# Patient Record
Sex: FEMALE | Race: WHITE | NOT HISPANIC OR LATINO | Employment: OTHER | URBAN - METROPOLITAN AREA
[De-identification: names, ages, dates, MRNs, and addresses within clinical notes are randomized per-mention and may not be internally consistent; named-entity substitution may affect disease eponyms.]

---

## 2017-01-09 RX ORDER — HYDROCHLOROTHIAZIDE 12.5 MG/1
CAPSULE, GELATIN COATED ORAL
Qty: 30 CAPSULE | Refills: 2 | OUTPATIENT
Start: 2017-01-09

## 2017-01-12 ENCOUNTER — ANESTHESIA (OUTPATIENT)
Dept: PERIOP | Facility: HOSPITAL | Age: 72
End: 2017-01-12

## 2017-01-12 ENCOUNTER — ANESTHESIA EVENT (OUTPATIENT)
Dept: PERIOP | Facility: HOSPITAL | Age: 72
End: 2017-01-12

## 2017-01-12 ENCOUNTER — APPOINTMENT (OUTPATIENT)
Dept: GENERAL RADIOLOGY | Facility: HOSPITAL | Age: 72
End: 2017-01-12

## 2017-01-12 PROBLEM — M17.9 DJD (DEGENERATIVE JOINT DISEASE) OF KNEE: Status: ACTIVE | Noted: 2017-01-12

## 2017-01-12 PROBLEM — M17.9 OSTEOARTHRITIS, KNEE: Status: ACTIVE | Noted: 2017-01-12

## 2017-01-12 PROCEDURE — 25010000002 HYDROMORPHONE PER 4 MG: Performed by: NURSE ANESTHETIST, CERTIFIED REGISTERED

## 2017-01-12 PROCEDURE — 25010000002 ONDANSETRON PER 1 MG: Performed by: NURSE ANESTHETIST, CERTIFIED REGISTERED

## 2017-01-12 PROCEDURE — 25010000002 FENTANYL CITRATE (PF) 100 MCG/2ML SOLUTION: Performed by: ANESTHESIOLOGY

## 2017-01-12 PROCEDURE — 73560 X-RAY EXAM OF KNEE 1 OR 2: CPT

## 2017-01-12 PROCEDURE — 25010000002 CEFAZOLIN PER 500 MG: Performed by: NURSE ANESTHETIST, CERTIFIED REGISTERED

## 2017-01-12 PROCEDURE — 25010000002 PROPOFOL 10 MG/ML EMULSION: Performed by: NURSE ANESTHETIST, CERTIFIED REGISTERED

## 2017-01-12 PROCEDURE — 25010000002 DEXAMETHASONE PER 1 MG: Performed by: NURSE ANESTHETIST, CERTIFIED REGISTERED

## 2017-01-12 RX ORDER — ONDANSETRON 2 MG/ML
INJECTION INTRAMUSCULAR; INTRAVENOUS AS NEEDED
Status: DISCONTINUED | OUTPATIENT
Start: 2017-01-12 | End: 2017-01-12 | Stop reason: SURG

## 2017-01-12 RX ORDER — LIDOCAINE HYDROCHLORIDE 20 MG/ML
INJECTION, SOLUTION INFILTRATION; PERINEURAL AS NEEDED
Status: DISCONTINUED | OUTPATIENT
Start: 2017-01-12 | End: 2017-01-12 | Stop reason: SURG

## 2017-01-12 RX ORDER — DEXAMETHASONE SODIUM PHOSPHATE 10 MG/ML
INJECTION INTRAMUSCULAR; INTRAVENOUS AS NEEDED
Status: DISCONTINUED | OUTPATIENT
Start: 2017-01-12 | End: 2017-01-12 | Stop reason: SURG

## 2017-01-12 RX ORDER — PROPOFOL 10 MG/ML
VIAL (ML) INTRAVENOUS AS NEEDED
Status: DISCONTINUED | OUTPATIENT
Start: 2017-01-12 | End: 2017-01-12 | Stop reason: SURG

## 2017-01-12 RX ORDER — TRANEXAMIC ACID 100 MG/ML
INJECTION, SOLUTION INTRAVENOUS AS NEEDED
Status: DISCONTINUED | OUTPATIENT
Start: 2017-01-12 | End: 2017-01-12 | Stop reason: SURG

## 2017-01-12 RX ORDER — CEFAZOLIN SODIUM 500 MG/2.2ML
INJECTION, POWDER, FOR SOLUTION INTRAMUSCULAR; INTRAVENOUS AS NEEDED
Status: DISCONTINUED | OUTPATIENT
Start: 2017-01-12 | End: 2017-01-12 | Stop reason: SURG

## 2017-01-12 RX ORDER — HYDROMORPHONE HCL 110MG/55ML
PATIENT CONTROLLED ANALGESIA SYRINGE INTRAVENOUS AS NEEDED
Status: DISCONTINUED | OUTPATIENT
Start: 2017-01-12 | End: 2017-01-12 | Stop reason: SURG

## 2017-01-12 RX ADMIN — PROPOFOL 200 MG: 10 INJECTION, EMULSION INTRAVENOUS at 12:19

## 2017-01-12 RX ADMIN — FENTANYL CITRATE 25 MCG: 50 INJECTION INTRAMUSCULAR; INTRAVENOUS at 12:42

## 2017-01-12 RX ADMIN — HYDROMORPHONE HYDROCHLORIDE 0.4 MG: 2 INJECTION, SOLUTION INTRAMUSCULAR; INTRAVENOUS; SUBCUTANEOUS at 13:35

## 2017-01-12 RX ADMIN — HYDROMORPHONE HYDROCHLORIDE 0.2 MG: 2 INJECTION, SOLUTION INTRAMUSCULAR; INTRAVENOUS; SUBCUTANEOUS at 13:13

## 2017-01-12 RX ADMIN — DEXAMETHASONE SODIUM PHOSPHATE 4 MG: 10 INJECTION INTRAMUSCULAR; INTRAVENOUS at 12:27

## 2017-01-12 RX ADMIN — SODIUM CHLORIDE, POTASSIUM CHLORIDE, SODIUM LACTATE AND CALCIUM CHLORIDE: 600; 310; 30; 20 INJECTION, SOLUTION INTRAVENOUS at 12:55

## 2017-01-12 RX ADMIN — FENTANYL CITRATE 100 MCG: 50 INJECTION INTRAMUSCULAR; INTRAVENOUS at 13:38

## 2017-01-12 RX ADMIN — SODIUM CHLORIDE, POTASSIUM CHLORIDE, SODIUM LACTATE AND CALCIUM CHLORIDE: 600; 310; 30; 20 INJECTION, SOLUTION INTRAVENOUS at 12:13

## 2017-01-12 RX ADMIN — FENTANYL CITRATE 25 MCG: 50 INJECTION INTRAMUSCULAR; INTRAVENOUS at 12:25

## 2017-01-12 RX ADMIN — CEFAZOLIN SODIUM 2 G: 500 INJECTION, POWDER, FOR SOLUTION INTRAMUSCULAR; INTRAVENOUS at 13:26

## 2017-01-12 RX ADMIN — FENTANYL CITRATE 50 MCG: 50 INJECTION INTRAMUSCULAR; INTRAVENOUS at 12:45

## 2017-01-12 RX ADMIN — ONDANSETRON 4 MG: 2 INJECTION INTRAMUSCULAR; INTRAVENOUS at 12:27

## 2017-01-12 RX ADMIN — HYDROMORPHONE HYDROCHLORIDE 0.6 MG: 2 INJECTION, SOLUTION INTRAMUSCULAR; INTRAVENOUS; SUBCUTANEOUS at 13:44

## 2017-01-12 RX ADMIN — TRANEXAMIC ACID 800 MG: 100 INJECTION, SOLUTION INTRAVENOUS at 13:12

## 2017-01-12 RX ADMIN — HYDROMORPHONE HYDROCHLORIDE 0.4 MG: 2 INJECTION, SOLUTION INTRAMUSCULAR; INTRAVENOUS; SUBCUTANEOUS at 12:55

## 2017-01-12 RX ADMIN — HYDROMORPHONE HYDROCHLORIDE 0.4 MG: 2 INJECTION, SOLUTION INTRAMUSCULAR; INTRAVENOUS; SUBCUTANEOUS at 13:42

## 2017-01-12 RX ADMIN — FENTANYL CITRATE 50 MCG: 50 INJECTION INTRAMUSCULAR; INTRAVENOUS at 12:44

## 2017-01-12 RX ADMIN — LIDOCAINE HYDROCHLORIDE 40 MG: 20 INJECTION, SOLUTION INFILTRATION; PERINEURAL at 12:19

## 2017-01-12 NOTE — ANESTHESIA PROCEDURE NOTES
Airway  Urgency: elective    Airway not difficult    General Information and Staff    Patient location during procedure: OR  Anesthesiologist: KATLYN CARROLL  CRNA: CARYL ESPAÑA    Indications and Patient Condition  Indications for airway management: airway protection    Preoxygenated: yes  Mask difficulty assessment: 1 - vent by mask    Final Airway Details  Final airway type: supraglottic airway      Successful airway: unique  Size 4    Number of attempts at approach: 1    Additional Comments  Smooth IV/mask induction and placement of LMA. Atraumatic, lips/teeth/mouth intact, as preop. +ETCO2, bilateral breath sounds and equal.

## 2017-01-12 NOTE — ANESTHESIA PREPROCEDURE EVALUATION
Anesthesia Evaluation      History of anesthetic complications (ponv)   Airway   Mallampati: II  TM distance: >3 FB  Neck ROM: full  no difficulty expected  Dental - normal exam     Pulmonary     breath sounds clear to auscultation  (+) asthma (very mild),   Cardiovascular   (+) hypertension, valvular problems/murmurs (mild) MR, CHF (EF 40%, no symptoms), PVD (carotid stenosis)  (-) CAD (negative cath)    Rhythm: regular  Rate: normal    Neuro/Psych  GI/Hepatic/Renal/Endo    (+)  hyperthyroidism    Musculoskeletal     Abdominal    Substance History      OB/GYN          Other   (+) arthritis                          Anesthesia Plan    ASA 3     general     Anesthetic plan and risks discussed with patient.

## 2017-01-12 NOTE — ANESTHESIA POSTPROCEDURE EVALUATION
Patient: Natalie Rosen    Procedure Summary     Date Anesthesia Start Anesthesia Stop Room / Location    01/12/17 1214 1344 [facility] OR 23 / [facility]       Procedure Diagnosis Surgeon Provider    RT TOTAL KNEE ARTHROPLASTY (Right Knee) DJD (degenerative joint disease) of knee MD Trace Dykes MD          Anesthesia Type: general  Last vitals  /75 (01/12/17 1545)    Temp      Pulse 89 (01/12/17 1545)   Resp 12 (01/12/17 1545)    SpO2 100 % (01/12/17 1545)      Post Anesthesia Care and Evaluation    Patient location during evaluation: bedside  Patient participation: complete - patient participated  Level of consciousness: awake  Pain management: adequate  Airway patency: patent  Anesthetic complications: No anesthetic complications    Cardiovascular status: acceptable  Respiratory status: acceptable  Hydration status: acceptable

## 2017-01-18 RX ORDER — HYDROCHLOROTHIAZIDE 12.5 MG/1
CAPSULE, GELATIN COATED ORAL
Qty: 30 CAPSULE | Refills: 2 | Status: SHIPPED | OUTPATIENT
Start: 2017-01-18 | End: 2017-04-16 | Stop reason: SDUPTHER

## 2017-01-27 ENCOUNTER — RESULTS ENCOUNTER (OUTPATIENT)
Dept: ENDOCRINOLOGY | Age: 72
End: 2017-01-27

## 2017-01-27 DIAGNOSIS — E05.90 HYPERTHYROIDISM: ICD-10-CM

## 2017-01-27 DIAGNOSIS — R53.82 CHRONIC FATIGUE: ICD-10-CM

## 2017-01-27 DIAGNOSIS — E04.2 NON-TOXIC MULTINODULAR GOITER: ICD-10-CM

## 2017-01-27 DIAGNOSIS — I42.9 CARDIOMYOPATHY (HCC): ICD-10-CM

## 2017-01-27 DIAGNOSIS — E78.49 OTHER HYPERLIPIDEMIA: ICD-10-CM

## 2017-02-08 DIAGNOSIS — E04.2 NON-TOXIC MULTINODULAR GOITER: ICD-10-CM

## 2017-02-08 DIAGNOSIS — E05.90 HYPERTHYROIDISM: Primary | ICD-10-CM

## 2017-02-26 RX ORDER — CARVEDILOL 25 MG/1
TABLET ORAL
Qty: 60 TABLET | Refills: 0 | Status: SHIPPED | OUTPATIENT
Start: 2017-02-26 | End: 2017-02-28 | Stop reason: SDUPTHER

## 2017-02-28 RX ORDER — CARVEDILOL 25 MG/1
25 TABLET ORAL 2 TIMES DAILY WITH MEALS
Qty: 60 TABLET | Refills: 2 | Status: SHIPPED | OUTPATIENT
Start: 2017-02-28 | End: 2017-07-17 | Stop reason: SDUPTHER

## 2017-03-13 RX ORDER — PRAVASTATIN SODIUM 40 MG
TABLET ORAL
Qty: 30 TABLET | Refills: 1 | OUTPATIENT
Start: 2017-03-13

## 2017-03-19 RX ORDER — PRAVASTATIN SODIUM 40 MG
TABLET ORAL
Qty: 30 TABLET | Refills: 1 | Status: SHIPPED | OUTPATIENT
Start: 2017-03-19 | End: 2017-05-26 | Stop reason: SDUPTHER

## 2017-04-17 RX ORDER — HYDROCHLOROTHIAZIDE 12.5 MG/1
CAPSULE, GELATIN COATED ORAL
Qty: 30 CAPSULE | Refills: 1 | Status: SHIPPED | OUTPATIENT
Start: 2017-04-17 | End: 2017-06-12

## 2017-04-25 LAB
BASOPHILS # BLD AUTO: 0.06 10*3/MM3 (ref 0–0.2)
BASOPHILS NFR BLD AUTO: 0.9 % (ref 0–1.5)
EOSINOPHIL # BLD AUTO: 0.26 10*3/MM3 (ref 0–0.7)
EOSINOPHIL NFR BLD AUTO: 3.7 % (ref 0.3–6.2)
ERYTHROCYTE [DISTWIDTH] IN BLOOD BY AUTOMATED COUNT: 15.9 % (ref 11.7–13)
HCT VFR BLD AUTO: 38.6 % (ref 35.6–45.5)
HGB BLD-MCNC: 12.4 G/DL (ref 11.9–15.5)
IMM GRANULOCYTES # BLD: 0 10*3/MM3 (ref 0–0.03)
IMM GRANULOCYTES NFR BLD: 0 % (ref 0–0.5)
LYMPHOCYTES # BLD AUTO: 2.02 10*3/MM3 (ref 0.9–4.8)
LYMPHOCYTES NFR BLD AUTO: 28.9 % (ref 19.6–45.3)
MCH RBC QN AUTO: 28.8 PG (ref 26.9–32)
MCHC RBC AUTO-ENTMCNC: 32.1 G/DL (ref 32.4–36.3)
MCV RBC AUTO: 89.8 FL (ref 80.5–98.2)
MONOCYTES # BLD AUTO: 0.76 10*3/MM3 (ref 0.2–1.2)
MONOCYTES NFR BLD AUTO: 10.9 % (ref 5–12)
NEUTROPHILS # BLD AUTO: 3.9 10*3/MM3 (ref 1.9–8.1)
NEUTROPHILS NFR BLD AUTO: 55.6 % (ref 42.7–76)
PLATELET # BLD AUTO: 210 10*3/MM3 (ref 140–500)
RBC # BLD AUTO: 4.3 10*6/MM3 (ref 3.9–5.2)
T4 FREE SERPL-MCNC: 1.16 NG/DL (ref 0.93–1.7)
TSH SERPL DL<=0.005 MIU/L-ACNC: 0.01 MIU/ML (ref 0.27–4.2)
WBC # BLD AUTO: 7 10*3/MM3 (ref 4.5–10.7)

## 2017-05-01 ENCOUNTER — OFFICE VISIT (OUTPATIENT)
Dept: ENDOCRINOLOGY | Age: 72
End: 2017-05-01

## 2017-05-01 VITALS
OXYGEN SATURATION: 98 % | SYSTOLIC BLOOD PRESSURE: 138 MMHG | DIASTOLIC BLOOD PRESSURE: 72 MMHG | HEIGHT: 67 IN | HEART RATE: 81 BPM | BODY MASS INDEX: 26.53 KG/M2 | WEIGHT: 169 LBS

## 2017-05-01 DIAGNOSIS — E04.2 NON-TOXIC MULTINODULAR GOITER: Primary | ICD-10-CM

## 2017-05-01 DIAGNOSIS — R63.5 ABNORMAL WEIGHT GAIN: ICD-10-CM

## 2017-05-01 DIAGNOSIS — E05.90 HYPERTHYROIDISM: ICD-10-CM

## 2017-05-01 DIAGNOSIS — R53.82 CHRONIC FATIGUE: ICD-10-CM

## 2017-05-01 PROCEDURE — 99214 OFFICE O/P EST MOD 30 MIN: CPT | Performed by: INTERNAL MEDICINE

## 2017-05-01 RX ORDER — METHIMAZOLE 5 MG/1
5 TABLET ORAL EVERY OTHER DAY
Qty: 15 TABLET | Refills: 6 | Status: SHIPPED | OUTPATIENT
Start: 2017-05-01 | End: 2017-08-16 | Stop reason: SDUPTHER

## 2017-05-13 RX ORDER — PRAVASTATIN SODIUM 40 MG
TABLET ORAL
Qty: 30 TABLET | Refills: 0 | OUTPATIENT
Start: 2017-05-13

## 2017-05-27 RX ORDER — PRAVASTATIN SODIUM 40 MG
TABLET ORAL
Qty: 30 TABLET | Refills: 0 | Status: SHIPPED | OUTPATIENT
Start: 2017-05-27 | End: 2017-06-12

## 2017-06-12 ENCOUNTER — HOSPITAL ENCOUNTER (OUTPATIENT)
Dept: GENERAL RADIOLOGY | Facility: HOSPITAL | Age: 72
Discharge: HOME | End: 2017-06-12
Admitting: ORTHOPAEDIC SURGERY

## 2017-06-12 ENCOUNTER — APPOINTMENT (OUTPATIENT)
Dept: PREADMISSION TESTING | Facility: HOSPITAL | Age: 72
End: 2017-06-12

## 2017-06-12 ENCOUNTER — HOSPITAL ENCOUNTER (OUTPATIENT)
Dept: GENERAL RADIOLOGY | Facility: HOSPITAL | Age: 72
Discharge: HOME | End: 2017-06-12

## 2017-06-12 VITALS
WEIGHT: 167 LBS | RESPIRATION RATE: 18 BRPM | HEART RATE: 67 BPM | OXYGEN SATURATION: 98 % | HEIGHT: 67 IN | SYSTOLIC BLOOD PRESSURE: 139 MMHG | BODY MASS INDEX: 26.21 KG/M2 | DIASTOLIC BLOOD PRESSURE: 74 MMHG | TEMPERATURE: 98.2 F

## 2017-06-12 LAB
ALBUMIN SERPL-MCNC: 4.4 G/DL (ref 3.5–5.2)
ALBUMIN/GLOB SERPL: 1.7 G/DL
ALP SERPL-CCNC: 64 U/L (ref 39–117)
ALT SERPL W P-5'-P-CCNC: 6 U/L (ref 1–33)
ANION GAP SERPL CALCULATED.3IONS-SCNC: 12.2 MMOL/L
APTT PPP: 27 SECONDS (ref 22.7–35.4)
AST SERPL-CCNC: 17 U/L (ref 1–32)
BACTERIA UR QL AUTO: NORMAL /HPF
BASOPHILS # BLD AUTO: 0.06 10*3/MM3 (ref 0–0.2)
BASOPHILS NFR BLD AUTO: 0.9 % (ref 0–1.5)
BILIRUB SERPL-MCNC: 0.5 MG/DL (ref 0.1–1.2)
BILIRUB UR QL STRIP: NEGATIVE
BUN BLD-MCNC: 25 MG/DL (ref 8–23)
BUN/CREAT SERPL: 35.7 (ref 7–25)
CALCIUM SPEC-SCNC: 9.4 MG/DL (ref 8.6–10.5)
CHLORIDE SERPL-SCNC: 100 MMOL/L (ref 98–107)
CLARITY UR: CLEAR
CO2 SERPL-SCNC: 27.8 MMOL/L (ref 22–29)
COLOR UR: YELLOW
CREAT BLD-MCNC: 0.7 MG/DL (ref 0.57–1)
DEPRECATED RDW RBC AUTO: 48.1 FL (ref 37–54)
EOSINOPHIL # BLD AUTO: 0.15 10*3/MM3 (ref 0–0.7)
EOSINOPHIL NFR BLD AUTO: 2.3 % (ref 0.3–6.2)
ERYTHROCYTE [DISTWIDTH] IN BLOOD BY AUTOMATED COUNT: 14.7 % (ref 11.7–13)
GFR SERPL CREATININE-BSD FRML MDRD: 83 ML/MIN/1.73
GLOBULIN UR ELPH-MCNC: 2.6 GM/DL
GLUCOSE BLD-MCNC: 113 MG/DL (ref 65–99)
GLUCOSE UR STRIP-MCNC: NEGATIVE MG/DL
HCT VFR BLD AUTO: 38 % (ref 35.6–45.5)
HGB BLD-MCNC: 12.3 G/DL (ref 11.9–15.5)
HGB UR QL STRIP.AUTO: NEGATIVE
HYALINE CASTS UR QL AUTO: NORMAL /LPF
IMM GRANULOCYTES # BLD: 0 10*3/MM3 (ref 0–0.03)
IMM GRANULOCYTES NFR BLD: 0 % (ref 0–0.5)
INR PPP: 1.08 (ref 0.9–1.1)
KETONES UR QL STRIP: NEGATIVE
LEUKOCYTE ESTERASE UR QL STRIP.AUTO: ABNORMAL
LYMPHOCYTES # BLD AUTO: 1.95 10*3/MM3 (ref 0.9–4.8)
LYMPHOCYTES NFR BLD AUTO: 29.4 % (ref 19.6–45.3)
MCH RBC QN AUTO: 28.9 PG (ref 26.9–32)
MCHC RBC AUTO-ENTMCNC: 32.4 G/DL (ref 32.4–36.3)
MCV RBC AUTO: 89.4 FL (ref 80.5–98.2)
MONOCYTES # BLD AUTO: 0.74 10*3/MM3 (ref 0.2–1.2)
MONOCYTES NFR BLD AUTO: 11.1 % (ref 5–12)
NEUTROPHILS # BLD AUTO: 3.74 10*3/MM3 (ref 1.9–8.1)
NEUTROPHILS NFR BLD AUTO: 56.3 % (ref 42.7–76)
NITRITE UR QL STRIP: NEGATIVE
PH UR STRIP.AUTO: 6.5 [PH] (ref 5–8)
PLATELET # BLD AUTO: 221 10*3/MM3 (ref 140–500)
PMV BLD AUTO: 9.7 FL (ref 6–12)
POTASSIUM BLD-SCNC: 3.7 MMOL/L (ref 3.5–5.2)
PROT SERPL-MCNC: 7 G/DL (ref 6–8.5)
PROT UR QL STRIP: NEGATIVE
PROTHROMBIN TIME: 13.6 SECONDS (ref 11.7–14.2)
RBC # BLD AUTO: 4.25 10*6/MM3 (ref 3.9–5.2)
RBC # UR: NORMAL /HPF
REF LAB TEST METHOD: NORMAL
SODIUM BLD-SCNC: 140 MMOL/L (ref 136–145)
SP GR UR STRIP: 1.02 (ref 1–1.03)
SQUAMOUS #/AREA URNS HPF: NORMAL /HPF
UROBILINOGEN UR QL STRIP: ABNORMAL
WBC NRBC COR # BLD: 6.64 10*3/MM3 (ref 4.5–10.7)
WBC UR QL AUTO: NORMAL /HPF

## 2017-06-12 PROCEDURE — 36415 COLL VENOUS BLD VENIPUNCTURE: CPT

## 2017-06-12 PROCEDURE — 73560 X-RAY EXAM OF KNEE 1 OR 2: CPT

## 2017-06-12 PROCEDURE — 85025 COMPLETE CBC W/AUTO DIFF WBC: CPT | Performed by: ORTHOPAEDIC SURGERY

## 2017-06-12 PROCEDURE — 85610 PROTHROMBIN TIME: CPT | Performed by: ORTHOPAEDIC SURGERY

## 2017-06-12 PROCEDURE — 80053 COMPREHEN METABOLIC PANEL: CPT | Performed by: ORTHOPAEDIC SURGERY

## 2017-06-12 PROCEDURE — 71020 HC CHEST PA AND LATERAL: CPT

## 2017-06-12 PROCEDURE — 85730 THROMBOPLASTIN TIME PARTIAL: CPT | Performed by: ORTHOPAEDIC SURGERY

## 2017-06-12 PROCEDURE — 81001 URINALYSIS AUTO W/SCOPE: CPT | Performed by: ORTHOPAEDIC SURGERY

## 2017-06-12 PROCEDURE — 87086 URINE CULTURE/COLONY COUNT: CPT | Performed by: ORTHOPAEDIC SURGERY

## 2017-06-12 RX ORDER — OXYBUTYNIN CHLORIDE 10 MG/1
10 TABLET, EXTENDED RELEASE ORAL EVERY MORNING
COMMUNITY
End: 2018-05-07

## 2017-06-12 RX ORDER — HYDROCHLOROTHIAZIDE 12.5 MG/1
12.5 TABLET ORAL EVERY MORNING
COMMUNITY
End: 2017-11-06 | Stop reason: SDUPTHER

## 2017-06-12 RX ORDER — PRAVASTATIN SODIUM 40 MG
40 TABLET ORAL NIGHTLY
COMMUNITY
End: 2017-11-06 | Stop reason: SDUPTHER

## 2017-06-12 RX ORDER — ASPIRIN 81 MG/1
81 TABLET, CHEWABLE ORAL EVERY MORNING
COMMUNITY
End: 2022-08-01

## 2017-06-12 NOTE — DISCHARGE INSTRUCTIONS
SURGERY 6-26-17  ARRIVAL TIME  7:00    Take the following medications the morning of surgery with a small sip of water:    CARVEDILOL      General Instructions:  • Do not eat solid food after midnight the night before surgery.  • NOTHING TO DRINK AFTER MIDNIGHT.   • Patients who avoid smoking, chewing tobacco and alcohol for 4 weeks prior to surgery have a reduced risk of post-operative complications.  Quit smoking as many days before surgery as you can.  • Do not smoke, use chewing tobacco or drink alcohol the day of surgery.   • If applicable bring your C-PAP/ BI-PAP machine.  • Bring any papers given to you in the doctor’s office.  • Wear clean comfortable clothes and socks.  • Do not wear contact lenses or make-up.  Bring a case for your glasses.   • Bring crutches or walker if applicable.  • Leave all other valuables and jewelry at home.  • The Pre-Admission Testing nurse will instruct you to bring medications if unable to obtain an accurate list in Pre-Admission Testing.        If you were given a blood bank ID arm band remember to bring it with you the day of surgery.    Preventing a Surgical Site Infection:  • For 2 to 3 days before surgery, avoid shaving with a razor because the razor can irritate skin and make it easier to develop an infection.  • The night prior to surgery sleep in a clean bed with clean clothing.  Do not allow pets to sleep with you.  • Shower on the morning of surgery using a fresh bar of anti-bacterial soap (such as Dial) and clean washcloth.  Dry with a clean towel and dress in clean clothing.  • Ask your surgeon if you will be receiving antibiotics prior to surgery.  • Make sure you, your family, and all healthcare providers clean their hands with soap and water or an alcohol based hand  before caring for you or your wound.    Day of surgery:  Upon arrival, a Pre-op nurse and Anesthesiologist will review your health history, obtain vital signs, and answer questions you may  have.  The only belongings needed at this time will be your home medications and if applicable your C-PAP/BI-PAP machine.  If you are staying overnight your family can leave the rest of your belongings in the car and bring them to your room later.  A Pre-op nurse will start an IV and you may receive medication in preparation for surgery, including something to help you relax.  Your family will be able to see you in the Pre-op area.  While you are in surgery your family should notify the waiting room  if they leave the waiting room area and provide a contact phone number.    Please be aware that surgery does come with discomfort.  We want to make every effort to control your discomfort so please discuss any uncontrolled symptoms with your nurse.   Your doctor will most likely have prescribed pain medications.      If you are going home after surgery you will receive individualized written care instructions before being discharged.  A responsible adult must drive you to and from the hospital on the day of your surgery and stay with you for 24 hours.    If you are staying overnight following surgery, you will be transported to your hospital room following the recovery period.  [facility] has all private rooms.    If you have any questions please call Pre-Admission Testing at [phone].  Deductibles and co-payments are collected on the day of service. Please be prepared to pay the required co-pay, deductible or deposit on the day of service as defined by your plan.USE AS DIRECTED    2% CHLORAHEXIDINE GLUCONATE* CLOTH  Preparing or “prepping” skin before surgery can reduce the risk of infection at the surgical site. To make the process easier, [facility] has chosen disposable cloths moistened with a rinse-free, 2% Chlorhexidine Gluconate (CHG) antiseptic solution. The steps below outline the prepping process and should be carefully followed.        Use the prep cloth on the area  that is circled in the diagram             Directions Night before Surgery  1) Shower using a fresh bar of anti-bacterial soap (such as Dial) and clean washcloth.  Use a clean towel to completely dry your skin.  2) Do not use any lotions, oils or creams on your skin.  3) Open the package and remove 1 cloth, wipe your skin for 30 seconds in a circular motion.  Allow to dry for 3 minutes.  4) Repeat #3 with second cloth.  5) Do not touch your eyes, ears, or mouth with the prep cloth.  6) Allow the wet prep solution to air dry.  7) Discard the prep cloth and wash your hands with soap and water.   8) Dress in clean bed clothes and sleep on fresh clean bed sheets.   9) You may experience some temporary itching after the prep.    Directions Day of Surgery  1) Repeat steps 1,2,3,4,5,6,7, and 9.   2) Dress in clean clothes before coming to the hospital.    BACTROBAN NASAL OINTMENT  There are many germs normally in your nose. Bactroban is an ointment that will help reduce these germs. Please follow these instructions for Bactroban use:    ____Two days before surgery in the evening Date________    ____The day before surgery in the morning  Date________    ____The day before surgery in the evening              Date________    ____The day of surgery in the morning    Date________    **Squirt ½ package of Bactroban Ointment onto a cotton applicator and apply to inside of 1st nostril.  Squirt the remaining Bactroban and apply to the inside of the other nostril.

## 2017-06-14 LAB — BACTERIA SPEC AEROBE CULT: NORMAL

## 2017-06-19 RX ORDER — HYDROCHLOROTHIAZIDE 12.5 MG/1
CAPSULE, GELATIN COATED ORAL
Qty: 30 CAPSULE | Refills: 0 | Status: ON HOLD | OUTPATIENT
Start: 2017-06-19 | End: 2017-06-26 | Stop reason: SDUPTHER

## 2017-06-25 RX ORDER — PRAVASTATIN SODIUM 40 MG
TABLET ORAL
Qty: 30 TABLET | Refills: 0 | OUTPATIENT
Start: 2017-06-25

## 2017-06-26 ENCOUNTER — HOSPITAL ENCOUNTER (INPATIENT)
Facility: HOSPITAL | Age: 72
LOS: 2 days | Discharge: HOME HEALTH | End: 2017-06-28
Attending: ORTHOPAEDIC SURGERY | Admitting: ORTHOPAEDIC SURGERY

## 2017-06-26 ENCOUNTER — APPOINTMENT (OUTPATIENT)
Dept: GENERAL RADIOLOGY | Facility: HOSPITAL | Age: 72
End: 2017-06-26

## 2017-06-26 ENCOUNTER — ANESTHESIA EVENT (OUTPATIENT)
Dept: PERIOP | Facility: HOSPITAL | Age: 72
End: 2017-06-26

## 2017-06-26 ENCOUNTER — ANESTHESIA (OUTPATIENT)
Dept: PERIOP | Facility: HOSPITAL | Age: 72
End: 2017-06-26

## 2017-06-26 DIAGNOSIS — R26.2 DIFFICULTY WALKING: Primary | ICD-10-CM

## 2017-06-26 PROCEDURE — 25010000002 VANCOMYCIN: Performed by: ORTHOPAEDIC SURGERY

## 2017-06-26 PROCEDURE — C1776 JOINT DEVICE (IMPLANTABLE): HCPCS | Performed by: ORTHOPAEDIC SURGERY

## 2017-06-26 PROCEDURE — 25010000002 DEXAMETHASONE PER 1 MG: Performed by: NURSE ANESTHETIST, CERTIFIED REGISTERED

## 2017-06-26 PROCEDURE — 94799 UNLISTED PULMONARY SVC/PX: CPT

## 2017-06-26 PROCEDURE — C1713 ANCHOR/SCREW BN/BN,TIS/BN: HCPCS | Performed by: ORTHOPAEDIC SURGERY

## 2017-06-26 PROCEDURE — 25010000002 ONDANSETRON PER 1 MG: Performed by: NURSE ANESTHETIST, CERTIFIED REGISTERED

## 2017-06-26 PROCEDURE — 97110 THERAPEUTIC EXERCISES: CPT

## 2017-06-26 PROCEDURE — 25010000002 KETOROLAC TROMETHAMINE PER 15 MG: Performed by: ORTHOPAEDIC SURGERY

## 2017-06-26 PROCEDURE — 25010000002 MIDAZOLAM PER 1 MG: Performed by: ANESTHESIOLOGY

## 2017-06-26 PROCEDURE — 0SRD0J9 REPLACEMENT OF LEFT KNEE JOINT WITH SYNTHETIC SUBSTITUTE, CEMENTED, OPEN APPROACH: ICD-10-PCS | Performed by: ORTHOPAEDIC SURGERY

## 2017-06-26 PROCEDURE — 25010000002 FENTANYL CITRATE (PF) 100 MCG/2ML SOLUTION: Performed by: NURSE ANESTHETIST, CERTIFIED REGISTERED

## 2017-06-26 PROCEDURE — 25010000002 DIPHENHYDRAMINE PER 50 MG: Performed by: NURSE ANESTHETIST, CERTIFIED REGISTERED

## 2017-06-26 PROCEDURE — 25010000002 PROPOFOL 10 MG/ML EMULSION: Performed by: NURSE ANESTHETIST, CERTIFIED REGISTERED

## 2017-06-26 PROCEDURE — 25010000002 NEOSTIGMINE PER 0.5 MG: Performed by: NURSE ANESTHETIST, CERTIFIED REGISTERED

## 2017-06-26 PROCEDURE — 25010000003 CEFAZOLIN IN DEXTROSE 2-4 GM/100ML-% SOLUTION: Performed by: ORTHOPAEDIC SURGERY

## 2017-06-26 PROCEDURE — 97161 PT EVAL LOW COMPLEX 20 MIN: CPT

## 2017-06-26 PROCEDURE — 73560 X-RAY EXAM OF KNEE 1 OR 2: CPT

## 2017-06-26 PROCEDURE — 25010000002 HYDROMORPHONE PER 4 MG: Performed by: NURSE ANESTHETIST, CERTIFIED REGISTERED

## 2017-06-26 PROCEDURE — 25010000002 CEFAZOLIN PER 500 MG: Performed by: NURSE ANESTHETIST, CERTIFIED REGISTERED

## 2017-06-26 DEVICE — STEM TIB PRI FINN 46X40MM: Type: IMPLANTABLE DEVICE | Site: KNEE | Status: FUNCTIONAL

## 2017-06-26 DEVICE — TRY TIB INTERLOK PRI 71MM: Type: IMPLANTABLE DEVICE | Site: KNEE | Status: FUNCTIONAL

## 2017-06-26 DEVICE — CMT BONE PALACOS 120001: Type: IMPLANTABLE DEVICE | Site: KNEE | Status: FUNCTIONAL

## 2017-06-26 DEVICE — BEAR TIB/KN VANGUARD CR DCM 10X71/75MM NS: Type: IMPLANTABLE DEVICE | Site: KNEE | Status: FUNCTIONAL

## 2017-06-26 DEVICE — COMP FEM/KN VANGUARD INTLK CR 65MM NS LT: Type: IMPLANTABLE DEVICE | Site: KNEE | Status: FUNCTIONAL

## 2017-06-26 DEVICE — CAP TOTL KN CMT PREMIUM: Type: IMPLANTABLE DEVICE | Status: FUNCTIONAL

## 2017-06-26 DEVICE — PAT 3PEG THN 34X7.8 34MM: Type: IMPLANTABLE DEVICE | Site: PATELLA | Status: FUNCTIONAL

## 2017-06-26 RX ORDER — NALOXONE HCL 0.4 MG/ML
0.1 VIAL (ML) INJECTION
Status: DISCONTINUED | OUTPATIENT
Start: 2017-06-26 | End: 2017-06-28 | Stop reason: HOSPADM

## 2017-06-26 RX ORDER — GLYCOPYRROLATE 0.2 MG/ML
INJECTION INTRAMUSCULAR; INTRAVENOUS AS NEEDED
Status: DISCONTINUED | OUTPATIENT
Start: 2017-06-26 | End: 2017-06-26 | Stop reason: SURG

## 2017-06-26 RX ORDER — KETOROLAC TROMETHAMINE 30 MG/ML
30 INJECTION, SOLUTION INTRAMUSCULAR; INTRAVENOUS EVERY 6 HOURS
Status: DISPENSED | OUTPATIENT
Start: 2017-06-26 | End: 2017-06-27

## 2017-06-26 RX ORDER — LIDOCAINE HYDROCHLORIDE 20 MG/ML
INJECTION, SOLUTION INFILTRATION; PERINEURAL AS NEEDED
Status: DISCONTINUED | OUTPATIENT
Start: 2017-06-26 | End: 2017-06-26 | Stop reason: SURG

## 2017-06-26 RX ORDER — HYDRALAZINE HYDROCHLORIDE 20 MG/ML
5 INJECTION INTRAMUSCULAR; INTRAVENOUS
Status: DISCONTINUED | OUTPATIENT
Start: 2017-06-26 | End: 2017-06-26 | Stop reason: HOSPADM

## 2017-06-26 RX ORDER — DOCUSATE SODIUM 100 MG/1
100 CAPSULE, LIQUID FILLED ORAL 2 TIMES DAILY PRN
Status: DISCONTINUED | OUTPATIENT
Start: 2017-06-26 | End: 2017-06-28 | Stop reason: HOSPADM

## 2017-06-26 RX ORDER — FENTANYL CITRATE 50 UG/ML
50 INJECTION, SOLUTION INTRAMUSCULAR; INTRAVENOUS
Status: DISCONTINUED | OUTPATIENT
Start: 2017-06-26 | End: 2017-06-26 | Stop reason: HOSPADM

## 2017-06-26 RX ORDER — PROMETHAZINE HYDROCHLORIDE 25 MG/1
12.5 TABLET ORAL ONCE AS NEEDED
Status: DISCONTINUED | OUTPATIENT
Start: 2017-06-26 | End: 2017-06-26 | Stop reason: HOSPADM

## 2017-06-26 RX ORDER — ONDANSETRON 4 MG/1
4 TABLET, ORALLY DISINTEGRATING ORAL EVERY 6 HOURS PRN
Status: DISCONTINUED | OUTPATIENT
Start: 2017-06-26 | End: 2017-06-28 | Stop reason: HOSPADM

## 2017-06-26 RX ORDER — SODIUM CHLORIDE 0.9 % (FLUSH) 0.9 %
1-10 SYRINGE (ML) INJECTION AS NEEDED
Status: DISCONTINUED | OUTPATIENT
Start: 2017-06-26 | End: 2017-06-26 | Stop reason: HOSPADM

## 2017-06-26 RX ORDER — OXYBUTYNIN CHLORIDE 10 MG/1
10 TABLET, EXTENDED RELEASE ORAL EVERY MORNING
Status: DISCONTINUED | OUTPATIENT
Start: 2017-06-27 | End: 2017-06-28 | Stop reason: HOSPADM

## 2017-06-26 RX ORDER — ACETAMINOPHEN 500 MG
1000 TABLET ORAL EVERY 6 HOURS PRN
COMMUNITY
End: 2018-05-07

## 2017-06-26 RX ORDER — SODIUM CHLORIDE, SODIUM LACTATE, POTASSIUM CHLORIDE, CALCIUM CHLORIDE 600; 310; 30; 20 MG/100ML; MG/100ML; MG/100ML; MG/100ML
9 INJECTION, SOLUTION INTRAVENOUS CONTINUOUS
Status: DISCONTINUED | OUTPATIENT
Start: 2017-06-26 | End: 2017-06-28 | Stop reason: HOSPADM

## 2017-06-26 RX ORDER — FAMOTIDINE 10 MG/ML
20 INJECTION, SOLUTION INTRAVENOUS ONCE
Status: COMPLETED | OUTPATIENT
Start: 2017-06-26 | End: 2017-06-26

## 2017-06-26 RX ORDER — ATORVASTATIN CALCIUM 10 MG/1
10 TABLET, FILM COATED ORAL DAILY
Status: DISCONTINUED | OUTPATIENT
Start: 2017-06-26 | End: 2017-06-28 | Stop reason: HOSPADM

## 2017-06-26 RX ORDER — DEXAMETHASONE SODIUM PHOSPHATE 10 MG/ML
INJECTION INTRAMUSCULAR; INTRAVENOUS AS NEEDED
Status: DISCONTINUED | OUTPATIENT
Start: 2017-06-26 | End: 2017-06-26 | Stop reason: SURG

## 2017-06-26 RX ORDER — PROPOFOL 10 MG/ML
VIAL (ML) INTRAVENOUS AS NEEDED
Status: DISCONTINUED | OUTPATIENT
Start: 2017-06-26 | End: 2017-06-26 | Stop reason: SURG

## 2017-06-26 RX ORDER — SULINDAC 200 MG/1
200 TABLET ORAL 2 TIMES DAILY
COMMUNITY
End: 2017-11-06

## 2017-06-26 RX ORDER — SODIUM CHLORIDE 0.9 % (FLUSH) 0.9 %
1-10 SYRINGE (ML) INJECTION AS NEEDED
Status: DISCONTINUED | OUTPATIENT
Start: 2017-06-26 | End: 2017-06-28 | Stop reason: HOSPADM

## 2017-06-26 RX ORDER — ONDANSETRON 2 MG/ML
4 INJECTION INTRAMUSCULAR; INTRAVENOUS ONCE AS NEEDED
Status: DISCONTINUED | OUTPATIENT
Start: 2017-06-26 | End: 2017-06-26 | Stop reason: HOSPADM

## 2017-06-26 RX ORDER — CEFAZOLIN SODIUM 2 G/100ML
2 INJECTION, SOLUTION INTRAVENOUS EVERY 8 HOURS
Status: COMPLETED | OUTPATIENT
Start: 2017-06-26 | End: 2017-06-27

## 2017-06-26 RX ORDER — EPHEDRINE SULFATE 50 MG/ML
5 INJECTION, SOLUTION INTRAVENOUS ONCE AS NEEDED
Status: DISCONTINUED | OUTPATIENT
Start: 2017-06-26 | End: 2017-06-26 | Stop reason: HOSPADM

## 2017-06-26 RX ORDER — HYDROCHLOROTHIAZIDE 25 MG/1
12.5 TABLET ORAL EVERY MORNING
Status: DISCONTINUED | OUTPATIENT
Start: 2017-06-27 | End: 2017-06-28 | Stop reason: HOSPADM

## 2017-06-26 RX ORDER — BISACODYL 10 MG
10 SUPPOSITORY, RECTAL RECTAL DAILY PRN
Status: DISCONTINUED | OUTPATIENT
Start: 2017-06-26 | End: 2017-06-28 | Stop reason: HOSPADM

## 2017-06-26 RX ORDER — NALOXONE HCL 0.4 MG/ML
0.2 VIAL (ML) INJECTION AS NEEDED
Status: DISCONTINUED | OUTPATIENT
Start: 2017-06-26 | End: 2017-06-26 | Stop reason: HOSPADM

## 2017-06-26 RX ORDER — CEFAZOLIN SODIUM 500 MG/2.2ML
INJECTION, POWDER, FOR SOLUTION INTRAMUSCULAR; INTRAVENOUS AS NEEDED
Status: DISCONTINUED | OUTPATIENT
Start: 2017-06-26 | End: 2017-06-26 | Stop reason: SURG

## 2017-06-26 RX ORDER — MIDAZOLAM HYDROCHLORIDE 1 MG/ML
1 INJECTION INTRAMUSCULAR; INTRAVENOUS
Status: DISCONTINUED | OUTPATIENT
Start: 2017-06-26 | End: 2017-06-26 | Stop reason: HOSPADM

## 2017-06-26 RX ORDER — ACETAMINOPHEN 325 MG/1
325 TABLET ORAL EVERY 4 HOURS PRN
Status: DISCONTINUED | OUTPATIENT
Start: 2017-06-26 | End: 2017-06-28 | Stop reason: HOSPADM

## 2017-06-26 RX ORDER — MIDAZOLAM HYDROCHLORIDE 1 MG/ML
2 INJECTION INTRAMUSCULAR; INTRAVENOUS
Status: DISCONTINUED | OUTPATIENT
Start: 2017-06-26 | End: 2017-06-26 | Stop reason: HOSPADM

## 2017-06-26 RX ORDER — ONDANSETRON 2 MG/ML
INJECTION INTRAMUSCULAR; INTRAVENOUS AS NEEDED
Status: DISCONTINUED | OUTPATIENT
Start: 2017-06-26 | End: 2017-06-26 | Stop reason: SURG

## 2017-06-26 RX ORDER — OXYCODONE HYDROCHLORIDE AND ACETAMINOPHEN 5; 325 MG/1; MG/1
1 TABLET ORAL EVERY 4 HOURS PRN
Status: DISCONTINUED | OUTPATIENT
Start: 2017-06-26 | End: 2017-06-28

## 2017-06-26 RX ORDER — HYDROCODONE BITARTRATE AND ACETAMINOPHEN 7.5; 325 MG/1; MG/1
1 TABLET ORAL ONCE AS NEEDED
Status: DISCONTINUED | OUTPATIENT
Start: 2017-06-26 | End: 2017-06-26 | Stop reason: HOSPADM

## 2017-06-26 RX ORDER — ASPIRIN 325 MG
325 TABLET, DELAYED RELEASE (ENTERIC COATED) ORAL DAILY
Status: DISCONTINUED | OUTPATIENT
Start: 2017-06-26 | End: 2017-06-28 | Stop reason: HOSPADM

## 2017-06-26 RX ORDER — ACETAMINOPHEN 325 MG/1
650 TABLET ORAL EVERY 4 HOURS PRN
Status: DISCONTINUED | OUTPATIENT
Start: 2017-06-26 | End: 2017-06-28 | Stop reason: HOSPADM

## 2017-06-26 RX ORDER — DIPHENHYDRAMINE HYDROCHLORIDE 50 MG/ML
12.5 INJECTION INTRAMUSCULAR; INTRAVENOUS
Status: DISCONTINUED | OUTPATIENT
Start: 2017-06-26 | End: 2017-06-26 | Stop reason: HOSPADM

## 2017-06-26 RX ORDER — OXYCODONE AND ACETAMINOPHEN 7.5; 325 MG/1; MG/1
1 TABLET ORAL ONCE AS NEEDED
Status: DISCONTINUED | OUTPATIENT
Start: 2017-06-26 | End: 2017-06-26 | Stop reason: HOSPADM

## 2017-06-26 RX ORDER — ACETAMINOPHEN 500 MG
1000 TABLET ORAL ONCE
Status: COMPLETED | OUTPATIENT
Start: 2017-06-26 | End: 2017-06-26

## 2017-06-26 RX ORDER — LABETALOL HYDROCHLORIDE 5 MG/ML
5 INJECTION, SOLUTION INTRAVENOUS
Status: DISCONTINUED | OUTPATIENT
Start: 2017-06-26 | End: 2017-06-26 | Stop reason: HOSPADM

## 2017-06-26 RX ORDER — OXYCODONE HYDROCHLORIDE AND ACETAMINOPHEN 5; 325 MG/1; MG/1
2 TABLET ORAL EVERY 4 HOURS PRN
Status: DISCONTINUED | OUTPATIENT
Start: 2017-06-26 | End: 2017-06-28

## 2017-06-26 RX ORDER — DIPHENHYDRAMINE HCL 25 MG
25 CAPSULE ORAL EVERY 6 HOURS PRN
Status: DISCONTINUED | OUTPATIENT
Start: 2017-06-26 | End: 2017-06-28 | Stop reason: HOSPADM

## 2017-06-26 RX ORDER — TRANEXAMIC ACID 100 MG/ML
INJECTION, SOLUTION INTRAVENOUS AS NEEDED
Status: DISCONTINUED | OUTPATIENT
Start: 2017-06-26 | End: 2017-06-26 | Stop reason: SURG

## 2017-06-26 RX ORDER — FERROUS SULFATE 325(65) MG
325 TABLET ORAL
Status: DISCONTINUED | OUTPATIENT
Start: 2017-06-27 | End: 2017-06-28 | Stop reason: HOSPADM

## 2017-06-26 RX ORDER — ONDANSETRON 4 MG/1
4 TABLET, FILM COATED ORAL EVERY 6 HOURS PRN
Status: DISCONTINUED | OUTPATIENT
Start: 2017-06-26 | End: 2017-06-28 | Stop reason: HOSPADM

## 2017-06-26 RX ORDER — CARVEDILOL 25 MG/1
25 TABLET ORAL 2 TIMES DAILY WITH MEALS
Status: DISCONTINUED | OUTPATIENT
Start: 2017-06-26 | End: 2017-06-28 | Stop reason: HOSPADM

## 2017-06-26 RX ORDER — CETIRIZINE HYDROCHLORIDE 10 MG/1
10 TABLET ORAL DAILY
Status: DISCONTINUED | OUTPATIENT
Start: 2017-06-26 | End: 2017-06-28 | Stop reason: HOSPADM

## 2017-06-26 RX ORDER — SODIUM CHLORIDE, SODIUM LACTATE, POTASSIUM CHLORIDE, CALCIUM CHLORIDE 600; 310; 30; 20 MG/100ML; MG/100ML; MG/100ML; MG/100ML
100 INJECTION, SOLUTION INTRAVENOUS CONTINUOUS
Status: DISCONTINUED | OUTPATIENT
Start: 2017-06-26 | End: 2017-06-28 | Stop reason: HOSPADM

## 2017-06-26 RX ORDER — HYDROMORPHONE HYDROCHLORIDE 1 MG/ML
0.5 INJECTION, SOLUTION INTRAMUSCULAR; INTRAVENOUS; SUBCUTANEOUS
Status: DISCONTINUED | OUTPATIENT
Start: 2017-06-26 | End: 2017-06-26 | Stop reason: HOSPADM

## 2017-06-26 RX ORDER — FLUMAZENIL 0.1 MG/ML
0.2 INJECTION INTRAVENOUS AS NEEDED
Status: DISCONTINUED | OUTPATIENT
Start: 2017-06-26 | End: 2017-06-26 | Stop reason: HOSPADM

## 2017-06-26 RX ORDER — MAGNESIUM HYDROXIDE 1200 MG/15ML
LIQUID ORAL AS NEEDED
Status: DISCONTINUED | OUTPATIENT
Start: 2017-06-26 | End: 2017-06-26 | Stop reason: HOSPADM

## 2017-06-26 RX ORDER — ONDANSETRON 2 MG/ML
4 INJECTION INTRAMUSCULAR; INTRAVENOUS EVERY 6 HOURS PRN
Status: DISCONTINUED | OUTPATIENT
Start: 2017-06-26 | End: 2017-06-28 | Stop reason: HOSPADM

## 2017-06-26 RX ORDER — MONTELUKAST SODIUM 10 MG/1
10 TABLET ORAL EVERY MORNING
Status: DISCONTINUED | OUTPATIENT
Start: 2017-06-27 | End: 2017-06-28 | Stop reason: HOSPADM

## 2017-06-26 RX ORDER — FENTANYL CITRATE 50 UG/ML
INJECTION, SOLUTION INTRAMUSCULAR; INTRAVENOUS AS NEEDED
Status: DISCONTINUED | OUTPATIENT
Start: 2017-06-26 | End: 2017-06-26 | Stop reason: SURG

## 2017-06-26 RX ORDER — ROCURONIUM BROMIDE 10 MG/ML
INJECTION, SOLUTION INTRAVENOUS AS NEEDED
Status: DISCONTINUED | OUTPATIENT
Start: 2017-06-26 | End: 2017-06-26 | Stop reason: SURG

## 2017-06-26 RX ORDER — LOSARTAN POTASSIUM 50 MG/1
100 TABLET ORAL EVERY MORNING
Status: DISCONTINUED | OUTPATIENT
Start: 2017-06-27 | End: 2017-06-28 | Stop reason: HOSPADM

## 2017-06-26 RX ORDER — BISACODYL 5 MG/1
10 TABLET, DELAYED RELEASE ORAL DAILY PRN
Status: DISCONTINUED | OUTPATIENT
Start: 2017-06-26 | End: 2017-06-28 | Stop reason: HOSPADM

## 2017-06-26 RX ADMIN — CARVEDILOL 25 MG: 25 TABLET, FILM COATED ORAL at 17:08

## 2017-06-26 RX ADMIN — OXYCODONE HYDROCHLORIDE AND ACETAMINOPHEN 2 TABLET: 5; 325 TABLET ORAL at 16:22

## 2017-06-26 RX ADMIN — ASPIRIN 325 MG: 325 TABLET, DELAYED RELEASE ORAL at 16:22

## 2017-06-26 RX ADMIN — HYDROMORPHONE HYDROCHLORIDE 0.5 MG: 1 INJECTION, SOLUTION INTRAMUSCULAR; INTRAVENOUS; SUBCUTANEOUS at 13:19

## 2017-06-26 RX ADMIN — FENTANYL CITRATE 50 MCG: 50 INJECTION INTRAMUSCULAR; INTRAVENOUS at 12:08

## 2017-06-26 RX ADMIN — HYDROMORPHONE HYDROCHLORIDE 0.5 MG: 1 INJECTION, SOLUTION INTRAMUSCULAR; INTRAVENOUS; SUBCUTANEOUS at 12:15

## 2017-06-26 RX ADMIN — SODIUM CHLORIDE, POTASSIUM CHLORIDE, SODIUM LACTATE AND CALCIUM CHLORIDE: 600; 310; 30; 20 INJECTION, SOLUTION INTRAVENOUS at 10:12

## 2017-06-26 RX ADMIN — CETIRIZINE HYDROCHLORIDE 10 MG: 10 TABLET, FILM COATED ORAL at 16:21

## 2017-06-26 RX ADMIN — TRANEXAMIC ACID 1000 MG: 100 INJECTION, SOLUTION INTRAVENOUS at 11:13

## 2017-06-26 RX ADMIN — ATORVASTATIN CALCIUM 10 MG: 10 TABLET, FILM COATED ORAL at 16:21

## 2017-06-26 RX ADMIN — FENTANYL CITRATE 50 MCG: 50 INJECTION, SOLUTION INTRAMUSCULAR; INTRAVENOUS at 10:44

## 2017-06-26 RX ADMIN — CEFAZOLIN SODIUM 2 G: 500 INJECTION, POWDER, FOR SOLUTION INTRAMUSCULAR; INTRAVENOUS at 11:23

## 2017-06-26 RX ADMIN — SODIUM CHLORIDE, POTASSIUM CHLORIDE, SODIUM LACTATE AND CALCIUM CHLORIDE 100 ML/HR: 600; 310; 30; 20 INJECTION, SOLUTION INTRAVENOUS at 17:09

## 2017-06-26 RX ADMIN — ROCURONIUM BROMIDE 40 MG: 10 INJECTION INTRAVENOUS at 10:19

## 2017-06-26 RX ADMIN — NEOSTIGMINE METHYLSULFATE 5 MG: 1 INJECTION INTRAMUSCULAR; INTRAVENOUS; SUBCUTANEOUS at 11:32

## 2017-06-26 RX ADMIN — GLYCOPYRROLATE 0.8 MG: 0.2 INJECTION INTRAMUSCULAR; INTRAVENOUS at 11:32

## 2017-06-26 RX ADMIN — SODIUM CHLORIDE, POTASSIUM CHLORIDE, SODIUM LACTATE AND CALCIUM CHLORIDE: 600; 310; 30; 20 INJECTION, SOLUTION INTRAVENOUS at 11:33

## 2017-06-26 RX ADMIN — HYDROMORPHONE HYDROCHLORIDE 0.5 MG: 1 INJECTION, SOLUTION INTRAMUSCULAR; INTRAVENOUS; SUBCUTANEOUS at 12:49

## 2017-06-26 RX ADMIN — ACETAMINOPHEN 1000 MG: 500 TABLET ORAL at 09:36

## 2017-06-26 RX ADMIN — VANCOMYCIN HYDROCHLORIDE 1500 MG: 1 INJECTION, POWDER, LYOPHILIZED, FOR SOLUTION INTRAVENOUS at 09:42

## 2017-06-26 RX ADMIN — PROPOFOL 200 MG: 10 INJECTION, EMULSION INTRAVENOUS at 10:19

## 2017-06-26 RX ADMIN — LIDOCAINE HYDROCHLORIDE 60 MG: 20 INJECTION, SOLUTION INFILTRATION; PERINEURAL at 10:19

## 2017-06-26 RX ADMIN — DEXAMETHASONE SODIUM PHOSPHATE 4 MG: 10 INJECTION INTRAMUSCULAR; INTRAVENOUS at 11:28

## 2017-06-26 RX ADMIN — FENTANYL CITRATE 50 MCG: 50 INJECTION, SOLUTION INTRAMUSCULAR; INTRAVENOUS at 10:58

## 2017-06-26 RX ADMIN — FENTANYL CITRATE 100 MCG: 50 INJECTION, SOLUTION INTRAMUSCULAR; INTRAVENOUS at 10:51

## 2017-06-26 RX ADMIN — HYDROMORPHONE HYDROCHLORIDE 0.5 MG: 1 INJECTION, SOLUTION INTRAMUSCULAR; INTRAVENOUS; SUBCUTANEOUS at 12:20

## 2017-06-26 RX ADMIN — SODIUM CHLORIDE, POTASSIUM CHLORIDE, SODIUM LACTATE AND CALCIUM CHLORIDE 9 ML/HR: 600; 310; 30; 20 INJECTION, SOLUTION INTRAVENOUS at 10:03

## 2017-06-26 RX ADMIN — FENTANYL CITRATE 50 MCG: 50 INJECTION, SOLUTION INTRAMUSCULAR; INTRAVENOUS at 10:27

## 2017-06-26 RX ADMIN — CEFAZOLIN SODIUM 2 G: 2 INJECTION, SOLUTION INTRAVENOUS at 20:00

## 2017-06-26 RX ADMIN — ONDANSETRON 4 MG: 2 INJECTION INTRAMUSCULAR; INTRAVENOUS at 11:28

## 2017-06-26 RX ADMIN — FAMOTIDINE 20 MG: 10 INJECTION INTRAVENOUS at 10:02

## 2017-06-26 RX ADMIN — ONDANSETRON 4 MG: 4 TABLET, FILM COATED ORAL at 16:22

## 2017-06-26 RX ADMIN — FENTANYL CITRATE 50 MCG: 50 INJECTION INTRAMUSCULAR; INTRAVENOUS at 11:57

## 2017-06-26 RX ADMIN — OXYCODONE HYDROCHLORIDE AND ACETAMINOPHEN 2 TABLET: 5; 325 TABLET ORAL at 21:10

## 2017-06-26 RX ADMIN — MIDAZOLAM 1 MG: 1 INJECTION INTRAMUSCULAR; INTRAVENOUS at 10:02

## 2017-06-26 RX ADMIN — DIPHENHYDRAMINE HYDROCHLORIDE 12.5 MG: 50 INJECTION INTRAMUSCULAR; INTRAVENOUS at 12:24

## 2017-06-26 NOTE — ANESTHESIA POSTPROCEDURE EVALUATION
Patient: Natalie Rosen    Procedure Summary     Date Anesthesia Start Anesthesia Stop Room / Location    06/26/17 1012 1147 [facility] OR 24 / [facility]       Procedure Diagnosis Surgeon Provider    LT TOTAL KNEE ARTHROPLASTY (Left Knee) No diagnosis on file. MD Leon Dykes MD          Anesthesia Type: general  Last vitals  /67 (06/26/17 1350)    Temp      Pulse 64 (06/26/17 1350)   Resp 14 (06/26/17 1350)    SpO2 95 % (06/26/17 1350)      Post Anesthesia Care and Evaluation    Patient location during evaluation: PACU  Patient participation: complete - patient participated  Level of consciousness: awake and alert  Pain management: adequate  Airway patency: patent  Anesthetic complications: No anesthetic complications    Cardiovascular status: acceptable  Respiratory status: acceptable  Hydration status: acceptable

## 2017-06-26 NOTE — ANESTHESIA PREPROCEDURE EVALUATION
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

## 2017-06-26 NOTE — ANESTHESIA PROCEDURE NOTES
Airway  Urgency: elective    Airway not difficult    General Information and Staff    Patient location during procedure: OR  Anesthesiologist: LUKASZ AMBROCIO  CRNA: FERNANDO LIU    Indications and Patient Condition  Indications for airway management: airway protection    Preoxygenated: yes  MILS maintained throughout  Mask difficulty assessment: 1 - vent by mask    Final Airway Details  Final airway type: endotracheal airway      Successful airway: ETT  Cuffed: yes   Successful intubation technique: direct laryngoscopy  Facilitating devices/methods: anterior pressure/BURP  Endotracheal tube insertion site: oral  Blade: Carmichael  Blade size: #2  ETT size: 7.0 mm  Cormack-Lehane Classification: grade IIa - partial view of glottis  Placement verified by: chest auscultation and capnometry   Cuff volume (mL): 8  Measured from: lips  ETT to lips (cm): 21  Number of attempts at approach: 1    Additional Comments  Pt preoxygenated, SIVI, bag mask vent, ATETI, dentition as before

## 2017-06-27 LAB
ANION GAP SERPL CALCULATED.3IONS-SCNC: 11.8 MMOL/L
BUN BLD-MCNC: 16 MG/DL (ref 8–23)
BUN/CREAT SERPL: 29.1 (ref 7–25)
CALCIUM SPEC-SCNC: 9 MG/DL (ref 8.6–10.5)
CHLORIDE SERPL-SCNC: 103 MMOL/L (ref 98–107)
CO2 SERPL-SCNC: 24.2 MMOL/L (ref 22–29)
CREAT BLD-MCNC: 0.55 MG/DL (ref 0.57–1)
GFR SERPL CREATININE-BSD FRML MDRD: 109 ML/MIN/1.73
GLUCOSE BLD-MCNC: 160 MG/DL (ref 65–99)
HCT VFR BLD AUTO: 34.1 % (ref 35.6–45.5)
HGB BLD-MCNC: 11.1 G/DL (ref 11.9–15.5)
POTASSIUM BLD-SCNC: 4.4 MMOL/L (ref 3.5–5.2)
SODIUM BLD-SCNC: 139 MMOL/L (ref 136–145)

## 2017-06-27 PROCEDURE — 25010000003 CEFAZOLIN IN DEXTROSE 2-4 GM/100ML-% SOLUTION: Performed by: ORTHOPAEDIC SURGERY

## 2017-06-27 PROCEDURE — 25010000002 FONDAPARINUX PER 0.5 MG: Performed by: ORTHOPAEDIC SURGERY

## 2017-06-27 PROCEDURE — 97110 THERAPEUTIC EXERCISES: CPT

## 2017-06-27 PROCEDURE — 25010000002 KETOROLAC TROMETHAMINE PER 15 MG: Performed by: ORTHOPAEDIC SURGERY

## 2017-06-27 PROCEDURE — 97150 GROUP THERAPEUTIC PROCEDURES: CPT

## 2017-06-27 PROCEDURE — 85014 HEMATOCRIT: CPT | Performed by: ORTHOPAEDIC SURGERY

## 2017-06-27 PROCEDURE — 85018 HEMOGLOBIN: CPT | Performed by: ORTHOPAEDIC SURGERY

## 2017-06-27 PROCEDURE — 80048 BASIC METABOLIC PNL TOTAL CA: CPT | Performed by: ORTHOPAEDIC SURGERY

## 2017-06-27 RX ORDER — FONDAPARINUX SODIUM 2.5 MG/.5ML
2.5 INJECTION SUBCUTANEOUS ONCE
Status: COMPLETED | OUTPATIENT
Start: 2017-06-27 | End: 2017-06-27

## 2017-06-27 RX ADMIN — KETOROLAC TROMETHAMINE 30 MG: 30 INJECTION, SOLUTION INTRAMUSCULAR at 10:17

## 2017-06-27 RX ADMIN — OXYCODONE HYDROCHLORIDE AND ACETAMINOPHEN 2 TABLET: 5; 325 TABLET ORAL at 03:38

## 2017-06-27 RX ADMIN — OXYCODONE HYDROCHLORIDE AND ACETAMINOPHEN 2 TABLET: 5; 325 TABLET ORAL at 12:37

## 2017-06-27 RX ADMIN — CARVEDILOL 25 MG: 25 TABLET, FILM COATED ORAL at 20:53

## 2017-06-27 RX ADMIN — CEFAZOLIN SODIUM 2 G: 2 INJECTION, SOLUTION INTRAVENOUS at 03:30

## 2017-06-27 RX ADMIN — ASPIRIN 325 MG: 325 TABLET, DELAYED RELEASE ORAL at 16:54

## 2017-06-27 RX ADMIN — OXYCODONE HYDROCHLORIDE AND ACETAMINOPHEN 2 TABLET: 5; 325 TABLET ORAL at 16:54

## 2017-06-27 RX ADMIN — DOCUSATE SODIUM 100 MG: 100 CAPSULE, LIQUID FILLED ORAL at 08:10

## 2017-06-27 RX ADMIN — OXYCODONE HYDROCHLORIDE AND ACETAMINOPHEN 2 TABLET: 5; 325 TABLET ORAL at 08:10

## 2017-06-27 RX ADMIN — ATORVASTATIN CALCIUM 10 MG: 10 TABLET, FILM COATED ORAL at 08:11

## 2017-06-27 RX ADMIN — OXYCODONE HYDROCHLORIDE AND ACETAMINOPHEN 2 TABLET: 5; 325 TABLET ORAL at 21:13

## 2017-06-27 RX ADMIN — OXYBUTYNIN CHLORIDE 10 MG: 10 TABLET, EXTENDED RELEASE ORAL at 06:50

## 2017-06-27 RX ADMIN — LOSARTAN POTASSIUM 100 MG: 50 TABLET, FILM COATED ORAL at 06:50

## 2017-06-27 RX ADMIN — CETIRIZINE HYDROCHLORIDE 10 MG: 10 TABLET, FILM COATED ORAL at 08:10

## 2017-06-27 RX ADMIN — FERROUS SULFATE TAB 325 MG (65 MG ELEMENTAL FE) 325 MG: 325 (65 FE) TAB at 08:10

## 2017-06-27 RX ADMIN — CARVEDILOL 25 MG: 25 TABLET, FILM COATED ORAL at 08:10

## 2017-06-27 RX ADMIN — MONTELUKAST 10 MG: 10 TABLET, FILM COATED ORAL at 06:51

## 2017-06-27 RX ADMIN — FONDAPARINUX SODIUM 2.5 MG: 2.5 INJECTION, SOLUTION SUBCUTANEOUS at 08:10

## 2017-06-27 RX ADMIN — HYDROCHLOROTHIAZIDE 12.5 MG: 25 TABLET ORAL at 06:51

## 2017-06-27 RX ADMIN — ASPIRIN 325 MG: 325 TABLET, DELAYED RELEASE ORAL at 08:10

## 2017-06-28 VITALS
TEMPERATURE: 97.5 F | HEIGHT: 67 IN | SYSTOLIC BLOOD PRESSURE: 130 MMHG | OXYGEN SATURATION: 94 % | BODY MASS INDEX: 26.06 KG/M2 | WEIGHT: 166 LBS | DIASTOLIC BLOOD PRESSURE: 63 MMHG | HEART RATE: 74 BPM | RESPIRATION RATE: 16 BRPM

## 2017-06-28 PROCEDURE — 97150 GROUP THERAPEUTIC PROCEDURES: CPT

## 2017-06-28 PROCEDURE — 97110 THERAPEUTIC EXERCISES: CPT

## 2017-06-28 RX ORDER — HYDROCODONE BITARTRATE AND ACETAMINOPHEN 10; 325 MG/1; MG/1
1 TABLET ORAL EVERY 6 HOURS PRN
Status: DISCONTINUED | OUTPATIENT
Start: 2017-06-28 | End: 2017-06-28 | Stop reason: HOSPADM

## 2017-06-28 RX ADMIN — OXYCODONE HYDROCHLORIDE AND ACETAMINOPHEN 2 TABLET: 5; 325 TABLET ORAL at 05:34

## 2017-06-28 RX ADMIN — ASPIRIN 325 MG: 325 TABLET, DELAYED RELEASE ORAL at 08:57

## 2017-06-28 RX ADMIN — OXYBUTYNIN CHLORIDE 10 MG: 10 TABLET, EXTENDED RELEASE ORAL at 08:59

## 2017-06-28 RX ADMIN — FERROUS SULFATE TAB 325 MG (65 MG ELEMENTAL FE) 325 MG: 325 (65 FE) TAB at 08:59

## 2017-06-28 RX ADMIN — LOSARTAN POTASSIUM 100 MG: 50 TABLET, FILM COATED ORAL at 08:59

## 2017-06-28 RX ADMIN — HYDROCODONE BITARTRATE AND ACETAMINOPHEN 1 TABLET: 10; 325 TABLET ORAL at 08:57

## 2017-06-28 RX ADMIN — HYDROCHLOROTHIAZIDE 12.5 MG: 25 TABLET ORAL at 08:58

## 2017-06-28 RX ADMIN — OXYCODONE HYDROCHLORIDE AND ACETAMINOPHEN 2 TABLET: 5; 325 TABLET ORAL at 01:08

## 2017-06-28 RX ADMIN — ATORVASTATIN CALCIUM 10 MG: 10 TABLET, FILM COATED ORAL at 08:57

## 2017-06-28 RX ADMIN — MONTELUKAST 10 MG: 10 TABLET, FILM COATED ORAL at 08:59

## 2017-06-28 RX ADMIN — DOCUSATE SODIUM 100 MG: 100 CAPSULE, LIQUID FILLED ORAL at 08:57

## 2017-06-28 RX ADMIN — CARVEDILOL 25 MG: 25 TABLET, FILM COATED ORAL at 08:57

## 2017-06-30 RX ORDER — PRAVASTATIN SODIUM 40 MG
TABLET ORAL
Qty: 30 TABLET | Refills: 0 | Status: SHIPPED | OUTPATIENT
Start: 2017-06-30

## 2017-07-03 RX ORDER — CARVEDILOL 25 MG/1
TABLET ORAL
Qty: 60 TABLET | Refills: 1 | OUTPATIENT
Start: 2017-07-03

## 2017-07-09 NOTE — OP NOTE
OP NOTE   Doc Lance M.D. - [facility]    Patient Name:  Natalie Rosen  YOB: 1945  Medical Records Number:  [id number]    Date of Procedure:  6/26/2017    Pre-operative Diagnosis:  DJD Left Knee    Post-operative Diagnosis:  DJD Left Knee    Procedure Performed:  Left Total Knee Replacement     Anesthesia: General, supplemented by a periarticular Exparel/bupivacaine injection.      Surgeon: Doc Lance MD     Assistant: Gerardo Ramos MD; note that as part of the surgical procedure, I utilized service of an assistant surgeon, specifically Gerardo Ramos MD. Assistant surgeon participated in crucial portion of the operation. Use of the assistant surgeon greatly reduced overall operative time, thus significantly reducing overall morbidity for the patient.      Implants:      Implant Name Type Inv. Item Serial No.  Lot No. LRB No. Used Action   CMT BONE PALACOS [id number] - [id number] Implant CMT BONE PALACOS [id number]  [place] [id number] Left 2 Implanted   TRY TIB INTERLOK BLAIR 71MM - [id number] Implant TRY TIB INTERLOK BLAIR 71MM  [employer] [id number] Left 1 Implanted   PAT 3PEG THN 34X7.8 34MM - [id number] Implant PAT 3PEG THN 34X7.8 34MM  [employer] [id number] Left 1 Implanted   STEM FEM VANGRD INTLK CR 65MM LT - [id number] Implant STEM FEM VANGRD INTLK CR 65MM LT  [employer] [id number] Left 1 Implanted   STEM TIB BLAIR DENTON [id number] - [id number] Implant STEM TIB BLAIR DENTON [id number]  [employer] [id number] Left 1 Implanted   BEAR TIB VANGRD CR DCM 10X71/75MM - [id number] Implant BEAR TIB VANGRD CR DCM 10X71/75MM  [employer] [id number] Left 1 Implanted   KN TOTL BLAIR [id number] - [id number] Implant KN TOTL BLAIR [id number]   [employer]   Left 1 Implanted             Medications: Note that we gave 1 g IV tranexamic acid when the cement was mixed.      Estimated Blood Loss: *No blood loss documented*     Complications: None.      Quality Measures: I have documented the patient's current medications including dosage,  frequency, and route of administration in medical record today. Conservative alternatives were discussed with the patient as part of the decision-making process prior to the procedure. The patient was evaluated immediately preoperatively for cardiovascular and thromboembolic risk factors. She has a history of CHF. Prophylactic IV antibiotics were administered before the tourniquet went up.      Description of Procedure: After prep and drape, Left knee was approached via midline longitudinal anterior incision. Medial parapatellar arthrotomy was extended to the vastus medialis obliquus which was split in line with the fibers near the medial border, in keeping with minimally invasive technique. Patella was osteotomized proper depth for a 34 patella implant. Acme holes are created. Patella was subluxed and protected. Intramedullary instrumentation was used on each side of the joint; careful and thorough canal content irrigation. I cut the femur 10 mm depth, 5° valgus controlling rotation. It was sized to a 65 implant. Anterior, posterior, and chamfer cuts are made. Tibia is cut 10 mm depth, 5° of posterior slope. Meniscectomies are completed. Trial reduction is performed. Rotation is marked and keel slot was created.      Bony surface was thoroughly cleaned and dried. I injected the posterior capsule and medial lateral gutter with Exparel solution containing 20 mL Exparel, 25 mL 0.25% bupivacaine with epinephrine, 20 mL saline. Care was taken to protect popliteal neurovascular structures and the peroneal nerve. I cemented the size 71 tibial baseplate, size 65 femur, and a 34 patella.  Trial reduction revealed a 10 mm  polyethylene insert best balanced stability and range of motion, and is locked in the tibial tray.      Tourniquet was released; hemostasis obtained. Wound was closed in layers with #1 Vicryl on the capsule, 2-0 Vicryl in subcutaneous tissue, and staples in the skin over a 1/8-inch drain. Note that I  injected my capsule with my Exparel solution during closure.         Doc Lance MD  7/9/2017  8:43 AM

## 2017-07-10 RX ORDER — CARVEDILOL 25 MG/1
TABLET ORAL
Qty: 60 TABLET | Refills: 1 | OUTPATIENT
Start: 2017-07-10

## 2017-07-14 ENCOUNTER — APPOINTMENT (OUTPATIENT)
Dept: WOMENS IMAGING | Facility: HOSPITAL | Age: 72
End: 2017-07-14

## 2017-07-14 PROCEDURE — G0279 TOMOSYNTHESIS, MAMMO: HCPCS | Performed by: RADIOLOGY

## 2017-07-14 PROCEDURE — 77063 BREAST TOMOSYNTHESIS BI: CPT

## 2017-07-14 PROCEDURE — 76641 ULTRASOUND BREAST COMPLETE: CPT | Performed by: RADIOLOGY

## 2017-07-14 PROCEDURE — G0202 SCR MAMMO BI INCL CAD: HCPCS | Performed by: RADIOLOGY

## 2017-07-14 PROCEDURE — G0206 DX MAMMO INCL CAD UNI: HCPCS | Performed by: RADIOLOGY

## 2017-07-14 PROCEDURE — MDREVIEWSP: Performed by: RADIOLOGY

## 2017-07-14 PROCEDURE — 77063 BREAST TOMOSYNTHESIS BI: CPT | Performed by: RADIOLOGY

## 2017-07-14 PROCEDURE — G0202 SCR MAMMO BI INCL CAD: HCPCS

## 2017-07-17 RX ORDER — CARVEDILOL 25 MG/1
25 TABLET ORAL 2 TIMES DAILY WITH MEALS
Qty: 90 TABLET | Refills: 3 | Status: SHIPPED | OUTPATIENT
Start: 2017-07-17 | End: 2022-12-06

## 2017-07-17 RX ORDER — CARVEDILOL 25 MG/1
TABLET ORAL
Qty: 60 TABLET | Refills: 1 | OUTPATIENT
Start: 2017-07-17

## 2017-07-24 RX ORDER — HYDROCHLOROTHIAZIDE 12.5 MG/1
CAPSULE, GELATIN COATED ORAL
Qty: 30 CAPSULE | Refills: 0 | Status: SHIPPED | OUTPATIENT
Start: 2017-07-24 | End: 2017-08-19 | Stop reason: SDUPTHER

## 2017-07-26 ENCOUNTER — APPOINTMENT (OUTPATIENT)
Dept: WOMENS IMAGING | Facility: HOSPITAL | Age: 72
End: 2017-07-26

## 2017-07-26 PROCEDURE — 19083 BX BREAST 1ST LESION US IMAG: CPT | Performed by: RADIOLOGY

## 2017-07-31 RX ORDER — PRAVASTATIN SODIUM 40 MG
TABLET ORAL
Qty: 30 TABLET | Refills: 0 | OUTPATIENT
Start: 2017-07-31

## 2017-08-07 ENCOUNTER — RESULTS ENCOUNTER (OUTPATIENT)
Dept: ENDOCRINOLOGY | Age: 72
End: 2017-08-07

## 2017-08-07 DIAGNOSIS — E05.90 HYPERTHYROIDISM: ICD-10-CM

## 2017-08-07 DIAGNOSIS — E04.2 NON-TOXIC MULTINODULAR GOITER: ICD-10-CM

## 2017-08-07 LAB
ALBUMIN SERPL-MCNC: 4.4 G/DL (ref 3.5–5.2)
ALBUMIN/GLOB SERPL: 1.8 G/DL
ALP SERPL-CCNC: 63 U/L (ref 39–117)
ALT SERPL-CCNC: 11 U/L (ref 1–33)
AST SERPL-CCNC: 16 U/L (ref 1–32)
BILIRUB SERPL-MCNC: 0.5 MG/DL (ref 0.1–1.2)
BUN SERPL-MCNC: 16 MG/DL (ref 8–23)
BUN/CREAT SERPL: 30.2 (ref 7–25)
CALCIUM SERPL-MCNC: 9.8 MG/DL (ref 8.6–10.5)
CHLORIDE SERPL-SCNC: 104 MMOL/L (ref 98–107)
CO2 SERPL-SCNC: 30.6 MMOL/L (ref 22–29)
CREAT SERPL-MCNC: 0.53 MG/DL (ref 0.57–1)
GLOBULIN SER CALC-MCNC: 2.5 GM/DL
GLUCOSE SERPL-MCNC: 126 MG/DL (ref 65–99)
POTASSIUM SERPL-SCNC: 4.1 MMOL/L (ref 3.5–5.2)
PROT SERPL-MCNC: 6.9 G/DL (ref 6–8.5)
SODIUM SERPL-SCNC: 146 MMOL/L (ref 136–145)
T4 FREE SERPL-MCNC: 1.85 NG/DL (ref 0.93–1.7)
TSH SERPL DL<=0.005 MIU/L-ACNC: <0.005 MIU/ML (ref 0.27–4.2)

## 2017-08-07 RX ORDER — PRAVASTATIN SODIUM 40 MG
TABLET ORAL
Qty: 30 TABLET | Refills: 0 | OUTPATIENT
Start: 2017-08-07

## 2017-08-08 RX ORDER — PRAVASTATIN SODIUM 40 MG
TABLET ORAL
Qty: 30 TABLET | Refills: 0 | OUTPATIENT
Start: 2017-08-08

## 2017-08-16 DIAGNOSIS — E05.90 HYPERTHYROIDISM: ICD-10-CM

## 2017-08-16 RX ORDER — METHIMAZOLE 5 MG/1
5 TABLET ORAL
Qty: 30 TABLET | Refills: 6 | Status: SHIPPED | OUTPATIENT
Start: 2017-08-16 | End: 2018-05-07 | Stop reason: SDUPTHER

## 2017-08-21 RX ORDER — HYDROCHLOROTHIAZIDE 12.5 MG/1
CAPSULE, GELATIN COATED ORAL
Qty: 30 CAPSULE | Refills: 0 | Status: SHIPPED | OUTPATIENT
Start: 2017-08-21 | End: 2017-09-16 | Stop reason: SDUPTHER

## 2017-09-18 RX ORDER — HYDROCHLOROTHIAZIDE 12.5 MG/1
CAPSULE, GELATIN COATED ORAL
Qty: 30 CAPSULE | Refills: 0 | Status: SHIPPED | OUTPATIENT
Start: 2017-09-18 | End: 2017-10-21 | Stop reason: SDUPTHER

## 2017-10-23 RX ORDER — HYDROCHLOROTHIAZIDE 12.5 MG/1
CAPSULE, GELATIN COATED ORAL
Qty: 30 CAPSULE | Refills: 0 | Status: SHIPPED | OUTPATIENT
Start: 2017-10-23 | End: 2017-11-18 | Stop reason: SDUPTHER

## 2017-10-30 ENCOUNTER — LAB (OUTPATIENT)
Dept: ENDOCRINOLOGY | Age: 72
End: 2017-10-30

## 2017-10-30 DIAGNOSIS — E04.2 NON-TOXIC MULTINODULAR GOITER: ICD-10-CM

## 2017-10-30 DIAGNOSIS — E05.90 HYPERTHYROIDISM: ICD-10-CM

## 2017-10-30 DIAGNOSIS — E05.90 HYPERTHYROIDISM: Primary | ICD-10-CM

## 2017-10-30 LAB
ALBUMIN SERPL-MCNC: 4.7 G/DL (ref 3.5–5.2)
ALBUMIN/GLOB SERPL: 1.8 G/DL
ALP SERPL-CCNC: 71 U/L (ref 39–117)
ALT SERPL-CCNC: 16 U/L (ref 1–33)
AST SERPL-CCNC: 19 U/L (ref 1–32)
BASOPHILS # BLD AUTO: 0.05 10*3/MM3 (ref 0–0.2)
BASOPHILS NFR BLD AUTO: 0.8 % (ref 0–1.5)
BILIRUB SERPL-MCNC: 0.3 MG/DL (ref 0.1–1.2)
BUN SERPL-MCNC: 20 MG/DL (ref 8–23)
BUN/CREAT SERPL: 31.7 (ref 7–25)
CALCIUM SERPL-MCNC: 9.8 MG/DL (ref 8.6–10.5)
CHLORIDE SERPL-SCNC: 103 MMOL/L (ref 98–107)
CO2 SERPL-SCNC: 28.1 MMOL/L (ref 22–29)
CREAT SERPL-MCNC: 0.63 MG/DL (ref 0.57–1)
EOSINOPHIL # BLD AUTO: 0.09 10*3/MM3 (ref 0–0.7)
EOSINOPHIL NFR BLD AUTO: 1.5 % (ref 0.3–6.2)
ERYTHROCYTE [DISTWIDTH] IN BLOOD BY AUTOMATED COUNT: 16.5 % (ref 11.7–13)
GFR SERPLBLD CREATININE-BSD FMLA CKD-EPI: 113 ML/MIN/1.73
GFR SERPLBLD CREATININE-BSD FMLA CKD-EPI: 93 ML/MIN/1.73
GLOBULIN SER CALC-MCNC: 2.6 GM/DL
GLUCOSE SERPL-MCNC: 96 MG/DL (ref 65–99)
HCT VFR BLD AUTO: 41.1 % (ref 35.6–45.5)
HGB BLD-MCNC: 13.1 G/DL (ref 11.9–15.5)
IMM GRANULOCYTES # BLD: 0 10*3/MM3 (ref 0–0.03)
IMM GRANULOCYTES NFR BLD: 0 % (ref 0–0.5)
LYMPHOCYTES # BLD AUTO: 2.05 10*3/MM3 (ref 0.9–4.8)
LYMPHOCYTES NFR BLD AUTO: 34.5 % (ref 19.6–45.3)
MCH RBC QN AUTO: 29.5 PG (ref 26.9–32)
MCHC RBC AUTO-ENTMCNC: 31.9 G/DL (ref 32.4–36.3)
MCV RBC AUTO: 92.6 FL (ref 80.5–98.2)
MONOCYTES # BLD AUTO: 0.5 10*3/MM3 (ref 0.2–1.2)
MONOCYTES NFR BLD AUTO: 8.4 % (ref 5–12)
NEUTROPHILS # BLD AUTO: 3.25 10*3/MM3 (ref 1.9–8.1)
NEUTROPHILS NFR BLD AUTO: 54.8 % (ref 42.7–76)
PLATELET # BLD AUTO: 195 10*3/MM3 (ref 140–500)
POTASSIUM SERPL-SCNC: 4.5 MMOL/L (ref 3.5–5.2)
PROT SERPL-MCNC: 7.3 G/DL (ref 6–8.5)
RBC # BLD AUTO: 4.44 10*6/MM3 (ref 3.9–5.2)
SODIUM SERPL-SCNC: 144 MMOL/L (ref 136–145)
T4 FREE SERPL-MCNC: 0.85 NG/DL (ref 0.93–1.7)
TSH SERPL DL<=0.005 MIU/L-ACNC: 0.15 MIU/ML (ref 0.27–4.2)
WBC # BLD AUTO: 5.94 10*3/MM3 (ref 4.5–10.7)

## 2017-11-06 ENCOUNTER — OFFICE VISIT (OUTPATIENT)
Dept: ENDOCRINOLOGY | Age: 72
End: 2017-11-06

## 2017-11-06 ENCOUNTER — RESULTS ENCOUNTER (OUTPATIENT)
Dept: ENDOCRINOLOGY | Age: 72
End: 2017-11-06

## 2017-11-06 VITALS
DIASTOLIC BLOOD PRESSURE: 60 MMHG | SYSTOLIC BLOOD PRESSURE: 114 MMHG | WEIGHT: 165.4 LBS | HEIGHT: 67 IN | HEART RATE: 77 BPM | BODY MASS INDEX: 25.96 KG/M2

## 2017-11-06 DIAGNOSIS — I42.9 CARDIOMYOPATHY, UNSPECIFIED TYPE (HCC): ICD-10-CM

## 2017-11-06 DIAGNOSIS — E04.2 NON-TOXIC MULTINODULAR GOITER: ICD-10-CM

## 2017-11-06 DIAGNOSIS — R63.5 ABNORMAL WEIGHT GAIN: ICD-10-CM

## 2017-11-06 DIAGNOSIS — E78.49 OTHER HYPERLIPIDEMIA: ICD-10-CM

## 2017-11-06 DIAGNOSIS — E05.90 HYPERTHYROIDISM: Primary | ICD-10-CM

## 2017-11-06 DIAGNOSIS — R53.82 CHRONIC FATIGUE: ICD-10-CM

## 2017-11-06 DIAGNOSIS — E05.90 HYPERTHYROIDISM: ICD-10-CM

## 2017-11-06 PROCEDURE — 99214 OFFICE O/P EST MOD 30 MIN: CPT | Performed by: INTERNAL MEDICINE

## 2017-11-06 NOTE — PROGRESS NOTES
72 y.o.    Patient Care Team:  James William Bosler III, MD as PCP - General (Internal Medicine)    Chief Complaint:        F/U HYPERTHYROIDISM.  HERE TO DISCUSS LAB RESULTS.  Subjective     HPI   Patient is a 72-year-old white female with a past history of hyperthyroidism and multinodular goiter came for follow-up  Hypothyroidism  Patient is currently taking methimazole 5 mg daily for the past 2-3 months  She is to take every other day.  After the last lab results she was advised to take it every day  She denies any side effects on the medication  She reports compliance with the medication  Multinodular goiter  Patient denies any recent change in the size of the gland  She denies any difficulty swallowing.  She denies any tenderness in the neck.  She denies any change in voice.  Fatigue  Patient continues to report fatigue.  Patient is steadily managed to lose some weight but feeling slightly better         Interval History          Hyperthyroidism:  Patient is currently taking methimazole 5 mg 1 tablet every other day. She denies any itching or rash. No other side effects reported she appears to be tolerating the medication very well.  Multinodular goiter  Patient denied any increase in size of the gland recently. She denied any difficulty swallowing or tenderness in the gland.  Fatigue  Patient reported significant improvement in his symptom over the past few months but still feels fatigued and cannot sleep much  she reports that she is trying to lose an additional 5 pounds in the next 3 months  patient has history of nonischemic cardiomyopathy and she sees her cardiologist She reports that she recently had an echocardiogram in the past few months and apparently it was unchanged from before      The following portions of the patient's history were reviewed and updated as appropriate: allergies, current medications, past family history, past medical history, past social history, past surgical history and problem  list.    Past Medical History:   Diagnosis Date   • Arthritis    • Asthma    • Benign essential hypertension    • Cancer     MELANOMA   • Cardiomyopathy    • Hyperlipidemia    • Hyperthyroidism    • Mitral valve insufficiency    • PONV (postoperative nausea and vomiting)    • Seasonal allergies    • Urinary frequency      Family History   Problem Relation Age of Onset   • Breast cancer Other    • Diabetes Other    • Malig Hyperthermia Neg Hx      Social History     Social History   • Marital status:      Spouse name: N/A   • Number of children: N/A   • Years of education: N/A     Occupational History   • Not on file.     Social History Main Topics   • Smoking status: Former Smoker     Packs/day: 1.00     Years: 25.00     Types: Cigarettes     Quit date: 12/27/1985   • Smokeless tobacco: Never Used   • Alcohol use Yes      Comment: DAILY COCKTAIL   • Drug use: No   • Sexual activity: Defer     Other Topics Concern   • Not on file     Social History Narrative     No Known Allergies    Current Outpatient Prescriptions:   •  acetaminophen (TYLENOL) 500 MG tablet, Take 1,000 mg by mouth Every 6 (Six) Hours As Needed for Mild Pain (1-3)., Disp: , Rfl:   •  aspirin 81 MG chewable tablet, Chew 81 mg Every Morning. ON HOLD FOR SX., Disp: , Rfl:   •  aspirin  MG EC tablet, Take 1 tablet by mouth Daily., Disp: 42 tablet, Rfl: 0  •  carvedilol (COREG) 25 MG tablet, Take 1 tablet by mouth 2 (Two) Times a Day With Meals., Disp: 90 tablet, Rfl: 3  •  hydrochlorothiazide (HYDRODIURIL) 12.5 MG tablet, Take 12.5 mg by mouth Every Morning., Disp: , Rfl:   •  hydrochlorothiazide (MICROZIDE) 12.5 MG capsule, TAKE ONE CAPSULE BY MOUTH EVERY MORNING, Disp: 30 capsule, Rfl: 0  •  levocetirizine (XYZAL) 5 MG tablet, Take 5 mg by mouth Every Morning., Disp: , Rfl:   •  losartan (COZAAR) 100 MG tablet, Take 100 mg by mouth Every Morning., Disp: , Rfl:   •  methIMAzole (TAPAZOLE) 5 MG tablet, Take 1 tablet by mouth Daily With  "Breakfast., Disp: 30 tablet, Rfl: 6  •  montelukast (SINGULAIR) 10 MG tablet, Take 1 tablet by mouth Every Morning., Disp: , Rfl:   •  multivitamin-minerals (CENTRUM) tablet, Take 1 tablet by mouth Daily. ON HOLD FOR SX., Disp: , Rfl:   •  oxybutynin XL (DITROPAN-XL) 10 MG 24 hr tablet, Take 10 mg by mouth Every Morning., Disp: , Rfl:   •  pravastatin (PRAVACHOL) 40 MG tablet, Take 40 mg by mouth Every Night., Disp: , Rfl:   •  pravastatin (PRAVACHOL) 40 MG tablet, TAKE ONE TABLET BY MOUTH EVERY NIGHT AT BEDTIME, Disp: 30 tablet, Rfl: 0  •  SSD 1 % cream, Apply 1 application topically As Needed (DRY SKIN TO EARS)., Disp: , Rfl:   •  sulindac (CLINORIL) 200 MG tablet, Take 200 mg by mouth 2 (Two) Times a Day. ON HOLD FOR SX., Disp: , Rfl:   •  triamcinolone (NASACORT) 55 MCG/ACT nasal inhaler, 2 sprays into each nostril Every Morning., Disp: , Rfl:         Review of Systems   Constitutional: Negative for chills, fatigue and fever.   Cardiovascular: Negative for chest pain and palpitations.   Gastrointestinal: Negative for abdominal pain, constipation, diarrhea, nausea and vomiting.   Endocrine: Negative for cold intolerance and heat intolerance.   All other systems reviewed and are negative.      Objective       Vitals:    11/06/17 1000   BP: 114/60   Pulse: 77   Weight: 165 lb 6.4 oz (75 kg)   Height: 67\" (170.2 cm)     Body mass index is 25.91 kg/(m^2).      Physical Exam   Constitutional: She is oriented to person, place, and time. She appears well-developed and well-nourished.   HENT:   Head: Normocephalic and atraumatic.   Eyes: EOM are normal. Pupils are equal, round, and reactive to light.   Neck: Normal range of motion. Neck supple. Thyromegaly present.   Cardiovascular: Normal rate, regular rhythm, normal heart sounds and intact distal pulses.    Pulmonary/Chest: Effort normal and breath sounds normal.   Abdominal: Soft. Bowel sounds are normal.   Musculoskeletal: Normal range of motion. She exhibits no " edema.   Neurological: She is alert and oriented to person, place, and time.   Skin: Skin is warm.   Psychiatric: She has a normal mood and affect. Her behavior is normal.   Nursing note and vitals reviewed.    Results Review:     I reviewed the patient's new clinical results.    Medical records reviewed  Summary:      Lab on 10/30/2017   Component Date Value Ref Range Status   • Glucose 10/30/2017 96  65 - 99 mg/dL Final   • BUN 10/30/2017 20  8 - 23 mg/dL Final   • Creatinine 10/30/2017 0.63  0.57 - 1.00 mg/dL Final   • eGFR Non  Am 10/30/2017 93  >60 mL/min/1.73 Final    Comment: The MDRD GFR formula is only valid for adults with stable  renal function between ages 18 and 70.     • eGFR  Am 10/30/2017 113  >60 mL/min/1.73 Final   • BUN/Creatinine Ratio 10/30/2017 31.7* 7.0 - 25.0 Final   • Sodium 10/30/2017 144  136 - 145 mmol/L Final   • Potassium 10/30/2017 4.5  3.5 - 5.2 mmol/L Final   • Chloride 10/30/2017 103  98 - 107 mmol/L Final   • Total CO2 10/30/2017 28.1  22.0 - 29.0 mmol/L Final   • Calcium 10/30/2017 9.8  8.6 - 10.5 mg/dL Final   • Total Protein 10/30/2017 7.3  6.0 - 8.5 g/dL Final   • Albumin 10/30/2017 4.70  3.50 - 5.20 g/dL Final   • Globulin 10/30/2017 2.6  gm/dL Final   • A/G Ratio 10/30/2017 1.8  g/dL Final   • Total Bilirubin 10/30/2017 0.3  0.1 - 1.2 mg/dL Final   • Alkaline Phosphatase 10/30/2017 71  39 - 117 U/L Final   • AST (SGOT) 10/30/2017 19  1 - 32 U/L Final   • ALT (SGPT) 10/30/2017 16  1 - 33 U/L Final   • Free T4 10/30/2017 0.85* 0.93 - 1.70 ng/dL Final   • TSH 10/30/2017 0.151* 0.270 - 4.200 mIU/mL Final   • WBC 10/30/2017 5.94  4.50 - 10.70 10*3/mm3 Final   • RBC 10/30/2017 4.44  3.90 - 5.20 10*6/mm3 Final   • Hemoglobin 10/30/2017 13.1  11.9 - 15.5 g/dL Final   • Hematocrit 10/30/2017 41.1  35.6 - 45.5 % Final   • MCV 10/30/2017 92.6  80.5 - 98.2 fL Final   • MCH 10/30/2017 29.5  26.9 - 32.0 pg Final   • MCHC 10/30/2017 31.9* 32.4 - 36.3 g/dL Final   • RDW  10/30/2017 16.5* 11.7 - 13.0 % Final   • Platelets 10/30/2017 195  140 - 500 10*3/mm3 Final   • Neutrophil Rel % 10/30/2017 54.8  42.7 - 76.0 % Final   • Lymphocyte Rel % 10/30/2017 34.5  19.6 - 45.3 % Final   • Monocyte Rel % 10/30/2017 8.4  5.0 - 12.0 % Final   • Eosinophil Rel % 10/30/2017 1.5  0.3 - 6.2 % Final   • Basophil Rel % 10/30/2017 0.8  0.0 - 1.5 % Final   • Neutrophils Absolute 10/30/2017 3.25  1.90 - 8.10 10*3/mm3 Final   • Lymphocytes Absolute 10/30/2017 2.05  0.90 - 4.80 10*3/mm3 Final   • Monocytes Absolute 10/30/2017 0.50  0.20 - 1.20 10*3/mm3 Final   • Eosinophils Absolute 10/30/2017 0.09  0.00 - 0.70 10*3/mm3 Final   • Basophils Absolute 10/30/2017 0.05  0.00 - 0.20 10*3/mm3 Final   • Immature Granulocyte Rel % 10/30/2017 0.0  0.0 - 0.5 % Final   • Immature Grans Absolute 10/30/2017 0.00  0.00 - 0.03 10*3/mm3 Final     No results found for: HGBA1C  Lab Results   Component Value Date    CREATININE 0.63 10/30/2017     Imaging Results (most recent)     None                Assessment and Plan:    Natalie was seen today for hyperthyroidism.    Diagnoses and all orders for this visit:    Hyperthyroidism  -     TSH; Future  -     T4, Free; Future    Non-toxic multinodular goiter  -     TSH; Future  -     T4, Free; Future    Abnormal weight gain  -     TSH; Future  -     T4, Free; Future    Chronic fatigue  -     TSH; Future  -     T4, Free; Future    Cardiomyopathy, unspecified type  -     TSH; Future  -     T4, Free; Future    Other hyperlipidemia  -     TSH; Future  -     T4, Free; Future         #1 hyperthyroidism. Patient is currently on methimazole 5 mg 7/7 days  #2 multinodular goiter  #3 fatigue again  #4 cardiomyopathy stable , recently had an echocardiogram done  #5 HLD stable       Patient's lab reports reviewed  TSH is 0.1 much improved but free T4 was 0.8 lower than normal  Patient feels much better  Her weight is steady and stable    I advised the patient to continue the 5 mg every  "day  Patient will get lab testing done in 2 months for med point check  If the free T4 continues to slide down than we may have to come up with another plan of missing 12 pills per week or change to PTU next line patient verbalized understanding    Patient will get lab testing done in 2 months  Patient will return to follow-up in 6 months with lab tests    The total time spent for old record and lab review and face- to- face was more than 25 min of which greater than 15 min of time was spent on counseling the patient on recommended evaluation and treatment options, instructions for management/treatment and /or follow up  and importance of compliance with chosen management or treatment options  Roque Boone MD. FACE    11/06/17      EMR Dragon / transcription disclaimer:     \"Dictated utilizing Dragon dictation\".         "

## 2017-11-20 RX ORDER — HYDROCHLOROTHIAZIDE 12.5 MG/1
CAPSULE, GELATIN COATED ORAL
Qty: 30 CAPSULE | Refills: 0 | Status: SHIPPED | OUTPATIENT
Start: 2017-11-20 | End: 2017-12-16 | Stop reason: SDUPTHER

## 2017-12-18 RX ORDER — HYDROCHLOROTHIAZIDE 12.5 MG/1
CAPSULE, GELATIN COATED ORAL
Qty: 30 CAPSULE | Refills: 0 | Status: SHIPPED | OUTPATIENT
Start: 2017-12-18 | End: 2018-01-20 | Stop reason: SDUPTHER

## 2017-12-26 RX ORDER — OXYBUTYNIN CHLORIDE 10 MG/1
TABLET, EXTENDED RELEASE ORAL
Qty: 30 TABLET | Refills: 8 | OUTPATIENT
Start: 2017-12-26

## 2018-01-04 ENCOUNTER — RESULTS ENCOUNTER (OUTPATIENT)
Dept: ENDOCRINOLOGY | Age: 73
End: 2018-01-04

## 2018-01-04 DIAGNOSIS — I42.9 CARDIOMYOPATHY, UNSPECIFIED TYPE (HCC): ICD-10-CM

## 2018-01-04 DIAGNOSIS — E04.2 NON-TOXIC MULTINODULAR GOITER: ICD-10-CM

## 2018-01-04 DIAGNOSIS — R63.5 ABNORMAL WEIGHT GAIN: ICD-10-CM

## 2018-01-04 DIAGNOSIS — R53.82 CHRONIC FATIGUE: ICD-10-CM

## 2018-01-04 DIAGNOSIS — E78.49 OTHER HYPERLIPIDEMIA: ICD-10-CM

## 2018-01-04 DIAGNOSIS — E05.90 HYPERTHYROIDISM: ICD-10-CM

## 2018-01-09 LAB
T4 FREE SERPL-MCNC: 0.84 NG/DL (ref 0.93–1.7)
TSH SERPL DL<=0.005 MIU/L-ACNC: 1.1 MIU/ML (ref 0.27–4.2)

## 2018-01-22 RX ORDER — HYDROCHLOROTHIAZIDE 12.5 MG/1
CAPSULE, GELATIN COATED ORAL
Qty: 30 CAPSULE | Refills: 0 | Status: SHIPPED | OUTPATIENT
Start: 2018-01-22 | End: 2018-02-17 | Stop reason: SDUPTHER

## 2018-02-02 ENCOUNTER — APPOINTMENT (OUTPATIENT)
Dept: WOMENS IMAGING | Facility: HOSPITAL | Age: 73
End: 2018-02-02

## 2018-02-02 PROCEDURE — 76641 ULTRASOUND BREAST COMPLETE: CPT | Performed by: RADIOLOGY

## 2018-02-19 RX ORDER — HYDROCHLOROTHIAZIDE 12.5 MG/1
CAPSULE, GELATIN COATED ORAL
Qty: 30 CAPSULE | Refills: 1 | Status: SHIPPED | OUTPATIENT
Start: 2018-02-19 | End: 2018-04-14 | Stop reason: SDUPTHER

## 2018-04-17 RX ORDER — HYDROCHLOROTHIAZIDE 12.5 MG/1
CAPSULE, GELATIN COATED ORAL
Qty: 30 CAPSULE | Refills: 0 | Status: SHIPPED | OUTPATIENT
Start: 2018-04-17 | End: 2018-05-12 | Stop reason: SDUPTHER

## 2018-04-30 LAB
ALBUMIN SERPL-MCNC: 4.1 G/DL (ref 3.5–5.2)
ALBUMIN/GLOB SERPL: 1.6 G/DL
ALP SERPL-CCNC: 58 U/L (ref 39–117)
ALT SERPL-CCNC: 16 U/L (ref 1–33)
AST SERPL-CCNC: 21 U/L (ref 1–32)
BILIRUB SERPL-MCNC: 0.5 MG/DL (ref 0.1–1.2)
BUN SERPL-MCNC: 21 MG/DL (ref 8–23)
BUN/CREAT SERPL: 36.8 (ref 7–25)
CALCIUM SERPL-MCNC: 9.5 MG/DL (ref 8.6–10.5)
CHLORIDE SERPL-SCNC: 100 MMOL/L (ref 98–107)
CO2 SERPL-SCNC: 29.5 MMOL/L (ref 22–29)
CREAT SERPL-MCNC: 0.57 MG/DL (ref 0.57–1)
GFR SERPLBLD CREATININE-BSD FMLA CKD-EPI: 105 ML/MIN/1.73
GFR SERPLBLD CREATININE-BSD FMLA CKD-EPI: 127 ML/MIN/1.73
GLOBULIN SER CALC-MCNC: 2.5 GM/DL
GLUCOSE SERPL-MCNC: 91 MG/DL (ref 65–99)
POTASSIUM SERPL-SCNC: 4.4 MMOL/L (ref 3.5–5.2)
PROT SERPL-MCNC: 6.6 G/DL (ref 6–8.5)
SODIUM SERPL-SCNC: 141 MMOL/L (ref 136–145)
T4 FREE SERPL-MCNC: 1.07 NG/DL (ref 0.93–1.7)
TSH SERPL DL<=0.005 MIU/L-ACNC: 0.01 MIU/ML (ref 0.27–4.2)

## 2018-05-07 ENCOUNTER — OFFICE VISIT (OUTPATIENT)
Dept: ENDOCRINOLOGY | Age: 73
End: 2018-05-07

## 2018-05-07 VITALS
DIASTOLIC BLOOD PRESSURE: 64 MMHG | HEART RATE: 82 BPM | SYSTOLIC BLOOD PRESSURE: 120 MMHG | BODY MASS INDEX: 27.56 KG/M2 | WEIGHT: 175.6 LBS | HEIGHT: 67 IN

## 2018-05-07 DIAGNOSIS — E05.90 HYPERTHYROIDISM: Primary | ICD-10-CM

## 2018-05-07 DIAGNOSIS — R53.82 CHRONIC FATIGUE: ICD-10-CM

## 2018-05-07 DIAGNOSIS — E04.2 NON-TOXIC MULTINODULAR GOITER: ICD-10-CM

## 2018-05-07 DIAGNOSIS — E78.49 OTHER HYPERLIPIDEMIA: ICD-10-CM

## 2018-05-07 DIAGNOSIS — R63.5 ABNORMAL WEIGHT GAIN: ICD-10-CM

## 2018-05-07 DIAGNOSIS — I42.9 CARDIOMYOPATHY, UNSPECIFIED TYPE (HCC): ICD-10-CM

## 2018-05-07 PROCEDURE — 99214 OFFICE O/P EST MOD 30 MIN: CPT | Performed by: INTERNAL MEDICINE

## 2018-05-07 RX ORDER — HEPATITIS A VACCINE 1440 [IU]/ML
INJECTION, SUSPENSION INTRAMUSCULAR
COMMUNITY
Start: 2018-04-29 | End: 2019-07-25

## 2018-05-07 RX ORDER — METHIMAZOLE 5 MG/1
TABLET ORAL
Qty: 22 TABLET | Refills: 6 | Status: SHIPPED | OUTPATIENT
Start: 2018-05-07 | End: 2019-02-23 | Stop reason: SDUPTHER

## 2018-05-07 RX ORDER — AZELASTINE 1 MG/ML
SPRAY, METERED NASAL
COMMUNITY
Start: 2018-04-23 | End: 2022-04-11

## 2018-05-07 RX ORDER — METHIMAZOLE 5 MG/1
5 TABLET ORAL EVERY OTHER DAY
Qty: 30 TABLET | Refills: 6 | Status: SHIPPED | OUTPATIENT
Start: 2018-05-07 | End: 2018-05-07 | Stop reason: SDUPTHER

## 2018-05-07 RX ORDER — SULINDAC 150 MG/1
TABLET ORAL
COMMUNITY
Start: 2018-04-27 | End: 2020-10-01 | Stop reason: ALTCHOICE

## 2018-05-12 ENCOUNTER — RESULTS ENCOUNTER (OUTPATIENT)
Dept: ENDOCRINOLOGY | Age: 73
End: 2018-05-12

## 2018-05-12 DIAGNOSIS — E05.90 HYPERTHYROIDISM: ICD-10-CM

## 2018-05-12 DIAGNOSIS — E04.2 NON-TOXIC MULTINODULAR GOITER: ICD-10-CM

## 2018-05-14 RX ORDER — HYDROCHLOROTHIAZIDE 12.5 MG/1
CAPSULE, GELATIN COATED ORAL
Qty: 30 CAPSULE | Refills: 0 | Status: SHIPPED | OUTPATIENT
Start: 2018-05-14 | End: 2018-06-09 | Stop reason: SDUPTHER

## 2018-06-11 RX ORDER — HYDROCHLOROTHIAZIDE 12.5 MG/1
CAPSULE, GELATIN COATED ORAL
Qty: 30 CAPSULE | Refills: 0 | Status: SHIPPED | OUTPATIENT
Start: 2018-06-11 | End: 2018-07-14 | Stop reason: SDUPTHER

## 2018-06-25 ENCOUNTER — RESULTS ENCOUNTER (OUTPATIENT)
Dept: ENDOCRINOLOGY | Age: 73
End: 2018-06-25

## 2018-06-25 DIAGNOSIS — E04.2 NON-TOXIC MULTINODULAR GOITER: ICD-10-CM

## 2018-06-25 DIAGNOSIS — E05.90 HYPERTHYROIDISM: ICD-10-CM

## 2018-07-16 RX ORDER — HYDROCHLOROTHIAZIDE 12.5 MG/1
CAPSULE, GELATIN COATED ORAL
Qty: 30 CAPSULE | Refills: 0 | Status: SHIPPED | OUTPATIENT
Start: 2018-07-16 | End: 2018-08-18 | Stop reason: SDUPTHER

## 2018-08-20 ENCOUNTER — APPOINTMENT (OUTPATIENT)
Dept: WOMENS IMAGING | Facility: HOSPITAL | Age: 73
End: 2018-08-20

## 2018-08-20 PROCEDURE — G0279 TOMOSYNTHESIS, MAMMO: HCPCS | Performed by: RADIOLOGY

## 2018-08-20 PROCEDURE — MDREVIEWSP: Performed by: RADIOLOGY

## 2018-08-20 PROCEDURE — 77065 DX MAMMO INCL CAD UNI: CPT | Performed by: RADIOLOGY

## 2018-08-20 RX ORDER — HYDROCHLOROTHIAZIDE 12.5 MG/1
CAPSULE, GELATIN COATED ORAL
Qty: 30 CAPSULE | Refills: 0 | Status: SHIPPED | OUTPATIENT
Start: 2018-08-20 | End: 2018-09-15 | Stop reason: SDUPTHER

## 2018-08-27 ENCOUNTER — APPOINTMENT (OUTPATIENT)
Dept: WOMENS IMAGING | Facility: HOSPITAL | Age: 73
End: 2018-08-27

## 2018-08-27 PROCEDURE — 77065 DX MAMMO INCL CAD UNI: CPT | Performed by: RADIOLOGY

## 2018-08-27 PROCEDURE — G0279 TOMOSYNTHESIS, MAMMO: HCPCS | Performed by: RADIOLOGY

## 2018-08-27 PROCEDURE — MDREVIEWSP: Performed by: RADIOLOGY

## 2018-09-04 ENCOUNTER — APPOINTMENT (OUTPATIENT)
Dept: WOMENS IMAGING | Facility: HOSPITAL | Age: 73
End: 2018-09-04

## 2018-09-04 PROCEDURE — 19081 BX BREAST 1ST LESION STRTCTC: CPT | Performed by: RADIOLOGY

## 2018-09-15 RX ORDER — HYDROCHLOROTHIAZIDE 12.5 MG/1
CAPSULE, GELATIN COATED ORAL
Qty: 30 CAPSULE | Refills: 0 | Status: SHIPPED | OUTPATIENT
Start: 2018-09-15 | End: 2018-10-20 | Stop reason: SDUPTHER

## 2018-10-22 RX ORDER — HYDROCHLOROTHIAZIDE 12.5 MG/1
CAPSULE, GELATIN COATED ORAL
Qty: 30 CAPSULE | Refills: 0 | Status: SHIPPED | OUTPATIENT
Start: 2018-10-22 | End: 2018-11-17 | Stop reason: SDUPTHER

## 2018-11-12 ENCOUNTER — LAB (OUTPATIENT)
Dept: ENDOCRINOLOGY | Age: 73
End: 2018-11-12

## 2018-11-12 DIAGNOSIS — E05.90 HYPERTHYROIDISM: ICD-10-CM

## 2018-11-12 DIAGNOSIS — E05.90 HYPERTHYROIDISM: Primary | ICD-10-CM

## 2018-11-12 DIAGNOSIS — E04.2 NON-TOXIC MULTINODULAR GOITER: ICD-10-CM

## 2018-11-12 LAB
ALBUMIN SERPL-MCNC: 4.7 G/DL (ref 3.5–5.2)
ALBUMIN/GLOB SERPL: 1.7 G/DL
ALP SERPL-CCNC: 66 U/L (ref 39–117)
ALT SERPL-CCNC: 14 U/L (ref 1–33)
AST SERPL-CCNC: 19 U/L (ref 1–32)
BASOPHILS # BLD AUTO: 0.07 10*3/MM3 (ref 0–0.2)
BASOPHILS NFR BLD AUTO: 1 % (ref 0–1.5)
BILIRUB SERPL-MCNC: 0.5 MG/DL (ref 0.1–1.2)
BUN SERPL-MCNC: 22 MG/DL (ref 8–23)
BUN/CREAT SERPL: 28.9 (ref 7–25)
CALCIUM SERPL-MCNC: 10.1 MG/DL (ref 8.6–10.5)
CHLORIDE SERPL-SCNC: 102 MMOL/L (ref 98–107)
CO2 SERPL-SCNC: 27.8 MMOL/L (ref 22–29)
CREAT SERPL-MCNC: 0.76 MG/DL (ref 0.57–1)
EOSINOPHIL # BLD AUTO: 0.2 10*3/MM3 (ref 0–0.7)
EOSINOPHIL NFR BLD AUTO: 2.9 % (ref 0.3–6.2)
ERYTHROCYTE [DISTWIDTH] IN BLOOD BY AUTOMATED COUNT: 14.2 % (ref 11.7–13)
GLOBULIN SER CALC-MCNC: 2.7 GM/DL
GLUCOSE SERPL-MCNC: 115 MG/DL (ref 65–99)
HCT VFR BLD AUTO: 42 % (ref 35.6–45.5)
HGB BLD-MCNC: 13.1 G/DL (ref 11.9–15.5)
IMM GRANULOCYTES # BLD: 0.02 10*3/MM3 (ref 0–0.03)
IMM GRANULOCYTES NFR BLD: 0.3 % (ref 0–0.5)
LYMPHOCYTES # BLD AUTO: 2.08 10*3/MM3 (ref 0.9–4.8)
LYMPHOCYTES NFR BLD AUTO: 30.5 % (ref 19.6–45.3)
MCH RBC QN AUTO: 30 PG (ref 26.9–32)
MCHC RBC AUTO-ENTMCNC: 31.2 G/DL (ref 32.4–36.3)
MCV RBC AUTO: 96.1 FL (ref 80.5–98.2)
MONOCYTES # BLD AUTO: 0.75 10*3/MM3 (ref 0.2–1.2)
MONOCYTES NFR BLD AUTO: 11 % (ref 5–12)
NEUTROPHILS # BLD AUTO: 3.69 10*3/MM3 (ref 1.9–8.1)
NEUTROPHILS NFR BLD AUTO: 54.3 % (ref 42.7–76)
PLATELET # BLD AUTO: 217 10*3/MM3 (ref 140–500)
POTASSIUM SERPL-SCNC: 5.1 MMOL/L (ref 3.5–5.2)
PROT SERPL-MCNC: 7.4 G/DL (ref 6–8.5)
RBC # BLD AUTO: 4.37 10*6/MM3 (ref 3.9–5.2)
SODIUM SERPL-SCNC: 144 MMOL/L (ref 136–145)
T4 FREE SERPL-MCNC: 1.02 NG/DL (ref 0.93–1.7)
TSH SERPL DL<=0.005 MIU/L-ACNC: 0.03 MIU/ML (ref 0.27–4.2)
WBC # BLD AUTO: 6.81 10*3/MM3 (ref 4.5–10.7)

## 2018-11-17 RX ORDER — HYDROCHLOROTHIAZIDE 12.5 MG/1
CAPSULE, GELATIN COATED ORAL
Qty: 30 CAPSULE | Refills: 0 | Status: SHIPPED | OUTPATIENT
Start: 2018-11-17 | End: 2018-12-15 | Stop reason: SDUPTHER

## 2018-12-17 RX ORDER — HYDROCHLOROTHIAZIDE 12.5 MG/1
CAPSULE, GELATIN COATED ORAL
Qty: 30 CAPSULE | Refills: 0 | Status: SHIPPED | OUTPATIENT
Start: 2018-12-17 | End: 2019-01-12 | Stop reason: SDUPTHER

## 2019-01-07 ENCOUNTER — OFFICE VISIT (OUTPATIENT)
Dept: OBSTETRICS AND GYNECOLOGY | Age: 74
End: 2019-01-07

## 2019-01-07 VITALS
DIASTOLIC BLOOD PRESSURE: 74 MMHG | WEIGHT: 175 LBS | HEIGHT: 67 IN | SYSTOLIC BLOOD PRESSURE: 124 MMHG | BODY MASS INDEX: 27.47 KG/M2

## 2019-01-07 DIAGNOSIS — Z01.419 WELL WOMAN EXAM WITH ROUTINE GYNECOLOGICAL EXAM: ICD-10-CM

## 2019-01-07 DIAGNOSIS — R35.1 NOCTURIA: Primary | ICD-10-CM

## 2019-01-07 LAB
BILIRUB BLD-MCNC: NEGATIVE MG/DL
CLARITY, POC: CLEAR
COLOR UR: YELLOW
GLUCOSE UR STRIP-MCNC: NEGATIVE MG/DL
KETONES UR QL: ABNORMAL
LEUKOCYTE EST, POC: ABNORMAL
NITRITE UR-MCNC: NEGATIVE MG/ML
PH UR: 5.5 [PH] (ref 5–8)
PROT UR STRIP-MCNC: NEGATIVE MG/DL
RBC # UR STRIP: NEGATIVE /UL
SP GR UR: 1.02 (ref 1–1.03)
UROBILINOGEN UR QL: NORMAL

## 2019-01-07 PROCEDURE — 81003 URINALYSIS AUTO W/O SCOPE: CPT | Performed by: PHYSICIAN ASSISTANT

## 2019-01-07 PROCEDURE — G0101 CA SCREEN;PELVIC/BREAST EXAM: HCPCS | Performed by: PHYSICIAN ASSISTANT

## 2019-01-07 NOTE — PROGRESS NOTES
Subjective     Chief Complaint   Patient presents with   • Gynecologic Exam     annual exam. last pap 09/27/2016 neg/neg, m/g 07/2018, dexa florencio next week, c/scope about 10 years ago.c/o frequent urination at night.       History of Present Illness    Natalie Rosen is a 73 y.o. No obstetric history on file. who presents for annual exam.    she is noting urinary frequency at night  Has been present for a the past year  Does fluid restrict at night but still up at least 4 times a night    She does have hyperthryoidism and is stable with her meds  She sees her endo routinely and has been stable for 5 years  She will f/u this month    Sees PCP routinely    Pt of Dr Julian's  Last seen in 2016  H/o hyst and BSO in the 90's for heavy bleeding  No h/o abnormal paps      Obstetric History:  OB History     No data available         Menstrual History:     No LMP recorded. Patient has had a hysterectomy.         Current contraception: post menopausal status  History of abnormal Pap smear: no  Received Gardasil immunization: no  Perform regular self breast exam: yes - mthly  Family history of uterine or ovarian cancer: no  Family History of colon cancer: no  Family history of breast cancer: no    Mammogram: up to date.  Colonoscopy: recommended.  DEXA: not indicated.    Exercise: not active  Calcium/Vitamin D: adequate intake    The following portions of the patient's history were reviewed and updated as appropriate: allergies, current medications, past family history, past medical history, past social history, past surgical history and problem list.    Review of Systems   Genitourinary:        Increased urinary frequency at night   All other systems reviewed and are negative.      Review of Systems   Constitutional: Negative for fatigue.   Respiratory: Negative for shortness of breath.    Gastrointestinal: Negative for abdominal pain.   Genitourinary: Negative for dysuria.   Neurological: Negative for headaches.  "  Psychiatric/Behavioral: Negative for dysphoric mood.         Objective   Physical Exam    /74   Ht 170.2 cm (67\")   Wt 79.4 kg (175 lb)   Breastfeeding? No   BMI 27.41 kg/m²     General:   alert, appears stated age and cooperative   Neck: no adenopathy and thyroid normal to palpation   Heart: regular rate and rhythm   Lungs: clear to auscultation bilaterally   Abdomen: soft and nontender   Breast: inspection negative, no nipple discharge or bleeding, no masses or nodularity palpable   Vulva: normal   Vagina: normal mucosa, atrophic changes   Cervix: absent   Uterus: absent   Adnexa: no mass, fullness, tenderness and absent   Rectal: normal rectal, no masses and guaiac negative stool obtained     Assessment/Plan   Natalie was seen today for gynecologic exam.    Diagnoses and all orders for this visit:    Nocturia  -     POC Urinalysis Dipstick, Multipro  -     Urine Culture - Urine, Urine, Clean Catch    Well woman exam with routine gynecological exam        All questions answered.  Breast self exam technique reviewed and patient encouraged to perform self-exam monthly.  Discussed healthy lifestyle modifications.  Recommended 30 minutes of aerobic exercise five times per week.  Discussed calcium needs to prevent osteoporosis.    Pap no longer needed  Plan urine culture, if neg for infection, could consider nocdurna to treat nocturia  DEXA next week  Due for CSC, she will check with PCP and let us know if she needs a referral from us             "

## 2019-01-09 LAB
BACTERIA UR CULT: NORMAL
BACTERIA UR CULT: NORMAL

## 2019-01-14 RX ORDER — HYDROCHLOROTHIAZIDE 12.5 MG/1
CAPSULE, GELATIN COATED ORAL
Qty: 30 CAPSULE | Refills: 0 | Status: SHIPPED | OUTPATIENT
Start: 2019-01-14 | End: 2019-02-11 | Stop reason: SDUPTHER

## 2019-01-15 ENCOUNTER — TELEPHONE (OUTPATIENT)
Dept: OBSTETRICS AND GYNECOLOGY | Age: 74
End: 2019-01-15

## 2019-01-15 NOTE — TELEPHONE ENCOUNTER
Dr Julian pt states she has had a sample for Nocdurna from [facility] and would like a prescription be sent to [facility] in [place] on file.     Please Route to  [place]

## 2019-01-16 NOTE — TELEPHONE ENCOUNTER
She can access the website [email or web address] and she can get it for 40 dollar

## 2019-01-24 ENCOUNTER — OFFICE VISIT (OUTPATIENT)
Dept: ENDOCRINOLOGY | Age: 74
End: 2019-01-24

## 2019-01-24 VITALS
BODY MASS INDEX: 28.06 KG/M2 | HEART RATE: 84 BPM | WEIGHT: 178.8 LBS | HEIGHT: 67 IN | SYSTOLIC BLOOD PRESSURE: 122 MMHG | DIASTOLIC BLOOD PRESSURE: 68 MMHG

## 2019-01-24 DIAGNOSIS — E04.2 NON-TOXIC MULTINODULAR GOITER: ICD-10-CM

## 2019-01-24 DIAGNOSIS — R53.82 CHRONIC FATIGUE: ICD-10-CM

## 2019-01-24 DIAGNOSIS — R63.5 ABNORMAL WEIGHT GAIN: ICD-10-CM

## 2019-01-24 DIAGNOSIS — E05.90 HYPERTHYROIDISM: Primary | ICD-10-CM

## 2019-01-24 DIAGNOSIS — I42.9 CARDIOMYOPATHY, UNSPECIFIED TYPE (HCC): ICD-10-CM

## 2019-01-24 PROCEDURE — 99214 OFFICE O/P EST MOD 30 MIN: CPT | Performed by: INTERNAL MEDICINE

## 2019-01-24 NOTE — PROGRESS NOTES
73 y.o.    Patient Care Team:  Bosler, James William III, MD as PCP - General (Internal Medicine)    Chief Complaint:      F/U HYPERTHYROIDISM.  NO RECENT LABS.     Subjective     HPI   Patient is a 73-year-old white female with a history of hyperthyroidism and multinodular goiter came for follow-up    Hyperthyroidism  Patient is currently taking methimazole 5 mg   She takes it on Monday Tuesday and Thursday Friday and Saturday  She skips Wednesday and Sunday  She has been reporting compliance with the medication  Denies any side effects  Multinodular goiter  Patient denies any recent change in the size of the gland  She denies any difficulty swallowing  She denies any change in voice  Fatigue  Patient continues to report chronic fatigue for a long time  Abnormal weight gain  Patient currently weighs 178 pounds with a BMI of 28  She gained about 3 pounds in the past 4 months      Interval History    Hyperthyroidism:  Patient is currently taking methimazole 5 mg 1 tablet every other day. She denies any itching or rash. No other side effects reported she appears to be tolerating the medication very well.  Multinodular goiter  Patient denied any increase in size of the gland recently. She denied any difficulty swallowing or tenderness in the gland.  Fatigue  Patient reported significant improvement in his symptom over the past few months but still feels fatigued and cannot sleep much  she reports that she is trying to lose an additional 5 pounds in the next 3 months  patient has history of nonischemic cardiomyopathy and she sees her cardiologist She reports that she recently had an echocardiogram in the past few months and apparently it was unchanged from before         The following portions of the patient's history were reviewed and updated as appropriate: allergies, current medications, past family history, past medical history, past social history, past surgical history and problem list.    Past Medical History:    Diagnosis Date   • Arthritis    • Asthma    • Benign essential hypertension    • Cancer (CMS/HCC)     MELANOMA   • Cardiomyopathy (CMS/HCC)    • Hyperlipidemia    • Hyperthyroidism    • Mitral valve insufficiency    • PONV (postoperative nausea and vomiting)    • Seasonal allergies    • Urinary frequency      Family History   Problem Relation Age of Onset   • Breast cancer Other    • Diabetes Other    • Malig Hyperthermia Neg Hx      Social History     Socioeconomic History   • Marital status:      Spouse name: Not on file   • Number of children: Not on file   • Years of education: Not on file   • Highest education level: Not on file   Social Needs   • Financial resource strain: Not on file   • Food insecurity - worry: Not on file   • Food insecurity - inability: Not on file   • Transportation needs - medical: Not on file   • Transportation needs - non-medical: Not on file   Occupational History   • Not on file   Tobacco Use   • Smoking status: Former Smoker     Packs/day: 1.00     Years: 25.00     Pack years: 25.00     Types: Cigarettes     Last attempt to quit: 1985     Years since quittin.0   • Smokeless tobacco: Never Used   Substance and Sexual Activity   • Alcohol use: Yes     Comment: DAILY COCKTAIL   • Drug use: No   • Sexual activity: Defer   Other Topics Concern   • Not on file   Social History Narrative   • Not on file     No Known Allergies    Current Outpatient Medications:   •  aspirin 81 MG chewable tablet, Chew 81 mg Every Morning. ON HOLD FOR SX., Disp: , Rfl:   •  azelastine (ASTELIN) 0.1 % nasal spray, , Disp: , Rfl:   •  carvedilol (COREG) 25 MG tablet, Take 1 tablet by mouth 2 (Two) Times a Day With Meals., Disp: 90 tablet, Rfl: 3  •  Desmopressin Acetate (NOCDURNA) 27.7 MCG sublingual tablet, Place 1 tablet under the tongue Every Night., Disp: 30 tablet, Rfl: 3  •  HAVRIX 1440 EL U/ML vaccine, , Disp: , Rfl:   •  hydrochlorothiazide (MICROZIDE) 12.5 MG capsule, TAKE ONE  "CAPSULE BY MOUTH EVERY MORNING, Disp: 30 capsule, Rfl: 0  •  losartan (COZAAR) 100 MG tablet, Take 100 mg by mouth Every Morning., Disp: , Rfl:   •  methIMAzole (TAPAZOLE) 5 MG tablet, 5 DAYS OF THE WEEK PO, Disp: 22 tablet, Rfl: 6  •  montelukast (SINGULAIR) 10 MG tablet, Take 1 tablet by mouth Every Morning., Disp: , Rfl:   •  Multiple Vitamins-Minerals (PRESERVISION AREDS 2 PO), Take  by mouth., Disp: , Rfl:   •  multivitamin-minerals (CENTRUM) tablet, Take 1 tablet by mouth Daily. ON HOLD FOR SX., Disp: , Rfl:   •  pravastatin (PRAVACHOL) 40 MG tablet, TAKE ONE TABLET BY MOUTH EVERY NIGHT AT BEDTIME, Disp: 30 tablet, Rfl: 0  •  SHINGRIX 50 MCG reconstituted suspension, , Disp: , Rfl:   •  SSD 1 % cream, Apply 1 application topically As Needed (DRY SKIN TO EARS)., Disp: , Rfl:   •  sulindac (CLINORIL) 150 MG tablet, , Disp: , Rfl:   •  triamcinolone (NASACORT) 55 MCG/ACT nasal inhaler, 2 sprays into each nostril Every Morning., Disp: , Rfl:         Review of Systems   Constitutional: Negative for chills, fatigue and fever.   Cardiovascular: Negative for chest pain and palpitations.   Gastrointestinal: Negative for abdominal pain, constipation, diarrhea, nausea and vomiting.   Endocrine: Negative for cold intolerance and heat intolerance.   All other systems reviewed and are negative.      Objective       Vitals:    01/24/19 1418   BP: 122/68   Pulse: 84   Weight: 81.1 kg (178 lb 12.8 oz)   Height: 170.2 cm (67\")     Body mass index is 28 kg/m².      Physical Exam   Constitutional: She is oriented to person, place, and time. She appears well-developed and well-nourished.   HENT:   Head: Normocephalic and atraumatic.   Eyes: EOM are normal. Pupils are equal, round, and reactive to light.   Neck: Normal range of motion. Neck supple. Thyromegaly present.   Cardiovascular: Normal rate, regular rhythm, normal heart sounds and intact distal pulses.   Pulmonary/Chest: Effort normal and breath sounds normal.   Abdominal: " Soft. Bowel sounds are normal.   Musculoskeletal: Normal range of motion. She exhibits no edema.   Neurological: She is alert and oriented to person, place, and time.   Skin: Skin is warm.   Psychiatric: She has a normal mood and affect. Her behavior is normal.   Nursing note and vitals reviewed.    Results Review:     I reviewed the patient's new clinical results.    Medical records reviewed  Summary:      Office Visit on 01/07/2019   Component Date Value Ref Range Status   • Color 01/07/2019 Yellow  Yellow, Straw, Dark Yellow, Brianna Final   • Clarity, UA 01/07/2019 Clear  Clear Final   • Glucose, UA 01/07/2019 Negative  Negative, 1000 mg/dL (3+) mg/dL Final   • Bilirubin 01/07/2019 Negative  Negative Final   • Ketones, UA 01/07/2019 Trace* Negative Final   • Specific Gravity  01/07/2019 1.025  1.005 - 1.030 Final   • Blood, UA 01/07/2019 Negative  Negative Final   • pH, Urine 01/07/2019 5.5  5.0 - 8.0 Final   • Protein, POC 01/07/2019 Negative  Negative mg/dL Final   • Urobilinogen, UA 01/07/2019 Normal  Normal Final   • Nitrite, UA 01/07/2019 Negative  Negative Final   • Leukocytes 01/07/2019 Small (1+)* Negative Final   • Urine Culture 01/07/2019 Final report   Final   • Result 1 01/07/2019 Comment   Final    Comment: Mixed urogenital esteban  25,000-50,000 colony forming units per mL       No results found for: HGBA1C  Lab Results   Component Value Date    CREATININE 0.76 11/12/2018     Imaging Results (most recent)     None          Assessment and Plan:    Natalie was seen today for hyperthyroidism.    Diagnoses and all orders for this visit:    Hyperthyroidism  -     Comprehensive Metabolic Panel  -     T4, Free  -     TSH  -     CBC & Differential  -     T4, Free  -     TSH    Non-toxic multinodular goiter  -     Comprehensive Metabolic Panel  -     T4, Free  -     TSH  -     CBC & Differential    Abnormal weight gain    Chronic fatigue    Cardiomyopathy, unspecified type (CMS/HCC)        #1 hyperthyroidism.  "Patient is currently on methimazole 5 mg 7/7 days  #2 multinodular goiter  #3 fatigue again  #4 cardiomyopathy stable , recently had an echocardiogram done  #5 HLD stable     Patient is experiencing cold intolerance  Increasing fatigue  No vision problems  No voice changes or dysphagia    Patient's last blood test was in November  TSH was 0.02 slightly better  Patient was taking methimazole 5 days a week skipping Wednesday and Sunday    Patient also gained 3 pounds since last visit  Patient will get lab testing done today  After the lab results are reviewed she'll be notified of any further changes in medication  She will return to follow-up in 6 months.    The total time spent  was more than 25 min of which greater than 15 min of time (greater than 50% of the total time)  was spent face to face with the patient counseling and coordination of care on recommended evaluation and treatment options, instructions for management/treatment and /or follow up and importance of compliance with chosen management or treatment options    Roque Boone MD. FACE    01/24/19      EMR Dragon / transcription disclaimer:     \"Dictated utilizing Dragon dictation\".         "

## 2019-01-25 LAB
ALBUMIN SERPL-MCNC: 4.4 G/DL (ref 3.5–5.2)
ALBUMIN/GLOB SERPL: 1.8 G/DL
ALP SERPL-CCNC: 55 U/L (ref 39–117)
ALT SERPL-CCNC: 16 U/L (ref 1–33)
AST SERPL-CCNC: 19 U/L (ref 1–32)
BASOPHILS # BLD AUTO: 0.05 10*3/MM3 (ref 0–0.2)
BASOPHILS NFR BLD AUTO: 0.8 % (ref 0–1.5)
BILIRUB SERPL-MCNC: 0.4 MG/DL (ref 0.1–1.2)
BUN SERPL-MCNC: 27 MG/DL (ref 8–23)
BUN/CREAT SERPL: 29.3 (ref 7–25)
CALCIUM SERPL-MCNC: 9.8 MG/DL (ref 8.6–10.5)
CHLORIDE SERPL-SCNC: 102 MMOL/L (ref 98–107)
CO2 SERPL-SCNC: 27.9 MMOL/L (ref 22–29)
CREAT SERPL-MCNC: 0.92 MG/DL (ref 0.57–1)
EOSINOPHIL # BLD AUTO: 0.17 10*3/MM3 (ref 0–0.7)
EOSINOPHIL NFR BLD AUTO: 2.7 % (ref 0.3–6.2)
ERYTHROCYTE [DISTWIDTH] IN BLOOD BY AUTOMATED COUNT: 14.1 % (ref 11.7–13)
GLOBULIN SER CALC-MCNC: 2.4 GM/DL
GLUCOSE SERPL-MCNC: 105 MG/DL (ref 65–99)
HCT VFR BLD AUTO: 39.4 % (ref 35.6–45.5)
HGB BLD-MCNC: 12.3 G/DL (ref 11.9–15.5)
IMM GRANULOCYTES # BLD AUTO: 0 10*3/MM3 (ref 0–0.03)
IMM GRANULOCYTES NFR BLD AUTO: 0 % (ref 0–0.5)
LYMPHOCYTES # BLD AUTO: 2.28 10*3/MM3 (ref 0.9–4.8)
LYMPHOCYTES NFR BLD AUTO: 36.8 % (ref 19.6–45.3)
MCH RBC QN AUTO: 30.8 PG (ref 26.9–32)
MCHC RBC AUTO-ENTMCNC: 31.2 G/DL (ref 32.4–36.3)
MCV RBC AUTO: 98.7 FL (ref 80.5–98.2)
MONOCYTES # BLD AUTO: 0.56 10*3/MM3 (ref 0.2–1.2)
MONOCYTES NFR BLD AUTO: 9 % (ref 5–12)
NEUTROPHILS # BLD AUTO: 3.14 10*3/MM3 (ref 1.9–8.1)
NEUTROPHILS NFR BLD AUTO: 50.7 % (ref 42.7–76)
PLATELET # BLD AUTO: 198 10*3/MM3 (ref 140–500)
POTASSIUM SERPL-SCNC: 4.3 MMOL/L (ref 3.5–5.2)
PROT SERPL-MCNC: 6.8 G/DL (ref 6–8.5)
RBC # BLD AUTO: 3.99 10*6/MM3 (ref 3.9–5.2)
SODIUM SERPL-SCNC: 144 MMOL/L (ref 136–145)
T4 FREE SERPL-MCNC: 1.08 NG/DL (ref 0.93–1.7)
TSH SERPL DL<=0.005 MIU/L-ACNC: 0.01 MIU/ML (ref 0.27–4.2)
WBC # BLD AUTO: 6.2 10*3/MM3 (ref 4.5–10.7)

## 2019-02-12 RX ORDER — HYDROCHLOROTHIAZIDE 12.5 MG/1
CAPSULE, GELATIN COATED ORAL
Qty: 30 CAPSULE | Refills: 0 | Status: SHIPPED | OUTPATIENT
Start: 2019-02-12 | End: 2019-03-16 | Stop reason: SDUPTHER

## 2019-02-23 DIAGNOSIS — E05.90 HYPERTHYROIDISM: ICD-10-CM

## 2019-02-25 RX ORDER — METHIMAZOLE 5 MG/1
TABLET ORAL
Qty: 22 TABLET | Refills: 5 | Status: SHIPPED | OUTPATIENT
Start: 2019-02-25 | End: 2019-08-17 | Stop reason: SDUPTHER

## 2019-03-05 ENCOUNTER — APPOINTMENT (OUTPATIENT)
Dept: WOMENS IMAGING | Facility: HOSPITAL | Age: 74
End: 2019-03-05

## 2019-03-05 PROCEDURE — MDREVIEWSP: Performed by: RADIOLOGY

## 2019-03-05 PROCEDURE — G0279 TOMOSYNTHESIS, MAMMO: HCPCS | Performed by: RADIOLOGY

## 2019-03-05 PROCEDURE — 77065 DX MAMMO INCL CAD UNI: CPT | Performed by: RADIOLOGY

## 2019-03-18 RX ORDER — HYDROCHLOROTHIAZIDE 12.5 MG/1
CAPSULE, GELATIN COATED ORAL
Qty: 30 CAPSULE | Refills: 0 | Status: SHIPPED | OUTPATIENT
Start: 2019-03-18 | End: 2019-04-14 | Stop reason: SDUPTHER

## 2019-04-15 RX ORDER — HYDROCHLOROTHIAZIDE 12.5 MG/1
CAPSULE, GELATIN COATED ORAL
Qty: 30 CAPSULE | Refills: 0 | Status: SHIPPED | OUTPATIENT
Start: 2019-04-15 | End: 2019-05-11 | Stop reason: SDUPTHER

## 2019-05-13 RX ORDER — HYDROCHLOROTHIAZIDE 12.5 MG/1
CAPSULE, GELATIN COATED ORAL
Qty: 30 CAPSULE | Refills: 0 | Status: SHIPPED | OUTPATIENT
Start: 2019-05-13 | End: 2019-06-13 | Stop reason: SDUPTHER

## 2019-06-13 RX ORDER — HYDROCHLOROTHIAZIDE 12.5 MG/1
CAPSULE, GELATIN COATED ORAL
Qty: 30 CAPSULE | Refills: 0 | Status: SHIPPED | OUTPATIENT
Start: 2019-06-13 | End: 2019-07-13 | Stop reason: SDUPTHER

## 2019-07-11 ENCOUNTER — LAB (OUTPATIENT)
Dept: ENDOCRINOLOGY | Age: 74
End: 2019-07-11

## 2019-07-11 DIAGNOSIS — E05.90 HYPERTHYROIDISM: ICD-10-CM

## 2019-07-11 DIAGNOSIS — E04.2 NON-TOXIC MULTINODULAR GOITER: ICD-10-CM

## 2019-07-11 DIAGNOSIS — E05.90 HYPERTHYROIDISM: Primary | ICD-10-CM

## 2019-07-11 LAB
BASOPHILS # BLD AUTO: 0.06 10*3/MM3 (ref 0–0.2)
BASOPHILS NFR BLD AUTO: 0.9 % (ref 0–1.5)
EOSINOPHIL # BLD AUTO: 0.25 10*3/MM3 (ref 0–0.4)
EOSINOPHIL NFR BLD AUTO: 3.7 % (ref 0.3–6.2)
ERYTHROCYTE [DISTWIDTH] IN BLOOD BY AUTOMATED COUNT: 13.4 % (ref 12.3–15.4)
HCT VFR BLD AUTO: 39.3 % (ref 34–46.6)
HGB BLD-MCNC: 12.5 G/DL (ref 12–15.9)
IMM GRANULOCYTES # BLD AUTO: 0.02 10*3/MM3 (ref 0–0.05)
IMM GRANULOCYTES NFR BLD AUTO: 0.3 % (ref 0–0.5)
LYMPHOCYTES # BLD AUTO: 2.04 10*3/MM3 (ref 0.7–3.1)
LYMPHOCYTES NFR BLD AUTO: 30.3 % (ref 19.6–45.3)
MCH RBC QN AUTO: 31.3 PG (ref 26.6–33)
MCHC RBC AUTO-ENTMCNC: 31.8 G/DL (ref 31.5–35.7)
MCV RBC AUTO: 98.5 FL (ref 79–97)
MONOCYTES # BLD AUTO: 0.63 10*3/MM3 (ref 0.1–0.9)
MONOCYTES NFR BLD AUTO: 9.3 % (ref 5–12)
NEUTROPHILS # BLD AUTO: 3.74 10*3/MM3 (ref 1.7–7)
NEUTROPHILS NFR BLD AUTO: 55.5 % (ref 42.7–76)
NRBC BLD AUTO-RTO: 0 /100 WBC (ref 0–0.2)
PLATELET # BLD AUTO: 209 10*3/MM3 (ref 140–450)
RBC # BLD AUTO: 3.99 10*6/MM3 (ref 3.77–5.28)
T4 FREE SERPL-MCNC: 0.88 NG/DL (ref 0.93–1.7)
TSH SERPL DL<=0.005 MIU/L-ACNC: 0.05 MIU/ML (ref 0.27–4.2)
WBC # BLD AUTO: 6.74 10*3/MM3 (ref 3.4–10.8)

## 2019-07-15 RX ORDER — HYDROCHLOROTHIAZIDE 12.5 MG/1
CAPSULE, GELATIN COATED ORAL
Qty: 30 CAPSULE | Refills: 0 | Status: SHIPPED | OUTPATIENT
Start: 2019-07-15 | End: 2019-08-20 | Stop reason: SDUPTHER

## 2019-07-25 ENCOUNTER — OFFICE VISIT (OUTPATIENT)
Dept: ENDOCRINOLOGY | Age: 74
End: 2019-07-25

## 2019-07-25 VITALS
WEIGHT: 176.4 LBS | SYSTOLIC BLOOD PRESSURE: 126 MMHG | BODY MASS INDEX: 27.69 KG/M2 | HEART RATE: 75 BPM | DIASTOLIC BLOOD PRESSURE: 60 MMHG | HEIGHT: 67 IN

## 2019-07-25 DIAGNOSIS — R63.5 ABNORMAL WEIGHT GAIN: ICD-10-CM

## 2019-07-25 DIAGNOSIS — I42.9 CARDIOMYOPATHY, UNSPECIFIED TYPE (HCC): ICD-10-CM

## 2019-07-25 DIAGNOSIS — E05.90 HYPERTHYROIDISM: Primary | ICD-10-CM

## 2019-07-25 DIAGNOSIS — E04.2 NON-TOXIC MULTINODULAR GOITER: ICD-10-CM

## 2019-07-25 DIAGNOSIS — R53.82 CHRONIC FATIGUE: ICD-10-CM

## 2019-07-25 PROCEDURE — 99214 OFFICE O/P EST MOD 30 MIN: CPT | Performed by: INTERNAL MEDICINE

## 2019-07-25 RX ORDER — ALBUTEROL SULFATE 90 UG/1
2 AEROSOL, METERED RESPIRATORY (INHALATION) EVERY 6 HOURS PRN
COMMUNITY
Start: 2019-05-16

## 2019-07-25 NOTE — PROGRESS NOTES
73 y.o.    Patient Care Team:  Bosler, James William III, MD as PCP - General (Internal Medicine)  Bosler, James William III, MD as PCP - Claims Attributed    Chief Complaint:      F/U HYPERTHYROIDISM.  HERE TO DISCUSS LAB RESULTS.  Subjective     HPI     Patient is a 73-year-old white male with a past history of hyperthyroidism and multinodular goiter came for follow-up    Hyperthyroidism  Patient is currently taking methimazole 5 mg  She used to take 5 mg 5 days of the week skipping Wednesday and Sunday  Apparently she saw the [employer] message very late about a week ago-a message from January February 2019 and started taking daily for the past 7 to 10 days  She had lab testing on 7/11/2019 but she was taking 5 days a week at that time  Patient denies any symptoms since taking daily  She denies any side effects on methimazole  Multinodular goiter  Patient denies any recent change in the size of the gland  No difficulty swallowing  No difficulty or change in voice  Chronic fatigue  Patient continues to explain his chronic fatigue for a very long time  Abnormal weight gain  Patient actually lost 2-1/2 pounds since last visit        Interval History    Hyperthyroidism:  Patient is currently taking methimazole 5 mg 1 tablet every other day. She denies any itching or rash. No other side effects reported she appears to be tolerating the medication very well.  Multinodular goiter  Patient denied any increase in size of the gland recently. She denied any difficulty swallowing or tenderness in the gland.  Fatigue  Patient reported significant improvement in his symptom over the past few months but still feels fatigued and cannot sleep much  she reports that she is trying to lose an additional 5 pounds in the next 3 months  patient has history of nonischemic cardiomyopathy and she sees her cardiologist She reports that she recently had an echocardiogram in the past few months and apparently it was unchanged from  before         The following portions of the patient's history were reviewed and updated as appropriate: allergies, current medications, past family history, past medical history, past social history, past surgical history and problem list.    Past Medical History:   Diagnosis Date   • Arthritis    • Asthma    • Benign essential hypertension    • Cancer (CMS/HCC)     MELANOMA   • Cardiomyopathy (CMS/HCC)    • Hyperlipidemia    • Hyperthyroidism    • Mitral valve insufficiency    • PONV (postoperative nausea and vomiting)    • Seasonal allergies    • Urinary frequency      Family History   Problem Relation Age of Onset   • Breast cancer Other    • Diabetes Other    • Malig Hyperthermia Neg Hx      Social History     Socioeconomic History   • Marital status:      Spouse name: Not on file   • Number of children: Not on file   • Years of education: Not on file   • Highest education level: Not on file   Tobacco Use   • Smoking status: Former Smoker     Packs/day: 1.00     Years: 25.00     Pack years: 25.00     Types: Cigarettes     Last attempt to quit: 1985     Years since quittin.5   • Smokeless tobacco: Never Used   Substance and Sexual Activity   • Alcohol use: Yes     Comment: DAILY COCKTAIL   • Drug use: No   • Sexual activity: Defer     No Known Allergies    Current Outpatient Medications:   •  aspirin 81 MG chewable tablet, Chew 81 mg Every Morning. ON HOLD FOR SX., Disp: , Rfl:   •  azelastine (ASTELIN) 0.1 % nasal spray, , Disp: , Rfl:   •  carvedilol (COREG) 25 MG tablet, Take 1 tablet by mouth 2 (Two) Times a Day With Meals., Disp: 90 tablet, Rfl: 3  •  HAVRIX 1440 EL U/ML vaccine, , Disp: , Rfl:   •  hydrochlorothiazide (MICROZIDE) 12.5 MG capsule, TAKE ONE CAPSULE BY MOUTH EVERY MORNING, Disp: 30 capsule, Rfl: 0  •  losartan (COZAAR) 100 MG tablet, Take 100 mg by mouth Every Morning., Disp: , Rfl:   •  methIMAzole (TAPAZOLE) 5 MG tablet, TAKE ONE TABLET BY MOUTH FIVE DAYS OF THE WEEK,  "Disp: 22 tablet, Rfl: 5  •  montelukast (SINGULAIR) 10 MG tablet, Take 1 tablet by mouth Every Morning., Disp: , Rfl:   •  Multiple Vitamins-Minerals (PRESERVISION AREDS 2 PO), Take  by mouth., Disp: , Rfl:   •  multivitamin-minerals (CENTRUM) tablet, Take 1 tablet by mouth Daily. ON HOLD FOR SX., Disp: , Rfl:   •  pravastatin (PRAVACHOL) 40 MG tablet, TAKE ONE TABLET BY MOUTH EVERY NIGHT AT BEDTIME, Disp: 30 tablet, Rfl: 0  •  SHINGRIX 50 MCG reconstituted suspension, , Disp: , Rfl:   •  SSD 1 % cream, Apply 1 application topically As Needed (DRY SKIN TO EARS)., Disp: , Rfl:   •  sulindac (CLINORIL) 150 MG tablet, , Disp: , Rfl:   •  triamcinolone (NASACORT) 55 MCG/ACT nasal inhaler, 2 sprays into each nostril Every Morning., Disp: , Rfl:         Review of Systems   Constitutional: Negative for chills, fatigue and fever.   Cardiovascular: Negative for chest pain and palpitations.   Gastrointestinal: Negative for abdominal pain, constipation, diarrhea, nausea and vomiting.   Endocrine: Negative for cold intolerance and heat intolerance.   All other systems reviewed and are negative.      Objective       Vitals:    07/25/19 1003   BP: 126/60   Pulse: 75   Weight: 80 kg (176 lb 6.4 oz)   Height: 170.2 cm (67\")     Body mass index is 27.63 kg/m².      Physical Exam   Constitutional: She is oriented to person, place, and time. She appears well-developed and well-nourished.   HENT:   Head: Normocephalic and atraumatic.   Eyes: EOM are normal. Pupils are equal, round, and reactive to light.   Neck: Normal range of motion. Neck supple. Thyromegaly present.   Cardiovascular: Normal rate, regular rhythm, normal heart sounds and intact distal pulses.   Pulmonary/Chest: Effort normal and breath sounds normal.   Abdominal: Soft. Bowel sounds are normal.   Musculoskeletal: Normal range of motion. She exhibits no edema.   Neurological: She is alert and oriented to person, place, and time.   Skin: Skin is warm.   Psychiatric: She " has a normal mood and affect. Her behavior is normal.   Nursing note and vitals reviewed.    Results Review:     I reviewed the patient's new clinical results.    Medical records reviewed  Summary:      Lab on 07/11/2019   Component Date Value Ref Range Status   • WBC 07/11/2019 6.74  3.40 - 10.80 10*3/mm3 Final   • RBC 07/11/2019 3.99  3.77 - 5.28 10*6/mm3 Final   • Hemoglobin 07/11/2019 12.5  12.0 - 15.9 g/dL Final   • Hematocrit 07/11/2019 39.3  34.0 - 46.6 % Final   • MCV 07/11/2019 98.5* 79.0 - 97.0 fL Final   • MCH 07/11/2019 31.3  26.6 - 33.0 pg Final   • MCHC 07/11/2019 31.8  31.5 - 35.7 g/dL Final   • RDW 07/11/2019 13.4  12.3 - 15.4 % Final   • Platelets 07/11/2019 209  140 - 450 10*3/mm3 Final   • Neutrophil Rel % 07/11/2019 55.5  42.7 - 76.0 % Final   • Lymphocyte Rel % 07/11/2019 30.3  19.6 - 45.3 % Final   • Monocyte Rel % 07/11/2019 9.3  5.0 - 12.0 % Final   • Eosinophil Rel % 07/11/2019 3.7  0.3 - 6.2 % Final   • Basophil Rel % 07/11/2019 0.9  0.0 - 1.5 % Final   • Neutrophils Absolute 07/11/2019 3.74  1.70 - 7.00 10*3/mm3 Final   • Lymphocytes Absolute 07/11/2019 2.04  0.70 - 3.10 10*3/mm3 Final   • Monocytes Absolute 07/11/2019 0.63  0.10 - 0.90 10*3/mm3 Final   • Eosinophils Absolute 07/11/2019 0.25  0.00 - 0.40 10*3/mm3 Final   • Basophils Absolute 07/11/2019 0.06  0.00 - 0.20 10*3/mm3 Final   • Immature Granulocyte Rel % 07/11/2019 0.3  0.0 - 0.5 % Final   • Immature Grans Absolute 07/11/2019 0.02  0.00 - 0.05 10*3/mm3 Final   • nRBC 07/11/2019 0.0  0.0 - 0.2 /100 WBC Final   • TSH 07/11/2019 0.050* 0.270 - 4.200 mIU/mL Final   • Free T4 07/11/2019 0.88* 0.93 - 1.70 ng/dL Final     No results found for: HGBA1C  Lab Results   Component Value Date    CREATININE 0.92 01/24/2019     Imaging Results (most recent)     None          Assessment and Plan:    Natalie was seen today for hyperthyroidism.    Diagnoses and all orders for this visit:    Hyperthyroidism  -     T4, Free; Future  -     TSH;  "Future    Non-toxic multinodular goiter    Abnormal weight gain    Chronic fatigue    Cardiomyopathy, unspecified type (CMS/HCC)        #1 hyperthyroidism. Patient is currently on methimazole 5 mg 7/7 days  #2 multinodular goiter  #3 fatigue again  #4 cardiomyopathy stable , recently had an echocardiogram done  #5 HLD stable    Patient reports that just within the past week he started taking methimazole 5 mg daily  She reports that she never saw the [employer] message until recently  On 7/11/2019 she had lab testing done and at that time she was taking 5 days of the week  A TSH was 0.05 and the free T4 was also 0.8    Considering that the free T4 was already going down at that time I advised the patient not to take the methimazole daily  She must revert back to 5 days of the week skipping Wednesday and Sunday  I recommend that she must get the lab testing done third week of August 2019 for recheck and readjustment of the medication    Patient return to follow-up in 6 months    The total time spent  was more than 25 min of which greater than 15 min of time (greater than 50% of the total time)  was spent face to face with the patient counseling and coordination of care on recommended evaluation and treatment options, instructions for management/treatment and /or follow up and importance of compliance with chosen management or treatment options  Roque Boone MD. FACE    07/25/19      EMR Dragon / transcription disclaimer:     \"Dictated utilizing Dragon dictation\".         "

## 2019-08-17 DIAGNOSIS — E05.90 HYPERTHYROIDISM: ICD-10-CM

## 2019-08-19 RX ORDER — METHIMAZOLE 5 MG/1
TABLET ORAL
Qty: 22 TABLET | Refills: 4 | Status: SHIPPED | OUTPATIENT
Start: 2019-08-19 | End: 2020-01-27 | Stop reason: SDUPTHER

## 2019-08-20 RX ORDER — HYDROCHLOROTHIAZIDE 12.5 MG/1
12.5 CAPSULE, GELATIN COATED ORAL EVERY MORNING
Qty: 30 CAPSULE | Refills: 0 | Status: SHIPPED | OUTPATIENT
Start: 2019-08-20 | End: 2019-09-07 | Stop reason: SDUPTHER

## 2019-08-22 ENCOUNTER — RESULTS ENCOUNTER (OUTPATIENT)
Dept: ENDOCRINOLOGY | Age: 74
End: 2019-08-22

## 2019-08-22 DIAGNOSIS — E05.90 HYPERTHYROIDISM: ICD-10-CM

## 2019-08-28 DIAGNOSIS — R53.82 CHRONIC FATIGUE: ICD-10-CM

## 2019-08-28 DIAGNOSIS — R63.5 ABNORMAL WEIGHT GAIN: ICD-10-CM

## 2019-08-28 DIAGNOSIS — E05.90 HYPERTHYROIDISM: Primary | ICD-10-CM

## 2019-09-05 ENCOUNTER — RESULTS ENCOUNTER (OUTPATIENT)
Dept: ENDOCRINOLOGY | Age: 74
End: 2019-09-05

## 2019-09-05 DIAGNOSIS — R63.5 ABNORMAL WEIGHT GAIN: ICD-10-CM

## 2019-09-05 DIAGNOSIS — E05.90 HYPERTHYROIDISM: ICD-10-CM

## 2019-09-05 DIAGNOSIS — R53.82 CHRONIC FATIGUE: ICD-10-CM

## 2019-09-05 LAB
ALBUMIN SERPL-MCNC: 4.7 G/DL (ref 3.5–5.2)
ALBUMIN/GLOB SERPL: 2.1 G/DL
ALP SERPL-CCNC: 62 U/L (ref 39–117)
ALT SERPL-CCNC: 22 U/L (ref 1–33)
AST SERPL-CCNC: 28 U/L (ref 1–32)
BASOPHILS # BLD AUTO: 0.04 10*3/MM3 (ref 0–0.2)
BASOPHILS NFR BLD AUTO: 0.7 % (ref 0–1.5)
BILIRUB SERPL-MCNC: 0.5 MG/DL (ref 0.2–1.2)
BUN SERPL-MCNC: 12 MG/DL (ref 8–23)
BUN/CREAT SERPL: 18.8 (ref 7–25)
CALCIUM SERPL-MCNC: 9.6 MG/DL (ref 8.6–10.5)
CHLORIDE SERPL-SCNC: 103 MMOL/L (ref 98–107)
CO2 SERPL-SCNC: 29.1 MMOL/L (ref 22–29)
CREAT SERPL-MCNC: 0.64 MG/DL (ref 0.57–1)
EOSINOPHIL # BLD AUTO: 0.24 10*3/MM3 (ref 0–0.4)
EOSINOPHIL NFR BLD AUTO: 4.3 % (ref 0.3–6.2)
ERYTHROCYTE [DISTWIDTH] IN BLOOD BY AUTOMATED COUNT: 13.2 % (ref 12.3–15.4)
GLOBULIN SER CALC-MCNC: 2.2 GM/DL
GLUCOSE SERPL-MCNC: 93 MG/DL (ref 65–99)
HCT VFR BLD AUTO: 40.6 % (ref 34–46.6)
HGB BLD-MCNC: 12.7 G/DL (ref 12–15.9)
IMM GRANULOCYTES # BLD AUTO: 0.03 10*3/MM3 (ref 0–0.05)
IMM GRANULOCYTES NFR BLD AUTO: 0.5 % (ref 0–0.5)
LYMPHOCYTES # BLD AUTO: 2.03 10*3/MM3 (ref 0.7–3.1)
LYMPHOCYTES NFR BLD AUTO: 36.5 % (ref 19.6–45.3)
MCH RBC QN AUTO: 31.1 PG (ref 26.6–33)
MCHC RBC AUTO-ENTMCNC: 31.3 G/DL (ref 31.5–35.7)
MCV RBC AUTO: 99.3 FL (ref 79–97)
MONOCYTES # BLD AUTO: 0.65 10*3/MM3 (ref 0.1–0.9)
MONOCYTES NFR BLD AUTO: 11.7 % (ref 5–12)
NEUTROPHILS # BLD AUTO: 2.57 10*3/MM3 (ref 1.7–7)
NEUTROPHILS NFR BLD AUTO: 46.3 % (ref 42.7–76)
NRBC BLD AUTO-RTO: 0 /100 WBC (ref 0–0.2)
PLATELET # BLD AUTO: 189 10*3/MM3 (ref 140–450)
POTASSIUM SERPL-SCNC: 4.6 MMOL/L (ref 3.5–5.2)
PROT SERPL-MCNC: 6.9 G/DL (ref 6–8.5)
RBC # BLD AUTO: 4.09 10*6/MM3 (ref 3.77–5.28)
SODIUM SERPL-SCNC: 144 MMOL/L (ref 136–145)
T4 FREE SERPL-MCNC: 0.98 NG/DL (ref 0.93–1.7)
TSH SERPL DL<=0.005 MIU/L-ACNC: 0.09 UIU/ML (ref 0.27–4.2)
WBC # BLD AUTO: 5.56 10*3/MM3 (ref 3.4–10.8)

## 2019-09-09 ENCOUNTER — APPOINTMENT (OUTPATIENT)
Dept: WOMENS IMAGING | Facility: HOSPITAL | Age: 74
End: 2019-09-09

## 2019-09-09 PROCEDURE — 77067 SCR MAMMO BI INCL CAD: CPT | Performed by: RADIOLOGY

## 2019-09-09 PROCEDURE — 77063 BREAST TOMOSYNTHESIS BI: CPT | Performed by: RADIOLOGY

## 2019-09-09 PROCEDURE — MDREVIEWSP: Performed by: RADIOLOGY

## 2019-09-09 RX ORDER — HYDROCHLOROTHIAZIDE 12.5 MG/1
CAPSULE, GELATIN COATED ORAL
Qty: 30 CAPSULE | Refills: 0 | Status: SHIPPED | OUTPATIENT
Start: 2019-09-09 | End: 2020-03-09

## 2019-09-16 RX ORDER — HYDROCHLOROTHIAZIDE 12.5 MG/1
CAPSULE, GELATIN COATED ORAL
Qty: 30 CAPSULE | Refills: 0 | Status: SHIPPED | OUTPATIENT
Start: 2019-09-16 | End: 2019-11-19 | Stop reason: SDUPTHER

## 2019-11-20 RX ORDER — HYDROCHLOROTHIAZIDE 12.5 MG/1
12.5 CAPSULE, GELATIN COATED ORAL EVERY MORNING
Qty: 30 CAPSULE | Refills: 0 | Status: SHIPPED | OUTPATIENT
Start: 2019-11-20 | End: 2020-01-27 | Stop reason: SDUPTHER

## 2019-11-20 RX ORDER — HYDROCHLOROTHIAZIDE 12.5 MG/1
12.5 CAPSULE, GELATIN COATED ORAL EVERY MORNING
Qty: 30 CAPSULE | Refills: 0 | Status: SHIPPED | OUTPATIENT
Start: 2019-11-20 | End: 2019-12-14 | Stop reason: SDUPTHER

## 2019-12-16 RX ORDER — HYDROCHLOROTHIAZIDE 12.5 MG/1
12.5 CAPSULE, GELATIN COATED ORAL EVERY MORNING
Qty: 30 CAPSULE | Refills: 0 | Status: SHIPPED | OUTPATIENT
Start: 2019-12-16 | End: 2020-01-04 | Stop reason: SDUPTHER

## 2020-01-06 RX ORDER — HYDROCHLOROTHIAZIDE 12.5 MG/1
12.5 CAPSULE, GELATIN COATED ORAL EVERY MORNING
Qty: 30 CAPSULE | Refills: 0 | Status: SHIPPED | OUTPATIENT
Start: 2020-01-06 | End: 2020-01-27 | Stop reason: SDUPTHER

## 2020-01-20 LAB
T4 FREE SERPL-MCNC: 0.87 NG/DL (ref 0.93–1.7)
TSH SERPL DL<=0.005 MIU/L-ACNC: 0.11 UIU/ML (ref 0.27–4.2)

## 2020-01-25 DIAGNOSIS — E05.90 HYPERTHYROIDISM: ICD-10-CM

## 2020-01-27 ENCOUNTER — OFFICE VISIT (OUTPATIENT)
Dept: ENDOCRINOLOGY | Age: 75
End: 2020-01-27

## 2020-01-27 VITALS
HEIGHT: 67 IN | DIASTOLIC BLOOD PRESSURE: 62 MMHG | SYSTOLIC BLOOD PRESSURE: 124 MMHG | HEART RATE: 77 BPM | BODY MASS INDEX: 28.41 KG/M2 | WEIGHT: 181 LBS

## 2020-01-27 DIAGNOSIS — R63.5 ABNORMAL WEIGHT GAIN: Primary | ICD-10-CM

## 2020-01-27 DIAGNOSIS — E05.90 HYPERTHYROIDISM: ICD-10-CM

## 2020-01-27 DIAGNOSIS — R53.82 CHRONIC FATIGUE: ICD-10-CM

## 2020-01-27 DIAGNOSIS — I42.9 CARDIOMYOPATHY, UNSPECIFIED TYPE (HCC): ICD-10-CM

## 2020-01-27 DIAGNOSIS — E04.2 NON-TOXIC MULTINODULAR GOITER: ICD-10-CM

## 2020-01-27 PROCEDURE — 99214 OFFICE O/P EST MOD 30 MIN: CPT | Performed by: INTERNAL MEDICINE

## 2020-01-27 RX ORDER — IRBESARTAN 150 MG/1
150 TABLET ORAL DAILY
COMMUNITY
Start: 2020-01-19 | End: 2022-10-18 | Stop reason: ALTCHOICE

## 2020-01-27 RX ORDER — METHIMAZOLE 5 MG/1
TABLET ORAL
Qty: 15 TABLET | Refills: 5 | Status: SHIPPED | OUTPATIENT
Start: 2020-01-27 | End: 2020-04-20 | Stop reason: CLARIF

## 2020-01-27 RX ORDER — METHIMAZOLE 5 MG/1
TABLET ORAL
Qty: 22 TABLET | Refills: 3 | Status: SHIPPED | OUTPATIENT
Start: 2020-01-27 | End: 2020-04-20 | Stop reason: SDUPTHER

## 2020-01-27 NOTE — PROGRESS NOTES
74 y.o.    Patient Care Team:  Bosler, James William III, MD as PCP - General (Internal Medicine)    Chief Complaint:      F/U HYPERTHYROIDISM.  HERE TO DISCUSS LAB RESULTS  Subjective     HPI   Patient is a 74-year-old white male with a past history of hyperthyroidism and multinodular goiter came for follow-up    Hyperthyroidism  Patient is currently taking methimazole 5 mg 5 days of the week  She takes medication except Wednesday and   She denies any side effects of medication  Multinodular goiter  Patient denies any recent change in the size of the gland  She denies any difficulty swallowing or change in voice  Chronic fatigue  Patient continues to report fatigue for several years  Abnormal weight gain  Patient currently weighs 181 pounds and she gained about 4 pounds since last visit      Interval History      The following portions of the patient's history were reviewed and updated as appropriate: allergies, current medications, past family history, past medical history, past social history, past surgical history and problem list.    Past Medical History:   Diagnosis Date   • Arthritis    • Asthma    • Benign essential hypertension    • Cancer (CMS/HCC)     MELANOMA   • Cardiomyopathy (CMS/HCC)    • Hyperlipidemia    • Hyperthyroidism    • Mitral valve insufficiency    • PONV (postoperative nausea and vomiting)    • Seasonal allergies    • Urinary frequency      Family History   Problem Relation Age of Onset   • Breast cancer Other    • Diabetes Other    • Malig Hyperthermia Neg Hx      Social History     Socioeconomic History   • Marital status:      Spouse name: Not on file   • Number of children: Not on file   • Years of education: Not on file   • Highest education level: Not on file   Tobacco Use   • Smoking status: Former Smoker     Packs/day: 1.00     Years: 25.00     Pack years: 25.00     Types: Cigarettes     Last attempt to quit: 1985     Years since quittin.1   • Smokeless  tobacco: Never Used   Substance and Sexual Activity   • Alcohol use: Yes     Comment: DAILY COCKTAIL   • Drug use: No   • Sexual activity: Defer     No Known Allergies    Current Outpatient Medications:   •  albuterol sulfate  (90 Base) MCG/ACT inhaler, , Disp: , Rfl:   •  aspirin 81 MG chewable tablet, Chew 81 mg Every Morning. ON HOLD FOR SX., Disp: , Rfl:   •  azelastine (ASTELIN) 0.1 % nasal spray, , Disp: , Rfl:   •  carvedilol (COREG) 25 MG tablet, Take 1 tablet by mouth 2 (Two) Times a Day With Meals., Disp: 90 tablet, Rfl: 3  •  hydrochlorothiazide (MICROZIDE) 12.5 MG capsule, TAKE ONE CAPSULE BY MOUTH EVERY MORNING, Disp: 30 capsule, Rfl: 0  •  losartan (COZAAR) 100 MG tablet, Take 100 mg by mouth Every Morning., Disp: , Rfl:   •  methIMAzole (TAPAZOLE) 5 MG tablet, TAKE 1 TABLET PO QOD, Disp: 15 tablet, Rfl: 5  •  montelukast (SINGULAIR) 10 MG tablet, Take 1 tablet by mouth Every Morning., Disp: , Rfl:   •  Multiple Vitamins-Minerals (PRESERVISION AREDS 2 PO), Take  by mouth., Disp: , Rfl:   •  multivitamin-minerals (CENTRUM) tablet, Take 1 tablet by mouth Daily. ON HOLD FOR SX., Disp: , Rfl:   •  pravastatin (PRAVACHOL) 40 MG tablet, TAKE ONE TABLET BY MOUTH EVERY NIGHT AT BEDTIME, Disp: 30 tablet, Rfl: 0  •  SSD 1 % cream, Apply 1 application topically As Needed (DRY SKIN TO EARS)., Disp: , Rfl:   •  sulindac (CLINORIL) 150 MG tablet, , Disp: , Rfl:   •  triamcinolone (NASACORT) 55 MCG/ACT nasal inhaler, 2 sprays into each nostril Every Morning., Disp: , Rfl:   •  irbesartan (AVAPRO) 150 MG tablet, , Disp: , Rfl:         Review of Systems   Constitutional: Negative for chills, fatigue and fever.   Cardiovascular: Negative for chest pain and palpitations.   Gastrointestinal: Negative for abdominal pain, constipation, diarrhea, nausea and vomiting.   Endocrine: Negative for cold intolerance and heat intolerance.   All other systems reviewed and are negative.      Objective       Vitals:    01/27/20  "1050   BP: 124/62   Pulse: 77   Weight: 82.1 kg (181 lb)   Height: 170.2 cm (67\")     Body mass index is 28.35 kg/m².      Physical Exam   Constitutional: She is oriented to person, place, and time. She appears well-developed and well-nourished.   HENT:   Head: Normocephalic and atraumatic.   Eyes: Pupils are equal, round, and reactive to light. EOM are normal.   Neck: Normal range of motion. Neck supple. Thyromegaly present.   Cardiovascular: Normal rate, regular rhythm, normal heart sounds and intact distal pulses.   Pulmonary/Chest: Effort normal and breath sounds normal.   Abdominal: Soft. Bowel sounds are normal.   Musculoskeletal: Normal range of motion. She exhibits no edema.   Neurological: She is alert and oriented to person, place, and time.   Skin: Skin is warm.   Psychiatric: She has a normal mood and affect. Her behavior is normal.   Nursing note and vitals reviewed.    Results Review:     I reviewed the patient's new clinical results.    Medical records reviewed  Summary:      Results Encounter on 09/05/2019   Component Date Value Ref Range Status   • TSH 09/05/2019 0.090* 0.270 - 4.200 uIU/mL Final   • Free T4 09/05/2019 0.98  0.93 - 1.70 ng/dL Final   • WBC 09/05/2019 5.56  3.40 - 10.80 10*3/mm3 Final   • RBC 09/05/2019 4.09  3.77 - 5.28 10*6/mm3 Final   • Hemoglobin 09/05/2019 12.7  12.0 - 15.9 g/dL Final   • Hematocrit 09/05/2019 40.6  34.0 - 46.6 % Final   • MCV 09/05/2019 99.3* 79.0 - 97.0 fL Final   • MCH 09/05/2019 31.1  26.6 - 33.0 pg Final   • MCHC 09/05/2019 31.3* 31.5 - 35.7 g/dL Final   • RDW 09/05/2019 13.2  12.3 - 15.4 % Final   • Platelets 09/05/2019 189  140 - 450 10*3/mm3 Final   • Neutrophil Rel % 09/05/2019 46.3  42.7 - 76.0 % Final   • Lymphocyte Rel % 09/05/2019 36.5  19.6 - 45.3 % Final   • Monocyte Rel % 09/05/2019 11.7  5.0 - 12.0 % Final   • Eosinophil Rel % 09/05/2019 4.3  0.3 - 6.2 % Final   • Basophil Rel % 09/05/2019 0.7  0.0 - 1.5 % Final   • Neutrophils Absolute " 09/05/2019 2.57  1.70 - 7.00 10*3/mm3 Final   • Lymphocytes Absolute 09/05/2019 2.03  0.70 - 3.10 10*3/mm3 Final   • Monocytes Absolute 09/05/2019 0.65  0.10 - 0.90 10*3/mm3 Final   • Eosinophils Absolute 09/05/2019 0.24  0.00 - 0.40 10*3/mm3 Final   • Basophils Absolute 09/05/2019 0.04  0.00 - 0.20 10*3/mm3 Final   • Immature Granulocyte Rel % 09/05/2019 0.5  0.0 - 0.5 % Final   • Immature Grans Absolute 09/05/2019 0.03  0.00 - 0.05 10*3/mm3 Final   • nRBC 09/05/2019 0.0  0.0 - 0.2 /100 WBC Final   • Glucose 09/05/2019 93  65 - 99 mg/dL Final   • BUN 09/05/2019 12  8 - 23 mg/dL Final   • Creatinine 09/05/2019 0.64  0.57 - 1.00 mg/dL Final   • eGFR Non  Am 09/05/2019 91  >60 mL/min/1.73 Final    Comment: The MDRD GFR formula is only valid for adults with stable  renal function between ages 18 and 70.     • eGFR  Am 09/05/2019 111  >60 mL/min/1.73 Final   • BUN/Creatinine Ratio 09/05/2019 18.8  7.0 - 25.0 Final   • Sodium 09/05/2019 144  136 - 145 mmol/L Final   • Potassium 09/05/2019 4.6  3.5 - 5.2 mmol/L Final   • Chloride 09/05/2019 103  98 - 107 mmol/L Final   • Total CO2 09/05/2019 29.1* 22.0 - 29.0 mmol/L Final   • Calcium 09/05/2019 9.6  8.6 - 10.5 mg/dL Final   • Total Protein 09/05/2019 6.9  6.0 - 8.5 g/dL Final   • Albumin 09/05/2019 4.70  3.50 - 5.20 g/dL Final   • Globulin 09/05/2019 2.2  gm/dL Final   • A/G Ratio 09/05/2019 2.1  g/dL Final   • Total Bilirubin 09/05/2019 0.5  0.2 - 1.2 mg/dL Final   • Alkaline Phosphatase 09/05/2019 62  39 - 117 U/L Final   • AST (SGOT) 09/05/2019 28  1 - 32 U/L Final   • ALT (SGPT) 09/05/2019 22  1 - 33 U/L Final     No results found for: HGBA1C  Lab Results   Component Value Date    CREATININE 0.64 09/05/2019     Imaging Results (Most Recent)     None                Assessment and Plan:    Natalie was seen today for hyperthyroidism.    Diagnoses and all orders for this visit:    Abnormal weight gain    Hyperthyroidism  -     methIMAzole (TAPAZOLE) 5 MG  "tablet; TAKE 1 TABLET PO QOD    Chronic fatigue    Non-toxic multinodular goiter    Cardiomyopathy, unspecified type (CMS/HCC)    Lab reports reviewed  Hyperthyroidism-uncontrolled  Patient's TSH is 0.1 and free T4 is 0.8    Patient is currently taking Tapazole 5 mg 5 days of the week  I think she needs to cut back even further  I recommended taking 5 mg every other day reducing that weekly dose by 2 tablets  Patient verbalized understanding    CBC and CMP are stable  Patient return to follow-up in 6 months      Roque Boone MD. FACE    01/27/20      EMR Dragon / transcription disclaimer:     \"Dictated utilizing Dragon dictation\".         "

## 2020-03-09 RX ORDER — HYDROCHLOROTHIAZIDE 12.5 MG/1
CAPSULE, GELATIN COATED ORAL
Qty: 30 CAPSULE | Refills: 0 | Status: SHIPPED | OUTPATIENT
Start: 2020-03-09 | End: 2020-04-06

## 2020-04-06 RX ORDER — HYDROCHLOROTHIAZIDE 12.5 MG/1
CAPSULE, GELATIN COATED ORAL
Qty: 30 CAPSULE | Refills: 0 | Status: SHIPPED | OUTPATIENT
Start: 2020-04-06 | End: 2020-04-30 | Stop reason: SDUPTHER

## 2020-04-20 DIAGNOSIS — E05.90 HYPERTHYROIDISM: ICD-10-CM

## 2020-04-20 RX ORDER — METHIMAZOLE 5 MG/1
TABLET ORAL
Qty: 90 TABLET | Refills: 0 | Status: SHIPPED | OUTPATIENT
Start: 2020-04-20 | End: 2020-07-27 | Stop reason: SDUPTHER

## 2020-04-29 ENCOUNTER — LAB REQUISITION (OUTPATIENT)
Dept: LAB | Facility: HOSPITAL | Age: 75
End: 2020-04-29

## 2020-04-29 DIAGNOSIS — R05.9 COUGH: ICD-10-CM

## 2020-04-29 PROCEDURE — U0002 COVID-19 LAB TEST NON-CDC: HCPCS | Performed by: OBSTETRICS & GYNECOLOGY

## 2020-04-30 DIAGNOSIS — R09.89 CAROTID BRUIT, UNSPECIFIED LATERALITY: ICD-10-CM

## 2020-04-30 DIAGNOSIS — I10 BENIGN ESSENTIAL HYPERTENSION: Primary | ICD-10-CM

## 2020-04-30 LAB
REF LAB TEST METHOD: NORMAL
SARS-COV-2 RNA RESP QL NAA+PROBE: NOT DETECTED

## 2020-04-30 RX ORDER — HYDROCHLOROTHIAZIDE 12.5 MG/1
12.5 CAPSULE, GELATIN COATED ORAL EVERY MORNING
Qty: 90 CAPSULE | Refills: 0 | Status: SHIPPED | OUTPATIENT
Start: 2020-04-30 | End: 2020-08-03

## 2020-07-01 DIAGNOSIS — E05.90 HYPERTHYROIDISM: ICD-10-CM

## 2020-07-01 DIAGNOSIS — R53.82 CHRONIC FATIGUE: ICD-10-CM

## 2020-07-01 DIAGNOSIS — R63.5 ABNORMAL WEIGHT GAIN: ICD-10-CM

## 2020-07-01 DIAGNOSIS — E04.2 NON-TOXIC MULTINODULAR GOITER: Primary | ICD-10-CM

## 2020-07-13 LAB
ALBUMIN SERPL-MCNC: 4.6 G/DL (ref 3.5–5.2)
ALBUMIN/GLOB SERPL: 2.3 G/DL
ALP SERPL-CCNC: 62 U/L (ref 39–117)
ALT SERPL-CCNC: 33 U/L (ref 1–33)
AST SERPL-CCNC: 34 U/L (ref 1–32)
BASOPHILS # BLD AUTO: 0.05 10*3/MM3 (ref 0–0.2)
BASOPHILS NFR BLD AUTO: 1 % (ref 0–1.5)
BILIRUB SERPL-MCNC: 0.5 MG/DL (ref 0–1.2)
BUN SERPL-MCNC: 13 MG/DL (ref 8–23)
BUN/CREAT SERPL: 19.7 (ref 7–25)
CALCIUM SERPL-MCNC: 9.6 MG/DL (ref 8.6–10.5)
CHLORIDE SERPL-SCNC: 92 MMOL/L (ref 98–107)
CO2 SERPL-SCNC: 28.2 MMOL/L (ref 22–29)
CREAT SERPL-MCNC: 0.66 MG/DL (ref 0.57–1)
EOSINOPHIL # BLD AUTO: 0.08 10*3/MM3 (ref 0–0.4)
EOSINOPHIL NFR BLD AUTO: 1.5 % (ref 0.3–6.2)
ERYTHROCYTE [DISTWIDTH] IN BLOOD BY AUTOMATED COUNT: 12.4 % (ref 12.3–15.4)
GLOBULIN SER CALC-MCNC: 2 GM/DL
GLUCOSE SERPL-MCNC: 114 MG/DL (ref 65–99)
HCT VFR BLD AUTO: 37.8 % (ref 34–46.6)
HGB BLD-MCNC: 12.7 G/DL (ref 12–15.9)
IMM GRANULOCYTES # BLD AUTO: 0.01 10*3/MM3 (ref 0–0.05)
IMM GRANULOCYTES NFR BLD AUTO: 0.2 % (ref 0–0.5)
LYMPHOCYTES # BLD AUTO: 1.37 10*3/MM3 (ref 0.7–3.1)
LYMPHOCYTES NFR BLD AUTO: 26.1 % (ref 19.6–45.3)
MCH RBC QN AUTO: 31.1 PG (ref 26.6–33)
MCHC RBC AUTO-ENTMCNC: 33.6 G/DL (ref 31.5–35.7)
MCV RBC AUTO: 92.6 FL (ref 79–97)
MONOCYTES # BLD AUTO: 0.62 10*3/MM3 (ref 0.1–0.9)
MONOCYTES NFR BLD AUTO: 11.8 % (ref 5–12)
NEUTROPHILS # BLD AUTO: 3.11 10*3/MM3 (ref 1.7–7)
NEUTROPHILS NFR BLD AUTO: 59.4 % (ref 42.7–76)
NRBC BLD AUTO-RTO: 0 /100 WBC (ref 0–0.2)
PLATELET # BLD AUTO: 213 10*3/MM3 (ref 140–450)
POTASSIUM SERPL-SCNC: 4.1 MMOL/L (ref 3.5–5.2)
PROT SERPL-MCNC: 6.6 G/DL (ref 6–8.5)
RBC # BLD AUTO: 4.08 10*6/MM3 (ref 3.77–5.28)
SODIUM SERPL-SCNC: 133 MMOL/L (ref 136–145)
T4 FREE SERPL-MCNC: 1.29 NG/DL (ref 0.93–1.7)
TSH SERPL DL<=0.005 MIU/L-ACNC: 0.01 UIU/ML (ref 0.27–4.2)
WBC # BLD AUTO: 5.24 10*3/MM3 (ref 3.4–10.8)

## 2020-07-27 ENCOUNTER — OFFICE VISIT (OUTPATIENT)
Dept: ENDOCRINOLOGY | Age: 75
End: 2020-07-27

## 2020-07-27 VITALS
WEIGHT: 177.4 LBS | DIASTOLIC BLOOD PRESSURE: 86 MMHG | HEIGHT: 68 IN | BODY MASS INDEX: 26.89 KG/M2 | SYSTOLIC BLOOD PRESSURE: 138 MMHG

## 2020-07-27 DIAGNOSIS — E05.90 HYPERTHYROIDISM: ICD-10-CM

## 2020-07-27 DIAGNOSIS — E04.2 NON-TOXIC MULTINODULAR GOITER: Primary | ICD-10-CM

## 2020-07-27 DIAGNOSIS — R63.5 ABNORMAL WEIGHT GAIN: ICD-10-CM

## 2020-07-27 DIAGNOSIS — R53.82 CHRONIC FATIGUE: ICD-10-CM

## 2020-07-27 PROCEDURE — 99214 OFFICE O/P EST MOD 30 MIN: CPT | Performed by: INTERNAL MEDICINE

## 2020-07-27 RX ORDER — METHIMAZOLE 5 MG/1
TABLET ORAL
Qty: 90 TABLET | Refills: 5 | Status: SHIPPED | OUTPATIENT
Start: 2020-07-27 | End: 2021-09-03 | Stop reason: SDUPTHER

## 2020-07-27 NOTE — PROGRESS NOTES
74 y.o.    Patient Care Team:  Bosler, James William III, MD as PCP - General (Internal Medicine)    Chief Complaint:  PT HERE FOR 6 MONTH FUP HYPERTHYROID DZ LAB REVIEW     Subjective     HPI   Patient is a 74-year-old white female with a history of hyperthyroidism and multinodular goiter came for follow-up    Hyperthyroidism  Currently taking methimazole 5 mg every other day  She denies any side effects of medication  She reports compliance  Multinodular goiter  Patient denies any difficulty swallowing or change in voice  Chronic fatigue  Patient continues to report fatigue but it has been for several years  Abnormal weight gain  Patient currently weighs 177 pounds and she actually lost 4 pounds since last visit  Patient reports her appetite is also low  She denies any palpitations or sweating episodes or tremors      Interval History      The following portions of the patient's history were reviewed and updated as appropriate: allergies, current medications, past family history, past medical history, past social history, past surgical history and problem list.    Past Medical History:   Diagnosis Date   • Arthritis    • Asthma    • Benign essential hypertension    • Cancer (CMS/HCC)     MELANOMA   • Cardiomyopathy (CMS/HCC)    • Hyperlipidemia    • Hyperthyroidism    • Mitral valve insufficiency    • PONV (postoperative nausea and vomiting)    • Seasonal allergies    • Urinary frequency      Family History   Problem Relation Age of Onset   • Breast cancer Other    • Diabetes Other    • Malig Hyperthermia Neg Hx      Social History     Socioeconomic History   • Marital status:      Spouse name: Not on file   • Number of children: Not on file   • Years of education: Not on file   • Highest education level: Not on file   Tobacco Use   • Smoking status: Former Smoker     Packs/day: 1.00     Years: 25.00     Pack years: 25.00     Types: Cigarettes     Last attempt to quit: 12/27/1985     Years since quitting:  34.6   • Smokeless tobacco: Never Used   Substance and Sexual Activity   • Alcohol use: Yes     Comment: DAILY COCKTAIL   • Drug use: No   • Sexual activity: Defer     No Known Allergies    Current Outpatient Medications:   •  albuterol sulfate  (90 Base) MCG/ACT inhaler, , Disp: , Rfl:   •  aspirin 81 MG chewable tablet, Chew 81 mg Every Morning. ON HOLD FOR SX., Disp: , Rfl:   •  azelastine (ASTELIN) 0.1 % nasal spray, , Disp: , Rfl:   •  carvedilol (COREG) 25 MG tablet, Take 1 tablet by mouth 2 (Two) Times a Day With Meals., Disp: 90 tablet, Rfl: 3  •  hydroCHLOROthiazide (MICROZIDE) 12.5 MG capsule, Take 1 capsule by mouth Every Morning., Disp: 90 capsule, Rfl: 0  •  irbesartan (AVAPRO) 150 MG tablet, Take 150 mg by mouth Daily., Disp: , Rfl:   •  losartan (COZAAR) 100 MG tablet, Take 100 mg by mouth Every Morning., Disp: , Rfl:   •  methIMAzole (TAPAZOLE) 5 MG tablet, TAKE ONE TABLET BY MOUTH ONCE DAILY FIVE DAYS OUT OF THE WEEK, Disp: 90 tablet, Rfl: 5  •  montelukast (SINGULAIR) 10 MG tablet, Take 1 tablet by mouth Every Morning., Disp: , Rfl:   •  Multiple Vitamins-Minerals (PRESERVISION AREDS 2 PO), Take  by mouth., Disp: , Rfl:   •  multivitamin-minerals (CENTRUM) tablet, Take 1 tablet by mouth Daily. ON HOLD FOR SX., Disp: , Rfl:   •  pravastatin (PRAVACHOL) 40 MG tablet, TAKE ONE TABLET BY MOUTH EVERY NIGHT AT BEDTIME, Disp: 30 tablet, Rfl: 0  •  triamcinolone (NASACORT) 55 MCG/ACT nasal inhaler, 2 sprays into each nostril Every Morning., Disp: , Rfl:   •  SSD 1 % cream, Apply 1 application topically As Needed (DRY SKIN TO EARS)., Disp: , Rfl:   •  sulindac (CLINORIL) 150 MG tablet, , Disp: , Rfl:         Review of Systems   Constitutional: Negative.    HENT: Negative.    Eyes: Negative.    Respiratory: Negative.    Cardiovascular: Negative.    Gastrointestinal: Negative.    Endocrine: Negative.    Genitourinary: Positive for frequency.   Musculoskeletal: Negative.    Skin: Negative.   "  Allergic/Immunologic: Negative.    Neurological: Negative.    Hematological: Negative.    Psychiatric/Behavioral: Positive for sleep disturbance.   All other systems reviewed and are negative.      Objective       Vitals:    07/27/20 1144   BP: 138/86   Weight: 80.5 kg (177 lb 6.4 oz)   Height: 172.7 cm (68\")     Body mass index is 26.97 kg/m².      Physical Exam  Results Review:     I reviewed the patient's new clinical results.    Medical records reviewed  Summary:      Orders Only on 07/01/2020   Component Date Value Ref Range Status   • WBC 07/13/2020 5.24  3.40 - 10.80 10*3/mm3 Final   • RBC 07/13/2020 4.08  3.77 - 5.28 10*6/mm3 Final   • Hemoglobin 07/13/2020 12.7  12.0 - 15.9 g/dL Final   • Hematocrit 07/13/2020 37.8  34.0 - 46.6 % Final   • MCV 07/13/2020 92.6  79.0 - 97.0 fL Final   • MCH 07/13/2020 31.1  26.6 - 33.0 pg Final   • MCHC 07/13/2020 33.6  31.5 - 35.7 g/dL Final   • RDW 07/13/2020 12.4  12.3 - 15.4 % Final   • Platelets 07/13/2020 213  140 - 450 10*3/mm3 Final   • Neutrophil Rel % 07/13/2020 59.4  42.7 - 76.0 % Final   • Lymphocyte Rel % 07/13/2020 26.1  19.6 - 45.3 % Final   • Monocyte Rel % 07/13/2020 11.8  5.0 - 12.0 % Final   • Eosinophil Rel % 07/13/2020 1.5  0.3 - 6.2 % Final   • Basophil Rel % 07/13/2020 1.0  0.0 - 1.5 % Final   • Neutrophils Absolute 07/13/2020 3.11  1.70 - 7.00 10*3/mm3 Final   • Lymphocytes Absolute 07/13/2020 1.37  0.70 - 3.10 10*3/mm3 Final   • Monocytes Absolute 07/13/2020 0.62  0.10 - 0.90 10*3/mm3 Final   • Eosinophils Absolute 07/13/2020 0.08  0.00 - 0.40 10*3/mm3 Final   • Basophils Absolute 07/13/2020 0.05  0.00 - 0.20 10*3/mm3 Final   • Immature Granulocyte Rel % 07/13/2020 0.2  0.0 - 0.5 % Final   • Immature Grans Absolute 07/13/2020 0.01  0.00 - 0.05 10*3/mm3 Final   • nRBC 07/13/2020 0.0  0.0 - 0.2 /100 WBC Final   • Glucose 07/13/2020 114* 65 - 99 mg/dL Final   • BUN 07/13/2020 13  8 - 23 mg/dL Final   • Creatinine 07/13/2020 0.66  0.57 - 1.00 mg/dL " Final   • eGFR Non  Am 07/13/2020 88  >60 mL/min/1.73 Final    Comment: The MDRD GFR formula is only valid for adults with stable  renal function between ages 18 and 70.     • eGFR  Am 07/13/2020 106  >60 mL/min/1.73 Final   • BUN/Creatinine Ratio 07/13/2020 19.7  7.0 - 25.0 Final   • Sodium 07/13/2020 133* 136 - 145 mmol/L Final   • Potassium 07/13/2020 4.1  3.5 - 5.2 mmol/L Final   • Chloride 07/13/2020 92* 98 - 107 mmol/L Final   • Total CO2 07/13/2020 28.2  22.0 - 29.0 mmol/L Final   • Calcium 07/13/2020 9.6  8.6 - 10.5 mg/dL Final   • Total Protein 07/13/2020 6.6  6.0 - 8.5 g/dL Final   • Albumin 07/13/2020 4.60  3.50 - 5.20 g/dL Final   • Globulin 07/13/2020 2.0  gm/dL Final   • A/G Ratio 07/13/2020 2.3  g/dL Final   • Total Bilirubin 07/13/2020 0.5  0.0 - 1.2 mg/dL Final   • Alkaline Phosphatase 07/13/2020 62  39 - 117 U/L Final   • AST (SGOT) 07/13/2020 34* 1 - 32 U/L Final   • ALT (SGPT) 07/13/2020 33  1 - 33 U/L Final   • TSH 07/13/2020 0.009* 0.270 - 4.200 uIU/mL Final   • Free T4 07/13/2020 1.29  0.93 - 1.70 ng/dL Final    Results may be falsely increased if patient taking Biotin.     No results found for: HGBA1C  Lab Results   Component Value Date    CREATININE 0.66 07/13/2020     Imaging Results (Most Recent)     None                Assessment and Plan:    Natalie was seen today for fatigue and hyperthyroidism.    Diagnoses and all orders for this visit:    Non-toxic multinodular goiter    Hyperthyroidism  -     methIMAzole (TAPAZOLE) 5 MG tablet; TAKE ONE TABLET BY MOUTH ONCE DAILY FIVE DAYS OUT OF THE WEEK  -     T4, Free; Standing  -     TSH; Standing  -     Comprehensive Metabolic Panel; Standing  -     CBC & Differential; Standing    Chronic fatigue    Abnormal weight gain      Lab reports reviewed  Patient's TSH is 0.009  Free T4 is 1.27  Patient was previously suppressed 2 other times in the past 5 years  I advised the patient to return to methimazole 5 mg 5 days of the week  She  "would skip on Wednesday and Sunday  Patient will get lab testing done in 3 months in 6 months  Patient return to follow-up in 6 months    Roque Boone MD. FACE    07/27/20      [facility] Dragon / transcription disclaimer:     \"Dictated utilizing Dragon dictation\".         "

## 2020-08-01 DIAGNOSIS — I10 BENIGN ESSENTIAL HYPERTENSION: ICD-10-CM

## 2020-08-01 DIAGNOSIS — R09.89 CAROTID BRUIT, UNSPECIFIED LATERALITY: ICD-10-CM

## 2020-08-03 RX ORDER — HYDROCHLOROTHIAZIDE 12.5 MG/1
CAPSULE, GELATIN COATED ORAL
Qty: 90 CAPSULE | Refills: 0 | Status: SHIPPED | OUTPATIENT
Start: 2020-08-03 | End: 2022-09-04 | Stop reason: HOSPADM

## 2020-08-20 ENCOUNTER — OFFICE (OUTPATIENT)
Dept: URBAN - METROPOLITAN AREA CLINIC 75 | Facility: CLINIC | Age: 75
End: 2020-08-20

## 2020-08-20 VITALS — WEIGHT: 170 LBS | HEIGHT: 68 IN | RESPIRATION RATE: 16 BRPM

## 2020-08-20 DIAGNOSIS — R19.4 CHANGE IN BOWEL HABIT: ICD-10-CM

## 2020-08-20 DIAGNOSIS — K92.1 MELENA: ICD-10-CM

## 2020-08-20 DIAGNOSIS — R10.9 UNSPECIFIED ABDOMINAL PAIN: ICD-10-CM

## 2020-08-20 PROCEDURE — 99204 OFFICE O/P NEW MOD 45 MIN: CPT | Performed by: INTERNAL MEDICINE

## 2020-09-04 ENCOUNTER — TELEPHONE (OUTPATIENT)
Dept: NEUROSURGERY | Facility: CLINIC | Age: 75
End: 2020-09-04

## 2020-09-04 NOTE — PROGRESS NOTES
Subjective   History of Present Illness: Natalie Rosen is a 74 y.o. female is being seen for consultation today via Telephone Visit at the request of James William Bosler II* for neck and back pain that radiates into her buttocks.    You have chosen to receive care through a telephone visit. Do you consent to use a telephone visit for your medical care today? Yes.    We did a telephone visit today.  The patient was at home and I was in the office.  We talked for 5 minutes.    History of Present Illness     This patient has been having fairly severe pain in her neck and in her lower back for most of this year.  Over the past several months it has gotten steadily worse.  The pain is located in her neck.  There is no radiation into her arms but there is some radiation into her shoulders.  The pain in the lower back radiates into her buttocks but not down her legs.  She has no difficulty with bowel bladder control or other associated symptoms.  She does not sense that she has any weakness.  She has had no specific treatment so far.    The following portions of the patient's history were reviewed and updated as appropriate: allergies, current medications, past family history, past medical history, past social history, past surgical history and problem list.    Review of Systems   Constitutional: Positive for activity change.   HENT: Negative.    Eyes: Negative.    Respiratory: Negative for chest tightness and shortness of breath.    Cardiovascular: Negative for chest pain.   Gastrointestinal: Negative.    Endocrine: Negative.    Genitourinary: Negative.    Musculoskeletal: Positive for back pain, myalgias, neck pain and neck stiffness.   Allergic/Immunologic: Negative.    Neurological: Negative.    Hematological: Negative.    Psychiatric/Behavioral: Negative.        I have reviewed the review of systems as documented by my MA.      Objective         Physical Exam   Constitutional: She is oriented to person, place, and  time.   Neurological: She is oriented to person, place, and time.     Neurologic Exam     Mental Status   Oriented to person, place, and time.           Assessment/Plan   Independent Review of Radiographic Studies:      I personally reviewed the images from the following studies.    There are no images to review    Medical Decision Making:      I told the patient that from my point of view we need to image her neck and her lumbar spine.  We will then schedule her for an in person follow-up visit afterwards so I can actually examine her.    Natalie was seen today for back pain and neck pain.    Diagnoses and all orders for this visit:    Neck pain  -     MRI Cervical Spine Without Contrast; Future    Chronic bilateral low back pain without sciatica  -     MRI Lumbar Spine Without Contrast; Future      Return for After radiology test.

## 2020-09-04 NOTE — TELEPHONE ENCOUNTER
Called the patient to complete her Covid Travel screening prior to her visit on Tuesday, She does not currently have any active symptoms and she was tested on Tuesday of this week and tested negative. However we would prefer to do a telephone visit with her just to be safe as she is not at least 14 days out from exposure. She is completely fine with a telephone visit and understands our reasoning.

## 2020-09-08 ENCOUNTER — OFFICE VISIT (OUTPATIENT)
Dept: NEUROSURGERY | Facility: CLINIC | Age: 75
End: 2020-09-08

## 2020-09-08 DIAGNOSIS — G89.29 CHRONIC BILATERAL LOW BACK PAIN WITHOUT SCIATICA: ICD-10-CM

## 2020-09-08 DIAGNOSIS — M54.2 NECK PAIN: Primary | ICD-10-CM

## 2020-09-08 DIAGNOSIS — M54.50 CHRONIC BILATERAL LOW BACK PAIN WITHOUT SCIATICA: ICD-10-CM

## 2020-09-08 PROCEDURE — 99441 PR PHYS/QHP TELEPHONE EVALUATION 5-10 MIN: CPT | Performed by: NEUROLOGICAL SURGERY

## 2020-09-10 ENCOUNTER — APPOINTMENT (OUTPATIENT)
Dept: WOMENS IMAGING | Facility: HOSPITAL | Age: 75
End: 2020-09-10

## 2020-09-10 PROCEDURE — 77063 BREAST TOMOSYNTHESIS BI: CPT | Performed by: RADIOLOGY

## 2020-09-10 PROCEDURE — 77067 SCR MAMMO BI INCL CAD: CPT | Performed by: RADIOLOGY

## 2020-09-30 ENCOUNTER — HOSPITAL ENCOUNTER (OUTPATIENT)
Dept: MRI IMAGING | Facility: HOSPITAL | Age: 75
Discharge: HOME | End: 2020-09-30

## 2020-09-30 DIAGNOSIS — M54.2 NECK PAIN: ICD-10-CM

## 2020-09-30 DIAGNOSIS — M54.50 CHRONIC BILATERAL LOW BACK PAIN WITHOUT SCIATICA: ICD-10-CM

## 2020-09-30 DIAGNOSIS — G89.29 CHRONIC BILATERAL LOW BACK PAIN WITHOUT SCIATICA: ICD-10-CM

## 2020-09-30 PROCEDURE — 72148 MRI LUMBAR SPINE W/O DYE: CPT

## 2020-09-30 PROCEDURE — 72141 MRI NECK SPINE W/O DYE: CPT

## 2020-10-01 ENCOUNTER — OFFICE VISIT (OUTPATIENT)
Dept: NEUROSURGERY | Facility: CLINIC | Age: 75
End: 2020-10-01

## 2020-10-01 VITALS — DIASTOLIC BLOOD PRESSURE: 76 MMHG | HEART RATE: 75 BPM | SYSTOLIC BLOOD PRESSURE: 149 MMHG | TEMPERATURE: 97.8 F

## 2020-10-01 DIAGNOSIS — M54.50 CHRONIC BILATERAL LOW BACK PAIN WITHOUT SCIATICA: Primary | ICD-10-CM

## 2020-10-01 DIAGNOSIS — M54.2 NECK PAIN: ICD-10-CM

## 2020-10-01 DIAGNOSIS — G89.29 CHRONIC BILATERAL LOW BACK PAIN WITHOUT SCIATICA: Primary | ICD-10-CM

## 2020-10-01 PROCEDURE — 99213 OFFICE O/P EST LOW 20 MIN: CPT | Performed by: NEUROLOGICAL SURGERY

## 2020-10-08 ENCOUNTER — OFFICE (OUTPATIENT)
Dept: URBAN - METROPOLITAN AREA LAB 2 | Facility: LAB | Age: 75
End: 2020-10-08
Payer: MEDICARE

## 2020-10-08 VITALS
HEART RATE: 89 BPM | SYSTOLIC BLOOD PRESSURE: 192 MMHG | SYSTOLIC BLOOD PRESSURE: 159 MMHG | TEMPERATURE: 98.1 F | HEIGHT: 68 IN | WEIGHT: 170 LBS | SYSTOLIC BLOOD PRESSURE: 158 MMHG | HEART RATE: 96 BPM | RESPIRATION RATE: 12 BRPM | HEART RATE: 94 BPM | OXYGEN SATURATION: 97 % | OXYGEN SATURATION: 99 % | DIASTOLIC BLOOD PRESSURE: 75 MMHG | DIASTOLIC BLOOD PRESSURE: 87 MMHG | DIASTOLIC BLOOD PRESSURE: 70 MMHG | OXYGEN SATURATION: 100 % | DIASTOLIC BLOOD PRESSURE: 93 MMHG | RESPIRATION RATE: 8 BRPM | HEART RATE: 93 BPM | RESPIRATION RATE: 17 BRPM | DIASTOLIC BLOOD PRESSURE: 67 MMHG | SYSTOLIC BLOOD PRESSURE: 152 MMHG | DIASTOLIC BLOOD PRESSURE: 76 MMHG | SYSTOLIC BLOOD PRESSURE: 145 MMHG | RESPIRATION RATE: 7 BRPM | HEART RATE: 103 BPM | RESPIRATION RATE: 23 BRPM | SYSTOLIC BLOOD PRESSURE: 165 MMHG | RESPIRATION RATE: 14 BRPM | SYSTOLIC BLOOD PRESSURE: 151 MMHG | DIASTOLIC BLOOD PRESSURE: 60 MMHG | DIASTOLIC BLOOD PRESSURE: 72 MMHG | DIASTOLIC BLOOD PRESSURE: 80 MMHG | HEART RATE: 87 BPM | SYSTOLIC BLOOD PRESSURE: 188 MMHG | RESPIRATION RATE: 16 BRPM | RESPIRATION RATE: 13 BRPM | HEART RATE: 85 BPM | TEMPERATURE: 97.5 F | HEART RATE: 82 BPM | RESPIRATION RATE: 15 BRPM | OXYGEN SATURATION: 98 % | OXYGEN SATURATION: 96 % | SYSTOLIC BLOOD PRESSURE: 172 MMHG | HEART RATE: 90 BPM | SYSTOLIC BLOOD PRESSURE: 163 MMHG | SYSTOLIC BLOOD PRESSURE: 190 MMHG | HEART RATE: 101 BPM

## 2020-10-08 DIAGNOSIS — Z20.828 CONTACT WITH AND (SUSPECTED) EXPOSURE TO OTHER VIRAL COMMUNI: ICD-10-CM

## 2020-10-08 PROCEDURE — U0002 COVID-19 LAB TEST NON-CDC: HCPCS | Performed by: INTERNAL MEDICINE

## 2020-10-12 ENCOUNTER — AMBULATORY SURGICAL CENTER (OUTPATIENT)
Dept: URBAN - METROPOLITAN AREA SURGERY 17 | Facility: SURGERY | Age: 75
End: 2020-10-12

## 2020-10-12 ENCOUNTER — OFFICE (OUTPATIENT)
Dept: URBAN - METROPOLITAN AREA PATHOLOGY 4 | Facility: PATHOLOGY | Age: 75
End: 2020-10-12

## 2020-10-12 DIAGNOSIS — K29.50 UNSPECIFIED CHRONIC GASTRITIS WITHOUT BLEEDING: ICD-10-CM

## 2020-10-12 DIAGNOSIS — K21.00 GASTRO-ESOPHAGEAL REFLUX DISEASE WITH ESOPHAGITIS, WITHOUT B: ICD-10-CM

## 2020-10-12 DIAGNOSIS — K92.1 MELENA: ICD-10-CM

## 2020-10-12 DIAGNOSIS — R19.4 CHANGE IN BOWEL HABIT: ICD-10-CM

## 2020-10-12 DIAGNOSIS — R10.9 UNSPECIFIED ABDOMINAL PAIN: ICD-10-CM

## 2020-10-12 DIAGNOSIS — K59.00 CONSTIPATION, UNSPECIFIED: ICD-10-CM

## 2020-10-12 PROBLEM — K22.70 BARRETT'S ESOPHAGUS WITHOUT DYSPLASIA: Status: ACTIVE | Noted: 2020-10-12

## 2020-10-12 PROBLEM — K31.89 OTHER DISEASES OF STOMACH AND DUODENUM: Status: ACTIVE | Noted: 2020-10-12

## 2020-10-12 LAB
GI HISTOLOGY: A. UNSPECIFIED: (no result)
GI HISTOLOGY: B. UNSPECIFIED: (no result)
GI HISTOLOGY: C. SELECT: (no result)
GI HISTOLOGY: D. SELECT: (no result)
GI HISTOLOGY: PDF REPORT: (no result)

## 2020-10-12 PROCEDURE — 45378 DIAGNOSTIC COLONOSCOPY: CPT | Performed by: INTERNAL MEDICINE

## 2020-10-12 PROCEDURE — 43239 EGD BIOPSY SINGLE/MULTIPLE: CPT | Performed by: INTERNAL MEDICINE

## 2020-10-12 PROCEDURE — 88305 TISSUE EXAM BY PATHOLOGIST: CPT | Performed by: INTERNAL MEDICINE

## 2020-10-16 ENCOUNTER — RESULTS ENCOUNTER (OUTPATIENT)
Dept: ENDOCRINOLOGY | Age: 75
End: 2020-10-16

## 2020-10-16 DIAGNOSIS — E05.90 HYPERTHYROIDISM: ICD-10-CM

## 2020-10-19 NOTE — PROGRESS NOTES
The patient has a pain history of the following:  Chronic bilateral low back pain  Neck pain    Previous interventions that the patient has received include:   None    Pain medications include:  Tylenol arthritis   Aleve     Other conservative modalities which the patient reports using include:  Physical Therapy: no, just for knee   Chiropractor: yes - helped sometimes   Massage Therapy: no  Neck or back surgery: no  Past pain management: no    Past Significant Surgical History:  None     HPI:       CHIEF COMPLAINT: Back Pain and Neck Pain    Natalie Rosen is a 75 y.o. female referred here by Hung Stock MD. Natalie Rosen presents to the office for evaluation and treatment of Back Pain and Neck Pain      Onset:  10 years   Inciting Event:  None  Location:  Low back   Pain: Pain described as cramping and tightness. Located in the low back  and does radiate into the bilateral buttocks.  Severity:  Pain rated as a 6 /10.  Apportions pain as 100%  back pain and 0% extremity pain.  Symptoms have been constant.  Exacerbation:  None.   Alleviation:  None.  Associated Symptoms:   She denies any new onset of bowel or bladder weakness, significant leg weakness or saddle anesthesia. Denies balance problems or lower extremity incoordination.  Ambulates: Without assistive device  Limitations: This pain limits the patient from nothing, it just makes cooking & dishes uncomfortable, steps are difficult  Goals: Functional goals include ability to do the above with more ease.     Her neck pain has been painful all year.  It is painful on the left side without radiating pain into the shoulder.  She describes it as an achy pain.  There are no exacerbating or alleviating factors.        PEG Assessment   What number best describes your pain on average in the past week?7  What number best describes how, during the past week, pain has interfered with your enjoyment of life?0  What number best describes how, during the past week, pain  has interfered with your general activity?  4        Current Outpatient Medications:   •  albuterol sulfate  (90 Base) MCG/ACT inhaler, , Disp: , Rfl:   •  aspirin 81 MG chewable tablet, Chew 81 mg Every Morning. ON HOLD FOR SX., Disp: , Rfl:   •  azelastine (ASTELIN) 0.1 % nasal spray, , Disp: , Rfl:   •  Carbinoxamine Maleate 4 MG tablet, , Disp: , Rfl:   •  carvedilol (COREG) 25 MG tablet, Take 1 tablet by mouth 2 (Two) Times a Day With Meals., Disp: 90 tablet, Rfl: 3  •  hydroCHLOROthiazide (MICROZIDE) 12.5 MG capsule, TAKE ONE CAPSULE BY MOUTH EVERY MORNING, Disp: 90 capsule, Rfl: 0  •  irbesartan (AVAPRO) 150 MG tablet, Take 150 mg by mouth Daily., Disp: , Rfl:   •  losartan (COZAAR) 100 MG tablet, Take 100 mg by mouth Every Morning., Disp: , Rfl:   •  methIMAzole (TAPAZOLE) 5 MG tablet, TAKE ONE TABLET BY MOUTH ONCE DAILY FIVE DAYS OUT OF THE WEEK, Disp: 90 tablet, Rfl: 5  •  montelukast (SINGULAIR) 10 MG tablet, Take 1 tablet by mouth Every Morning., Disp: , Rfl:   •  Multiple Vitamins-Minerals (PRESERVISION AREDS 2 PO), Take  by mouth., Disp: , Rfl:   •  multivitamin-minerals (CENTRUM) tablet, Take 1 tablet by mouth Daily. ON HOLD FOR SX., Disp: , Rfl:   •  omeprazole (priLOSEC) 40 MG capsule, , Disp: , Rfl:   •  pravastatin (PRAVACHOL) 40 MG tablet, TAKE ONE TABLET BY MOUTH EVERY NIGHT AT BEDTIME, Disp: 30 tablet, Rfl: 0  •  SSD 1 % cream, Apply 1 application topically As Needed (DRY SKIN TO EARS)., Disp: , Rfl:   •  triamcinolone (NASACORT) 55 MCG/ACT nasal inhaler, 2 sprays into each nostril Every Morning., Disp: , Rfl:   •  diclofenac (VOLTAREN) 1 % gel gel, Apply 4 g topically to the appropriate area as directed 4 (Four) Times a Day., Disp: 100 g, Rfl: 3    The following portions of the patient's history were reviewed and updated as appropriate: allergies, current medications, past family history, past medical history, past social history, past surgical history and problem list.      REVIEW OF  PERTINENT MEDICAL DATA    9/30/2020 Cervical and Lumbar MRI: FINDINGS:     There is a mild degree of dextroconvex scoliotic curvature of the lumbar  spine with its apex centered at L3. There are pronounced degenerative  disc changes identified at L1-2, L2-3, L3-4, L4-5, and L5-S1. Adjacent  degenerative endplate marrow edema is seen at all of these levels with  the exception of the L2-3 level. Degenerative endplate marrow edema is  most prominently seen at the L1-2, L3-4, and L5-S1 levels.     The conus medullaris terminates at the level of the lower body of L1.     At L1-2, there is a disc bulge which minimally indents the ventral  subarachnoid space. There is minimal foraminal narrowing.     At L2-3, again there is minimal canal and foraminal narrowing secondary  to bulging disc material.     At L3-4, there is mild-to-moderate facet hypertrophic change. There is a  mild degree of left foraminal narrowing secondary to a disc osteophyte  complex. Minimal degree of right foraminal narrowing is noted. Minimal  canal stenosis is seen.     At L4-5, there is mild-to-moderate facet hypertrophic change. There is a  minimal degree of canal and foraminal narrowing at the L4-5 level  secondary to a disc osteophyte complex and facet arthropathy.     At L5-S1, there is mild-to-moderate facet arthropathy. There is a mild  degree of right foraminal narrowing secondary to bulging disc material.    IMPRESSION:     Mild dextroconvex scoliotic curvature of the lumbar spine is seen with  its apex centered at L3. There are pronounced degenerative disc changes  seen from L1-2 down to L5-S1. Degenerative endplate marrow edema is most  prominently seen at the L1-2, L3-4, and L5-S1 levels. Relatively mild  degrees of foraminal narrowing and minimal degrees of canal narrowing  are identified as discussed in detail above.     MRI of the cervical spine was obtained with sagittal T1, gradient echo,  and T2-weighted images. Additionally, there  are axial gradient echo and  oblique sagittal T2-weighted images through the cervical spine.     FINDINGS:     At C2-3, significant canal stenosis. Facet arthropathy is identified  bilaterally minimally narrowing the neural foramina. There is arthritic  marrow edema noted within the left posterior elements of C2 and C3 and  prominent fluid signal intensity is seen within the left C2-3 facet  joint compatible with facet synovitis.      At C3-4, there is mild left foraminal narrowing secondary to facet  arthropathy. There is no significant degree of right foraminal narrowing  or canal stenosis.     At C4-5, no significant canal or foraminal narrowing is appreciated.     At C5-6, there are advanced degenerative disc changes. There is a disc  osteophyte complex eccentric to the right which results in a mild degree  of canal stenosis. There is moderate right foraminal narrowing secondary  to uncovertebral joint hypertrophy. Minimal left foraminal narrowing is  identified.     At C6-7, there is a disc osteophyte complex which results in mild canal  stenosis. There is mild foraminal narrowing secondary to uncovertebral  joint hypertrophy. Prominent degenerative disc changes are also  appreciated at the C6-7 level     At C7-T1, there is no significant degree of canal or foraminal  narrowing.     The cervical spinal cord has normal signal intensity. The visualized  contents of the cranial vault are unremarkable. Incidental note is made  of degenerative anterior spondylolisthesis of T1 on T2 by approximately  2-3 mm. Prominent facet arthritic changes are noted bilaterally at the  T1-2 level.     IMPRESSION:     There is prominent facet arthritis involving the left C2-3 facet joint  with arthritic marrow edema identified within the left posterior  elements of C2 and C3.     Prominent degenerative disc changes are noted at C5-6 and C6-7 where  disc osteophyte complex results in relatively mild degrees of canal  narrowing.    "  There is a moderate degree of right C5-6 foraminal narrowing secondary  to uncovertebral joint hypertrophy. Other lesser degrees of multilevel  foraminal narrowing are as discussed in detail above.    7/13/2020 Creatinine 0.66, Platelets 213 (10*3)    Review of Systems   Constitutional: Negative for fatigue.   HENT: Positive for congestion.    Eyes: Negative for visual disturbance.   Respiratory: Negative for cough, shortness of breath and wheezing.    Cardiovascular: Negative.    Gastrointestinal: Negative for constipation and diarrhea.   Genitourinary: Negative for difficulty urinating.   Musculoskeletal: Positive for back pain and neck pain.   Neurological: Negative for weakness and numbness.   Psychiatric/Behavioral: Negative for sleep disturbance and suicidal ideas. The patient is not nervous/anxious.      I have reviewed and confirmed the accuracy of the ROS as documented by the MA/LPN/RN Kathy Pineda MD      Vitals:    10/22/20 1022   BP: 142/71   Pulse: 84   Resp: 18   Temp: 96.6 °F (35.9 °C)   SpO2: 94%   Weight: 80.2 kg (176 lb 12.8 oz)   Height: 172.7 cm (68\")   PainSc:   6   PainLoc: Back         Objective     Physical Exam  Vitals signs reviewed.   Constitutional:       General: She is not in acute distress.  HENT:      Head: Normocephalic.      Ears:      Comments: Hearing normal  Eyes:      General: No scleral icterus.     Conjunctiva/sclera: Conjunctivae normal.   Neck:      Comments: Cervical Examination:  Appearance: Posterior scarring absent.  Spurling's test is negative, bilateral.  Cervical paravertebral regions and transverse processes are tender.  Greater occipital nerves are not tender to palpation, bilateral. The Cervical Paraspinous muscles are tender to palpation, left.    Cardiovascular:      Rate and Rhythm: Normal rate.      Pulses: Normal pulses.   Pulmonary:      Effort: Pulmonary effort is normal. No respiratory distress.   Musculoskeletal:      Comments: Ambulation: Without " assistive device  Lumbar Exam:  Appearance: Scoliotic curve absent and scarring absent  Palpated over lumbosacral paravertebral regions and transverse processes with positive tenderness appreciated, Bilateral.   Sacroiliac joints are tender, Bilateral.  Trochanteric bursa are not tender, Bilateral.  Straight leg raise is negative radiculopathy, Bilateral.  Slump test is negative  radiculopathy, Bilateral.  Facet loading is positive for pain, Bilateral.  Paraspinal/adjacent lumbar musculature are not tender to palpation, Bilateral.  Nael Michael's test is negative sacroiliac pain, Bilateral.     Skin:     General: Skin is warm and dry.   Neurological:      General: No focal deficit present.      Mental Status: She is alert and oriented to person, place, and time.   Psychiatric:         Mood and Affect: Mood normal.         Thought Content: Thought content normal.       Trigger Point Injection.      Patient reassessed immediately prior to procedure    Patient location during procedure: Pain Clinic    Reason for block: procedure for pain  Performed by  Anesthesiologist: Kathy Pineda MD  Preanesthetic Checklist  Completed: patient identified, surgical consent, pre-op evaluation, timeout performed and risks and benefits discussed  Prep:  Patient Position:sitting  Sterile Tech:gloves and mask  Prep:Chloraprep  Procedure:  Sedation:no  Guidance:palpation technique  Location:cervical  Muscle Group:1  Needle Gauge:25 G  Aspiration:negative  Medications:  Depomedrol:40  Comment:0.25% bupivacaine 4mL    Post Assessment  Injection Assessment: negative  Patient Tolerance:comfortable throughout block  Complications:no  Additional Notes  Left cervical paraspinous muscle region      Bupivacaine 0.25% Batch: [id number], Exp: Dec 2022  Methylprednisolone 40mg Lot: [id number] Exp: 10/21    Assessment/Plan   Diagnoses and all orders for this visit:    1. Myofasciitis (Primary)  -     diclofenac (VOLTAREN) 1 % gel gel; Apply 4 g  topically to the appropriate area as directed 4 (Four) Times a Day.  Dispense: 100 g; Refill: 3  -     Trigger Point Injection.    2. Spondylosis of lumbar region without myelopathy or radiculopathy  -     diclofenac (VOLTAREN) 1 % gel gel; Apply 4 g topically to the appropriate area as directed 4 (Four) Times a Day.  Dispense: 100 g; Refill: 3  -     Case Request    3. Lumbar facet joint syndrome  -     diclofenac (VOLTAREN) 1 % gel gel; Apply 4 g topically to the appropriate area as directed 4 (Four) Times a Day.  Dispense: 100 g; Refill: 3  -     Case Request    4. Cervical spondylosis without myelopathy  -     diclofenac (VOLTAREN) 1 % gel gel; Apply 4 g topically to the appropriate area as directed 4 (Four) Times a Day.  Dispense: 100 g; Refill: 3        - Baseline urine drug screen was obtained.  - Pertinent labs reviewed.   - Pertinent imaging reviewed.   - Voltaren gel prescribed to use over painful areas.   - Performed left cervical paraspinous muscle trigger point injection today.  Risks discussed.   - Will schedule for bilateral L3-S1 Medial branch blocks x2 1-2 weeks apart.  If good results, plan for Radiofrequency ablation.  Risks discussed.     --- Follow-up for bilateral L3-S1 Medial branch blocks x2 1-2 weeks apart then 1-2 week office visit.            KRISSY REPORT    KRISSY report has been reviewed and scanned into the patient's chart.    As the clinician, I personally reviewed the KRISSY from 10/22/2020 while the patient was in the office today.    While examining this patient, I wore protective equipment including a mask, eye shield and gloves.  I washed my hands before and after this patient encounter.  The patient wore a mask throughout the visit as well.     Kathy Pineda MD  Pain Management

## 2020-10-22 ENCOUNTER — OFFICE VISIT (OUTPATIENT)
Dept: PAIN MEDICINE | Facility: CLINIC | Age: 75
End: 2020-10-22

## 2020-10-22 VITALS
SYSTOLIC BLOOD PRESSURE: 142 MMHG | TEMPERATURE: 96.6 F | BODY MASS INDEX: 26.8 KG/M2 | DIASTOLIC BLOOD PRESSURE: 71 MMHG | HEART RATE: 84 BPM | HEIGHT: 68 IN | OXYGEN SATURATION: 94 % | WEIGHT: 176.8 LBS | RESPIRATION RATE: 18 BRPM

## 2020-10-22 DIAGNOSIS — M47.816 LUMBAR FACET JOINT SYNDROME: ICD-10-CM

## 2020-10-22 DIAGNOSIS — M47.816 SPONDYLOSIS OF LUMBAR REGION WITHOUT MYELOPATHY OR RADICULOPATHY: ICD-10-CM

## 2020-10-22 DIAGNOSIS — M60.9 MYOFASCIITIS: Primary | ICD-10-CM

## 2020-10-22 DIAGNOSIS — M47.812 CERVICAL SPONDYLOSIS WITHOUT MYELOPATHY: ICD-10-CM

## 2020-10-22 LAB
POC AMPHETAMINES: NEGATIVE
POC BARBITURATES: NEGATIVE
POC BENZODIAZEPHINES: NEGATIVE
POC COCAINE: NEGATIVE
POC METHADONE: NEGATIVE
POC METHAMPHETAMINE SCREEN URINE: NEGATIVE
POC OPIATES: NEGATIVE
POC OXYCODONE: NEGATIVE
POC PHENCYCLIDINE: NEGATIVE
POC PROPOXYPHENE: NEGATIVE
POC THC: NEGATIVE
POC TRICYCLIC ANTIDEPRESSANTS: NEGATIVE

## 2020-10-22 PROCEDURE — 80305 DRUG TEST PRSMV DIR OPT OBS: CPT | Performed by: ANESTHESIOLOGY

## 2020-10-22 PROCEDURE — 99204 OFFICE O/P NEW MOD 45 MIN: CPT | Performed by: ANESTHESIOLOGY

## 2020-10-22 PROCEDURE — 20552 NJX 1/MLT TRIGGER POINT 1/2: CPT | Performed by: ANESTHESIOLOGY

## 2020-10-22 RX ORDER — OMEPRAZOLE 40 MG/1
40 CAPSULE, DELAYED RELEASE ORAL DAILY PRN
COMMUNITY
Start: 2020-10-12

## 2020-10-22 RX ORDER — CARBINOXAMINE MALEATE 4 MG/1
4 TABLET ORAL DAILY
COMMUNITY
Start: 2020-10-10 | End: 2023-03-20 | Stop reason: HOSPADM

## 2020-10-22 NOTE — PATIENT INSTRUCTIONS
Facet Syndrome    Facet syndrome is a condition in which joints (facet joints) that connect the bones of the spine (vertebrae) become damaged. Facet joints help the spine move, and they wear down (degenerate) or become inflamed as a person gets older. This can cause pain and stiffness in the neck (cervical facet syndrome) or in the lower back (lumbar facet syndrome).  When a facet joint becomes damaged, a vertebra may slip forward, out of its normal place in the spine. Damage to a facet joint can also damage nerves near the spine, which can cause tingling or weakness in the arms or legs. Facet syndrome can make it difficult to turn the head or bend backward without pain. This condition usually gets worse over time.  What are the causes?  Common causes of this condition include:  · Age-related inflammation of the facet joints (arthritis) that may create extra bone on the joint surface (bone spurs).  · Age-related decrease in space between the vertebrae (disk degeneration and cartilage degeneration).  · Repetitive stress on the spine, such as repetitive twisting of the back.  · Injury to the back or neck.  · Poor posture.  · Being overweight or obese.  What increases the risk?  The following factors may make you more likely to develop this condition:  · Playing contact sports.  · Doing activities or sports that involve repetitive twisting motions or repetitive heavy lifting.  · Having poor back strength and flexibility.  · Having another back or spine condition, such as scoliosis.  What are the signs or symptoms?  Symptoms of facet syndrome may include:  · An ache in the neck or lower back. This may get worse when you twist or arch your back, or when you look up.  · Stiffness in the neck or lower back.  · Numbness, tingling, or weakness in the arms or legs.  Symptoms of cervical facet syndrome may include:  · Headache.  · Pain in the back of the head.  · Pain in the shoulder blades.  Symptoms of lumbar facet syndrome  may include pain in any of the following areas:  · Groin.  · Thighs.  · Lower back.  · Buttocks.  · Hips.  How is this diagnosed?  This condition may be diagnosed based on:  · Your symptoms.  · Your medical history.  · A physical exam.  · Imaging tests, such as:  ? X-rays.  ? MRI.  · A procedure in which medicines to numb the area (local anesthetic) and medicines to reduce inflammation (steroids) are injected into your affected joint (facet joint block). This helps to diagnose and may relieve pain.  How is this treated?  This condition may be treated by:  · Stopping or modifying activities that make your condition worse.  · Taking medicines that help reduce pain and inflammation.  · Having steroid injections to help reduce severe pain.  · Doing physical therapy.  · Following a weight-control plan.  · Having a procedure called radiofrequency ablation. This is a surgical procedure that uses high-frequency radio waves to block signals from affected nerves.  · Having surgery to stabilize your spine or to take pressure off your nerves. This is rare.  Follow these instructions at home:  Medicines  · Take over-the-counter and prescription medicines only as told by your health care provider.  · Ask your health care provider if the medicine prescribed to you:  ? Requires you to avoid driving or using heavy machinery.  ? Can cause constipation. You may need to take actions to prevent or treat constipation, such as:  § Drink enough fluid to keep your urine pale yellow.  § Take over-the-counter or prescription medicines.  § Eat foods that are high in fiber, such as beans, whole grains, and fresh fruits and vegetables.  § Limit foods that are high in fat and processed sugars, such as fried or sweet foods.  Activity  · Rest your neck and back as told by your health care provider.  · Return to your normal activities as told by your health care provider. Ask your health care provider what activities are safe for you.  · If physical  therapy was prescribed, do exercises as told by your health care provider.  General instructions    · Do not use any products that contain nicotine or tobacco, such as cigarettes, e-cigarettes, and chewing tobacco. These can delay healing. If you need help quitting, ask your health care provider.  · Use good posture throughout your daily activities. Good posture means that your spine is in its natural S-curve position (your spine is neutral), your shoulders are pulled back slightly, and your head is not forward.  · Maintain a healthy weight. Follow instructions from your health care provider for weight control. These may include dietary restrictions.  · Keep all follow-up visits as told by your health care provider. This is important.  Contact a health care provider if you have:  · Symptoms that get worse or do not improve in 2-4 weeks of treatment.  · New symptoms.  Get help right away if you:  · Have numbness or weakness in any part of your body.  · Lose control over your bladder or bowel functions.  Summary  · Facet syndrome is a condition in which joints (facet joints) that connect the bones of the spine (vertebrae) become damaged. This can cause pain and stiffness in the neck (cervical facet syndrome) or in the lower back (lumbar facet syndrome).  · Rest your neck and back as told by your health care provider.  · If physical therapy was prescribed, do exercises as told by your health care provider.  · Take over-the-counter and prescription medicines only as told by your health care provider.  · Contact a health care provider if you have symptoms that get worse or do not improve in 2-4 weeks of treatment, or if you have new symptoms.  This information is not intended to replace advice given to you by your health care provider. Make sure you discuss any questions you have with your health care provider.  Document Released: 12/18/2006 Document Revised: 11/26/2019 Document Reviewed: 12/01/2019  [employer] Patient  Education © 2020 [employer]    Medial Branch Nerve Block    Medial branch nerve block is a procedure to numb the nerves that supply the joints between your spinal bones (facet joints). The facet joints are located on the back of your spine. You may have the procedure on your neck or your upper, middle, or lower spine.  During this procedure, your health care provider will inject a numbing medicine (local anesthetic) around the medial nerves near the facet joint being treated. If more than one facet joint is causing pain, you may have more than one injection. In most cases, an anti-inflammatory medicine (steroid) will also be injected. You may need this procedure if:  · You have back pain from wear and tear (osteoarthritis) of your facet joint.  · You have an injury to a facet joint.  · Your health care provider wants to diagnose a facet joint as the cause of your pain. If the procedure relieves the pain, this indicates that the facet joint was the cause.  The local anesthetic will relieve pain for several days. The steroid may continue to relieve pain for several weeks. If your pain returns when the medicines wear off, this procedure may be repeated.  Tell a health care provider about:  · Any allergies you have.  · All medicines you are taking, including vitamins, herbs, eye drops, creams, and over-the-counter medicines.  · Any problems you or family members have had with anesthetic medicines.  · Any blood disorders you have.  · Any surgeries you have had.  · Any medical conditions you have.  · Whether you are pregnant or may be pregnant.  What are the risks?  Generally, this is a safe procedure. However, problems may occur, including:  · Infection.  · Bleeding.  · Allergic reactions to medicines or dyes.  · Damage to other structures or organs.  · Injection of the anesthetic into a blood vessel, which may decrease blood supply to your spinal cord and cause damage.  · Spread of the anesthetic to nearby nerves,  which may cause temporary weakness or numbness.  What happens before the procedure?  · Ask your health care provider about:  ? Changing or stopping your regular medicines. This is especially important if you are taking diabetes medicines or blood thinners.  ? Taking medicines such as aspirin and ibuprofen. These medicines can thin your blood. Do not take these medicines unless your health care provider tells you to take them.  ? Taking over-the-counter medicines, vitamins, herbs, and supplements.  · Plan to have someone take you home from the hospital or clinic.  · Follow instructions from your health care provider about any eating or drinking restrictions before the procedure.  · Ask your health care provider what steps will be taken to help prevent infection. These may include:  ? Removing hair at the injection site.  ? Washing skin with a germ-killing soap.  What happens during the procedure?  · An IV may be inserted into one of your veins.  · You will be given one or more of the following:  ? A medicine to help you relax (sedative).  ? A medicine to numb the area (local anesthetic). Your health care provider will feel for the facet joint or joints that are causing pain and inject a short-acting local anesthetic into the skin over the joint or joints.  · Your health care provider will then pass a needle into the area around the facet joint.  · Your health care provider may use a type of X-ray (fluoroscopy) to look at images of your spinal cord. If so, the health care provider will inject a small amount of dye into the facet joint area. The dye will show up on fluoroscopy and help locate the exact area to inject the long-acting anesthetic.  · The needle will be removed, and a bandage will be placed over the injection site.  The procedure may vary among health care providers and hospitals.  What can I expect after the procedure?  · Your blood pressure, heart rate, breathing rate, and blood oxygen level will be  monitored until you leave the hospital or clinic.  · You should feel less pain in your back.  · You may have some soreness around the injection site.  Follow these instructions at home:  Injection site care  · Leave your bandage on for 24 hours.  · Do not take baths, swim, or use a hot tub until your health care provider approves.  · Check your injection site every day for signs of infection. Check for:  ? Redness, swelling, or pain.  ? Fluid or blood.  ? Warmth.  ? Pus or a bad smell.  · If directed, put ice on the injection area:  ? Put ice in a plastic bag.  ? Place a towel between your skin and the bag.  ? Leave the ice on for 20 minutes, 2-3 times a day.  General instructions  · Take over-the-counter and prescription medicines only as told by your health care provider.  · Do not drive for 24 hours if you were given a sedative during the procedure.  · Return to your normal activities as told by your health care provider. Ask your health care provider what activities are safe for you.  · Keep a log of your pain after the procedure. Keep track of how much pain you have and when you have it. This will help your health care provider plan your future treatment.  ? You should have relief of pain from the anesthetic for up to 3 days.  ? After that you may notice some pain again until the steroid starts to help. Pain relief from the steroid may last for a few weeks.  · Keep all follow-up visits as told by your health care provider. This is important.  Contact your health care provider if:  · Your pain is not relieved or gets worse at home.  · You have a fever or chills.  · You have any signs of infection.  · You develop any numbness or weakness.  Summary  · Medial branch nerve block is a procedure to numb the nerves that supply the joints between your spinal bones (facet joint).  · You may have the procedure on your neck or your upper, middle, or lower spine.  · This procedure may be done both to diagnose and relieve  facet joint pain.  This information is not intended to replace advice given to you by your health care provider. Make sure you discuss any questions you have with your health care provider.  Document Released: 08/22/2019 Document Revised: 04/10/2020 Document Reviewed: 08/22/2019  [employer] Patient Education © 2020 [employer]

## 2020-10-27 ENCOUNTER — RESULTS ENCOUNTER (OUTPATIENT)
Dept: PAIN MEDICINE | Facility: CLINIC | Age: 75
End: 2020-10-27

## 2020-10-27 DIAGNOSIS — M47.816 LUMBAR FACET JOINT SYNDROME: ICD-10-CM

## 2020-10-27 DIAGNOSIS — M60.9 MYOFASCIITIS: ICD-10-CM

## 2020-10-27 DIAGNOSIS — M47.812 CERVICAL SPONDYLOSIS WITHOUT MYELOPATHY: ICD-10-CM

## 2020-10-27 DIAGNOSIS — M47.816 SPONDYLOSIS OF LUMBAR REGION WITHOUT MYELOPATHY OR RADICULOPATHY: ICD-10-CM

## 2020-11-05 ENCOUNTER — LAB REQUISITION (OUTPATIENT)
Dept: LAB | Facility: HOSPITAL | Age: 75
End: 2020-11-05

## 2020-11-05 DIAGNOSIS — Z00.00 ENCOUNTER FOR GENERAL ADULT MEDICAL EXAMINATION WITHOUT ABNORMAL FINDINGS: ICD-10-CM

## 2020-11-06 PROCEDURE — U0004 COV-19 TEST NON-CDC HGH THRU: HCPCS | Performed by: ANESTHESIOLOGY

## 2020-11-07 LAB — SARS-COV-2 RNA RESP QL NAA+PROBE: NOT DETECTED

## 2020-11-09 ENCOUNTER — OUTSIDE FACILITY SERVICE (OUTPATIENT)
Dept: PAIN MEDICINE | Facility: CLINIC | Age: 75
End: 2020-11-09

## 2020-11-09 ENCOUNTER — DOCUMENTATION (OUTPATIENT)
Dept: PAIN MEDICINE | Facility: CLINIC | Age: 75
End: 2020-11-09

## 2020-11-09 PROCEDURE — 64494 INJ PARAVERT F JNT L/S 2 LEV: CPT | Performed by: ANESTHESIOLOGY

## 2020-11-09 PROCEDURE — 64493 INJ PARAVERT F JNT L/S 1 LEV: CPT | Performed by: ANESTHESIOLOGY

## 2020-11-09 PROCEDURE — 64495 INJ PARAVERT F JNT L/S 3 LEV: CPT | Performed by: ANESTHESIOLOGY

## 2020-11-09 NOTE — PROGRESS NOTES
Bilateral L3-S1 Lumbar Medial Branch Blockade  [facility]      PREOPERATIVE DIAGNOSIS:  Lumbar spondylosis without myelopathy    POSTOPERATIVE DIAGNOSIS:  Lumbar spondylosis without myelopathy    PROCEDURE:   Diagnostic Lumbar Medial Branch Nerve Blockades, with fluoroscopy:  at the L3, L4, L5 transverse processes and the sacral alar groove)   1. 56243-52 -- Lumbar Facet blocks, 1st Level  2. 26731-03 -- Lumbar Facet blocks, 2nd  Level  3. [id number] -- Lumbar Facet blocks, 3rd Level     PRE-PROCEDURE DISCUSSION WITH PATIENT:    Risks and complications were discussed with the patient prior to starting the procedure and informed consent was obtained.      SURGEON:  Kathy Pineda MD    REASON FOR PROCEDURE:    The patient complains of pain that seems to have a significant axial component, Tenderness of the affected facet joints on palpation, Increased back pain on range of motion exams and Pain on extension of the lumbar spine    SEDATION:  Patient declined administration of moderate sedation    ANESTHETIC:  0.25% bupivacaine  STEROID:  None  TOTAL VOLUME OF SOLUTION:  8ml    DESCRIPTON OF PROCEDURE:  After obtaining informed consent, IV access was not obtained in the preoperative area.   The patient was taken to the operating room.  The patient was placed in the prone position with a pillow under the abdomen. All pressure points were well padded.  EKG, blood pressure, and pulse oximeter were monitored.  The patient was monitored and sedated by the RN under my direction. The lumbosacral area was prepped with Chloraprep and draped in a sterile fashion.     AP fluoroscopic image was used to visualize the junction of the right S1 superior articular process with the sacral ala.  The skin and subcutaneous tissue over the area was anesthetized with 1% lidocaine.  A 22-gauge spinal needle was then advanced percutaneously through the anesthetized skin tract under fluoroscopic guidance in a coaxial view to  contact periosteum.  After negative aspiration, a volume of 1 mL of the local anesthetic was injected without resistance.  The image was then optimized to maximize visualization of the junctions of the L3, L4, L5 superior articular processes with the transverse processes.  The skin and subcutaneous tissue over the areas was anesthetized with 1% lidocaine.  A 22-gauge spinal needle was then advanced percutaneously through the anesthetized skin tracts under fluoroscopic guidance in a coaxial view to contact periosteum at the target sites.  After negative aspiration, a volume of 1 mL of the local anesthetic  was injected without resistance at each of the target sites.      The same procedure was then performed on the contralateral side in the exact same fashion.         Onset of analgesia was noted.  Vital signs remained stable throughout.      ESTIMATED BLOOD LOSS:  <5 mL  SPECIMENS:  none    COMPLICATIONS:   No complications were noted.    TOLERANCE & DISCHARGE CONDITION:    The patient tolerated the procedure well.  The patient was transported to the recovery area without difficulties.  The patient was discharged to home under the care of family in stable and satisfactory condition.    Pre-procedure pain score: 8/10  Post-procedure pain score: 3/10    PLAN OF CARE:  1. The patient was given our standard instruction sheet.  2. We discussed that Lumbar Medial Branch Blockade is a diagnostic procedure in consideration for radiofrequency ablation if two diagnostic procedures prove to be positive for significant benefit.  An alternative plan could be therapeutic lumbar branch blockades.  3. The patient is asked to keep an account of pain relief after the procedure today.  4. The patient will  Repeat injection 1 wk.  5. The patient will resume all medications as per the medication reconciliation sheet.

## 2020-11-12 ENCOUNTER — LAB REQUISITION (OUTPATIENT)
Dept: LAB | Facility: HOSPITAL | Age: 75
End: 2020-11-12

## 2020-11-12 DIAGNOSIS — Z00.00 ENCOUNTER FOR GENERAL ADULT MEDICAL EXAMINATION WITHOUT ABNORMAL FINDINGS: ICD-10-CM

## 2020-11-13 PROCEDURE — U0004 COV-19 TEST NON-CDC HGH THRU: HCPCS | Performed by: ANESTHESIOLOGY

## 2020-11-14 LAB — SARS-COV-2 RNA RESP QL NAA+PROBE: NOT DETECTED

## 2020-11-16 ENCOUNTER — DOCUMENTATION (OUTPATIENT)
Dept: PAIN MEDICINE | Facility: CLINIC | Age: 75
End: 2020-11-16

## 2020-11-16 ENCOUNTER — OUTSIDE FACILITY SERVICE (OUTPATIENT)
Dept: PAIN MEDICINE | Facility: CLINIC | Age: 75
End: 2020-11-16

## 2020-11-16 PROCEDURE — 64493 INJ PARAVERT F JNT L/S 1 LEV: CPT | Performed by: ANESTHESIOLOGY

## 2020-11-16 PROCEDURE — 64494 INJ PARAVERT F JNT L/S 2 LEV: CPT | Performed by: ANESTHESIOLOGY

## 2020-11-16 PROCEDURE — 64495 INJ PARAVERT F JNT L/S 3 LEV: CPT | Performed by: ANESTHESIOLOGY

## 2020-11-16 NOTE — PROGRESS NOTES
Bilateral L3-S1 Lumbar Medial Branch Blockade  [facility]      PREOPERATIVE DIAGNOSIS:  Lumbar spondylosis without myelopathy    POSTOPERATIVE DIAGNOSIS:  Lumbar spondylosis without myelopathy    PROCEDURE:   Diagnostic Lumbar Medial Branch Nerve Blockades, with fluoroscopy:  at the L3, L4, L5 transverse processes and the sacral alar groove)   1. 35534-61 -- Lumbar Facet blocks, 1st Level  2. [id number] -- Lumbar Facet blocks, 2nd  Level  3. [id number] -- Lumbar Facet blocks, 3rd Level     PRE-PROCEDURE DISCUSSION WITH PATIENT:    Risks and complications were discussed with the patient prior to starting the procedure and informed consent was obtained.      SURGEON:  Kathy Pineda MD    REASON FOR PROCEDURE:    The patient complains of pain that seems to have a significant axial component, Previous diagnostic positivity of a Lumbar Medial Branch Blockade at the same levels, Increased back pain on range of motion exams, Pain on extension of the lumbar spine and Positive lumbar facet loading maneuver    SEDATION:  Patient declined administration of moderate sedation    ANESTHETIC:  0.25% bupivacaine  STEROID:  None  TOTAL VOLUME OF SOLUTION:  8ml    DESCRIPTON OF PROCEDURE:  After obtaining informed consent, IV access was not obtained in the preoperative area.   The patient was taken to the operating room.  The patient was placed in the prone position with a pillow under the abdomen. All pressure points were well padded.  EKG, blood pressure, and pulse oximeter were monitored.  The patient was monitored and sedated by the RN under my direction. The lumbosacral area was prepped with Chloraprep and draped in a sterile fashion.     AP fluoroscopic image was used to visualize the junction of the right S1 superior articular process with the sacral ala.  The skin and subcutaneous tissue over the area was anesthetized with 1% lidocaine.  A 22-gauge spinal needle was then advanced percutaneously through the  anesthetized skin tract under fluoroscopic guidance in a coaxial view to contact periosteum.  After negative aspiration, a volume of 1 mL of the local anesthetic was injected without resistance.  The image was then optimized to maximize visualization of the junctions of the L3, L4, L5 superior articular processes with the transverse processes.  The skin and subcutaneous tissue over the areas was anesthetized with 1% lidocaine.  A 22-gauge spinal needle was then advanced percutaneously through the anesthetized skin tracts under fluoroscopic guidance in a coaxial view to contact periosteum at the target sites.  After negative aspiration, a volume of 1 mL of the local anesthetic  was injected without resistance at each of the target sites.      The same procedure was then performed on the contralateral side in the exact same fashion.         Onset of analgesia was noted.  Vital signs remained stable throughout.      ESTIMATED BLOOD LOSS:  <5 mL  SPECIMENS:  none    COMPLICATIONS:   No complications were noted.    TOLERANCE & DISCHARGE CONDITION:    The patient tolerated the procedure well.  The patient was transported to the recovery area without difficulties.  The patient was discharged to home under the care of family in stable and satisfactory condition.    Pre-procedure pain score: 5/10  Post-procedure pain score: 0/10    PLAN OF CARE:  1. The patient was given our standard instruction sheet.  2. We discussed that Lumbar Medial Branch Blockade is a diagnostic procedure in consideration for radiofrequency ablation if two diagnostic procedures prove to be positive for significant benefit.  An alternative plan could be therapeutic lumbar branch blockades.  3. The patient is asked to keep an account of pain relief after the procedure today.  4. The patient will  Return to clinic 1-2 wks.  5. The patient will resume all medications as per the medication reconciliation sheet.

## 2020-11-20 ENCOUNTER — OFFICE (OUTPATIENT)
Dept: URBAN - METROPOLITAN AREA CLINIC 75 | Facility: CLINIC | Age: 75
End: 2020-11-20

## 2020-11-20 VITALS — WEIGHT: 176 LBS | HEIGHT: 68 IN

## 2020-11-20 DIAGNOSIS — K59.00 CONSTIPATION, UNSPECIFIED: ICD-10-CM

## 2020-11-20 DIAGNOSIS — K21.9 GASTRO-ESOPHAGEAL REFLUX DISEASE WITHOUT ESOPHAGITIS: ICD-10-CM

## 2020-11-20 PROCEDURE — 99214 OFFICE O/P EST MOD 30 MIN: CPT | Performed by: NURSE PRACTITIONER

## 2020-11-20 RX ORDER — OMEPRAZOLE 40 MG/1
80 CAPSULE, DELAYED RELEASE ORAL
Qty: 60 | Refills: 2 | Status: COMPLETED
Start: 2020-11-20 | End: 2021-05-20

## 2020-11-23 ENCOUNTER — OFFICE VISIT (OUTPATIENT)
Dept: PAIN MEDICINE | Facility: CLINIC | Age: 75
End: 2020-11-23

## 2020-11-23 VITALS
BODY MASS INDEX: 26.52 KG/M2 | RESPIRATION RATE: 18 BRPM | WEIGHT: 175 LBS | HEART RATE: 79 BPM | DIASTOLIC BLOOD PRESSURE: 70 MMHG | HEIGHT: 68 IN | SYSTOLIC BLOOD PRESSURE: 137 MMHG | TEMPERATURE: 98.3 F | OXYGEN SATURATION: 99 %

## 2020-11-23 DIAGNOSIS — M54.2 NECK PAIN ON LEFT SIDE: ICD-10-CM

## 2020-11-23 DIAGNOSIS — M62.838 MUSCLE SPASM: ICD-10-CM

## 2020-11-23 DIAGNOSIS — G89.29 CHRONIC BILATERAL LOW BACK PAIN WITHOUT SCIATICA: Primary | ICD-10-CM

## 2020-11-23 DIAGNOSIS — M47.816 LUMBAR FACET ARTHROPATHY: ICD-10-CM

## 2020-11-23 DIAGNOSIS — M54.50 CHRONIC BILATERAL LOW BACK PAIN WITHOUT SCIATICA: Primary | ICD-10-CM

## 2020-11-23 PROCEDURE — 99214 OFFICE O/P EST MOD 30 MIN: CPT | Performed by: NURSE PRACTITIONER

## 2020-11-23 NOTE — PATIENT INSTRUCTIONS
You underwent a Bilateral L3-S1 Lumbar Medial Branch Blockade x 2    We are recommending the following:  --------  Education about Radiofrequency Lesioning of Medial Branches:    The medial branch blockade was intended for diagnostic purposes, with the intent of offering the patient Radiofrequency thermal rhizotomy if the MBB is diagnostically effective.  The diagnostic blockade is necessary to determine the likelihood that RF therapy could be efficacious in providing long term relief to the patient.  As indicated above, diagnostic efficacy was established.      In the RF procedure, needles are placed to the joint lines in the same fashion, and after testing, the needle tips are heated to thermally treat the nerves, blocking the nerves by in essence damaging the nerves with the heat treatment.      Medically, a successful RF procedure should provide a patient with 50% pain relief or more for at least 6 months.  We estimate a likelihood of about an 80% chance that medical success will be realized.  We discussed & educated once again that not all patients have a medically successful result, and the patient voices understanding.  However, our clinical experience suggests that successful patients receive relief more in the range of 12 months on average.  (We also discussed that a fortunate minority of patients receive therapeutic success from the MBB, and may not require RF ablation.  If a patient receives more than 8 weeks of relief from MBB, then occasional repeat MBB for therapeutic purposes is a very reasonable alternative therapy.  This course of therapy is consistent with our LCDs according to our CMS  in the area, and therefore other insurance providers should follow accordingly.  We will monitor our patients to screen for these therapeutic responders and will offer RF therapy only when necessary.  However, in this clinical scenario, this therapeutic result was not realized, and therefore  Radiofrequency Lesioning is medically necessary.)      We discussed that MBB & RF are not without risks.  Guidelines regarding anticoagulant use & neuraxial procedures will be respected.  Patients that are ill or otherwise may be at risk for sepsis will not have their spines accessed by neuraxial injections of any type.  This patient will not be offered these therapies if there is an increased risk.   We discussed that there is a risk of postprocedural pain and also a risk of worsening of clinical picture with these procedures as with any neuraxial procedure.  All invasive procedures have risks including but not limited to bleeding, infection, injury, nerve injury, paralysis, coma, death, lack of pain relief, and worsening of clinical picture.      In this education session, all of these topics were covered and the patient voiced understanding.    ---------

## 2020-11-23 NOTE — PROGRESS NOTES
"CHIEF COMPLAINT  Back pain has decreased since injections, neck pain has increased    Initial evaluation by JOSEPH Pascual   Natalie Rosen is a 75 y.o. female  who presents to the office for follow-up of procedure. Office visit from 10/22/2020 with Dr. Pineda reviewed.  Patient referred to our clinic by Dr. Marques for evaluation and treatment of back and neck pain.  Patient has complaint of cramping and tightness in her low back radiating into the bilateral buttocks.  She also complains of a painful area on the left side of her neck radiating into her shoulder.  Cervical paraspinous trigger point injections performed during this office visit.  Patient scheduled for bilateral L3-S1 medial branch blocks x2 with goal of performing RFL.     She completed a Bilateral L3-S1 Lumbar Medial Branch Blockade   on 11/9/2020 and 11/16/2020 performed by Dr. Pineda for management of back pain. Patient reports 80% relief from the first procedure x 2 days.  She reports 100% relief from the second procedure diagnostic.     Today her pain is 5/10VAS in severity. She describes her neck pain as intermittent \"twisting\" pain on the left side of her neck.  She states she received good relief from the TPI x 1 month and then her pain began to return.  She has not undergone PT for her neck pain. She describes her back pain as intermittent pain that is increasing in frequency.  She states this pain is shooting pain that does not radiate into her lower extremities.     Patient remained masked during entire encounter. No cough present. I donned a mask and eye protection throughout entire visit. Prior to donning mask and eye protection, hand hygiene was performed, as well as when it was doffed.  I was closer than 6 feet, but not for an extended period of time. No obvious exposure to any bodily fluids.    Neck Pain   This is a chronic problem. The current episode started more than 1 month ago. The problem occurs intermittently. The " "pain is associated with nothing. The pain is present in the left side. Quality: twisting. The pain is at a severity of 5/10. The symptoms are aggravated by bending and position. Pertinent negatives include no chest pain, fever, headaches, numbness or weakness. She has tried acetaminophen (TPI) for the symptoms. The treatment provided mild relief.   Back Pain  This is a chronic problem. The current episode started more than 1 year ago. The problem occurs intermittently (Has pain \"most days\"). The problem has been waxing and waning since onset. The pain is present in the lumbar spine. The quality of the pain is described as shooting. The pain does not radiate. The pain is at a severity of 5/10. The symptoms are aggravated by standing. Pertinent negatives include no chest pain, dysuria, fever, headaches, numbness or weakness. She has tried analgesics (Tylenol arthritis) for the symptoms. Improvement on treatment: MBB-Diagnostic relief.      PEG Assessment   What number best describes your pain on average in the past week?4  What number best describes how, during the past week, pain has interfered with your enjoyment of life?3  What number best describes how, during the past week, pain has interfered with your general activity?  3    The following portions of the patient's history were reviewed and updated as appropriate: allergies, current medications, past family history, past medical history, past social history, past surgical history and problem list.    Review of Systems   Constitutional: Negative for chills and fever.   Respiratory: Negative for shortness of breath.    Cardiovascular: Negative for chest pain.   Gastrointestinal: Positive for constipation. Negative for diarrhea, nausea and vomiting.   Genitourinary: Negative for difficulty urinating, dyspareunia and dysuria.   Musculoskeletal: Positive for back pain and neck pain.   Neurological: Negative for dizziness, weakness, light-headedness, numbness and " "headaches.   Psychiatric/Behavioral: Negative for confusion, hallucinations, self-injury, sleep disturbance and suicidal ideas. The patient is not nervous/anxious.      --  The aforementioned information the Chief Complaint section and above subjective data including any HPI data, and also the Review of Systems data, has been personally reviewed and affirmed.  --     Vitals:    11/23/20 1423   BP: 137/70   Pulse: 79   Resp: 18   Temp: 98.3 °F (36.8 °C)   SpO2: 99%   Weight: 79.4 kg (175 lb)   Height: 172.7 cm (68\")   PainSc:   8   PainLoc: Neck     Objective   Physical Exam  Vitals signs and nursing note reviewed.   Constitutional:       Appearance: Normal appearance. She is well-developed.   HENT:      Head: Normocephalic and atraumatic.   Eyes:      General: Lids are normal.      Conjunctiva/sclera: Conjunctivae normal.   Neck:      Musculoskeletal: Normal range of motion.      Trachea: Trachea normal.   Cardiovascular:      Rate and Rhythm: Normal rate.   Pulmonary:      Effort: Pulmonary effort is normal.   Musculoskeletal:      Cervical back: She exhibits decreased range of motion and spasm.      Lumbar back: She exhibits decreased range of motion. She exhibits no tenderness and no bony tenderness.      Comments: POS Lumbar Loading Manuever   Skin:     General: Skin is warm and dry.   Neurological:      Mental Status: She is alert and oriented to person, place, and time.      Gait: Gait normal.   Psychiatric:         Attention and Perception: Attention normal.         Mood and Affect: Mood normal.         Speech: Speech normal.         Behavior: Behavior normal.         Judgment: Judgment normal.       Assessment/Plan   Diagnoses and all orders for this visit:    1. Chronic bilateral low back pain without sciatica (Primary)  -     Case Request    2. Neck pain on left side  -     Ambulatory Referral to Physical Therapy Evaluate and treat    3. Muscle spasm  -     Ambulatory Referral to Physical Therapy Evaluate " and treat    4. Lumbar facet arthropathy  -     Case Request      --- Bilateral L3-S1 Radiofrequency Ablation.   --------  Education about Radiofrequency Lesioning of Medial Branches:    The medial branch blockade was intended for diagnostic purposes, with the intent of offering the patient Radiofrequency thermal rhizotomy if the MBB is diagnostically effective.  The diagnostic blockade is necessary to determine the likelihood that RF therapy could be efficacious in providing long term relief to the patient.  As indicated above, diagnostic efficacy was established.      In the RF procedure, needles are placed to the joint lines in the same fashion, and after testing, the needle tips are heated to thermally treat the nerves, blocking the nerves by in essence damaging the nerves with the heat treatment.      Medically, a successful RF procedure should provide a patient with 50% pain relief or more for at least 6 months.  We estimate a likelihood of about an 80% chance that medical success will be realized.  We discussed & educated once again that not all patients have a medically successful result, and the patient voices understanding.  However, our clinical experience suggests that successful patients receive relief more in the range of 12 months on average.  (We also discussed that a fortunate minority of patients receive therapeutic success from the MBB, and may not require RF ablation.  If a patient receives more than 8 weeks of relief from MBB, then occasional repeat MBB for therapeutic purposes is a very reasonable alternative therapy.  This course of therapy is consistent with our LCDs according to our CMS  in the area, and therefore other insurance providers should follow accordingly.  We will monitor our patients to screen for these therapeutic responders and will offer RF therapy only when necessary.  However, in this clinical scenario, this therapeutic result was not realized, and therefore  Radiofrequency Lesioning is medically necessary.)      We discussed that MBB & RF are not without risks.  Guidelines regarding anticoagulant use & neuraxial procedures will be respected.  Patients that are ill or otherwise may be at risk for sepsis will not have their spines accessed by neuraxial injections of any type.  This patient will not be offered these therapies if there is an increased risk.   We discussed that there is a risk of postprocedural pain and also a risk of worsening of clinical picture with these procedures as with any neuraxial procedure.  All invasive procedures have risks including but not limited to bleeding, infection, injury, nerve injury, paralysis, coma, death, lack of pain relief, and worsening of clinical picture.      In this education session, all of these topics were covered and the patient voiced understanding.    ---------  --- Physical Therapy with consideration for dry needling for left sided neck pain.   --- Follow-up after procedure     KRISSY REPORT  KRISSY report has been reviewed and scanned into the patient's chart.    As the clinician, I personally reviewed the KRISSY from 11/23/2020 while the patient was in the office today.    EMR Dragon/Transcription disclaimer:   Much of this encounter note is an electronic transcription/translation of spoken language to printed text. The electronic translation of spoken language may permit erroneous, or at times, nonsensical words or phrases to be inadvertently transcribed; Although I have reviewed the note for such errors, some may still exist.

## 2020-12-22 ENCOUNTER — TREATMENT (OUTPATIENT)
Dept: PHYSICAL THERAPY | Facility: CLINIC | Age: 75
End: 2020-12-22

## 2020-12-22 DIAGNOSIS — M54.2 PAIN, NECK: Primary | ICD-10-CM

## 2020-12-22 DIAGNOSIS — R29.898 DECREASED RANGE OF MOTION OF NECK: ICD-10-CM

## 2020-12-22 PROCEDURE — 97530 THERAPEUTIC ACTIVITIES: CPT | Performed by: PHYSICAL THERAPIST

## 2020-12-22 PROCEDURE — 97140 MANUAL THERAPY 1/> REGIONS: CPT | Performed by: PHYSICAL THERAPIST

## 2020-12-22 PROCEDURE — 97161 PT EVAL LOW COMPLEX 20 MIN: CPT | Performed by: PHYSICAL THERAPIST

## 2020-12-22 NOTE — PATIENT INSTRUCTIONS
Access Code: [email or web address]   URL: [email or web address]   Date: 12/22/2020   Prepared by: Kaya Fiore     Exercises  Seated Cervical Rotation AROM - 10 reps - 1x daily - 7x weekly  Seated Cervical Sidebending AROM - 10 reps - 1x daily - 7x weekly  Seated Cervical Extension AROM - 10 reps - 1x daily - 7x weekly  Seated Scapular Retraction - 10 reps - 3 sets - 1x daily - 7x weekly  Pt was educated on findings of evaluation, purpose of treatment and goals for therapy. Treatment options discussed and questions answered. Pt was educated on exercises, self treatment and pain relief techniques. Pt educated on anatomy of affected structures. Pt educated on posture and posture awareness, avoiding [place] hiking.

## 2020-12-22 NOTE — PROGRESS NOTES
Physical Therapy Initial Evaluation and Plan of Care    Patient: Natalie Rosen   : 1945  Diagnosis/ICD-10 Code:  Pain, neck [M54.2]  Referring practitioner: JOSEPH Feliciano    Subjective Evaluation    History of Present Illness  Mechanism of injury: Pt reports L neck muscle tightness and pain that started earlier this year of unknown etiology. Denies N&T in hands or shoulder pain. PT had MRI of cervical spine in 2020: There is prominent facet arthritis involving the left C2-3 facet joint  with arthritic marrow edema identified within the left posterior  elements of C2 and C3.     Prominent degenerative disc changes are noted at C5-6 and C6-7 where  disc osteophyte complex results in relatively mild degrees of canal  narrowing.     There is a moderate degree of right C5-6 foraminal narrowing secondary  to uncovertebral joint hypertrophy. Other lesser degrees of multilevel  foraminal narrowing are as discussed in detail above    PMH reviewed          Patient Occupation: retired  Pain  Current pain ratin  At best pain ratin  At worst pain ratin  Location: L side of neck   Quality: tight, dull ache and discomfort  Relieving factors: medications  Aggravating factors: prolonged positioning and movement (reading )  Progression: no change    Hand dominance: right    Diagnostic Tests  X-ray: abnormal  MRI studies: abnormal    Treatments  Previous treatment: injection treatment  Current treatment: physical therapy  Patient Goals  Patient goals for therapy: decreased pain, increased motion, increased strength and independence with ADLs/IADLs             Objective          Palpation   Left   Hypertonic in the upper trapezius.   Tenderness of the cervical paraspinals, levator scapulae, sternocleidomastoid and upper trapezius.     Right   Hypertonic in the upper trapezius. Tenderness of the cervical paraspinals and upper trapezius.     Active Range of Motion   Cervical/Thoracic Spine    Cervical    Flexion: 25 degrees   Extension: 20 degrees   Left lateral flexion: 15 degrees   Right lateral flexion: 20 degrees   Left rotation: 40 degrees   Right rotation: 70 degrees with pain    Strength/Myotome Testing   Cervical Spine     Left   Normal strength    Right   Normal strength    Functional Assessment     Comments  NDI: 17          Assessment & Plan     Assessment  Impairments: abnormal or restricted ROM, activity intolerance, impaired physical strength, lacks appropriate home exercise program and pain with function  Assessment details: Pt presents to PT with symptoms consistent with neck pain, decreased ROM, decreased activity tolerance, decreased posture .  Pt would benefit from skilled PT intervention to address the deficits noted.     Prognosis: good  Functional Limitations: carrying objects, lifting and uncomfortable because of pain  Goals  Plan Goals: SHORT TERM GOALS:  Time for Goal Achievement: 8 visits   1.  Patient to be compliant with HEP  2.  Pt to report increased ease to drive and read with less pain.  3.  Increased cervical and thoracic segmental mobility to allow for increased ease with driving and ADL's.  4.  Pt. to be independent with CT stabilization exercises to allow for improved posture while in clinic with fatiguing exercises.    LONG TERM GOALS: Time for Goal Achievement: 16 visits  1.  Pt to score < 20% perceived disability on Neck Index  2.  Pain level < 3/10 at worse with all activities  3.  Increased cervical AROM to WFL to allow for driving and household tasks without restrictions.  4.  Pt able to perform  drive, lift and daily activities without complaints of weakness or pain limiting function.        Plan  Therapy options: will be seen for skilled physical therapy services  Planned modality interventions: cryotherapy, electrical stimulation/Russian stimulation, TENS, thermotherapy (hydrocollator packs) and ultrasound  Other planned modality interventions: Dry  Needling  Planned therapy interventions: manual therapy, home exercise program, functional ROM exercises, flexibility, neuromuscular re-education, postural training, soft tissue mobilization, spinal/joint mobilization, strengthening, stretching, body mechanics training and therapeutic activities  Frequency: 2x week  Duration in visits: 16  Treatment plan discussed with: patient        Manual Therapy:    10      mins  80715;  Therapeutic Exercise:          mins  84429;     Neuromuscular Maryanne:         mins  56177;    Therapeutic Activity:      15     mins  99384;     Gait Training:            mins  33701;     Ultrasound:           mins  69396;    Electrical Stimulation:          mins  78961 ( );  Dry Needling           mins self-pay  Traction           mins [id number]  Canalith Repositioning         mins [id number]      Timed Treatment: 25     mins   Total Treatment:       55mins    PT SIGNATURE: Kaya Fiore PT   [place] Lic #[id number]    DATE TREATMENT INITIATED: 12/22/2020    Initial Certification  Certification Period: 3/22/2021  I certify that the therapy services are furnished while this patient is under my care.  The services outlined above are required by this patient, and will be reviewed every 90 days.     PHYSICIAN: Merna Silva APRN      DATE:     Please sign and return via fax to [phone].. Thank you, [facility].

## 2020-12-28 ENCOUNTER — TREATMENT (OUTPATIENT)
Dept: PHYSICAL THERAPY | Facility: CLINIC | Age: 75
End: 2020-12-28

## 2020-12-28 DIAGNOSIS — M54.2 PAIN, NECK: Primary | ICD-10-CM

## 2020-12-28 DIAGNOSIS — R29.898 DECREASED RANGE OF MOTION OF NECK: ICD-10-CM

## 2020-12-28 PROCEDURE — 97530 THERAPEUTIC ACTIVITIES: CPT | Performed by: PHYSICAL THERAPIST

## 2020-12-28 PROCEDURE — 97110 THERAPEUTIC EXERCISES: CPT | Performed by: PHYSICAL THERAPIST

## 2020-12-28 PROCEDURE — 97140 MANUAL THERAPY 1/> REGIONS: CPT | Performed by: PHYSICAL THERAPIST

## 2020-12-28 NOTE — PROGRESS NOTES
Physical Therapy Daily Progress Note    Patient: Natalie Rosen   : 1945  Diagnosis/ICD-10 Code:  Pain, neck [M54.2]  Referring practitioner: No ref. provider found  Date of Initial Visit: Type: THERAPY  Noted: 2020  Today's Date: 2020  Patient seen for 2 sessions         Natalie Rosen reports: Neck feels a little stiff, but exercises are going well and she feels like they are helping.    Subjective     Objective   See Exercise, Manual, and Modality Logs for complete treatment.       Assessment/Plan   Compliant and cooperative with rehab efforts. Subjectively, pt reports no increase of pain or discomfort with interventions performed today. Performed well with given HEP exercises and addition of new exercises. Positive response to manual therapy in regards to decreased tightness in L UT. Continues to benefit from verbal/tactile cues to ensure proper form and technique for exercise performance.       Progress per Plan of Care           Manual Therapy:    10     mins  48711;  Therapeutic Exercise:   18      mins  87386;     Neuromuscular Maryanne:        mins  15132;    Therapeutic Activity:     10     mins  03844;     Gait Training:           mins  72923;     Ultrasound:          mins  55138;    Electrical Stimulation:         mins  57365 ( );  Dry Needling          mins self-pay    Timed Treatment:   38   mins   Total Treatment:     48   mins    Kristina Thomas PTA  Physical Therapist Assistant [id number]

## 2020-12-29 ENCOUNTER — LAB REQUISITION (OUTPATIENT)
Dept: LAB | Facility: HOSPITAL | Age: 75
End: 2020-12-29

## 2020-12-29 DIAGNOSIS — Z00.00 ENCOUNTER FOR GENERAL ADULT MEDICAL EXAMINATION WITHOUT ABNORMAL FINDINGS: ICD-10-CM

## 2020-12-30 ENCOUNTER — TREATMENT (OUTPATIENT)
Dept: PHYSICAL THERAPY | Facility: CLINIC | Age: 75
End: 2020-12-30

## 2020-12-30 DIAGNOSIS — M54.2 PAIN, NECK: Primary | ICD-10-CM

## 2020-12-30 DIAGNOSIS — R29.898 DECREASED RANGE OF MOTION OF NECK: ICD-10-CM

## 2020-12-30 PROCEDURE — 97110 THERAPEUTIC EXERCISES: CPT | Performed by: PHYSICAL THERAPIST

## 2020-12-30 PROCEDURE — 97140 MANUAL THERAPY 1/> REGIONS: CPT | Performed by: PHYSICAL THERAPIST

## 2020-12-30 NOTE — PROGRESS NOTES
Physical Therapy Daily Progress Note    Patient: Natalie Rosen   : 1945  Diagnosis/ICD-10 Code:  Pain, neck [M54.2]  Referring practitioner: JOSEPH Feliciano  Date of Initial Visit: Type: THERAPY  Noted: 2020  Today's Date: 2020  Patient seen for 3 sessions         Natalie Rosen reports: A little more sore than usual after last visit, thinks it was from HFR exercises.    Subjective     Objective   D/C HFR exercises. Added first rib mobilizations, scapular strengthening and pec stretch in corner.   See Exercise, Manual, and Modality Logs for complete treatment.     Assessment/Plan   Compliant and cooperative with rehab efforts. Subjectively, pt reports no increase of pain or discomfort with interventions performed today. Performed well with first rib mobilizations and addition of strengthening activities. Positive response to manual therapy in regards to decreased tightness. Continues to benefit from verbal/tactile cues to ensure proper form and technique for exercise performance.       Progress per Plan of Care. Assess first rib.            Manual Therapy:    12     mins  87558;  Therapeutic Exercise:    22     mins  20879;     Neuromuscular Maryanne:        mins  27859;    Therapeutic Activity:          mins  85154;     Gait Training:           mins  69523;     Ultrasound:          mins  96898;    Electrical Stimulation:         mins  22861 ( );  Dry Needling          mins self-pay    Timed Treatment:  34    mins   Total Treatment:     44   mins    Kristina Thomas PTA  Physical Therapist Assistant [id number]

## 2021-01-01 PROCEDURE — U0004 COV-19 TEST NON-CDC HGH THRU: HCPCS | Performed by: ANESTHESIOLOGY

## 2021-01-02 LAB — SARS-COV-2 RNA RESP QL NAA+PROBE: NOT DETECTED

## 2021-01-04 ENCOUNTER — DOCUMENTATION (OUTPATIENT)
Dept: PAIN MEDICINE | Facility: CLINIC | Age: 76
End: 2021-01-04

## 2021-01-04 ENCOUNTER — OUTSIDE FACILITY SERVICE (OUTPATIENT)
Dept: PAIN MEDICINE | Facility: CLINIC | Age: 76
End: 2021-01-04

## 2021-01-04 PROCEDURE — 64635 DESTROY LUMB/SAC FACET JNT: CPT | Performed by: ANESTHESIOLOGY

## 2021-01-04 PROCEDURE — 64636 DESTROY L/S FACET JNT ADDL: CPT | Performed by: ANESTHESIOLOGY

## 2021-01-04 NOTE — PROGRESS NOTES
[address] [address]  Outpatient Physical Therapy    Treatment Note        Date: 12/3/2020  Patient: Kendal Overall  : 2014  ACCT #: [de-identified]  Referring Practitioner: Martha Hart  Diagnosis: heel cord tightness, left    Visit Information:  PT Visit Information  Onset Date: 11/11/15  PT Insurance Information: Jozef Strauss, through [facility]  Total # of Visits Approved: (unlimited)  Progress Note Counter: 3/12    Subjective: Mom present during session. States pt is doing really well;and is climbing stairs     HEP Compliance:  [x] Good [] Fair [] Poor [] Reports not doing due to:    Vital Signs  Patient Currently in Pain: No   Pain Screening  Patient Currently in Pain: No    OBJECTIVE:   Exercises  Exercise 1: Down dog stretch 20 sec x 3  Exercise 2: Manual Lt gastroc str 30 sec x 3  Exercise 3: Crab soccer  Exercise 4: jumping: foward contiuously x 6, onto off 2\"/4\" box, focus on FT  Exercise 5: Animal walks: Bear, crab, penguin 15 ft x 3 ea  Exercise 6: Lt SLS: 3 sec, 6 sec  Exercise 12: Lt SLS with ball toss at trampoline, CGA at hips to stabilize  Exercise 13: Rt foot on 2\" block to facilitate kicking ball with good grading and direction  Exercise 20: HEP: faciliated SLS with play, Heel strike        *Indicates exercise, modality, or manual techniques to be initiated when appropriate    Assessment: Body structures, Functions, Activity limitations: Decreased functional mobility , Decreased ROM, Decreased strength, Decreased safe awareness, Decreased balance, Decreased coordination, Decreased posture  Assessment: Focused on jumping with FT landing and cues for improved stability. Pt responded well and was able to caryover with improved stability in landing witout rolling Lt anklle. Pt demo's difficulty with  Kicking with LLE, often stomping ball vs DF to kick through toes. Improved with 2\" block to facilitate clearance.   Treatment Diagnosis: gait instability, decresed motor Radiofrequency ablation of bilateral L3-S1  [facility]    PREOPERATIVE DIAGNOSIS:  Lumbar spondylosis without myelopathy   POSTOPERATIVE DIAGNOSIS:  Lumbar spondylosis without myelopathy     PROCEDURES PERFORMED:   Bilateral  lumbar radiofrequency ablation of the medial branches at the transverse processes of L3, L4, L5, and the sacral alar groove to thermally treat these facet joints.  1.  45859 -50 Lumbar radiofrequency ablation 1st level.  2.  81653 -50 Lumbar radiofrequency ablation 2nd level.  3.  20202 -50 Lumbar radiofrequency ablation 3rd level.     INFORMED CONSENT:  In preprocedure discussion with the patient, the risks and complications were discussed prior to starting the procedure and informed consent was obtained.     SURGEON:   Kathy Pineda MD    INDICATIONS:  The patient presents with chronic lower back pain. The patient underwent 2 lumbar medial branch blocks with diagnostically positive relief. Given the patient’s significant pain relief, it is diagnostic that we have likely found the source of the patient’s pain; therefore, lumbar radiofrequency ablation has been indicated.     SEDATION:  Versed 2mg & Fentanyl 50 mcg IV.    TIME OF PROCEDURE:  The Interoperative procedure time, after administration of the IV sedative, was 23 minutes.    ANESTHETIC:  Lidocaine 1% for skin infiltration, 2% lidocaine and 0.25% bupivacaine for injection.    STEROID:  None.    DESCRIPTION OF PROCEDURE:  After obtaining informed consent an IV was  started in the preoperative area. The patient was taken to the operating room. The patient was placed in prone position with a pillow under the abdomen. All pressure points were padded. EKG, blood pressure, and pulse oximetry were monitored. The patient was  sedated. The lumbosacral area was prepped with ChloraPrep and draped in a sterile fashion.     The junction of the right S1 superior articular process and sacral ala was identified in a AP  control  Prognosis: Good       Goals:  Short term goals  Time Frame for Short term goals: 2 weeks  Short term goal 1: independent in HEP    Long term goals  Time Frame for Long term goals : 12 weeks  Long term goal 1: improve PROM of left ankle dorsiflexion by at least 10 degrees, to 10 degrees. Long term goal 2: Improve gait pattern to have heel strike at least 75% of normal walking speed  Long term goal 3: Improve jumping to include heel contact, in at least 50% of observed jumps when tested. Progress toward goals:jumping, core, rom     POST-PAIN       Pain Rating (0-10 pain scale):   0/10   Location and pain description same as pre-treatment unless indicated. Action: [x] NA   [] Perform HEP  [] Meds as prescribed  [] Modalities as prescribed   [] Call Physician     Frequency/Duration:  Plan  Times per week: 1  Plan weeks: 12  Current Treatment Recommendations: Strengthening, ROM, Balance Training, Functional Mobility Training, Home Exercise Program, Manual Therapy - Soft Tissue Mobilization, Patient/Caregiver Education & Training     Pt to continue current HEP. See objective section for any therapeutic exercise changes, additions or modifications this date.          PT Individual Minutes  Time In: 2350  Time Out: 1702  Minutes: 27  Timed Code Treatment Minutes: 27 Minutes  Procedure Minutes:0     Timed Activity Minutes Units   Ther Ex 25 2   Manual  2 0       Signature:  Electronically signed by Agus Jones PTA on 12/3/20 at 5:13 PM EST fluoroscopic view. The skin and subcutaneous tissue inferior to the junction was anesthetized with 1% lidocaine. A 20-gauge 150mm RF Abbott needle was then advanced percutaneously through the anesthetized skin tract under fluoroscopic guidance until the non-insulated portion of the needle lie at the junction.  The image was then obliqued towards the right side to maximize visualization of the junctions of the L3, L4, L5 superior articular processes with the transverse processes.  Needle placement was performed as described above until the non-insulated portion of the needles lie at the targeted junctions.  All needle tips were confirmed to be posterior to the neural foramen in the lateral fluoroscopic view. Sensory stimulation was then performed with good stimulation of the back at 0.5V or less at each level. Motor stimulation was performed up to 1.5V at each level producing stimulation of the multifidus muscles of the back and no stimulation of the lower extremity at any level. Each level was then anesthetized with 2% lidocaine prior to treatment with pulsed radiofrequency thermocoagulation at 42 degrees Celsius. Each level was then treated with thermal radiofrequency thermocoagulation at 80 degrees Celsius for 60 seconds in two separate cycles.  Prior to the removal of each needle, a volume of 1 mL of injectate was administered at each site.  The total volume consisted of mL of 2% lidocaine and mL of 0.25% bupivacaine.    The same procedure was then performed on the contralateral side in the exact same fashion.       ESTIMATED BLOOD LOSS:  Minimal.    SPECIMENS:  None.    COMPLICATIONS:  None.    TOLERANCE AND DISCHARGE:  The patient tolerated the procedure well. The patient was transported to the recovery area without difficulties. The patient was discharged home under the care of family in stable and satisfactory condition.    PLAN:  1.  The patient was given our standard instruction sheet.  2.  The patient will  resume all medications per the medication reconciliation sheet.  3.  The patient will return to the [facility] for reevaluation in approximately 6 weeks.

## 2021-01-05 ENCOUNTER — TREATMENT (OUTPATIENT)
Dept: PHYSICAL THERAPY | Facility: CLINIC | Age: 76
End: 2021-01-05

## 2021-01-05 DIAGNOSIS — M54.2 PAIN, NECK: Primary | ICD-10-CM

## 2021-01-05 DIAGNOSIS — R29.898 DECREASED RANGE OF MOTION OF NECK: ICD-10-CM

## 2021-01-05 PROCEDURE — 97110 THERAPEUTIC EXERCISES: CPT | Performed by: PHYSICAL THERAPIST

## 2021-01-05 PROCEDURE — 97140 MANUAL THERAPY 1/> REGIONS: CPT | Performed by: PHYSICAL THERAPIST

## 2021-01-05 PROCEDURE — 97530 THERAPEUTIC ACTIVITIES: CPT | Performed by: PHYSICAL THERAPIST

## 2021-01-05 NOTE — PROGRESS NOTES
Physical Therapy Daily Progress Note    Patient: Natalie Rosen   : 1945  Diagnosis/ICD-10 Code:  Pain, neck [M54.2]  Referring practitioner: JOSEPH Feliciano  Date of Initial Visit: Type: THERAPY  Noted: 2020  Today's Date: 2021  Patient seen for 4 sessions         Natalie Rosen reports: Neck is feeling better, still has discomfort and it bothers her at times but she is able to perform stretches that helps to relieve her symptoms.     Subjective     Objective   See Exercise, Manual, and Modality Logs for complete treatment.       Assessment/Plan   Compliant and cooperative with rehab efforts. Subjectively, pt reports decrease of pain and discomfort with therapy overall. Performed well with increased strengthening interventions. Positive response to manual therapy in regards to decreased tightness and discomfort in L UT. Continues to benefit from verbal/tactile cues to ensure proper form and technique for exercise performance.       Progress per Plan of Care           Manual Therapy:    12     mins  86201;  Therapeutic Exercise:    18     mins  61210;     Neuromuscular Maryanne:        mins  02112;    Therapeutic Activity:     10     mins  58325;     Gait Training:           mins  35786;     Ultrasound:          mins  05577;    Electrical Stimulation:         mins  17373 ( );  Dry Needling          mins self-pay    Timed Treatment:   40   mins   Total Treatment:     50   mins    Kristina Thomas PTA  Physical Therapist Assistant [id number]

## 2021-01-08 ENCOUNTER — TREATMENT (OUTPATIENT)
Dept: PHYSICAL THERAPY | Facility: CLINIC | Age: 76
End: 2021-01-08

## 2021-01-08 DIAGNOSIS — M54.2 PAIN, NECK: Primary | ICD-10-CM

## 2021-01-08 DIAGNOSIS — R29.898 DECREASED RANGE OF MOTION OF NECK: ICD-10-CM

## 2021-01-08 PROCEDURE — 97140 MANUAL THERAPY 1/> REGIONS: CPT | Performed by: PHYSICAL THERAPIST

## 2021-01-08 PROCEDURE — 97110 THERAPEUTIC EXERCISES: CPT | Performed by: PHYSICAL THERAPIST

## 2021-01-08 NOTE — PROGRESS NOTES
Physical Therapy Daily Progress Note  Visit: 5    Subjective Natalie Rosen reports: overall neck is feeling better, just feels tight     Objective   See Exercise, Manual, and Modality Logs for complete treatment.     Assessment/Plan: Compliant/cooperative with current rehab efforts.  Benefits from verbal/tactile cues to ensure correct exercise performance/technique, hold time and position. Plan details: Progress ROM / strengthening / stabilization / functional activity as tolerated     Manual Therapy:   15       mins  59672;  Therapeutic Exercise:    25      mins  59916;     Neuromuscular Maryanne:         mins  40776;    Therapeutic Activity:           mins  22385;     Gait Training:            mins  33528;     Ultrasound:           mins  05581;    Electrical Stimulation:          mins  54363 ( );  Dry Needling           mins self-pay  Traction           mins 85582  Canalith Repositioning         mins 48153      Timed Treatment:   40   mins   Total Treatment:    50    mins    JACK Avina License #: [id number]    Physical Therapist

## 2021-01-12 ENCOUNTER — TREATMENT (OUTPATIENT)
Dept: PHYSICAL THERAPY | Facility: CLINIC | Age: 76
End: 2021-01-12

## 2021-01-12 DIAGNOSIS — M54.2 PAIN, NECK: Primary | ICD-10-CM

## 2021-01-12 DIAGNOSIS — R29.898 DECREASED RANGE OF MOTION OF NECK: ICD-10-CM

## 2021-01-12 PROCEDURE — 97110 THERAPEUTIC EXERCISES: CPT | Performed by: PHYSICAL THERAPIST

## 2021-01-12 PROCEDURE — 97140 MANUAL THERAPY 1/> REGIONS: CPT | Performed by: PHYSICAL THERAPIST

## 2021-01-12 NOTE — PROGRESS NOTES
Physical Therapy Daily Progress Note  Visit: 6    Subjective Natalie Rosen reports: her neck felt better after last visit     Objective   See Exercise, Manual, and Modality Logs for complete treatment.     Assessment/Plan: responds well to manual therapy. Plan details: Progress ROM / strengthening / stabilization / functional activity as tolerated     Manual Therapy:   30       mins  16122;  Therapeutic Exercise:     8     mins  75177;     Neuromuscular Maryanne:         mins  22144;    Therapeutic Activity:           mins  73039;     Gait Training:            mins  85261;     Ultrasound:           mins  76915;    Electrical Stimulation:          mins  23937 ( );  Dry Needling           mins self-pay  Traction           mins 19048  Canalith Repositioning         mins 75238      Timed Treatment:   38   mins   Total Treatment:    48   mins    Kaya Fiore, PT  [place] License #: [id number]    Physical Therapist

## 2021-02-02 ENCOUNTER — OFFICE VISIT (OUTPATIENT)
Dept: ENDOCRINOLOGY | Age: 76
End: 2021-02-02

## 2021-02-02 VITALS
BODY MASS INDEX: 26.95 KG/M2 | DIASTOLIC BLOOD PRESSURE: 80 MMHG | SYSTOLIC BLOOD PRESSURE: 124 MMHG | WEIGHT: 177.8 LBS | RESPIRATION RATE: 16 BRPM | HEIGHT: 68 IN

## 2021-02-02 DIAGNOSIS — E05.90 HYPERTHYROIDISM: Primary | ICD-10-CM

## 2021-02-02 PROCEDURE — 99213 OFFICE O/P EST LOW 20 MIN: CPT | Performed by: NURSE PRACTITIONER

## 2021-02-02 NOTE — PROGRESS NOTES
"Chief Complaint  Hyperthyroidism and Goiter    Subjective          Natalie Rosen presents to [facility] for   History of Present Illness     Hyperthyroidism x 13 years found on labs after weight loss   Methimazole 5mg 5 days a week  Denies dysphagia   Weight has been stable  Does have a low appetite but this seems normal to her r/t her age   She is tired all the time but this is not worsening  She denies chest pains or palpitations   Overall doing well      Review of Systems   Constitutional: Negative for activity change, appetite change and fatigue.   HENT: Negative for sore throat, trouble swallowing and voice change.    Respiratory: Negative for cough, choking and shortness of breath.    Cardiovascular: Negative for chest pain, palpitations and leg swelling.   Gastrointestinal: Negative for abdominal pain, constipation, diarrhea, nausea and vomiting.   Endocrine: Negative for cold intolerance, heat intolerance, polydipsia, polyphagia and polyuria.   Genitourinary: Negative for decreased urine volume, dysuria, flank pain and urgency.   Musculoskeletal: Negative for arthralgias, gait problem and myalgias.   Skin: Negative for color change, rash and wound.   Neurological: Negative for dizziness, weakness, light-headedness, numbness and headaches.   Hematological: Does not bruise/bleed easily.   Psychiatric/Behavioral: Positive for sleep disturbance.             Objective   Vital Signs:   /80   Resp 16   Ht 172.7 cm (68\")   Wt 80.6 kg (177 lb 12.8 oz)   BMI 27.03 kg/m²     Physical Exam  Vitals signs reviewed.   Constitutional:       General: She is not in acute distress.  HENT:      Head: Normocephalic and atraumatic.   Neck:      Musculoskeletal: Normal range of motion and neck supple.   Cardiovascular:      Rate and Rhythm: Normal rate and regular rhythm.   Pulmonary:      Effort: Pulmonary effort is normal. No respiratory distress.   Musculoskeletal: Normal range of " motion.         General: No signs of injury.   Skin:     General: Skin is warm and dry.   Neurological:      Mental Status: She is alert and oriented to person, place, and time. Mental status is at baseline.   Psychiatric:         Mood and Affect: Mood normal.         Behavior: Behavior normal.         Thought Content: Thought content normal.         Judgment: Judgment normal.        Result Review :   The following data was reviewed by: JOSEPH Daugherty on 02/02/2021:  Common labs    Common Labsle 7/13/20 7/13/20    1031 1031   Glucose  114 (A)   BUN  13   Creatinine  0.66   eGFR Non  Am  88   eGFR African Am  106   Sodium  133 (A)   Potassium  4.1   Chloride  92 (A)   Calcium  9.6   Total Protein  6.6   Albumin  4.60   Total Bilirubin  0.5   Alkaline Phosphatase  62   AST (SGOT)  34 (A)   ALT (SGPT)  33   WBC 5.24    Hemoglobin 12.7    Hematocrit 37.8    Platelets 213    (A) Abnormal value       Comments are available for some flowsheets but are not being displayed.                     Assessment and Plan    Problem List Items Addressed This Visit        Other    Hyperthyroidism - Primary    Overview     Description: DIAGNOSED 2012         Relevant Orders    TSH    T4, Free    T3, Free    Comprehensive Metabolic Panel    CBC Auto Differential          Follow Up {Instructions Charge Capture  Follow-up Communications :23}  Return in about 6 months (around 8/2/2021).   Repeat labs today  Continue methimazole 5 mg 5 days a week  We will need close observation to minimize the risk of over or under treatment with thyroid hormone blocking medication    Patient was given instructions and counseling regarding her condition or for health maintenance advice. Please see specific information pulled into the AVS if appropriate.     JOSEPH Daugherty

## 2021-02-03 ENCOUNTER — TREATMENT (OUTPATIENT)
Dept: PHYSICAL THERAPY | Facility: CLINIC | Age: 76
End: 2021-02-03

## 2021-02-03 DIAGNOSIS — M54.2 PAIN, NECK: Primary | ICD-10-CM

## 2021-02-03 DIAGNOSIS — R29.898 DECREASED RANGE OF MOTION OF NECK: ICD-10-CM

## 2021-02-03 LAB
ALBUMIN SERPL-MCNC: 4.6 G/DL (ref 3.5–5.2)
ALBUMIN/GLOB SERPL: 1.9 G/DL
ALP SERPL-CCNC: 65 U/L (ref 39–117)
ALT SERPL-CCNC: 18 U/L (ref 1–33)
AST SERPL-CCNC: 27 U/L (ref 1–32)
BASOPHILS # BLD AUTO: 0.06 10*3/MM3 (ref 0–0.2)
BASOPHILS NFR BLD AUTO: 1 % (ref 0–1.5)
BILIRUB SERPL-MCNC: 0.5 MG/DL (ref 0–1.2)
BUN SERPL-MCNC: 17 MG/DL (ref 8–23)
BUN/CREAT SERPL: 23 (ref 7–25)
CALCIUM SERPL-MCNC: 9.5 MG/DL (ref 8.6–10.5)
CHLORIDE SERPL-SCNC: 96 MMOL/L (ref 98–107)
CO2 SERPL-SCNC: 28.5 MMOL/L (ref 22–29)
CREAT SERPL-MCNC: 0.74 MG/DL (ref 0.57–1)
EOSINOPHIL # BLD AUTO: 0.15 10*3/MM3 (ref 0–0.4)
EOSINOPHIL NFR BLD AUTO: 2.5 % (ref 0.3–6.2)
ERYTHROCYTE [DISTWIDTH] IN BLOOD BY AUTOMATED COUNT: 12.2 % (ref 12.3–15.4)
GLOBULIN SER CALC-MCNC: 2.4 GM/DL
GLUCOSE SERPL-MCNC: 91 MG/DL (ref 65–99)
HCT VFR BLD AUTO: 38 % (ref 34–46.6)
HGB BLD-MCNC: 13 G/DL (ref 12–15.9)
IMM GRANULOCYTES # BLD AUTO: 0.02 10*3/MM3 (ref 0–0.05)
IMM GRANULOCYTES NFR BLD AUTO: 0.3 % (ref 0–0.5)
LYMPHOCYTES # BLD AUTO: 1.63 10*3/MM3 (ref 0.7–3.1)
LYMPHOCYTES NFR BLD AUTO: 27.5 % (ref 19.6–45.3)
MCH RBC QN AUTO: 32.3 PG (ref 26.6–33)
MCHC RBC AUTO-ENTMCNC: 34.2 G/DL (ref 31.5–35.7)
MCV RBC AUTO: 94.5 FL (ref 79–97)
MONOCYTES # BLD AUTO: 0.65 10*3/MM3 (ref 0.1–0.9)
MONOCYTES NFR BLD AUTO: 11 % (ref 5–12)
NEUTROPHILS # BLD AUTO: 3.41 10*3/MM3 (ref 1.7–7)
NEUTROPHILS NFR BLD AUTO: 57.7 % (ref 42.7–76)
NRBC BLD AUTO-RTO: 0 /100 WBC (ref 0–0.2)
PLATELET # BLD AUTO: 209 10*3/MM3 (ref 140–450)
POTASSIUM SERPL-SCNC: 4.4 MMOL/L (ref 3.5–5.2)
PROT SERPL-MCNC: 7 G/DL (ref 6–8.5)
RBC # BLD AUTO: 4.02 10*6/MM3 (ref 3.77–5.28)
SODIUM SERPL-SCNC: 136 MMOL/L (ref 136–145)
T3FREE SERPL-MCNC: 3.9 PG/ML (ref 2–4.4)
T4 FREE SERPL-MCNC: 0.98 NG/DL (ref 0.93–1.7)
TSH SERPL DL<=0.005 MIU/L-ACNC: 0.18 UIU/ML (ref 0.27–4.2)
WBC # BLD AUTO: 5.92 10*3/MM3 (ref 3.4–10.8)

## 2021-02-03 PROCEDURE — 97140 MANUAL THERAPY 1/> REGIONS: CPT | Performed by: PHYSICAL THERAPIST

## 2021-02-03 PROCEDURE — 97530 THERAPEUTIC ACTIVITIES: CPT | Performed by: PHYSICAL THERAPIST

## 2021-02-03 NOTE — PROGRESS NOTES
Re-Assessment / Re-Certification        Patient: Natalie Rosen   : 1945  Diagnosis/ICD-10 Code:  Pain, neck [M54.2]  Referring practitioner: JOSEPH Feliciano  Date of Initial Visit: Type: THERAPY  Noted: 2020  Today's Date: 2/3/2021  Patient seen for 7 sessions      Subjective:   Natalie Rosen reports: increased L shoulder pain and continued neck stiffness but pain in neck lessened some.  Rates pain in L shoulder at 8/10  Subjective Questionnaire: NDI:17  Clinical Progress: improved  Home Program Compliance: Yes  Treatment has included: therapeutic exercise, manual therapy, therapeutic activity and moist heat    Subjective     Objective   Active Range of Motion   Cervical/Thoracic Spine   Cervical     Flexion: 25 degrees   Extension: 20 degrees   Left lateral flexion: 15 degrees   Right lateral flexion: 20 degrees   Left rotation: 40 degrees   Right rotation: 70 degrees with pain   AROM R shoulder flexion: 140  AROM R shoulder ABD: 135     Goals  Plan Goals: SHORT TERM GOALS:  Time for Goal Achievement: 8 visits   1.  Patient to be compliant with HEP Progressing   2.  Pt to report increased ease to drive and read with less pain. Progressing   3.  Increased cervical and thoracic segmental mobility to allow for increased ease with driving and ADL's.Progressing   4.  Pt. to be independent with CT stabilization exercises to allow for improved posture while in clinic with fatiguing exercises. Progressing    LONG TERM GOALS: Time for Goal Achievement: 16 visits  1.  Pt to score < 20% perceived disability on Neck Index Progressing   2.  Pain level < 3/10 at worse with all activities Progressing   3.  Increased cervical AROM to WFL to allow for driving and household tasks without restrictions.Progresing  4.  Pt able to perform  drive, lift and daily activities without complaints of weakness or pain limiting function. Progressing   Assessment/Plan  Progress toward previous goals:  Progressing    Recommendations: Continue as planned  Timeframe: 1 month  Prognosis to achieve goals: good    PT Signature: Kaya Fiore PT      Based upon review of the patient's progress and continued therapy plan, it is my medical opinion that Natalie Rosen should continue physical therapy treatment at [facility]  [address] [place] [id number]  [phone].    Signature: __________________________________  Merna Silva APRN    Manual Therapy:  25       mins  20052;  Therapeutic Exercise:         mins  71232;     Neuromuscular Maryanne:       mins  11199;    Therapeutic Activity:     15     mins  76887;     Gait Training:          mins  08023;     Ultrasound:          mins  06459;    Electrical Stimulation:         mins  99372 ( );  Dry Needling          mins self-pay    Timed Treatment:  40    mins   Total Treatment:     50   mins

## 2021-02-05 ENCOUNTER — TELEPHONE (OUTPATIENT)
Dept: ENDOCRINOLOGY | Age: 76
End: 2021-02-05

## 2021-02-05 ENCOUNTER — TREATMENT (OUTPATIENT)
Dept: PHYSICAL THERAPY | Facility: CLINIC | Age: 76
End: 2021-02-05

## 2021-02-05 DIAGNOSIS — M54.2 PAIN, NECK: Primary | ICD-10-CM

## 2021-02-05 DIAGNOSIS — R29.898 DECREASED RANGE OF MOTION OF NECK: ICD-10-CM

## 2021-02-05 PROCEDURE — 97140 MANUAL THERAPY 1/> REGIONS: CPT | Performed by: PHYSICAL THERAPIST

## 2021-02-05 NOTE — TELEPHONE ENCOUNTER
Spoke to patient. She will make change to medication dose and she expressed understanding. She will call back when she is ready for a refill.      ----- Message from JOSEPH Daugherty sent at 2/4/2021 10:07 AM EST -----  Please call patient with the following message:         The medication list says that she is on methimazole 1 tablet 5 days a week she could consider Increasing the dosage to 1 tablet for 6 days a week and we could reevaluate when she comes back when she comes back to see me

## 2021-02-05 NOTE — PROGRESS NOTES
Physical Therapy Daily Progress Note  Visit: 8    Subjective Natalie Rosen reports: her neck is getting better but continued tightness. Shoulder is feeling better than last visit     Objective   See Exercise, Manual, and Modality Logs for complete treatment.     Assessment/Plan: Compliant/cooperative with current rehab efforts.  Plan details: Progress ROM / strengthening / stabilization / functional activity as tolerated     Manual Therapy:     25     mins  56760;  Therapeutic Exercise:    5      mins  86165;     Neuromuscular Maryanne:         mins  18777;    Therapeutic Activity:           mins  77188;     Gait Training:            mins  69092;     Ultrasound:           mins  78775;    Electrical Stimulation:          mins  96480 ( );  Dry Needling           mins self-pay  Traction           mins 60253  Canalith Repositioning         mins 52303      Timed Treatment:   30   mins   Total Treatment:   40     mins    JACK Avina License #: [id number]    Physical Therapist

## 2021-02-08 ENCOUNTER — TREATMENT (OUTPATIENT)
Dept: PHYSICAL THERAPY | Facility: CLINIC | Age: 76
End: 2021-02-08

## 2021-02-08 DIAGNOSIS — M54.2 PAIN, NECK: Primary | ICD-10-CM

## 2021-02-08 DIAGNOSIS — R29.898 DECREASED RANGE OF MOTION OF NECK: ICD-10-CM

## 2021-02-08 PROCEDURE — 97140 MANUAL THERAPY 1/> REGIONS: CPT | Performed by: PHYSICAL THERAPIST

## 2021-02-08 PROCEDURE — 97110 THERAPEUTIC EXERCISES: CPT | Performed by: PHYSICAL THERAPIST

## 2021-02-08 NOTE — PROGRESS NOTES
Physical Therapy Daily Progress Note    Patient: Natalie Rosen   : 1945  Diagnosis/ICD-10 Code:  Pain, neck [M54.2]  Referring practitioner: JOSEPH Feliciano  Date of Initial Visit: Type: THERAPY  Noted: 2020  Today's Date: 2021  Patient seen for 9 sessions         Natalie Rosen reports: no new complaints, still having neck and L UE pain.     Subjective     Objective   See Exercise, Manual, and Modality Logs for complete treatment.       Assessment/Plan  Compliant and cooperative with rehab efforts. Subjectively, pt reports no increase of pain or discomfort with interventions performed today. Performed well with all established interventions.  Positive response to manual therapy in regards to decreased tightness and discomfort in L UE and L UT. Continues to benefit from verbal/tactile cues to ensure proper form and technique for exercise performance.     Progress per Plan of Care           Manual Therapy:    25     mins  27316;  Therapeutic Exercise:    13     mins  93813;     Neuromuscular Maryanne:        mins  67356;    Therapeutic Activity:          mins  19449;     Gait Training:           mins  79792;     Ultrasound:          mins  04726;    Electrical Stimulation:         mins  52653 ( );  Dry Needling          mins self-pay    Timed Treatment:   38   mins   Total Treatment:     48   mins    Kristina Thomas PTA  Physical Therapist Assistant [phone]

## 2021-02-15 ENCOUNTER — TREATMENT (OUTPATIENT)
Dept: PHYSICAL THERAPY | Facility: CLINIC | Age: 76
End: 2021-02-15

## 2021-02-15 DIAGNOSIS — M54.2 PAIN, NECK: Primary | ICD-10-CM

## 2021-02-15 DIAGNOSIS — R29.898 DECREASED RANGE OF MOTION OF NECK: ICD-10-CM

## 2021-02-15 PROCEDURE — 97140 MANUAL THERAPY 1/> REGIONS: CPT | Performed by: PHYSICAL THERAPIST

## 2021-02-15 NOTE — PROGRESS NOTES
Physical Therapy Daily Progress Note  Visit: 10    Subjective Natalie Rosen reports: her shoulder is doing better. Reports her neck feels good somedays and feels tight somedays.     Objective   See Exercise, Manual, and Modality Logs for complete treatment.     Assessment/Plan: Compliant/cooperative with current rehab efforts.  Plan details: Progress ROM / strengthening / stabilization / functional activity as tolerated     Manual Therapy:    25      mins  76810;  Therapeutic Exercise:     5     mins  52486;     Neuromuscular Maryanne:         mins  08084;    Therapeutic Activity:           mins  66178;     Gait Training:            mins  25920;     Ultrasound:           mins  79005;    Electrical Stimulation:          mins  81286 ( );  Dry Needling           mins self-pay  Traction           mins 60046  Canalith Repositioning         mins 52856      Timed Treatment:  30   mins   Total Treatment:    40    mins    JACK Avina License #: [id number]    Physical Therapist

## 2021-02-16 ENCOUNTER — OFFICE VISIT (OUTPATIENT)
Dept: PAIN MEDICINE | Facility: CLINIC | Age: 76
End: 2021-02-16

## 2021-02-16 VITALS
DIASTOLIC BLOOD PRESSURE: 74 MMHG | SYSTOLIC BLOOD PRESSURE: 127 MMHG | OXYGEN SATURATION: 98 % | TEMPERATURE: 98 F | RESPIRATION RATE: 16 BRPM | HEIGHT: 68 IN | WEIGHT: 178.2 LBS | HEART RATE: 83 BPM | BODY MASS INDEX: 27.01 KG/M2

## 2021-02-16 DIAGNOSIS — M62.838 MUSCLE SPASM: ICD-10-CM

## 2021-02-16 DIAGNOSIS — G89.29 CHRONIC BILATERAL LOW BACK PAIN WITHOUT SCIATICA: ICD-10-CM

## 2021-02-16 DIAGNOSIS — M54.50 CHRONIC BILATERAL LOW BACK PAIN WITHOUT SCIATICA: ICD-10-CM

## 2021-02-16 DIAGNOSIS — M47.816 LUMBAR FACET ARTHROPATHY: Primary | ICD-10-CM

## 2021-02-16 DIAGNOSIS — M54.2 NECK PAIN ON LEFT SIDE: ICD-10-CM

## 2021-02-16 PROCEDURE — 99212 OFFICE O/P EST SF 10 MIN: CPT | Performed by: NURSE PRACTITIONER

## 2021-02-16 NOTE — PROGRESS NOTES
"CHIEF COMPLAINT  F/U back and neck pain- Radiofrequency ablation of bilateral L3-S1    Subjective   Natalie Rosen is a 75 y.o. female  who presents to the office for follow-up of procedure.  She completed a Bilateral L3-S1 Radiofrequency ablation   on  1/4/2021 performed by Dr. Pineda for management of back pain. Patient reports 25% ONGOING relief from the procedure.     Today her pain is 4/10VAS in severity. She states she continues to have low back pain and stiffness in the morning and with prolonged activity.  She states she has not completed PT for her low back pain.  I will place a referral to PT for her back pain as well.     She continues with PT for her neck and states that this has helped.     Patient remained masked during entire encounter. No cough present. I donned a mask and eye protection throughout entire visit. Prior to donning mask and eye protection, hand hygiene was performed, as well as when it was doffed.  I was closer than 6 feet, but not for an extended period of time. No obvious exposure to any bodily fluids.    Neck Pain   This is a chronic problem. The current episode started more than 1 month ago. The problem occurs intermittently. The pain is associated with nothing. The pain is present in the left side. Quality: twisting. The pain is at a severity of 3/10. The symptoms are aggravated by bending and position. Pertinent negatives include no chest pain, fever, headaches, numbness or weakness. She has tried acetaminophen (TPI) for the symptoms. The treatment provided mild relief.   Back Pain  This is a chronic problem. The current episode started more than 1 year ago. The problem occurs intermittently (Has pain \"most days\"). The problem has been waxing and waning since onset. The pain is present in the lumbar spine. The quality of the pain is described as shooting. The pain does not radiate. The pain is at a severity of 4/10. The symptoms are aggravated by standing. Pertinent negatives " "include no abdominal pain, chest pain, dysuria, fever, headaches, numbness or weakness. She has tried analgesics (Tylenol arthritis) for the symptoms. Improvement on treatment: MBB-Diagnostic relief.      PEG Assessment   What number best describes your pain on average in the past week?7  What number best describes how, during the past week, pain has interfered with your enjoyment of life?0  What number best describes how, during the past week, pain has interfered with your general activity?  5    The following portions of the patient's history were reviewed and updated as appropriate: allergies, current medications, past family history, past medical history, past social history, past surgical history and problem list.    Review of Systems   Constitutional: Negative for activity change, chills, fatigue and fever.   HENT: Negative for congestion.    Eyes: Negative for visual disturbance.   Respiratory: Negative for chest tightness and shortness of breath.    Cardiovascular: Negative for chest pain.   Gastrointestinal: Negative for abdominal pain, constipation and diarrhea.   Genitourinary: Negative for difficulty urinating, dyspareunia and dysuria.   Musculoskeletal: Positive for back pain and neck pain.   Neurological: Negative for dizziness, weakness, light-headedness, numbness and headaches.   Psychiatric/Behavioral: Negative for agitation and sleep disturbance. The patient is not nervous/anxious.      --  The aforementioned information the Chief Complaint section and above subjective data including any HPI data, and also the Review of Systems data, has been personally reviewed and affirmed.  --     Vitals:    02/16/21 1014   BP: 127/74   Pulse: 83   Resp: 16   Temp: 98 °F (36.7 °C)   SpO2: 98%   Weight: 80.8 kg (178 lb 3.2 oz)   Height: 172.7 cm (68\")   PainSc:   4   PainLoc: Back     Objective   Physical Exam  Vitals signs and nursing note reviewed.   Constitutional:       Appearance: Normal appearance. She is " well-developed.   Eyes:      General: Lids are normal.   Neck:      Musculoskeletal: Normal range of motion.   Cardiovascular:      Rate and Rhythm: Normal rate.   Pulmonary:      Effort: Pulmonary effort is normal.   Musculoskeletal:      Lumbar back: She exhibits decreased range of motion. She exhibits no tenderness and no bony tenderness.      Comments: NEG Chi SLR for radiculopathy, POS back pain  NEG Chi Michael  NEG Chi Thigh Thrust  NEG Chi SI Compression   Neurological:      Mental Status: She is alert and oriented to person, place, and time.      Gait: Gait normal.   Psychiatric:         Speech: Speech normal.         Behavior: Behavior normal.         Judgment: Judgment normal.       Assessment/Plan   Diagnoses and all orders for this visit:    1. Lumbar facet arthropathy (Primary)  -     Ambulatory Referral to Physical Therapy Evaluate and treat    2. Chronic bilateral low back pain without sciatica  -     Ambulatory Referral to Physical Therapy Evaluate and treat    3. Muscle spasm    4. Neck pain on left side      --- Continue PT for neck pain  --- Referral to PT for low back pain  --- Continue voltaren gel as prescribed.   --- Follow-up after completion of PT if pain fails to improve or worsens.      KRISSY REPORT    KRISSY report has been reviewed and scanned into the patient's chart.    As the clinician, I personally reviewed the KRISSY from 2/16/2021 while the patient was in the office today.    EMR Dragon/Transcription disclaimer:   Much of this encounter note is an electronic transcription/translation of spoken language to printed text. The electronic translation of spoken language may permit erroneous, or at times, nonsensical words or phrases to be inadvertently transcribed; Although I have reviewed the note for such errors, some may still exist.

## 2021-03-05 ENCOUNTER — TREATMENT (OUTPATIENT)
Dept: PHYSICAL THERAPY | Facility: CLINIC | Age: 76
End: 2021-03-05

## 2021-03-05 DIAGNOSIS — M54.50 CHRONIC BILATERAL LOW BACK PAIN WITHOUT SCIATICA: Primary | ICD-10-CM

## 2021-03-05 DIAGNOSIS — G89.29 CHRONIC BILATERAL LOW BACK PAIN WITHOUT SCIATICA: Primary | ICD-10-CM

## 2021-03-05 PROCEDURE — 97161 PT EVAL LOW COMPLEX 20 MIN: CPT | Performed by: PHYSICAL THERAPIST

## 2021-03-05 PROCEDURE — 97140 MANUAL THERAPY 1/> REGIONS: CPT | Performed by: PHYSICAL THERAPIST

## 2021-03-05 PROCEDURE — 97110 THERAPEUTIC EXERCISES: CPT | Performed by: PHYSICAL THERAPIST

## 2021-03-05 NOTE — PROGRESS NOTES
Physical Therapy Initial Evaluation and Plan of Care      Patient: Natalie Rosen   : 1945  Diagnosis/ICD-10 Code:  Chronic bilateral low back pain without sciatica [M54.5, G89.29]  Referring practitioner: JOSEPH Feliciano    Subjective Evaluation    History of Present Illness  Mechanism of injury: Low back : radiofrequency ablation. Not much help  Chronic for years. Worsening  No LE pain.   gel on neck and back; Tylenol arthritis  B TKA 5yeras    Pain  Aggravating factors: ambulation and standing (sit tostand; standing > 10 min; sitting > 30 )             Objective          Active Range of Motion     Lumbar   Normal active range of motion    Passive Range of Motion     Additional Passive Range of Motion Details  Decreased B lumbar segmental mobility T12-L5    Strength/Myotome Testing     Left Hip   Planes of Motion   Flexion: 3+  Extension: 4-  Abduction: 4+  Adduction: 5  External rotation: 4-  Internal rotation: 4+    Right Hip   Planes of Motion   Flexion: 4  Extension: 4-  Abduction: 4+  Adduction: 5  External rotation: 4  Internal rotation: 4+    Left Knee   Flexion: 5  Extension: 5    Right Knee   Flexion: 5  Extension: 5    Left Ankle/Foot   Dorsiflexion: 5    Right Ankle/Foot   Dorsiflexion: 5    Additional Strength Details  Lower abs 3+/5    Tests     Lumbar     Left   Negative femoral stretch, passive SLR and quadrant.     Right   Negative femoral stretch, passive SLR and quadrant.     Functional Assessment     Comments  Modified Oswestry 54%          Assessment & Plan     Assessment  Impairments: abnormal muscle tone, activity intolerance, impaired physical strength, lacks appropriate home exercise program and pain with function  Assessment details: Pt is a good candidate for skilled PT intervention, azar manual therapy for spinal joint mobility, alignment, postural restoration and core strengthening to restore functional AROM and strength to return to previous level of ADL's.  Prognosis:  good  Functional Limitations: lifting, walking, uncomfortable because of pain, sitting and standing  Goals  Plan Goals: Short Term Goals: ( 3 weeks)  1.Pt to be independent with HEP  2. Pt to exhibit improved lumbar segmental mobility to WNL to allow for increased ease with ADL's  3. Pt to demonstrate proper lifting technique  4. Pt to improve lower ab strength to 4-/5 to allow for improved standing and walking tolerance    Long Term Goals (6 weeks )  1. Pt to demonstrate ability to stand/walk > 45 minutes with none to min LBP  2. Pt to exhibit >4/5 lower abdominal strength to allow for more strenuous daily activities  3. Pt to exhibit hip flexibility to WFLto allow for reaching and bending with min to no pain  4. Pt to score <25% on Back Index    Plan  Therapy options: will be seen for skilled physical therapy services  Planned modality interventions: cryotherapy, electrical stimulation/Russian stimulation, thermotherapy (hydrocollator packs) and traction  Planned therapy interventions: abdominal trunk stabilization, balance/weight-bearing training, body mechanics training, flexibility, home exercise program, joint mobilization, manual therapy, neuromuscular re-education, postural training, soft tissue mobilization, stretching, spinal/joint mobilization, strengthening and therapeutic activities  Frequency: 2x week  Duration in weeks: 6  Treatment plan discussed with: patient        Manual Therapy:    10     mins  74138;  Therapeutic Exercise:    15    mins  73104;     Neuromuscular Maryanne:        mins  03243;    Therapeutic Activity:          mins  90973;     Gait Training:           mins  60644;     Ultrasound:          mins  63100;    Electrical Stimulation:         mins  61996 ( );  Dry Needling          mins self-pay    Timed Treatment:   25   mins   Total Treatment:     55   mins    PT SIGNATURE: Ping Helms PT   [place] License # [id number]  DATE TREATMENT INITIATED: 3/8/2021    Medicare Initial  Certification  Certification Period: 6/6/2021  I certify that the therapy services are furnished while this patient is under my care.  The services outlined above are required by this patient, and will be reviewed every 90 days.     PHYSICIAN: Merna Silva, APRN      DATE:     Please sign and return via fax to [phone].. Thank you, [facility].

## 2021-03-05 NOTE — PATIENT INSTRUCTIONS
Access Code: [email or web address]  URL: [email or web address]  Date: 03/05/2021  Prepared by: Ping Helms    Exercises  Supine Lower Trunk Rotation - 10 reps - 1 sets - 1x daily - 7x weekly  Supine Lumbar Rotation Stretch - 10 reps - 1 sets - 1x daily - 7x weekly  Supine Posterior Pelvic Tilt - 10 reps - 3 sets - 1x daily - 7x weekly  Supine 90/90 Alternating Toe Touch - 10 reps - 3 sets - 1x daily - 7x weekly  Standing Hip Extension with Unilateral Counter Support - 10 reps - 1 sets - 1x daily - 7x weekly    Pt was educated on the importance of their HEP and their current need for skilled physical therapy. Patients goals and potential limitations were discussed and pt is in agreement with current plan of care and treatment emphasis.

## 2021-03-16 ENCOUNTER — TREATMENT (OUTPATIENT)
Dept: PHYSICAL THERAPY | Facility: CLINIC | Age: 76
End: 2021-03-16

## 2021-03-16 DIAGNOSIS — M54.50 CHRONIC BILATERAL LOW BACK PAIN WITHOUT SCIATICA: Primary | ICD-10-CM

## 2021-03-16 DIAGNOSIS — G89.29 CHRONIC BILATERAL LOW BACK PAIN WITHOUT SCIATICA: Primary | ICD-10-CM

## 2021-03-16 PROCEDURE — 97530 THERAPEUTIC ACTIVITIES: CPT | Performed by: PHYSICAL THERAPIST

## 2021-03-16 PROCEDURE — 97110 THERAPEUTIC EXERCISES: CPT | Performed by: PHYSICAL THERAPIST

## 2021-03-16 NOTE — PROGRESS NOTES
Physical Therapy Daily Progress Note    Patient: Natalie Rosen   : 1945  Diagnosis/ICD-10 Code:  Chronic bilateral low back pain without sciatica [M54.5, G89.29]  Referring practitioner: JOSEPH Feliciano  Date of Initial Visit: Type: THERAPY  Noted: 3/5/2021  Today's Date: 3/16/2021  Patient seen for 2 sessions         Natalie Rosen reports: Back is feeling tight today. Exercises are going well, no problems with them.    Subjective     Objective   See Exercise, Manual, and Modality Logs for complete treatment.       Assessment/Plan  Subjectively, pt reports no increase of pain or discomfort with interventions performed today. Performed well with added hip mobility and core strengthening interventions. Continues to demonstrate difficulty with transitional movements.  Continues to benefit from verbal/tactile cues to ensure proper form and technique for exercise performance.     Progress per Plan of Care           Manual Therapy:         mins  10297;  Therapeutic Exercise:    20     mins  83337;     Neuromuscular Maryanne:        mins  83392;    Therapeutic Activity:     15     mins  17533;     Gait Training:           mins  46843;     Ultrasound:          mins  43407;    Electrical Stimulation:         mins  23243 ( );  Dry Needling          mins self-pay    Timed Treatment:   35   mins   Total Treatment:     45   mins    Kristina Thomas PTA  Physical Therapist Assistant [id number]

## 2021-03-19 ENCOUNTER — TREATMENT (OUTPATIENT)
Dept: PHYSICAL THERAPY | Facility: CLINIC | Age: 76
End: 2021-03-19

## 2021-03-19 DIAGNOSIS — G89.29 CHRONIC BILATERAL LOW BACK PAIN WITHOUT SCIATICA: Primary | ICD-10-CM

## 2021-03-19 DIAGNOSIS — M54.50 CHRONIC BILATERAL LOW BACK PAIN WITHOUT SCIATICA: Primary | ICD-10-CM

## 2021-03-19 PROCEDURE — 97530 THERAPEUTIC ACTIVITIES: CPT | Performed by: PHYSICAL THERAPIST

## 2021-03-19 PROCEDURE — 97110 THERAPEUTIC EXERCISES: CPT | Performed by: PHYSICAL THERAPIST

## 2021-03-19 NOTE — PROGRESS NOTES
Physical Therapy Daily Progress Note    Patient: Natalie Rosen   : 1945  Diagnosis/ICD-10 Code:  Chronic bilateral low back pain without sciatica [M54.5, G89.29]  Referring practitioner: JOSEPH Feliciano  Date of Initial Visit: Type: THERAPY  Noted: 3/5/2021  Today's Date: 3/19/2021  Patient seen for 3 sessions         Natalie Rosen reports: Very sore after last session, stretches helped relieve it some. Feeling okay today.     Subjective     Objective   See Exercise, Manual, and Modality Logs for complete treatment.       Assessment/Plan  Subjectively, pt reports no increase of pain or discomfort with interventions performed today. Performed well with decreased exercise performance due to pt c/o soreness. Continues to demonstrate weakness and difficulty with transitional movements. Continues to benefit from verbal/tactile cues to ensure proper form and technique for exercise performance.     Progress per Plan of Care           Manual Therapy:         mins  45964;  Therapeutic Exercise:    15     mins  96640;     Neuromuscular Maryanne:        mins  34788;    Therapeutic Activity:     15     mins  30787;     Gait Training:           mins  24834;     Ultrasound:          mins  57201;    Electrical Stimulation:         mins  42558 ( );  Dry Needling          mins self-pay    Timed Treatment:   30   mins   Total Treatment:     40   mins    Kristina Thomas PTA  Physical Therapist Assistant [id number]

## 2021-03-23 ENCOUNTER — TREATMENT (OUTPATIENT)
Dept: PHYSICAL THERAPY | Facility: CLINIC | Age: 76
End: 2021-03-23

## 2021-03-23 DIAGNOSIS — G89.29 CHRONIC BILATERAL LOW BACK PAIN WITHOUT SCIATICA: Primary | ICD-10-CM

## 2021-03-23 DIAGNOSIS — M54.50 CHRONIC BILATERAL LOW BACK PAIN WITHOUT SCIATICA: Primary | ICD-10-CM

## 2021-03-23 PROCEDURE — 97110 THERAPEUTIC EXERCISES: CPT | Performed by: PHYSICAL THERAPIST

## 2021-03-23 PROCEDURE — 97530 THERAPEUTIC ACTIVITIES: CPT | Performed by: PHYSICAL THERAPIST

## 2021-03-23 NOTE — PROGRESS NOTES
Physical Therapy Daily Progress Note    Patient: Natalie Rosen   : 1945  Diagnosis/ICD-10 Code:  Chronic bilateral low back pain without sciatica [M54.5, G89.29]  Referring practitioner: JOSEPH Feliciano  Date of Initial Visit: Type: THERAPY  Noted: 3/5/2021  Today's Date: 3/23/2021  Patient seen for 4 sessions         Natalie Rosen reports: feeling better today, not having as much soreness and discomfort.    Subjective     Objective   See Exercise, Manual, and Modality Logs for complete treatment.       Assessment/Plan   Subjectively, pt reports no increase of pain or discomfort with interventions performed today. Performed well with established interventions for LE flexibility and core stability. Continues to demonstrate decreased functional activity tolerance.Continues to benefit from verbal/tactile cues to ensure proper form and technique for exercise performance.       Progress per Plan of Care           Manual Therapy:         mins  42183;  Therapeutic Exercise:    24     mins  38763;     Neuromuscular Maryanne:        mins  50535;    Therapeutic Activity:     16     mins  78494;     Gait Training:           mins  49242;     Ultrasound:          mins  71984;    Electrical Stimulation:         mins  55205 ( );  Dry Needling          mins self-pay    Timed Treatment:   40   mins   Total Treatment:     50   mins    Kristina Thomas PTA  Physical Therapist Assistant [id number]

## 2021-03-30 ENCOUNTER — TREATMENT (OUTPATIENT)
Dept: PHYSICAL THERAPY | Facility: CLINIC | Age: 76
End: 2021-03-30

## 2021-03-30 PROCEDURE — 97140 MANUAL THERAPY 1/> REGIONS: CPT | Performed by: PHYSICAL THERAPIST

## 2021-03-30 PROCEDURE — 97110 THERAPEUTIC EXERCISES: CPT | Performed by: PHYSICAL THERAPIST

## 2021-03-30 NOTE — PROGRESS NOTES
Physical Therapy Daily Progress Note        Subjective     Natalie Rosen reports: Not as sore with therex but no sig change with back pain. Primarily with standing and walking    Objective   See Exercise, Manual, and Modality Logs for complete treatment.       Assessment/Plan  Restricted T-L segmental mobility, more on R with LROT. Added standing quick kicks for more functional strengthening to help with standing and walking     Progress per Plan of Care and Progress strengthening /stabilization /functional activity           Manual Therapy:  25       mins  96472;  Therapeutic Exercise: 15      mins  08036;     Neuromuscular Maryanne:       mins  09343;    Therapeutic Activity:         mins  49652;     Gait Training:         mins  79682;     Ultrasound:         mins  95686;    Electrical Stimulation:        mins  95583 ( );  Dry Needling         mins self-pay    Timed Treatment:   40   mins   Total Treatment:     40   mins    Ping Helms PT  Physical Therapist  [place] License # [id number]

## 2021-04-01 ENCOUNTER — TREATMENT (OUTPATIENT)
Dept: PHYSICAL THERAPY | Facility: CLINIC | Age: 76
End: 2021-04-01

## 2021-04-01 DIAGNOSIS — M54.50 CHRONIC BILATERAL LOW BACK PAIN WITHOUT SCIATICA: Primary | ICD-10-CM

## 2021-04-01 DIAGNOSIS — G89.29 CHRONIC BILATERAL LOW BACK PAIN WITHOUT SCIATICA: Primary | ICD-10-CM

## 2021-04-01 PROCEDURE — 97112 NEUROMUSCULAR REEDUCATION: CPT | Performed by: PHYSICAL THERAPIST

## 2021-04-01 PROCEDURE — 97110 THERAPEUTIC EXERCISES: CPT | Performed by: PHYSICAL THERAPIST

## 2021-04-01 PROCEDURE — 97140 MANUAL THERAPY 1/> REGIONS: CPT | Performed by: PHYSICAL THERAPIST

## 2021-04-01 NOTE — PROGRESS NOTES
Physical Therapy Daily Progress Note        Subjective     Natalie Rosen reports:  A little sore after last session but OK. Couldn't figure out how to hook up band for quick  Kicks.    Objective   See Exercise, Manual, and Modality Logs for complete treatment.       Assessment/Plan  Pt sore with manual therapy and fatigues easily with therex.    Progress per Plan of Care and Progress strengthening /stabilization /functional activity           Manual Therapy:  20       mins  78944;  Therapeutic Exercise: 15      mins  03561;     Neuromuscular Maryanne:5       mins  74021;    Therapeutic Activity:         mins  73334;     Gait Training:         mins  78925;     Ultrasound:         mins  52644;    Electrical Stimulation:        mins  82313 ( );  Dry Needling         mins self-pay    Timed Treatment:   40   mins   Total Treatment:     40   mins    Ping Helms, PT  Physical Therapist  [place] License # [id number]

## 2021-04-07 ENCOUNTER — TELEPHONE (OUTPATIENT)
Dept: PHYSICAL THERAPY | Facility: CLINIC | Age: 76
End: 2021-04-07

## 2021-04-11 DIAGNOSIS — M47.816 SPONDYLOSIS OF LUMBAR REGION WITHOUT MYELOPATHY OR RADICULOPATHY: ICD-10-CM

## 2021-04-11 DIAGNOSIS — M60.9 MYOFASCIITIS: ICD-10-CM

## 2021-04-11 DIAGNOSIS — M47.812 CERVICAL SPONDYLOSIS WITHOUT MYELOPATHY: ICD-10-CM

## 2021-04-11 DIAGNOSIS — M47.816 LUMBAR FACET JOINT SYNDROME: ICD-10-CM

## 2021-04-12 ENCOUNTER — TREATMENT (OUTPATIENT)
Dept: PHYSICAL THERAPY | Facility: CLINIC | Age: 76
End: 2021-04-12

## 2021-04-12 DIAGNOSIS — G89.29 CHRONIC BILATERAL LOW BACK PAIN WITHOUT SCIATICA: Primary | ICD-10-CM

## 2021-04-12 DIAGNOSIS — M54.50 CHRONIC BILATERAL LOW BACK PAIN WITHOUT SCIATICA: Primary | ICD-10-CM

## 2021-04-12 PROCEDURE — 97140 MANUAL THERAPY 1/> REGIONS: CPT | Performed by: PHYSICAL THERAPIST

## 2021-04-12 PROCEDURE — 97110 THERAPEUTIC EXERCISES: CPT | Performed by: PHYSICAL THERAPIST

## 2021-04-12 NOTE — PROGRESS NOTES
Physical Therapy Daily Progress Note        Subjective     Natalie Rosen reports: Back feeling better. Can stand /walk longer with less pain.    Objective   See Exercise, Manual, and Modality Logs for complete treatment.       Assessment/Plan  Unable to do 2 legs up. Sore with bridging but able to complete. Fatigued but able to walk 10 min on treadmill and stand for Paloff exercises w/o back pain    Progress per Plan of Care           Manual Therapy:  25       mins  72078;  Therapeutic Exercise: 20      mins  72801;     Neuromuscular Maryanne:       mins  54951;    Therapeutic Activity:         mins  84320;     Gait Training:         mins  96235;     Ultrasound:         mins  99454;    Electrical Stimulation:        mins  41682 ( );  Dry Needling         mins self-pay    Timed Treatment:   45   mins   Total Treatment:     45   mins    Ping Helms PT  Physical Therapist  [place] License # [id number]

## 2021-05-04 ENCOUNTER — TREATMENT (OUTPATIENT)
Dept: PHYSICAL THERAPY | Facility: CLINIC | Age: 76
End: 2021-05-04

## 2021-05-04 DIAGNOSIS — G89.29 CHRONIC BILATERAL LOW BACK PAIN WITHOUT SCIATICA: Primary | ICD-10-CM

## 2021-05-04 DIAGNOSIS — M54.50 CHRONIC BILATERAL LOW BACK PAIN WITHOUT SCIATICA: Primary | ICD-10-CM

## 2021-05-04 PROCEDURE — 97140 MANUAL THERAPY 1/> REGIONS: CPT | Performed by: PHYSICAL THERAPIST

## 2021-05-04 PROCEDURE — 97110 THERAPEUTIC EXERCISES: CPT | Performed by: PHYSICAL THERAPIST

## 2021-05-04 NOTE — PROGRESS NOTES
Re-Assessment / Re-Certification      Patient: Natalie Rosen   : 1945  Diagnosis/ICD-10 Code:  Chronic bilateral low back pain without sciatica [M54.5, G89.29]  Referring practitioner: JOSEPH Feliciano  Date of Initial Visit: 3/5/2021  Today's Date: 2021  Patient seen for 8 sessions      Subjective:   Natalie Rosen reports: I was able to stand/walk at [facility] without back pain  Subjective Questionnaire: Oswestry: NT  Clinical Progress: improved  Home Program Compliance: Yes  Treatment has included: therapeutic exercise, neuromuscular re-education and manual therapy    Objective   Lumbar AROM WFL all planes without pain  LE strength 5/5 except hip extension 4+/5  Lower abs 4-/5  (-) SLR but tight quads and hams B    Assessment/Plan   Long Term Goals (6 weeks )  1. Pt to demonstrate ability to stand/walk > 45 minutes with none to min LBP PROGRESSING 30 min  2. Pt to exhibit >4/5 lower abdominal strength to allow for more strenuous daily activities PROGRESSING  3. Pt to exhibit hip flexibility to WFLto allow for reaching and bending with min to no pain MET  4. Pt to score <25% on Back Index NT  Progress toward previous goals: Partially Met        Recommendations: Continue as planned. Pt to try (I) HEP    PT Signature: Ping Helms, PT  [place] License # [id number]    Based upon review of the patient's progress and continued therapy plan, it is my medical opinion that Natalie Rosen should continue physical therapy treatment at [facility]  [address] [place] [id number] [phone].    Signature: __________________________________  Merna Silva APRN    Manual Therapy:    15     mins  19359;  Therapeutic Exercise:    10     mins  99055;     Neuromuscular Maryanne:        mins  81421;    Therapeutic Activity:         mins  18977;     Gait Training:           mins  46817;     Ultrasound:          mins  39154;    Electrical Stimulation:          mins  48441 ( );  Dry Needling          mins self-pay    Timed Treatment:   25   mins   Total Treatment:     25   mins

## 2021-05-20 ENCOUNTER — OFFICE (OUTPATIENT)
Dept: URBAN - METROPOLITAN AREA CLINIC 75 | Facility: CLINIC | Age: 76
End: 2021-05-20

## 2021-05-20 VITALS
HEART RATE: 80 BPM | TEMPERATURE: 97.6 F | DIASTOLIC BLOOD PRESSURE: 80 MMHG | WEIGHT: 177 LBS | HEIGHT: 68 IN | OXYGEN SATURATION: 96 % | SYSTOLIC BLOOD PRESSURE: 130 MMHG

## 2021-05-20 DIAGNOSIS — K21.9 GASTRO-ESOPHAGEAL REFLUX DISEASE WITHOUT ESOPHAGITIS: ICD-10-CM

## 2021-05-20 DIAGNOSIS — K59.00 CONSTIPATION, UNSPECIFIED: ICD-10-CM

## 2021-05-20 DIAGNOSIS — R14.0 ABDOMINAL DISTENSION (GASEOUS): ICD-10-CM

## 2021-05-20 PROCEDURE — 99214 OFFICE O/P EST MOD 30 MIN: CPT | Performed by: NURSE PRACTITIONER

## 2021-05-20 RX ORDER — OMEPRAZOLE 40 MG/1
40 CAPSULE, DELAYED RELEASE ORAL
Qty: 90 | Refills: 3 | Status: ACTIVE
Start: 2020-10-12

## 2021-08-21 LAB
ALBUMIN SERPL-MCNC: 4.4 G/DL (ref 3.5–5.2)
ALBUMIN/GLOB SERPL: 2 G/DL
ALP SERPL-CCNC: 54 U/L (ref 39–117)
ALT SERPL-CCNC: 22 U/L (ref 1–33)
AST SERPL-CCNC: 29 U/L (ref 1–32)
BASOPHILS # BLD AUTO: 0.06 10*3/MM3 (ref 0–0.2)
BASOPHILS NFR BLD AUTO: 1.4 % (ref 0–1.5)
BILIRUB SERPL-MCNC: 0.6 MG/DL (ref 0–1.2)
BUN SERPL-MCNC: 9 MG/DL (ref 8–23)
BUN/CREAT SERPL: 16.7 (ref 7–25)
CALCIUM SERPL-MCNC: 9.7 MG/DL (ref 8.6–10.5)
CHLORIDE SERPL-SCNC: 95 MMOL/L (ref 98–107)
CO2 SERPL-SCNC: 28.3 MMOL/L (ref 22–29)
CREAT SERPL-MCNC: 0.54 MG/DL (ref 0.57–1)
EOSINOPHIL # BLD AUTO: 0.08 10*3/MM3 (ref 0–0.4)
EOSINOPHIL NFR BLD AUTO: 1.9 % (ref 0.3–6.2)
ERYTHROCYTE [DISTWIDTH] IN BLOOD BY AUTOMATED COUNT: 12.3 % (ref 12.3–15.4)
GLOBULIN SER CALC-MCNC: 2.2 GM/DL
GLUCOSE SERPL-MCNC: 86 MG/DL (ref 65–99)
HCT VFR BLD AUTO: 38.2 % (ref 34–46.6)
HGB BLD-MCNC: 12.3 G/DL (ref 12–15.9)
IMM GRANULOCYTES # BLD AUTO: 0.01 10*3/MM3 (ref 0–0.05)
IMM GRANULOCYTES NFR BLD AUTO: 0.2 % (ref 0–0.5)
LYMPHOCYTES # BLD AUTO: 1.5 10*3/MM3 (ref 0.7–3.1)
LYMPHOCYTES NFR BLD AUTO: 35 % (ref 19.6–45.3)
MCH RBC QN AUTO: 31.3 PG (ref 26.6–33)
MCHC RBC AUTO-ENTMCNC: 32.2 G/DL (ref 31.5–35.7)
MCV RBC AUTO: 97.2 FL (ref 79–97)
MONOCYTES # BLD AUTO: 0.57 10*3/MM3 (ref 0.1–0.9)
MONOCYTES NFR BLD AUTO: 13.3 % (ref 5–12)
NEUTROPHILS # BLD AUTO: 2.06 10*3/MM3 (ref 1.7–7)
NEUTROPHILS NFR BLD AUTO: 48.2 % (ref 42.7–76)
NRBC BLD AUTO-RTO: 0 /100 WBC (ref 0–0.2)
PLATELET # BLD AUTO: 222 10*3/MM3 (ref 140–450)
POTASSIUM SERPL-SCNC: 4.7 MMOL/L (ref 3.5–5.2)
PROT SERPL-MCNC: 6.6 G/DL (ref 6–8.5)
RBC # BLD AUTO: 3.93 10*6/MM3 (ref 3.77–5.28)
SODIUM SERPL-SCNC: 135 MMOL/L (ref 136–145)
T3FREE SERPL-MCNC: 3.9 PG/ML (ref 2–4.4)
T4 FREE SERPL-MCNC: 1.06 NG/DL (ref 0.93–1.7)
TSH SERPL DL<=0.005 MIU/L-ACNC: 0.02 UIU/ML (ref 0.27–4.2)
WBC # BLD AUTO: 4.28 10*3/MM3 (ref 3.4–10.8)

## 2021-09-03 ENCOUNTER — OFFICE VISIT (OUTPATIENT)
Dept: ENDOCRINOLOGY | Age: 76
End: 2021-09-03

## 2021-09-03 DIAGNOSIS — E04.2 NON-TOXIC MULTINODULAR GOITER: ICD-10-CM

## 2021-09-03 DIAGNOSIS — E05.90 HYPERTHYROIDISM: Primary | ICD-10-CM

## 2021-09-03 DIAGNOSIS — R73.01 IMPAIRED FASTING BLOOD SUGAR: ICD-10-CM

## 2021-09-03 PROCEDURE — 99214 OFFICE O/P EST MOD 30 MIN: CPT | Performed by: INTERNAL MEDICINE

## 2021-09-03 RX ORDER — METHIMAZOLE 5 MG/1
TABLET ORAL
Qty: 90 TABLET | Refills: 5 | Status: SHIPPED | OUTPATIENT
Start: 2021-09-03 | End: 2022-09-12

## 2021-09-03 NOTE — PROGRESS NOTES
Chief Complaint  Chief Complaint   Patient presents with   • Hyperthyroidism   FOLLOW UP/ HYPERTHYROIDISM    Subjective          History of Present Illness    Natalie Rosen 75 y.o. presents as a follow-up for the evaluation of hypothyroidism.  Patient used to see Dr. Boone in the past.  Transferred her care to me today.    Today in visit patient reports that she has been on methimazole at various dosages over the years because of her thyroid levels being up and down.  Currently she is on methimazole 5 mg 5 days a week, she even tried methimazole 5 mg every other day in the past.    Her energy levels are okay, weight has been stable.  No history of tremors, racing of heart or diagnosis of A. fib or cardiac arrhythmias.  No history of frequent fractures.        Reviewed primary care physician's/consulting physician documentation and lab results     You have chosen to receive care through a telehealth visit.  Do you consent to use a video/audio connection for your medical care today? Yes  Audio visit      I have reviewed the patient's allergies, medicines, past medical hx, family hx and social hx in detail.    Objective   Vital Signs:   There were no vitals taken for this visit. - tele- health visit  Physical Exam   General appearance - no distress  Eyes- anicteric sclera  Ear nose and throat-external ears and nose normal.    Respiratory-normal chest on inspection.  No respiratory distress noted.  Skin-no rashes.  Neuro-alert and oriented x3            Result Review :   The following data was reviewed by: Andrew Carlin MD on 09/03/2021:  Orders Only on 08/20/2021   Component Date Value Ref Range Status   • WBC 08/20/2021 4.28  3.40 - 10.80 10*3/mm3 Final   • RBC 08/20/2021 3.93  3.77 - 5.28 10*6/mm3 Final   • Hemoglobin 08/20/2021 12.3  12.0 - 15.9 g/dL Final   • Hematocrit 08/20/2021 38.2  34.0 - 46.6 % Final   • MCV 08/20/2021 97.2* 79.0 - 97.0 fL Final   • MCH 08/20/2021 31.3  26.6 - 33.0 pg Final   •  MCHC 08/20/2021 32.2  31.5 - 35.7 g/dL Final   • RDW 08/20/2021 12.3  12.3 - 15.4 % Final   • Platelets 08/20/2021 222  140 - 450 10*3/mm3 Final   • Neutrophil Rel % 08/20/2021 48.2  42.7 - 76.0 % Final   • Lymphocyte Rel % 08/20/2021 35.0  19.6 - 45.3 % Final   • Monocyte Rel % 08/20/2021 13.3* 5.0 - 12.0 % Final   • Eosinophil Rel % 08/20/2021 1.9  0.3 - 6.2 % Final   • Basophil Rel % 08/20/2021 1.4  0.0 - 1.5 % Final   • Neutrophils Absolute 08/20/2021 2.06  1.70 - 7.00 10*3/mm3 Final   • Lymphocytes Absolute 08/20/2021 1.50  0.70 - 3.10 10*3/mm3 Final   • Monocytes Absolute 08/20/2021 0.57  0.10 - 0.90 10*3/mm3 Final   • Eosinophils Absolute 08/20/2021 0.08  0.00 - 0.40 10*3/mm3 Final   • Basophils Absolute 08/20/2021 0.06  0.00 - 0.20 10*3/mm3 Final   • Immature Granulocyte Rel % 08/20/2021 0.2  0.0 - 0.5 % Final   • Immature Grans Absolute 08/20/2021 0.01  0.00 - 0.05 10*3/mm3 Final   • nRBC 08/20/2021 0.0  0.0 - 0.2 /100 WBC Final   • Glucose 08/20/2021 86  65 - 99 mg/dL Final   • BUN 08/20/2021 9  8 - 23 mg/dL Final   • Creatinine 08/20/2021 0.54* 0.57 - 1.00 mg/dL Final   • eGFR Non  Am 08/20/2021 110  >60 mL/min/1.73 Final    Comment: The MDRD GFR formula is only valid for adults with stable  renal function between ages 18 and 70.     • eGFR  Am 08/20/2021 134  >60 mL/min/1.73 Final   • BUN/Creatinine Ratio 08/20/2021 16.7  7.0 - 25.0 Final   • Sodium 08/20/2021 135* 136 - 145 mmol/L Final   • Potassium 08/20/2021 4.7  3.5 - 5.2 mmol/L Final   • Chloride 08/20/2021 95* 98 - 107 mmol/L Final   • Total CO2 08/20/2021 28.3  22.0 - 29.0 mmol/L Final   • Calcium 08/20/2021 9.7  8.6 - 10.5 mg/dL Final   • Total Protein 08/20/2021 6.6  6.0 - 8.5 g/dL Final   • Albumin 08/20/2021 4.40  3.50 - 5.20 g/dL Final   • Globulin 08/20/2021 2.2  gm/dL Final   • A/G Ratio 08/20/2021 2.0  g/dL Final   • Total Bilirubin 08/20/2021 0.6  0.0 - 1.2 mg/dL Final   • Alkaline Phosphatase 08/20/2021 54  39 - 117 U/L  Final   • AST (SGOT) 08/20/2021 29  1 - 32 U/L Final   • ALT (SGPT) 08/20/2021 22  1 - 33 U/L Final   • Free T4 08/20/2021 1.06  0.93 - 1.70 ng/dL Final    Results may be falsely increased if patient taking Biotin.   • TSH 08/20/2021 0.018* 0.270 - 4.200 uIU/mL Final   • T3, Free 08/20/2021 3.9  2.0 - 4.4 pg/mL Final     Data reviewed: PCP documentation        Results Review:    I reviewed the patient's new clinical results.     Assessment and Plan    Problem List Items Addressed This Visit        Other    Hyperthyroidism - Primary    Overview     Description: DIAGNOSED 2012         Relevant Medications    methIMAzole (TAPAZOLE) 5 MG tablet    Other Relevant Orders    TSH    T3, Free    T4, Free    TSH    T4, Free    T3, Free    Hemoglobin A1c    Comprehensive Metabolic Panel    Non-toxic multinodular goiter    Relevant Medications    methIMAzole (TAPAZOLE) 5 MG tablet    Other Relevant Orders    TSH    T3, Free    T4, Free    TSH    T4, Free    T3, Free    Hemoglobin A1c    Comprehensive Metabolic Panel      Other Visit Diagnoses     Impaired fasting blood sugar         Relevant Orders    Hemoglobin A1c        Hyperthyroidism-chronic problem  Levels are not stable  Change the medication to methimazole 5 mg for 6 days a week.  Repeat blood work-up in 6 weeks from now to monitor for the stability of the levels.    Hyperlipidemia  Continue pravastatin all the above problems are new for me      Interpreted the blood work-up/imaging results performed by the primary care/consulting physician -    Refills sent to pharmacy    Follow Up     Patient was given instructions and counseling regarding her condition or for health maintenance advice. Please see specific information pulled into the AVS if appropriate.       Thank you for asking me to see your patient, Natalie Rosen in consultation.     I spent 32 minutes caring for Natalie on this date of service. This time includes time spent by me in the following  "activities:preparing for the visit, reviewing tests, obtaining and/or reviewing a separately obtained history, counseling and educating the patient/family/caregiver, ordering medications, tests, or procedures, documenting information in the medical record, independently interpreting results and communicating that information with the patient/family/caregiver and care coordination          Andrew Carlin MD  09/03/21      EMR Dragon / transcription disclaimer:     \"Dictated utilizing Dragon dictation\".                      "

## 2021-09-28 ENCOUNTER — APPOINTMENT (OUTPATIENT)
Dept: WOMENS IMAGING | Facility: HOSPITAL | Age: 76
End: 2021-09-28

## 2021-09-28 PROCEDURE — 77067 SCR MAMMO BI INCL CAD: CPT | Performed by: RADIOLOGY

## 2021-09-28 PROCEDURE — 77063 BREAST TOMOSYNTHESIS BI: CPT | Performed by: RADIOLOGY

## 2021-11-23 ENCOUNTER — OFFICE (OUTPATIENT)
Dept: URBAN - METROPOLITAN AREA CLINIC 75 | Facility: CLINIC | Age: 76
End: 2021-11-23

## 2021-11-23 VITALS
HEIGHT: 68 IN | SYSTOLIC BLOOD PRESSURE: 126 MMHG | OXYGEN SATURATION: 91 % | WEIGHT: 180 LBS | DIASTOLIC BLOOD PRESSURE: 70 MMHG | HEART RATE: 88 BPM

## 2021-11-23 DIAGNOSIS — R14.0 ABDOMINAL DISTENSION (GASEOUS): ICD-10-CM

## 2021-11-23 DIAGNOSIS — K21.9 GASTRO-ESOPHAGEAL REFLUX DISEASE WITHOUT ESOPHAGITIS: ICD-10-CM

## 2021-11-23 PROCEDURE — 99214 OFFICE O/P EST MOD 30 MIN: CPT | Performed by: NURSE PRACTITIONER

## 2021-11-23 RX ORDER — OMEPRAZOLE 40 MG/1
40 CAPSULE, DELAYED RELEASE ORAL
Qty: 90 | Refills: 3 | Status: ACTIVE
Start: 2020-10-12

## 2022-02-15 DIAGNOSIS — M47.812 CERVICAL SPONDYLOSIS WITHOUT MYELOPATHY: ICD-10-CM

## 2022-02-15 DIAGNOSIS — M47.816 LUMBAR FACET JOINT SYNDROME: ICD-10-CM

## 2022-02-15 DIAGNOSIS — M60.9 MYOFASCIITIS: ICD-10-CM

## 2022-02-15 DIAGNOSIS — M47.816 SPONDYLOSIS OF LUMBAR REGION WITHOUT MYELOPATHY OR RADICULOPATHY: ICD-10-CM

## 2022-04-11 ENCOUNTER — OFFICE VISIT (OUTPATIENT)
Dept: ENDOCRINOLOGY | Age: 77
End: 2022-04-11

## 2022-04-11 VITALS
WEIGHT: 178.8 LBS | HEIGHT: 68 IN | OXYGEN SATURATION: 98 % | HEART RATE: 90 BPM | SYSTOLIC BLOOD PRESSURE: 145 MMHG | BODY MASS INDEX: 27.1 KG/M2 | DIASTOLIC BLOOD PRESSURE: 69 MMHG

## 2022-04-11 DIAGNOSIS — E05.90 HYPERTHYROIDISM: Primary | ICD-10-CM

## 2022-04-11 DIAGNOSIS — E04.2 NON-TOXIC MULTINODULAR GOITER: ICD-10-CM

## 2022-04-11 DIAGNOSIS — R73.03 PRE-DIABETES: ICD-10-CM

## 2022-04-11 PROCEDURE — 99214 OFFICE O/P EST MOD 30 MIN: CPT | Performed by: INTERNAL MEDICINE

## 2022-04-11 RX ORDER — ONDANSETRON 4 MG/1
4 TABLET, FILM COATED ORAL DAILY
COMMUNITY

## 2022-04-11 NOTE — PROGRESS NOTES
"Chief Complaint  Hyperthyroidism     Subjective            History of Present Illness  Natalie Rosen,76 y.o. is here as a follow-up for the evaluation of hyperthyroidism.  Patient used to see Dr. Boone in the past.  Transferred her care to me today.     Today in visit patient reports that she has been on methimazole at various dosages over the years because of her thyroid levels being up and down.  Currently she is on methimazole 5 mg 6 days a week.     Her energy levels are okay, weight has been stable.  No history of tremors, racing of heart or diagnosis of A. fib or cardiac arrhythmias.  No history of frequent fractures.  Does c/o clearing of the throat.       Reviewed primary care physician's/consulting physician documentation and lab results     I have reviewed the patient's allergies, medicines, past medical hx, family hx and social hx in detail.    Objective   Vital Signs:   /69   Pulse 90   Ht 172.7 cm (68\")   Wt 81.1 kg (178 lb 12.8 oz)   SpO2 98%   BMI 27.19 kg/m²     Physical Exam   General appearance - no distress  Eyes- anicteric sclera  Ear nose and throat-external ears and nose normal.    Respiratory-normal chest on inspection.  No respiratory distress noted.  Skin-no rashes.  Neuro-alert and oriented x3            Result Review :   The following data was reviewed by: Andrew Carlin MD on 04/11/2022:  Results Encounter on 12/02/2021   Component Date Value Ref Range Status   • TSH 03/28/2022 2.150  0.450 - 4.500 uIU/mL Final   • Free T4 03/28/2022 0.92  0.82 - 1.77 ng/dL Final   • T3, Free 03/28/2022 2.6  2.0 - 4.4 pg/mL Final   • Hemoglobin A1C 03/28/2022 5.7 (A) 4.8 - 5.6 % Final    Comment:          Prediabetes: 5.7 - 6.4           Diabetes: >6.4           Glycemic control for adults with diabetes: <7.0     • Glucose 03/28/2022 104 (A) 65 - 99 mg/dL Final   • BUN 03/28/2022 17  8 - 27 mg/dL Final   • Creatinine 03/28/2022 0.68  0.57 - 1.00 mg/dL Final   • EGFR Result 03/28/2022 " 91  >59 mL/min/1.73 Final   • BUN/Creatinine Ratio 03/28/2022 25  12 - 28 Final   • Sodium 03/28/2022 139  134 - 144 mmol/L Final   • Potassium 03/28/2022 4.6  3.5 - 5.2 mmol/L Final   • Chloride 03/28/2022 98  96 - 106 mmol/L Final   • Total CO2 03/28/2022 26  20 - 29 mmol/L Final   • Calcium 03/28/2022 9.8  8.7 - 10.3 mg/dL Final   • Total Protein 03/28/2022 7.0  6.0 - 8.5 g/dL Final   • Albumin 03/28/2022 4.7  3.7 - 4.7 g/dL Final   • Globulin 03/28/2022 2.3  1.5 - 4.5 g/dL Final   • A/G Ratio 03/28/2022 2.0  1.2 - 2.2 Final   • Total Bilirubin 03/28/2022 0.7  0.0 - 1.2 mg/dL Final   • Alkaline Phosphatase 03/28/2022 76  44 - 121 IU/L Final   • AST (SGOT) 03/28/2022 29  0 - 40 IU/L Final   • ALT (SGPT) 03/28/2022 21  0 - 32 IU/L Final     Data reviewed: PCP notes        I reviewed the patient's new clinical results and mentioned them above in HPI and in plan as well.          Assessment and Plan    Problem List Items Addressed This Visit        Other    Hyperthyroidism - Primary    Overview     Description: DIAGNOSED 2012           Relevant Orders    Hemoglobin A1c    TSH    T3, Free    T4, Free    US Thyroid    Non-toxic multinodular goiter    Relevant Orders    Hemoglobin A1c    TSH    T3, Free    T4, Free    US Thyroid      Other Visit Diagnoses     Pre-diabetes        Relevant Orders    Hemoglobin A1c    TSH    T3, Free    T4, Free        Hyperthyroidism  Continue methimazole 5 mg 6 days a week.  Will repeat blood work up in 3 months from now, based on the levels might consider doing methimazole for 6 days a week and methimazole for 5 days a week alternating.    Thyroid nodules  Proceed with thyroid ultrasound.    Follow Up   No follow-ups on file.    Refills/Meds sent to pharmacy    Interpreted the blood work-up/imaging results performed by the primary care/consulting physician -    Patient was given instructions and counseling regarding her condition or for health maintenance advice. Please see specific  information pulled into the AVS if appropriate.

## 2022-04-19 ENCOUNTER — APPOINTMENT (OUTPATIENT)
Dept: ULTRASOUND IMAGING | Facility: HOSPITAL | Age: 77
End: 2022-04-19

## 2022-04-20 ENCOUNTER — HOSPITAL ENCOUNTER (OUTPATIENT)
Dept: ULTRASOUND IMAGING | Facility: HOSPITAL | Age: 77
Discharge: HOME | End: 2022-04-20
Admitting: INTERNAL MEDICINE

## 2022-04-20 DIAGNOSIS — E04.2 NON-TOXIC MULTINODULAR GOITER: ICD-10-CM

## 2022-04-20 DIAGNOSIS — E05.90 HYPERTHYROIDISM: ICD-10-CM

## 2022-04-20 PROCEDURE — 76536 US EXAM OF HEAD AND NECK: CPT

## 2022-04-25 DIAGNOSIS — E04.2 NON-TOXIC MULTINODULAR GOITER: Primary | ICD-10-CM

## 2022-08-01 ENCOUNTER — OFFICE VISIT (OUTPATIENT)
Dept: OBSTETRICS AND GYNECOLOGY | Age: 77
End: 2022-08-01

## 2022-08-01 VITALS
HEIGHT: 68 IN | BODY MASS INDEX: 26.98 KG/M2 | SYSTOLIC BLOOD PRESSURE: 128 MMHG | WEIGHT: 178 LBS | DIASTOLIC BLOOD PRESSURE: 70 MMHG

## 2022-08-01 DIAGNOSIS — Z12.31 BREAST CANCER SCREENING BY MAMMOGRAM: Primary | ICD-10-CM

## 2022-08-01 DIAGNOSIS — Z01.419 WELL WOMAN EXAM WITH ROUTINE GYNECOLOGICAL EXAM: ICD-10-CM

## 2022-08-01 PROCEDURE — G0101 CA SCREEN;PELVIC/BREAST EXAM: HCPCS | Performed by: PHYSICIAN ASSISTANT

## 2022-08-01 RX ORDER — IPRATROPIUM BROMIDE 42 UG/1
2 SPRAY, METERED NASAL
COMMUNITY

## 2022-08-01 RX ORDER — MULTIPLE VITAMINS W/ MINERALS TAB 9MG-400MCG
1 TAB ORAL DAILY
Status: ON HOLD | COMMUNITY
End: 2022-09-02

## 2022-08-01 RX ORDER — NAPROXEN SODIUM 220 MG
220 TABLET ORAL 2 TIMES DAILY WITH MEALS
COMMUNITY
End: 2022-09-04 | Stop reason: HOSPADM

## 2022-08-01 NOTE — PROGRESS NOTES
"Subjective     Chief Complaint   Patient presents with   • Gynecologic Exam     New pt re-establish care. Last pap 09/27/2016 neg/neg, m/g 07/2021, dexa 2019, c/scope 2021       History of Present Illness    Natalie Rosen is a 76 y.o. No obstetric history on file. who presents for annual exam.    She is doing well  Has no gyn c/o     Bladder function is stable  Bowels slow but stable  utd on CSC    H/o hyst r/t heavy bleeding  No h/o abnormal paps    Daughter with h/o breast cancer at 45 yoa  Neg genetic history    Visits [facility]     Obstetric History:  OB History    No obstetric history on file.        Menstrual History:     No LMP recorded. Patient has had a hysterectomy.         Current contraception: post menopausal status  History of abnormal Pap smear: no  Received Gardasil immunization: no  Perform regular self breast exam: yes - occl  Family history of uterine or ovarian cancer: no  Family History of colon cancer: no  Family history of breast cancer: yes - daughter at 45 yoa, tested negative    Mammogram: ordered.  Colonoscopy: up to date.  DEXA: up to date.    Exercise: moderately active  Calcium/Vitamin D: adequate intake    The following portions of the patient's history were reviewed and updated as appropriate: allergies, current medications, past family history, past medical history, past social history, past surgical history and problem list.    Review of Systems   All other systems reviewed and are negative.      Review of Systems   Constitutional: Negative for fatigue.   Respiratory: Negative for shortness of breath.    Gastrointestinal: Negative for abdominal pain.   Genitourinary: Negative for dysuria.   Neurological: Negative for headaches.   Psychiatric/Behavioral: Negative for dysphoric mood.         Objective   Physical Exam    /70   Ht 172.7 cm (68\")   Wt 80.7 kg (178 lb)   Breastfeeding No   BMI 27.06 kg/m²   General:   alert, comfortable and no " distress   Heart: regular rate and rhythm   Lungs: clear to auscultation bilaterally   Breast: normal appearance, no masses or tenderness, Inspection negative, No nipple retraction or dimpling, No nipple discharge or bleeding, No axillary or supraclavicular adenopathy, Normal to palpation without dominant masses   Neck: no adenopathy and no carotid bruit   Abdomen: normal findings: soft, non-tender   CVA: Not performed today   Pelvis: External genitalia: normal general appearance  Urinary system: urethral meatus normal  Vaginal: normal rugae and atrophic mucosa  Cervix: absent and removed surgically  Adnexa: normal bimanual exam and non palpable  Uterus: absent and removed surgically  Bladder: wnl  Rectal: deferred   Extremities: Not performed today   Neurologic: negative   Psychiatric: Normal affect, judgement, and mood     Assessment & Plan   Diagnoses and all orders for this visit:    1. Breast cancer screening by mammogram (Primary)  -     Mammo Screening Digital Tomosynthesis Bilateral With CAD    2. Well woman exam with routine gynecological exam        All questions answered.  Breast self exam technique reviewed and patient encouraged to perform self-exam monthly.  Discussed healthy lifestyle modifications.  Recommended 30 minutes of aerobic exercise five times per week.  Discussed calcium needs to prevent osteoporosis.    Pap no longer needed  Mammogram ordered  F/u in 2 years or sooner prn

## 2022-08-30 ENCOUNTER — APPOINTMENT (OUTPATIENT)
Dept: ULTRASOUND IMAGING | Facility: HOSPITAL | Age: 77
End: 2022-08-30

## 2022-09-01 ENCOUNTER — APPOINTMENT (OUTPATIENT)
Dept: GENERAL RADIOLOGY | Facility: HOSPITAL | Age: 77
End: 2022-09-01

## 2022-09-01 ENCOUNTER — HOSPITAL ENCOUNTER (INPATIENT)
Facility: HOSPITAL | Age: 77
LOS: 2 days | Discharge: HOME | End: 2022-09-04
Attending: EMERGENCY MEDICINE | Admitting: INTERNAL MEDICINE

## 2022-09-01 DIAGNOSIS — J81.0 ACUTE PULMONARY EDEMA: ICD-10-CM

## 2022-09-01 DIAGNOSIS — J96.01 ACUTE RESPIRATORY FAILURE WITH HYPOXIA: ICD-10-CM

## 2022-09-01 DIAGNOSIS — U07.1 COVID-19: Primary | ICD-10-CM

## 2022-09-01 DIAGNOSIS — E87.1 HYPONATREMIA: ICD-10-CM

## 2022-09-01 DIAGNOSIS — I47.29 VENTRICULAR TACHYCARDIA (PAROXYSMAL): ICD-10-CM

## 2022-09-01 PROCEDURE — 85025 COMPLETE CBC W/AUTO DIFF WBC: CPT | Performed by: PHYSICIAN ASSISTANT

## 2022-09-01 PROCEDURE — 99285 EMERGENCY DEPT VISIT HI MDM: CPT

## 2022-09-01 PROCEDURE — 83880 ASSAY OF NATRIURETIC PEPTIDE: CPT | Performed by: PHYSICIAN ASSISTANT

## 2022-09-01 PROCEDURE — 84145 PROCALCITONIN (PCT): CPT | Performed by: PHYSICIAN ASSISTANT

## 2022-09-01 PROCEDURE — 80053 COMPREHEN METABOLIC PANEL: CPT | Performed by: PHYSICIAN ASSISTANT

## 2022-09-01 PROCEDURE — 93005 ELECTROCARDIOGRAM TRACING: CPT | Performed by: PHYSICIAN ASSISTANT

## 2022-09-01 PROCEDURE — 84484 ASSAY OF TROPONIN QUANT: CPT | Performed by: PHYSICIAN ASSISTANT

## 2022-09-01 PROCEDURE — 71045 X-RAY EXAM CHEST 1 VIEW: CPT

## 2022-09-01 PROCEDURE — 0202U NFCT DS 22 TRGT SARS-COV-2: CPT | Performed by: PHYSICIAN ASSISTANT

## 2022-09-01 RX ORDER — SODIUM CHLORIDE 0.9 % (FLUSH) 0.9 %
10 SYRINGE (ML) INJECTION AS NEEDED
Status: DISCONTINUED | OUTPATIENT
Start: 2022-09-01 | End: 2022-09-04 | Stop reason: HOSPADM

## 2022-09-02 ENCOUNTER — APPOINTMENT (OUTPATIENT)
Dept: CT IMAGING | Facility: HOSPITAL | Age: 77
End: 2022-09-02

## 2022-09-02 PROBLEM — E87.1 HYPONATREMIA: Status: ACTIVE | Noted: 2022-09-02

## 2022-09-02 PROBLEM — J45.909 ASTHMA: Status: ACTIVE | Noted: 2022-09-02

## 2022-09-02 PROBLEM — U07.1 COVID-19: Status: ACTIVE | Noted: 2022-09-02

## 2022-09-02 PROBLEM — I50.43 ACUTE ON CHRONIC COMBINED SYSTOLIC AND DIASTOLIC CHF (CONGESTIVE HEART FAILURE) (HCC): Status: ACTIVE | Noted: 2022-09-02

## 2022-09-02 PROBLEM — J96.01 ACUTE RESPIRATORY FAILURE WITH HYPOXIA: Status: ACTIVE | Noted: 2022-09-02

## 2022-09-02 LAB
ALBUMIN SERPL-MCNC: 4.1 G/DL (ref 3.5–5.2)
ALBUMIN/GLOB SERPL: 1.4 G/DL
ALP SERPL-CCNC: 77 U/L (ref 39–117)
ALT SERPL W P-5'-P-CCNC: 28 U/L (ref 1–33)
ANION GAP SERPL CALCULATED.3IONS-SCNC: 12.7 MMOL/L (ref 5–15)
ARTERIAL PATENCY WRIST A: POSITIVE
AST SERPL-CCNC: 32 U/L (ref 1–32)
ATMOSPHERIC PRESS: 751 MMHG
B PARAPERT DNA SPEC QL NAA+PROBE: NOT DETECTED
B PERT DNA SPEC QL NAA+PROBE: NOT DETECTED
BASE EXCESS BLDA CALC-SCNC: -1.5 MMOL/L (ref 0–2)
BASOPHILS # BLD AUTO: 0.07 10*3/MM3 (ref 0–0.2)
BASOPHILS NFR BLD AUTO: 0.6 % (ref 0–1.5)
BDY SITE: ABNORMAL
BILIRUB SERPL-MCNC: 0.6 MG/DL (ref 0–1.2)
BUN SERPL-MCNC: 12 MG/DL (ref 8–23)
BUN/CREAT SERPL: 13 (ref 7–25)
C PNEUM DNA NPH QL NAA+NON-PROBE: NOT DETECTED
CALCIUM SPEC-SCNC: 9.5 MG/DL (ref 8.6–10.5)
CHLORIDE SERPL-SCNC: 88 MMOL/L (ref 98–107)
CO2 SERPL-SCNC: 27.3 MMOL/L (ref 22–29)
CREAT SERPL-MCNC: 0.92 MG/DL (ref 0.57–1)
DEPRECATED RDW RBC AUTO: 41.5 FL (ref 37–54)
EGFRCR SERPLBLD CKD-EPI 2021: 65.1 ML/MIN/1.73
EOSINOPHIL # BLD AUTO: 0.31 10*3/MM3 (ref 0–0.4)
EOSINOPHIL NFR BLD AUTO: 2.9 % (ref 0.3–6.2)
ERYTHROCYTE [DISTWIDTH] IN BLOOD BY AUTOMATED COUNT: 12.4 % (ref 12.3–15.4)
FLUAV SUBTYP SPEC NAA+PROBE: NOT DETECTED
FLUBV RNA ISLT QL NAA+PROBE: NOT DETECTED
GLOBULIN UR ELPH-MCNC: 2.9 GM/DL
GLUCOSE SERPL-MCNC: 255 MG/DL (ref 65–99)
HADV DNA SPEC NAA+PROBE: NOT DETECTED
HCO3 BLDA-SCNC: 24.8 MMOL/L (ref 22–28)
HCOV 229E RNA SPEC QL NAA+PROBE: NOT DETECTED
HCOV HKU1 RNA SPEC QL NAA+PROBE: NOT DETECTED
HCOV NL63 RNA SPEC QL NAA+PROBE: NOT DETECTED
HCOV OC43 RNA SPEC QL NAA+PROBE: NOT DETECTED
HCT VFR BLD AUTO: 41.7 % (ref 34–46.6)
HGB BLD-MCNC: 13.6 G/DL (ref 12–15.9)
HMPV RNA NPH QL NAA+NON-PROBE: NOT DETECTED
HPIV1 RNA ISLT QL NAA+PROBE: NOT DETECTED
HPIV2 RNA SPEC QL NAA+PROBE: NOT DETECTED
HPIV3 RNA NPH QL NAA+PROBE: NOT DETECTED
HPIV4 P GENE NPH QL NAA+PROBE: NOT DETECTED
IMM GRANULOCYTES # BLD AUTO: 0.03 10*3/MM3 (ref 0–0.05)
IMM GRANULOCYTES NFR BLD AUTO: 0.3 % (ref 0–0.5)
INHALED O2 CONCENTRATION: 60 %
LYMPHOCYTES # BLD AUTO: 5.01 10*3/MM3 (ref 0.7–3.1)
LYMPHOCYTES NFR BLD AUTO: 46.3 % (ref 19.6–45.3)
M PNEUMO IGG SER IA-ACNC: NOT DETECTED
MAGNESIUM SERPL-MCNC: 1.7 MG/DL (ref 1.6–2.4)
MCH RBC QN AUTO: 30.2 PG (ref 26.6–33)
MCHC RBC AUTO-ENTMCNC: 32.6 G/DL (ref 31.5–35.7)
MCV RBC AUTO: 92.5 FL (ref 79–97)
MODALITY: ABNORMAL
MONOCYTES # BLD AUTO: 0.85 10*3/MM3 (ref 0.1–0.9)
MONOCYTES NFR BLD AUTO: 7.8 % (ref 5–12)
NEUTROPHILS NFR BLD AUTO: 4.56 10*3/MM3 (ref 1.7–7)
NEUTROPHILS NFR BLD AUTO: 42.1 % (ref 42.7–76)
NRBC BLD AUTO-RTO: 0 /100 WBC (ref 0–0.2)
NT-PROBNP SERPL-MCNC: 7745 PG/ML (ref 0–1800)
O2 A-A PPRESDIFF RESPIRATORY: 0.3 MMHG
PCO2 BLDA: 46.4 MM HG (ref 35–45)
PEEP RESPIRATORY: 5 CM[H2O]
PH BLDA: 7.34 PH UNITS (ref 7.35–7.45)
PLATELET # BLD AUTO: 215 10*3/MM3 (ref 140–450)
PMV BLD AUTO: 9.7 FL (ref 6–12)
PO2 BLDA: 101.6 MM HG (ref 80–100)
POTASSIUM SERPL-SCNC: 3.5 MMOL/L (ref 3.5–5.2)
POTASSIUM SERPL-SCNC: 4.1 MMOL/L (ref 3.5–5.2)
PROCALCITONIN SERPL-MCNC: 0.04 NG/ML (ref 0–0.25)
PROT SERPL-MCNC: 7 G/DL (ref 6–8.5)
QT INTERVAL: 411 MS
RBC # BLD AUTO: 4.51 10*6/MM3 (ref 3.77–5.28)
RHINOVIRUS RNA SPEC NAA+PROBE: NOT DETECTED
RSV RNA NPH QL NAA+NON-PROBE: NOT DETECTED
SAO2 % BLDCOA: 97.4 % (ref 92–99)
SARS-COV-2 RNA NPH QL NAA+NON-PROBE: DETECTED
SET MECH RESP RATE: 12
SODIUM SERPL-SCNC: 128 MMOL/L (ref 136–145)
TOTAL RATE: 28 BREATHS/MINUTE
TROPONIN T SERPL-MCNC: <0.01 NG/ML (ref 0–0.03)
VENTILATOR MODE: ABNORMAL
VT ON VENT VENT: 500 ML
WBC NRBC COR # BLD: 10.83 10*3/MM3 (ref 3.4–10.8)

## 2022-09-02 PROCEDURE — 25010000002 REMDESIVIR 100 MG/20ML SOLUTION 1 EACH VIAL: Performed by: NURSE PRACTITIONER

## 2022-09-02 PROCEDURE — 25010000002 FUROSEMIDE PER 20 MG: Performed by: NURSE PRACTITIONER

## 2022-09-02 PROCEDURE — 84132 ASSAY OF SERUM POTASSIUM: CPT | Performed by: INTERNAL MEDICINE

## 2022-09-02 PROCEDURE — 71275 CT ANGIOGRAPHY CHEST: CPT

## 2022-09-02 PROCEDURE — 82803 BLOOD GASES ANY COMBINATION: CPT

## 2022-09-02 PROCEDURE — 36600 WITHDRAWAL OF ARTERIAL BLOOD: CPT

## 2022-09-02 PROCEDURE — 25010000002 DEXAMETHASONE PER 1 MG: Performed by: EMERGENCY MEDICINE

## 2022-09-02 PROCEDURE — 83735 ASSAY OF MAGNESIUM: CPT | Performed by: INTERNAL MEDICINE

## 2022-09-02 PROCEDURE — 25010000002 FUROSEMIDE PER 20 MG: Performed by: PHYSICIAN ASSISTANT

## 2022-09-02 PROCEDURE — 0 IOPAMIDOL PER 1 ML: Performed by: EMERGENCY MEDICINE

## 2022-09-02 PROCEDURE — 93010 ELECTROCARDIOGRAM REPORT: CPT | Performed by: INTERNAL MEDICINE

## 2022-09-02 PROCEDURE — 99222 1ST HOSP IP/OBS MODERATE 55: CPT | Performed by: INTERNAL MEDICINE

## 2022-09-02 PROCEDURE — 25010000002 ENOXAPARIN PER 10 MG: Performed by: NURSE PRACTITIONER

## 2022-09-02 PROCEDURE — 94799 UNLISTED PULMONARY SVC/PX: CPT

## 2022-09-02 PROCEDURE — XW033E5 INTRODUCTION OF REMDESIVIR ANTI-INFECTIVE INTO PERIPHERAL VEIN, PERCUTANEOUS APPROACH, NEW TECHNOLOGY GROUP 5: ICD-10-PCS | Performed by: INTERNAL MEDICINE

## 2022-09-02 PROCEDURE — 94660 CPAP INITIATION&MGMT: CPT

## 2022-09-02 RX ORDER — ALBUTEROL SULFATE 2.5 MG/3ML
2.5 SOLUTION RESPIRATORY (INHALATION) EVERY 6 HOURS PRN
Status: DISCONTINUED | OUTPATIENT
Start: 2022-09-02 | End: 2022-09-04 | Stop reason: HOSPADM

## 2022-09-02 RX ORDER — SODIUM CHLORIDE 0.9 % (FLUSH) 0.9 %
10 SYRINGE (ML) INJECTION AS NEEDED
Status: DISCONTINUED | OUTPATIENT
Start: 2022-09-02 | End: 2022-09-04 | Stop reason: HOSPADM

## 2022-09-02 RX ORDER — ONDANSETRON 2 MG/ML
4 INJECTION INTRAMUSCULAR; INTRAVENOUS EVERY 6 HOURS PRN
Status: DISCONTINUED | OUTPATIENT
Start: 2022-09-02 | End: 2022-09-04 | Stop reason: HOSPADM

## 2022-09-02 RX ORDER — ENOXAPARIN SODIUM 100 MG/ML
40 INJECTION SUBCUTANEOUS DAILY
Status: DISCONTINUED | OUTPATIENT
Start: 2022-09-02 | End: 2022-09-04 | Stop reason: HOSPADM

## 2022-09-02 RX ORDER — CALCIUM CARBONATE 200(500)MG
2 TABLET,CHEWABLE ORAL 2 TIMES DAILY PRN
Status: DISCONTINUED | OUTPATIENT
Start: 2022-09-02 | End: 2022-09-04 | Stop reason: HOSPADM

## 2022-09-02 RX ORDER — FUROSEMIDE 10 MG/ML
40 INJECTION INTRAMUSCULAR; INTRAVENOUS EVERY 12 HOURS
Status: DISCONTINUED | OUTPATIENT
Start: 2022-09-02 | End: 2022-09-02

## 2022-09-02 RX ORDER — MONTELUKAST SODIUM 10 MG/1
10 TABLET ORAL EVERY MORNING
Status: DISCONTINUED | OUTPATIENT
Start: 2022-09-02 | End: 2022-09-04 | Stop reason: HOSPADM

## 2022-09-02 RX ORDER — DEXAMETHASONE 6 MG/1
6 TABLET ORAL DAILY
Status: DISCONTINUED | OUTPATIENT
Start: 2022-09-02 | End: 2022-09-02

## 2022-09-02 RX ORDER — NIRMATRELVIR AND RITONAVIR 150-100 MG
2 KIT ORAL 2 TIMES DAILY
COMMUNITY
Start: 2022-08-29 | End: 2022-09-04 | Stop reason: HOSPADM

## 2022-09-02 RX ORDER — IPRATROPIUM BROMIDE 42 UG/1
2 SPRAY, METERED NASAL NIGHTLY
Status: DISCONTINUED | OUTPATIENT
Start: 2022-09-02 | End: 2022-09-04 | Stop reason: HOSPADM

## 2022-09-02 RX ORDER — METHIMAZOLE 5 MG/1
5 TABLET ORAL
Status: DISCONTINUED | OUTPATIENT
Start: 2022-09-03 | End: 2022-09-04 | Stop reason: HOSPADM

## 2022-09-02 RX ORDER — LOSARTAN POTASSIUM 50 MG/1
50 TABLET ORAL
Status: DISCONTINUED | OUTPATIENT
Start: 2022-09-02 | End: 2022-09-04 | Stop reason: HOSPADM

## 2022-09-02 RX ORDER — PANTOPRAZOLE SODIUM 40 MG/1
40 TABLET, DELAYED RELEASE ORAL EVERY MORNING
Status: DISCONTINUED | OUTPATIENT
Start: 2022-09-02 | End: 2022-09-04 | Stop reason: HOSPADM

## 2022-09-02 RX ORDER — FUROSEMIDE 40 MG/1
40 TABLET ORAL
Status: DISCONTINUED | OUTPATIENT
Start: 2022-09-03 | End: 2022-09-03

## 2022-09-02 RX ORDER — SPIRONOLACTONE 25 MG/1
25 TABLET ORAL DAILY
Status: DISCONTINUED | OUTPATIENT
Start: 2022-09-02 | End: 2022-09-04 | Stop reason: HOSPADM

## 2022-09-02 RX ORDER — DEXAMETHASONE SODIUM PHOSPHATE 10 MG/ML
6 INJECTION INTRAMUSCULAR; INTRAVENOUS ONCE
Status: COMPLETED | OUTPATIENT
Start: 2022-09-02 | End: 2022-09-02

## 2022-09-02 RX ORDER — ACETAMINOPHEN 325 MG/1
650 TABLET ORAL EVERY 4 HOURS PRN
Status: DISCONTINUED | OUTPATIENT
Start: 2022-09-02 | End: 2022-09-04 | Stop reason: HOSPADM

## 2022-09-02 RX ORDER — PRAVASTATIN SODIUM 40 MG
40 TABLET ORAL NIGHTLY
Status: DISCONTINUED | OUTPATIENT
Start: 2022-09-02 | End: 2022-09-04 | Stop reason: HOSPADM

## 2022-09-02 RX ORDER — FLUTICASONE PROPIONATE 50 MCG
2 SPRAY, SUSPENSION (ML) NASAL DAILY
Status: DISCONTINUED | OUTPATIENT
Start: 2022-09-02 | End: 2022-09-04 | Stop reason: HOSPADM

## 2022-09-02 RX ORDER — MULTIPLE VITAMINS W/ MINERALS TAB 9MG-400MCG
1 TAB ORAL DAILY
Status: DISCONTINUED | OUTPATIENT
Start: 2022-09-02 | End: 2022-09-04 | Stop reason: HOSPADM

## 2022-09-02 RX ORDER — CARVEDILOL 25 MG/1
25 TABLET ORAL 2 TIMES DAILY WITH MEALS
Status: DISCONTINUED | OUTPATIENT
Start: 2022-09-02 | End: 2022-09-04 | Stop reason: HOSPADM

## 2022-09-02 RX ORDER — SODIUM CHLORIDE 0.9 % (FLUSH) 0.9 %
10 SYRINGE (ML) INJECTION EVERY 12 HOURS SCHEDULED
Status: DISCONTINUED | OUTPATIENT
Start: 2022-09-02 | End: 2022-09-04 | Stop reason: HOSPADM

## 2022-09-02 RX ORDER — FUROSEMIDE 10 MG/ML
80 INJECTION INTRAMUSCULAR; INTRAVENOUS ONCE
Status: COMPLETED | OUTPATIENT
Start: 2022-09-02 | End: 2022-09-02

## 2022-09-02 RX ORDER — DEXAMETHASONE SODIUM PHOSPHATE 10 MG/ML
6 INJECTION INTRAMUSCULAR; INTRAVENOUS DAILY
Status: DISCONTINUED | OUTPATIENT
Start: 2022-09-02 | End: 2022-09-02

## 2022-09-02 RX ORDER — NITROGLYCERIN 0.4 MG/1
0.4 TABLET SUBLINGUAL
Status: DISCONTINUED | OUTPATIENT
Start: 2022-09-02 | End: 2022-09-04 | Stop reason: HOSPADM

## 2022-09-02 RX ORDER — LANOLIN ALCOHOL/MO/W.PET/CERES
50 CREAM (GRAM) TOPICAL DAILY
COMMUNITY

## 2022-09-02 RX ORDER — LANOLIN ALCOHOL/MO/W.PET/CERES
50 CREAM (GRAM) TOPICAL DAILY
Status: DISCONTINUED | OUTPATIENT
Start: 2022-09-02 | End: 2022-09-04 | Stop reason: HOSPADM

## 2022-09-02 RX ORDER — ACETAMINOPHEN 650 MG/1
650 SUPPOSITORY RECTAL EVERY 4 HOURS PRN
Status: DISCONTINUED | OUTPATIENT
Start: 2022-09-02 | End: 2022-09-04 | Stop reason: HOSPADM

## 2022-09-02 RX ORDER — ONDANSETRON 4 MG/1
4 TABLET, FILM COATED ORAL EVERY 6 HOURS PRN
Status: DISCONTINUED | OUTPATIENT
Start: 2022-09-02 | End: 2022-09-04 | Stop reason: HOSPADM

## 2022-09-02 RX ORDER — ACETAMINOPHEN 160 MG/5ML
650 SOLUTION ORAL EVERY 4 HOURS PRN
Status: DISCONTINUED | OUTPATIENT
Start: 2022-09-02 | End: 2022-09-04 | Stop reason: HOSPADM

## 2022-09-02 RX ADMIN — IPRATROPIUM BROMIDE 2 SPRAY: 42 SPRAY, METERED NASAL at 20:06

## 2022-09-02 RX ADMIN — FUROSEMIDE 80 MG: 10 INJECTION, SOLUTION INTRAMUSCULAR; INTRAVENOUS at 02:37

## 2022-09-02 RX ADMIN — PANTOPRAZOLE SODIUM 40 MG: 40 TABLET, DELAYED RELEASE ORAL at 12:22

## 2022-09-02 RX ADMIN — LOSARTAN POTASSIUM 50 MG: 50 TABLET, FILM COATED ORAL at 12:22

## 2022-09-02 RX ADMIN — Medication 50 MG: at 12:21

## 2022-09-02 RX ADMIN — ENOXAPARIN SODIUM 40 MG: 100 INJECTION SUBCUTANEOUS at 12:19

## 2022-09-02 RX ADMIN — ACETAMINOPHEN 650 MG: 325 TABLET, FILM COATED ORAL at 16:15

## 2022-09-02 RX ADMIN — FLUTICASONE PROPIONATE 2 SPRAY: 50 SPRAY, METERED NASAL at 16:15

## 2022-09-02 RX ADMIN — MONTELUKAST SODIUM 10 MG: 10 TABLET, FILM COATED ORAL at 12:21

## 2022-09-02 RX ADMIN — Medication 10 ML: at 00:13

## 2022-09-02 RX ADMIN — DEXAMETHASONE 6 MG: 6 TABLET ORAL at 12:21

## 2022-09-02 RX ADMIN — IOPAMIDOL 95 ML: 755 INJECTION, SOLUTION INTRAVENOUS at 03:13

## 2022-09-02 RX ADMIN — FUROSEMIDE 40 MG: 10 INJECTION, SOLUTION INTRAMUSCULAR; INTRAVENOUS at 15:53

## 2022-09-02 RX ADMIN — REMDESIVIR 200 MG: 100 INJECTION, POWDER, LYOPHILIZED, FOR SOLUTION INTRAVENOUS at 12:19

## 2022-09-02 RX ADMIN — CARVEDILOL 25 MG: 25 TABLET, FILM COATED ORAL at 19:25

## 2022-09-02 RX ADMIN — Medication 10 ML: at 00:12

## 2022-09-02 RX ADMIN — PRAVASTATIN SODIUM 40 MG: 40 TABLET ORAL at 20:05

## 2022-09-02 RX ADMIN — Medication 10 ML: at 19:25

## 2022-09-02 RX ADMIN — SPIRONOLACTONE 25 MG: 25 TABLET, FILM COATED ORAL at 19:25

## 2022-09-02 RX ADMIN — NIRMATRELVIR AND RITONAVIR 3 EACH: KIT at 19:25

## 2022-09-02 RX ADMIN — CARVEDILOL 25 MG: 25 TABLET, FILM COATED ORAL at 12:21

## 2022-09-02 RX ADMIN — DEXAMETHASONE SODIUM PHOSPHATE 6 MG: 10 INJECTION, SOLUTION INTRAMUSCULAR; INTRAVENOUS at 00:11

## 2022-09-02 NOTE — ED TRIAGE NOTES
To ER via EMS from home.  C/o SOA this evening.  Tested positive for covid on Monday.  Initial Sats 90's on EMS arrival with wheezes.  Alb MN in route.  Sats decreased to the 80's on nonrebreather mask.      Pt anxious.      Triage staff in appropriate PPE.

## 2022-09-02 NOTE — PROGRESS NOTES
Discharge Planning Assessment  [facility]     Patient Name: Natalie Rosen  MRN: [id number]  Today's Date: 9/2/2022    Admit Date: 9/1/2022     Discharge Needs Assessment     Row Name 09/02/22 1544       Living Environment    People in Home spouse    Current Living Arrangements home    Primary Care Provided by self    Provides Primary Care For no one    Family Caregiver if Needed spouse    Quality of Family Relationships helpful;involved;supportive               Discharge Plan     Row Name 09/02/22 1548       Plan    Plan Home with spouse    Patient/Family in Agreement with Plan yes    Provided Post Acute Provider List? N/A    Provided Post Acute Provider Quality & Resource List? N/A    Plan Comments Spoke with patient, face sheet verified. Patient lives with her spouse she is IADL, she does not use any DME. Plan is to return home with spouse. Pt denies dc needs at this time. If HH or home o2 are needed she would be agreeable to use [facility] and [facility]. Pt is currently on room air. PCP is Dr. Bosler. Pt would like to use [facility] for any new dc meds. CCP will follow progress.              Continued Care and Services - Admitted Since 9/1/2022    Coordination has not been started for this encounter.       Expected Discharge Date and Time     Expected Discharge Date Expected Discharge Time    Sep 5, 2022          Demographic Summary    No documentation.                Functional Status    No documentation.                Psychosocial    No documentation.                Abuse/Neglect    No documentation.                Legal    No documentation.                Substance Abuse    No documentation.                Patient Forms    No documentation.                   Monica Hillman, RN

## 2022-09-02 NOTE — PROGRESS NOTES
[facility]  Clinical Pharmacy Department     Remdesivir Review Note    Natalie Rosen is a 76 y.o. female with confirmed COVID-19 infection on day 1 of hospitalization.     Consulting Provider:  Dr. Villavicencio  Date of Confirmed SARS-CoV-2: 8/29  Date of Symptom Onset: 8/29  Other Antimicrobials: none  Hydroxychloroquine or chloroquine prior to arrival: none    Allergies  Allergies as of 09/01/2022    (No Known Allergies)       Microbiology:  Microbiology Results (last 10 days)       Procedure Component Value - Date/Time    Respiratory Panel PCR w/COVID-19(SARS-CoV-2) JOANNA/CATHY/SCOTTIE/PAD/COR/MAD/CORINNA In-House, NP Swab in UTM/VTM, 3-4 HR TAT - Swab, Nasopharynx [[id number]]  (Abnormal) Collected: 09/01/22 7356    Lab Status: Final result Specimen: Swab from Nasopharynx Updated: 09/02/22 0104     ADENOVIRUS, PCR Not Detected     Coronavirus 229E Not Detected     Coronavirus HKU1 Not Detected     Coronavirus NL63 Not Detected     Coronavirus OC43 Not Detected     COVID19 Detected     Human Metapneumovirus Not Detected     Human Rhinovirus/Enterovirus Not Detected     Influenza A PCR Not Detected     Influenza B PCR Not Detected     Parainfluenza Virus 1 Not Detected     Parainfluenza Virus 2 Not Detected     Parainfluenza Virus 3 Not Detected     Parainfluenza Virus 4 Not Detected     RSV, PCR Not Detected     Bordetella pertussis pcr Not Detected     Bordetella parapertussis PCR Not Detected     Chlamydophila pneumoniae PCR Not Detected     Mycoplasma pneumo by PCR Not Detected    Narrative:      In the setting of a positive respiratory panel with a viral infection PLUS a negative procalcitonin without other underlying concern for bacterial infection, consider observing off antibiotics or discontinuation of antibiotics and continue supportive care. If the respiratory panel is positive for atypical bacterial infection (Bordetella pertussis, Chlamydophila pneumoniae, or Mycoplasma pneumoniae), consider antibiotic  de-escalation to target atypical bacterial infection.            Vitals/Labs/I&O  Visit Vitals  /90   Pulse 83   Temp 97.7 °F (36.5 °C)   Resp 28   SpO2 99%       Results from last 7 days   Lab Units 09/01/22  2357   WBC 10*3/mm3 10.83*         Results from last 7 days   Lab Units 09/01/22  2357   AST (SGOT) U/L 32      Results from last 7 days   Lab Units 09/01/22  2357   ALT (SGPT) U/L 28       CrCl cannot be calculated (Unknown ideal weight.).  Results from last 7 days   Lab Units 09/01/22  2357   BUN mg/dL 12   CREATININE mg/dL 0.92     Intake & Output (last 3 days)         08/30 0701 08/31 0700 08/31 0701 09/01 0700 09/01 0701 09/02 0700    Urine   1000    Total Output   1000    Net   -1000                   Assessment/Plan:    Patient meets the following inclusion/exclusion criteria:  Patient is hospitalized with confirmed COVID-19 infection (and accompanying symptoms)  Patient meets one of the two below criteria:  Patient is requiring an increase in baseline supplemental oxygen requirements OR SpO2 </= 94% on room air secondary to COVID-19 infection  Baseline and daily LFTs and Scr have been ordered prior to remdesivir initiation  ALT is not ? 10 times the upper limit of normal  Patient is not on concomitant hydroxychloroquine or chloroquine  Patient does not require invasive mechanical ventilation (IMV) or ECMO  Patient is within 10 days from symptom onset (for criteria 2a) or within 7 days of symptom onset (for criteria 2b)    Remdesivir 200 mg IV x 1 dose, followed by 100 mg IV q24h for 4 days or until hospital discharge (whichever comes first) has been ordered.    Thank you for involving pharmacy in this patient's care. Please contact pharmacy with any questions or concerns.                           Linda Ramon, [facility]  Clinical Pharmacist

## 2022-09-02 NOTE — PROGRESS NOTES
"[facility] Clinical Pharmacy Services: Enoxaparin Consult    Natalie Rosen has a pharmacy consult to dose ppx enoxaparin per Dr Fermin's request.     Indication: VTE Prophylaxis  Home Anticoagulation: none      Relevant clinical data and objective history reviewed:  76 y.o. female 170.2 cm (67\") 78.8 kg (173 lb 12.8 oz)   Body mass index is 27.22 kg/m².   Results from last 7 days   Lab Units 09/01/22  2357   PLATELETS 10*3/mm3 215     Estimated Creatinine Clearance: 57.1 mL/min (by C-G formula based on SCr of 0.92 mg/dL).    Assessment/Plan    Pt currently has orders for Lovenox 40 mg qday, agree with dosing based on CrCl, indication, and weight  Consult order will be discontinued but pharmacy will continue to follow.     Jayna Dunham [facility]  Clinical Pharmacist    "

## 2022-09-02 NOTE — H&P
"    Patient Name:  Natalie Rosen  YOB: 1945  MRN:  [id number]  Admit Date:  9/1/2022  Patient Care Team:  Bosler, James William III, MD as PCP - General (Internal Medicine)  LinkGerardo MD as Obstetrician (Obstetrics and Gynecology)      Subjective   History Present Illness     Chief Complaint   Patient presents with   • Shortness of Breath       Ms. Rosen is a 76 y.o. female former smoker with a history of HTN, asthma, NICM, hyperthyroidism, mild mitral regurg  that presents to [facility] complaining of shortness of breath. She was diagnosed with Covid 19 on 8/29/22, symptoms began 8/28 with fatigue, myalgias, cough. She began feeling short of breath last night when lying flat. She reports having a \"gurgling\" cough.  EMS was called, administered nebulizer treatment. Sats decreased to 80s, she was placed on NRB and brought to ED. She required Bipap in ED but was able to weaned to hi flow O2. She was found to have pulm edema, given IV lasix and started on dexamethasone and remdesivir for Covid. Her PCP had prescribed nirmatrelvir/ritonavir. She has hx of asthma, followed by [facility] pulmonology. On exam, she is much improved. Currently requiring O2 2L hi flow w/sats 99%.    Afebrile. HR controlled. BP up to 191 systolic. Sats 99% on O2 2L hi flow. Procal 0.04. WBC 10.83, Hgb 13.6. BNP 7745. Trop neg. Gluc 255, Na 128, Cl 88; remaining wnl. RVP + covid 19. ABG on Bipap 60% O2: pH 7.336, pCO2 46.4, pO2 101.6. CXR: hazy increased density mid-lower lungs bilaterally c/w viral pneumonitis vs CHF. CTA chest: no PE; mild cardiomegaly; sm paola pl effusions, mild bibasilar pneumonia vs atelectasis or edema    Review of Systems   Constitutional: Positive for appetite change and fatigue. Negative for fever.   HENT: Negative for congestion.    Respiratory: Positive for cough and shortness of breath.    Cardiovascular: Negative for chest pain and leg swelling.   Gastrointestinal: Negative for " diarrhea, nausea and vomiting.   Genitourinary: Negative for dysuria.   Musculoskeletal: Positive for myalgias. Negative for arthralgias.   Skin: Negative for rash.   Neurological: Negative for headaches.   Psychiatric/Behavioral: Negative for sleep disturbance.        Personal History     Past Medical History:   Diagnosis Date   • Arthritis    • Asthma    • Benign essential hypertension    • Cancer (HCC)     MELANOMA   • Cardiomyopathy (HCC)    • Goiter    • Hyperlipidemia    • Hyperthyroidism    • Mitral valve insufficiency    • PONV (postoperative nausea and vomiting)    • Seasonal allergies    • Urinary frequency      Past Surgical History:   Procedure Laterality Date   • CATARACT EXTRACTION     • COLONOSCOPY     • HYSTERECTOMY     • OTHER SURGICAL HISTORY      PT HAS HAD SKIN CA REMOVED FROM BACK AND FACE (UPPER LIP)    • RI TOTAL KNEE ARTHROPLASTY Right 2017    Procedure: RT TOTAL KNEE ARTHROPLASTY;  Surgeon: Doc Lance MD;  Location: [facility];  Service: Orthopedics   • RI TOTAL KNEE ARTHROPLASTY Left 2017    Procedure: LT TOTAL KNEE ARTHROPLASTY;  Surgeon: Doc Lance MD;  Location: [facility];  Service: Orthopedics     Family History   Problem Relation Age of Onset   • Breast cancer Other    • Diabetes Other    • Cancer Mother    • Cancer Father    • Malig Hyperthermia Neg Hx      Social History     Tobacco Use   • Smoking status: Former Smoker     Packs/day: 1.00     Years: 25.00     Pack years: 25.00     Types: Cigarettes     Quit date: 1985     Years since quittin.7   • Smokeless tobacco: Never Used   Substance Use Topics   • Alcohol use: Yes     Comment: DAILY COCKTAIL   • Drug use: No     No current facility-administered medications on file prior to encounter.     Current Outpatient Medications on File Prior to Encounter   Medication Sig Dispense Refill   • albuterol sulfate  (90 Base) MCG/ACT inhaler Inhale 2 puffs Every 6 (Six) Hours As Needed.     •  Carbinoxamine Maleate 4 MG tablet Take 4 mg by mouth Daily.     • carvedilol (COREG) 25 MG tablet Take 1 tablet by mouth 2 (Two) Times a Day With Meals. 90 tablet 3   • Diclofenac Sodium (VOLTAREN) 1 % gel gel APPLY 4 GRAMS TO THE AFFECTED AREA FOUR TIMES A DAY AS DIRECTED (Patient taking differently: Apply 4 g topically to the appropriate area as directed 4 (Four) Times a Day. back) 100 g 5   • hydroCHLOROthiazide (MICROZIDE) 12.5 MG capsule TAKE ONE CAPSULE BY MOUTH EVERY MORNING (Patient taking differently: Take 12.5 mg by mouth Every Morning.) 90 capsule 0   • ipratropium (ATROVENT) 0.06 % nasal spray 2 sprays into the nostril(s) as directed by provider every night at bedtime.     • irbesartan (AVAPRO) 150 MG tablet Take 150 mg by mouth Daily.     • methIMAzole (TAPAZOLE) 5 MG tablet TAKE ONE TABLET BY MOUTH ONCE DAILY 6 DAYS OUT OF THE WEEK (Patient taking differently: Take 5 mg by mouth Daily. TAKE ONE TABLET BY MOUTH ONCE DAILY 6 DAYS OUT OF THE WEEK (hold on Wednesday)) 90 tablet 5   • montelukast (SINGULAIR) 10 MG tablet Take 1 tablet by mouth Every Morning.     • Multiple Vitamins-Minerals (PRESERVISION AREDS 2 PO) Take 1 capsule by mouth 2 (Two) Times a Day.     • naproxen sodium (ALEVE) 220 MG tablet Take 220 mg by mouth 2 (Two) Times a Day With Meals.     • Nirmatrelvir&Ritonavir 150/100 (Paxlovid, 150/100,) 10 x 150 MG & 10 x 100MG tablet therapy pack tablet (for renal adjustment) Take 2 tablets by mouth 2 (Two) Times a Day.     • omeprazole (priLOSEC) 40 MG capsule Take 40 mg by mouth Daily As Needed.     • ondansetron (ZOFRAN) 4 MG tablet Take 4 mg by mouth Daily. Takes every morning     • pravastatin (PRAVACHOL) 40 MG tablet TAKE ONE TABLET BY MOUTH EVERY NIGHT AT BEDTIME 30 tablet 0   • triamcinolone (NASACORT) 55 MCG/ACT nasal inhaler 2 sprays into each nostril Every Morning.     • vitamin B-6 (PYRIDOXINE) 50 MG tablet Take 50 mg by mouth Daily.     • [DISCONTINUED] multivitamin with minerals  (EYE-VITES PO) Take 1 tablet by mouth Daily.       No Known Allergies    Objective    Objective     Vital Signs  Temp:  [97.7 °F (36.5 °C)] 97.7 °F (36.5 °C)  Heart Rate:  [] 69  Resp:  [18-28] 18  BP: (132-191)/(73-98) 141/73  SpO2:  [88 %-100 %] 100 %  on  Flow (L/min):  [4-15] 4;   Device (Oxygen Therapy): nasal cannula  Body mass index is 27.22 kg/m².    Physical Exam  Vitals and nursing note reviewed.   Constitutional:       General: She is not in acute distress.  HENT:      Head: Normocephalic.      Mouth/Throat:      Mouth: Mucous membranes are moist.   Eyes:      Conjunctiva/sclera: Conjunctivae normal.   Cardiovascular:      Rate and Rhythm: Normal rate and regular rhythm.   Pulmonary:      Effort: Pulmonary effort is normal. No respiratory distress.      Breath sounds: No wheezing.      Comments: Faintly coarse bases  Abdominal:      General: Bowel sounds are normal.      Palpations: Abdomen is soft.   Musculoskeletal:      Cervical back: Neck supple.      Right lower leg: No edema.      Left lower leg: No edema.   Skin:     General: Skin is warm and dry.   Neurological:      Mental Status: She is alert and oriented to person, place, and time.   Psychiatric:         Mood and Affect: Mood normal.         Behavior: Behavior normal.         Results Review:  I reviewed the patient's new clinical results.  I reviewed the patient's new imaging results and agree with the interpretation.  I reviewed the patient's other test results and agree with the interpretation  I personally viewed and interpreted the patient's EKG/Telemetry data    Lab Results (last 24 hours)     Procedure Component Value Units Date/Time    CBC & Differential [[id number]]  (Abnormal) Collected: 09/01/22 9319    Specimen: Blood Updated: 09/02/22 0012    Narrative:      The following orders were created for panel order CBC & Differential.  Procedure                               Abnormality         Status                     ---------                                -----------         ------                     CBC Auto Differential[[id number]]        Abnormal            Final result                 Please view results for these tests on the individual orders.    Comprehensive Metabolic Panel [[id number]]  (Abnormal) Collected: 09/01/22 2357    Specimen: Blood Updated: 09/02/22 0120     Glucose 255 mg/dL      BUN 12 mg/dL      Creatinine 0.92 mg/dL      Sodium 128 mmol/L      Potassium 4.1 mmol/L      Chloride 88 mmol/L      CO2 27.3 mmol/L      Calcium 9.5 mg/dL      Total Protein 7.0 g/dL      Albumin 4.10 g/dL      ALT (SGPT) 28 U/L      AST (SGOT) 32 U/L      Alkaline Phosphatase 77 U/L      Total Bilirubin 0.6 mg/dL      Globulin 2.9 gm/dL      A/G Ratio 1.4 g/dL      BUN/Creatinine Ratio 13.0     Anion Gap 12.7 mmol/L      eGFR 65.1 mL/min/1.73      Comment: National Kidney Foundation and American Society of Nephrology (ASN) Task Force recommended calculation based on the Chronic Kidney Disease Epidemiology Collaboration (CKD-EPI) equation refit without adjustment for race.       Narrative:      GFR Normal >60  Chronic Kidney Disease <60  Kidney Failure <15      Troponin [[id number]]  (Normal) Collected: 09/01/22 2357    Specimen: Blood Updated: 09/02/22 0120     Troponin T <0.010 ng/mL     Narrative:      Troponin T Reference Range:  <= 0.03 ng/mL-   Negative for AMI  >0.03 ng/mL-     Abnormal for myocardial necrosis.  Clinicians would have to utilize clinical acumen, EKG, Troponin and serial changes to determine if it is an Acute Myocardial Infarction or myocardial injury due to an underlying chronic condition.       Results may be falsely decreased if patient taking Biotin.      BNP [[id number]]  (Abnormal) Collected: 09/01/22 2357    Specimen: Blood Updated: 09/02/22 0058     proBNP 7,745.0 pg/mL     Narrative:      Among patients with dyspnea, NT-proBNP is highly sensitive for the detection of acute congestive heart failure. In addition  "NT-proBNP of <300 pg/ml effectively rules out acute congestive heart failure with 99% negative predictive value.    Results may be falsely decreased if patient taking Biotin.      CBC Auto Differential [[id number]]  (Abnormal) Collected: 09/01/22 2357    Specimen: Blood Updated: 09/02/22 0012     WBC 10.83 10*3/mm3      RBC 4.51 10*6/mm3      Hemoglobin 13.6 g/dL      Hematocrit 41.7 %      MCV 92.5 fL      MCH 30.2 pg      MCHC 32.6 g/dL      RDW 12.4 %      RDW-SD 41.5 fl      MPV 9.7 fL      Platelets 215 10*3/mm3      Neutrophil % 42.1 %      Lymphocyte % 46.3 %      Monocyte % 7.8 %      Eosinophil % 2.9 %      Basophil % 0.6 %      Immature Grans % 0.3 %      Neutrophils, Absolute 4.56 10*3/mm3      Lymphocytes, Absolute 5.01 10*3/mm3      Monocytes, Absolute 0.85 10*3/mm3      Eosinophils, Absolute 0.31 10*3/mm3      Basophils, Absolute 0.07 10*3/mm3      Immature Grans, Absolute 0.03 10*3/mm3      nRBC 0.0 /100 WBC     Procalcitonin [[id number]]  (Normal) Collected: 09/01/22 2357    Specimen: Blood Updated: 09/02/22 0737     Procalcitonin 0.04 ng/mL     Narrative:      As a Marker for Sepsis (Non-Neonates):    1. <0.5 ng/mL represents a low risk of severe sepsis and/or septic shock.  2. >2 ng/mL represents a high risk of severe sepsis and/or septic shock.    As a Marker for Lower Respiratory Tract Infections that require antibiotic therapy:    PCT on Admission    Antibiotic Therapy       6-12 Hrs later    >0.5                Strongly Recommended  >0.25 - <0.5        Recommended   0.1 - 0.25          Discouraged              Remeasure/reassess PCT  <0.1                Strongly Discouraged     Remeasure/reassess PCT    As 28 day mortality risk marker: \"Change in Procalcitonin Result\" (>80% or <=80%) if Day 0 (or Day 1) and Day 4 values are available. Refer to http://www.[facility]-pct-calculator.com    Change in PCT <=80%  A decrease of PCT levels below or equal to 80% defines a positive change in PCT test result " representing a higher risk for 28-day all-cause mortality of patients diagnosed with severe sepsis for septic shock.    Change in PCT >80%  A decrease of PCT levels of more than 80% defines a negative change in PCT result representing a lower risk for 28-day all-cause mortality of patients diagnosed with severe sepsis or septic shock.       Respiratory Panel PCR w/COVID-19(SARS-CoV-2) JOANNA/CATHY/SCOTTIE/PAD/COR/MAD/CORINNA In-House, NP Swab in UTM/VTM, 3-4 HR TAT - Swab, Nasopharynx [[id number]]  (Abnormal) Collected: 09/01/22 2358    Specimen: Swab from Nasopharynx Updated: 09/02/22 0104     ADENOVIRUS, PCR Not Detected     Coronavirus 229E Not Detected     Coronavirus HKU1 Not Detected     Coronavirus NL63 Not Detected     Coronavirus OC43 Not Detected     COVID19 Detected     Human Metapneumovirus Not Detected     Human Rhinovirus/Enterovirus Not Detected     Influenza A PCR Not Detected     Influenza B PCR Not Detected     Parainfluenza Virus 1 Not Detected     Parainfluenza Virus 2 Not Detected     Parainfluenza Virus 3 Not Detected     Parainfluenza Virus 4 Not Detected     RSV, PCR Not Detected     Bordetella pertussis pcr Not Detected     Bordetella parapertussis PCR Not Detected     Chlamydophila pneumoniae PCR Not Detected     Mycoplasma pneumo by PCR Not Detected    Narrative:      In the setting of a positive respiratory panel with a viral infection PLUS a negative procalcitonin without other underlying concern for bacterial infection, consider observing off antibiotics or discontinuation of antibiotics and continue supportive care. If the respiratory panel is positive for atypical bacterial infection (Bordetella pertussis, Chlamydophila pneumoniae, or Mycoplasma pneumoniae), consider antibiotic de-escalation to target atypical bacterial infection.    Blood Gas, Arterial - [[id number]]  (Abnormal) Collected: 09/02/22 0022    Specimen: Arterial Blood Updated: 09/02/22 0025     Site Arterial: right radial     Isac's  Test Positive     pH, Arterial 7.336 pH units      pCO2, Arterial 46.4 mm Hg      pO2, Arterial 101.6 mm Hg      HCO3, Arterial 24.8 mmol/L      Base Excess, Arterial -1.5 mmol/L      O2 Saturation Calculated 97.4 %      A-a DO2 0.3 mmHg      Barometric Pressure for Blood Gas 751.0 mmHg      Modality BiPap     FIO2 60 %      Ventilator Mode NIV     Set Tidal Volume 500     Set Mech Resp Rate 12     Rate 28 Breaths/minute      PEEP 5     Comment: 100% AVAPS MIN 10 MAX 20 Meter: [id number] : [id number] Rizwan Parmar             Imaging Results (Last 24 Hours)     Procedure Component Value Units Date/Time    CT Angiogram Chest [[id number]] Collected: 09/02/22 0351     Updated: 09/02/22 0351    Narrative:        Patient: MARIBELL DOUGLAS  Time Out: 03:50  Exam(s): CTA CHEST     EXAM:    CT Angiography Chest With Intravenous Contrast    CLINICAL HISTORY:     Reason for exam: Abrupt onset of respiratory distress. Covid-19   patient. Rule out PE.    TECHNIQUE:    Axial computed tomographic angiography images of the chest with   intravenous contrast.  CTDI is 47.9 mGy and DLP is 571.3 mGy-cm.  This CT   exam was performed according to the principle of ALARA (As Low As   Reasonably Achievable) by using one or more of the following dose   reduction techniques: automated exposure control, adjustment of the mA   and or kV according to patient size, and or use of iterative   reconstruction technique.    3D and MIP reconstructed images were created and reviewed.    COMPARISON:    Chest x-ray from September 1, 2022    FINDINGS:    Pulmonary arteries:  The pulmonary arterial tree is well opacified with   contrast.  No pulmonary emboli are identified.    Aorta:  The thoracic aorta is mildly calcified but nondilated.  There   is no aneurysm or dissection.    Lungs:  Bronchial wall thickening centrally suggesting bronchitis.    Pleural space:  See below.      Heart:  Mild cardiomegaly.  There are small bilateral pleural  effusions   measuring up to 1.4 cm in the right and mild bibasilar atelectasis versus   edema or pneumonia.    Bones joints:  No acute fracture.  No dislocation.    Soft tissues:  Unremarkable.    Lymph nodes:  Unremarkable.  No enlarged lymph nodes.    Gallbladder and bile ducts:  Limited images of the upper abdomen   demonstrates a 8 mm gallstone in the dependent portion of the gallbladder.    IMPRESSION:       1.  The pulmonary arterial tree is well opacified with contrast.  No   pulmonary emboli are identified.  2.  Mild cardiomegaly.  There are small bilateral pleural effusions   measuring up to 1.4 cm in the right and mild bibasilar pneumonia versus   atelectasis or edema.      Impression:          Electronically signed by Andrew Dotson MD on 09-02-22 at 0350    XR Chest 1 View [[id number]] Collected: 09/02/22 0049     Updated: 09/02/22 0049    Narrative:        Patient: MARIBELL DOUGLAS  Time Out: 00:48  Exam(s): FILM CXR 1 VIEW     EXAM:    XR Chest, 1 View    CLINICAL HISTORY:     Reason for exam: Hypoxia respiratory distress.    TECHNIQUE:    Frontal view of the chest.    COMPARISON:    June 12, 2017    FINDINGS:    Lungs:  Hazy increased density in the mid to lower lungs bilaterally   consistent with viral interstitial pneumonitis versus CHF, new since   previous.    Pleural space:  Unremarkable.  No pneumothorax.    Heart:  Mild cardiomegaly, new since previous.    Mediastinum:  Unremarkable.    Bones joints:  Unremarkable.    Upper abdomen:  There is no pneumoperitoneum under the diaphragm.    IMPRESSION:         Hazy increased density in the mid to lower lungs bilaterally consistent   with viral interstitial pneumonitis versus CHF, new since previous.      Impression:          Electronically signed by Andrew Dotson MD on 09-02-22 at 0048          Results for orders placed in visit on 12/09/16    Adult Transthoracic Echo Complete    Interpretation Summary  · The left ventricular cavity is mildly  dilated.  · Left ventricular wall segments contract abnormally. Refer to wall scoring diagram for more information.  · Mild mitral valve regurgitation is present  · Left atrial cavity size is mild-to-moderately dilated.  · Left ventricular diastolic dysfunction (grade I) consistent with impaired relaxation.  · Left ventricular function is moderately decreased.  · Estimated EF appears to be in the range of 36 - 40%.      ECG 12 Lead   Preliminary Result   HEART RATE= 100  bpm   RR Interval= 600  ms   SD Interval= 165  ms   P Horizontal Axis= -2  deg   P Front Axis= 40  deg   QRSD Interval= 164  ms   QT Interval= 411  ms   QRS Axis= -30  deg   T Wave Axis= 103  deg   - ABNORMAL ECG -   Sinus tachycardia   Left bundle branch block   Electronically Signed By:    Date and Time of Study: 2022-09-02 00:16:45           Assessment/Plan     Active Hospital Problems    Diagnosis  POA   • **Acute respiratory failure with hypoxia (HCC) [J96.01]  Yes   • COVID-19 [U07.1]  Yes   • Acute on chronic combined systolic and diastolic CHF (congestive heart failure) (HCC) [I50.43]  Yes   • Asthma [J45.909]  Yes   • Hyponatremia [E87.1]  Yes   • Mitral valve insufficiency [I34.0]  Yes   • Benign essential hypertension [I10]  Yes   • Cardiomyopathy (HCC) [I42.9]  Yes   • Hyperthyroidism [E05.90]  Yes      Resolved Hospital Problems   No resolved problems to display.       Ms. Rosen is a 76 y.o. female with a history of HTN, asthma, NICM, hyperthyroidism, mild mitral regurg who is admitted for acute respiratory failure with hypoxia due to acute on chronic combined systolic & diastolic CHF, Covid 19 virus    -Acute respiratory failure with hypoxia/Covid 19 virus/Asthma: O2 down to 2L hi flow. No evidence of asthma exacerbation. Screened positive for Covid 19 on 8/29, symptoms began 8/28. Continue dexamethasone and remdesivir. Encourage IS. No evidence of bacterial infection; procal wnl, afebrile. Mild leukocytosis. Monitor off antibiotics.  Pulmonology consulted, pt requesting  -Acute on chronic combined systolic & diastolic CHF: followed by [facility], last seen in July. Responding well to diuretics. Echo 2/2021 EF 44%. BNP elevated w/evidence of edema,sm paola pleural effusions on CT. Ask Cardiology to evaluate  -Hyperthyroidism: continue methmazole. TSH 1.580 7/2022  -Hyponatremia: Na 128 w/decreased po intake. Monitor  -Further recommendations based on hospital course    · I discussed the patient's findings and my recommendations with patient and pharmacy, nursing staff, Dr. Fermin.    VTE Prophylaxis - Pharmacy to dose Lovenox.  Code Status - Full code.       JOSEPH Pagan  [facility]  09/02/22  10:34 EDT

## 2022-09-02 NOTE — CONSULTS
Pulmonary Consultation     Patient Name: Natalie Rosen  Age/Sex: 76 y.o. female  : 1945  MRN: [id number]    Date of Admission: 2022  Date of Encounter Visit: 22  Encounter Provider: Marylou David MD  Referring Provider: Jose Loza MD  Place of Service: [facility]  Patient Care Team:  Bosler, James William III, MD as PCP - General (Internal Medicine)  Gerardo Julian MD as Obstetrician (Obstetrics and Gynecology)      Subjective:     Consulted for: Acute hypoxemic respiratory failure with COVID-19 pneumonia    Chief Complaint: Shortness of breath    History of Present Illness:  Natalie Rosen is a 76 y.o. female with history of nonischemic cardiomyopathy with EF of 35%, diastolic congestive heart failure grade 1, asthma and hypertension, mitral valve regurgitation who presented with worsening hypoxemia.  She was diagnosed with COVID-19 pneumonia on 2022 with the onset of symptoms the day prior.  She was started on nirmatrelvir/ritonavir.  Her symptoms include fatigue myalgias cough with shortness of breath worsening in the supine position with no specific relief, with wet sounding cough with no significant purulent secretions.  EMS were called she was found to be hypoxic in the 80s, she was brought into the emergency room and required BiPAP initially however she did improve over the hospital course and now she is down to 2 L/min nasal cannula oxygen, she did have some element of pulmonary edema and she did improve on the diuretics.  Patient follows with us in the office and she sees Dr. Sin, she was last seen on 2022  Who treats her for asthma  Her most recent spirometry was on that visit with FEV1/FVC 74% with FEV1 of 1.78 L / 76% with 3% improvement post bronchodilators.  She did have a chest x-ray on that visit that showed no cardiomegaly, no pleural effusion or noticeable nodules or masses or infiltrate.  His impression was that of a stable asthma which is  currently being treated only with PRNshort acting bronchodilators with oral Singulair  Her most recent echocardiogram on record was from December 2016 that showed EF of 35% with some diastolic dysfunction grade 1 as well.  She did have CT of the chest that did not show any significant diffuse interstitial pattern, rather she had some bibasilar atelectasis/pneumonia with some trace effusion.  Her blood tests are showing elevated proBNP along with slightly elevated white blood cell count with no left shift along with hyponatremia.  Her procalcitonin was negative.      Echocardiogram from 12/18/2016:  · The left ventricular cavity is mildly dilated.  · Left ventricular wall segments contract abnormally. Refer to wall scoring diagram for more information.  · Mild mitral valve regurgitation is present  · Left atrial cavity size is mild-to-moderately dilated.  · Left ventricular diastolic dysfunction (grade I) consistent with impaired relaxation.  · Left ventricular function is moderately decreased.  · Estimated EF appears to be in the range of 36 - 40%.      Pulmonary Functions Testing Results:    No results found for: FEV1, FVC, VCH2HLT, TLC, DLCO    Review of Systems:   Review of Systems   Constitutional: Positive for appetite change and fatigue. Negative for fever.   HENT: Negative for congestion.    Respiratory: Positive for cough and shortness of breath.    Cardiovascular: Negative for chest pain and leg swelling.   Gastrointestinal: Negative for diarrhea, nausea and vomiting.   Genitourinary: Negative for dysuria.   Musculoskeletal: Positive for myalgias. Negative for arthralgias.   Skin: Negative for rash.   Neurological: Negative for headaches.   Psychiatric/Behavioral: Negative for sleep disturbance.   Past Medical History:  Past Medical History:   Diagnosis Date   • Arthritis    • Asthma    • Benign essential hypertension    • Cancer (HCC)     MELANOMA   • Cardiomyopathy (HCC)    • Goiter    • Hyperlipidemia     • Hyperthyroidism    • Mitral valve insufficiency    • PONV (postoperative nausea and vomiting)    • Seasonal allergies    • Urinary frequency        Past Surgical History:   Procedure Laterality Date   • CATARACT EXTRACTION     • COLONOSCOPY     • HYSTERECTOMY     • OTHER SURGICAL HISTORY      PT HAS HAD SKIN CA REMOVED FROM BACK AND FACE (UPPER LIP)    • SC TOTAL KNEE ARTHROPLASTY Right 1/12/2017    Procedure: RT TOTAL KNEE ARTHROPLASTY;  Surgeon: Doc Lance MD;  Location: [facility] OR;  Service: Orthopedics   • SC TOTAL KNEE ARTHROPLASTY Left 6/26/2017    Procedure: LT TOTAL KNEE ARTHROPLASTY;  Surgeon: Doc Lance MD;  Location: [facility] OR;  Service: Orthopedics       Home Medications:   Medications Prior to Admission   Medication Sig Dispense Refill Last Dose   • albuterol sulfate  (90 Base) MCG/ACT inhaler Inhale 2 puffs Every 6 (Six) Hours As Needed.   Past Week at Unknown time   • Carbinoxamine Maleate 4 MG tablet Take 4 mg by mouth Daily.   9/1/2022 at Unknown time   • carvedilol (COREG) 25 MG tablet Take 1 tablet by mouth 2 (Two) Times a Day With Meals. 90 tablet 3 9/1/2022 at Unknown time   • Diclofenac Sodium (VOLTAREN) 1 % gel gel APPLY 4 GRAMS TO THE AFFECTED AREA FOUR TIMES A DAY AS DIRECTED (Patient taking differently: Apply 4 g topically to the appropriate area as directed 4 (Four) Times a Day. back) 100 g 5 9/1/2022 at Unknown time   • hydroCHLOROthiazide (MICROZIDE) 12.5 MG capsule TAKE ONE CAPSULE BY MOUTH EVERY MORNING (Patient taking differently: Take 12.5 mg by mouth Every Morning.) 90 capsule 0 9/1/2022 at Unknown time   • ipratropium (ATROVENT) 0.06 % nasal spray 2 sprays into the nostril(s) as directed by provider every night at bedtime.   9/1/2022 at Unknown time   • irbesartan (AVAPRO) 150 MG tablet Take 150 mg by mouth Daily.   9/1/2022 at Unknown time   • methIMAzole (TAPAZOLE) 5 MG tablet TAKE ONE TABLET BY MOUTH ONCE DAILY 6 DAYS OUT OF THE WEEK (Patient  taking differently: Take 5 mg by mouth Daily. TAKE ONE TABLET BY MOUTH ONCE DAILY 6 DAYS OUT OF THE WEEK (hold on Wednesday)) 90 tablet 5    • montelukast (SINGULAIR) 10 MG tablet Take 1 tablet by mouth Every Morning.   9/1/2022 at Unknown time   • Multiple Vitamins-Minerals (PRESERVISION AREDS 2 PO) Take 1 capsule by mouth 2 (Two) Times a Day.   9/1/2022 at Unknown time   • naproxen sodium (ALEVE) 220 MG tablet Take 220 mg by mouth 2 (Two) Times a Day With Meals.   9/1/2022 at Unknown time   • Nirmatrelvir&Ritonavir 150/100 (Paxlovid, 150/100,) 10 x 150 MG & 10 x 100MG tablet therapy pack tablet (for renal adjustment) Take 2 tablets by mouth 2 (Two) Times a Day.   9/1/2022 at Unknown time   • omeprazole (priLOSEC) 40 MG capsule Take 40 mg by mouth Daily As Needed.   Past Month at Unknown time   • ondansetron (ZOFRAN) 4 MG tablet Take 4 mg by mouth Daily. Takes every morning   9/1/2022 at Unknown time   • pravastatin (PRAVACHOL) 40 MG tablet TAKE ONE TABLET BY MOUTH EVERY NIGHT AT BEDTIME 30 tablet 0 9/1/2022 at Unknown time   • triamcinolone (NASACORT) 55 MCG/ACT nasal inhaler 2 sprays into each nostril Every Morning.   9/1/2022 at Unknown time   • vitamin B-6 (PYRIDOXINE) 50 MG tablet Take 50 mg by mouth Daily.   9/1/2022 at Unknown time       Inpatient Medications:  Scheduled Meds:carvedilol, 25 mg, Oral, BID With Meals  dexamethasone, 6 mg, Oral, Daily   Or  dexamethasone, 6 mg, Intravenous, Daily  enoxaparin, 40 mg, Subcutaneous, Daily  fluticasone, 2 spray, Each Nare, Daily  furosemide, 40 mg, Intravenous, Q12H  ipratropium, 2 spray, Each Nare, Nightly  losartan, 50 mg, Oral, Q24H  [START ON 9/3/2022] methIMAzole, 5 mg, Oral, Once per day on Sun Mon Tue Thu Fri Sat  montelukast, 10 mg, Oral, QAM  multivitamin with minerals, 1 tablet, Oral, Daily  pantoprazole, 40 mg, Oral, QAM  pravastatin, 40 mg, Oral, Nightly  [START ON 9/3/2022] remdesivir, 100 mg, Intravenous, Q24H  sodium chloride, 10 mL, Intravenous,  Q12H  vitamin B-6, 50 mg, Oral, Daily      Continuous Infusions:Pharmacy Consult - Remdesivir,       PRN Meds:.•  acetaminophen **OR** acetaminophen **OR** acetaminophen  •  albuterol  •  calcium carbonate  •  nitroglycerin  •  ondansetron **OR** ondansetron  •  Pharmacy Consult - Remdesivir  •  [COMPLETED] Insert peripheral IV **AND** sodium chloride  •  sodium chloride    Allergies:  No Known Allergies    Past Social History:  Social History     Socioeconomic History   • Marital status:    Tobacco Use   • Smoking status: Former Smoker     Packs/day: 1.00     Years: 25.00     Pack years: 25.00     Types: Cigarettes     Quit date: 1985     Years since quittin.7   • Smokeless tobacco: Never Used   Substance and Sexual Activity   • Alcohol use: Yes     Comment: DAILY COCKTAIL   • Drug use: No   • Sexual activity: Defer       Past Family History:  Family History   Problem Relation Age of Onset   • Breast cancer Other    • Diabetes Other    • Cancer Mother    • Cancer Father    • Malig Hyperthermia Neg Hx            Objective:   Temp:  [97.7 °F (36.5 °C)-98.1 °F (36.7 °C)] 98.1 °F (36.7 °C)  Heart Rate:  [] 76  Resp:  [18-28] 18  BP: (121-191)/(73-98) 121/84  SpO2:  [88 %-100 %] 100 %  on  Flow (L/min):  [4-15] 4 Device (Oxygen Therapy): room air     Intake/Output Summary (Last 24 hours) at 2022 1445  Last data filed at 2022 0400  Gross per 24 hour   Intake --   Output 1000 ml   Net -1000 ml     Body mass index is 27.22 kg/m².      22  0900   Weight: 78.8 kg (173 lb 12.8 oz)     Weight change:     Physical Exam:   Physical Exam   General:    No acute distress, alert and oriented x4, pleasant                   Head:    Normocephalic, atraumatic. External ears and nose are normal   Eyes:          Conjunctivae and sclerae normal, no icterus, PERRLA, no  discharge   Throat:   No oral lesions, no thrush, oral mucosa moist.    Neck:   Supple, trachea midline. No JVD, no cervical or  supraclavicular lymphadenopathy    Lungs:     Normal chest on inspection, CTAB, no wheezes. No rhonchi. No crackles. Respirations regular, even and unlabored.      Heart:    Regular rhythm and normal rate.  No murmurs, gallops, or rubs noted.   Abdomen:     Soft, nontender, nondistended, positive bowel sounds. No hepatosplenomegaly    Extremities:   No clubbing, cyanosis, or edema.     Pulses:   Pulses palpable and equal bilaterally.    Skin:   No bleeding or rash. No bumps, good turgor pressure    Neuro:   Nonfocal.  Moves all extremities well. Strength 5/5 and symmetrical, no sensory deficit    Psychiatric:   Normal mood and affect.     Lab Review:   Results from last 7 days   Lab Units 09/01/22  2357   SODIUM mmol/L 128*   POTASSIUM mmol/L 4.1   CHLORIDE mmol/L 88*   CO2 mmol/L 27.3   BUN mg/dL 12   CREATININE mg/dL 0.92   GLUCOSE mg/dL 255*   CALCIUM mg/dL 9.5   AST (SGOT) U/L 32   ALT (SGPT) U/L 28   ALBUMIN g/dL 4.10     Results from last 7 days   Lab Units 09/01/22  2357   TROPONIN T ng/mL <0.010     Results from last 7 days   Lab Units 09/01/22  2357   WBC 10*3/mm3 10.83*   HEMOGLOBIN g/dL 13.6   HEMATOCRIT % 41.7   PLATELETS 10*3/mm3 215   MCV fL 92.5   MCH pg 30.2   MCHC g/dL 32.6   RDW % 12.4   RDW-SD fl 41.5   MPV fL 9.7   NEUTROPHIL % % 42.1*   LYMPHOCYTE % % 46.3*   MONOCYTES % % 7.8   EOSINOPHIL % % 2.9   BASOPHIL % % 0.6   IMM GRAN % % 0.3   NEUTROS ABS 10*3/mm3 4.56   LYMPHS ABS 10*3/mm3 5.01*   MONOS ABS 10*3/mm3 0.85   EOS ABS 10*3/mm3 0.31   BASOS ABS 10*3/mm3 0.07   IMMATURE GRANS (ABS) 10*3/mm3 0.03   NRBC /100 WBC 0.0                   Invalid input(s): LDLCALC  Results from last 7 days   Lab Units 09/01/22  2357   PROBNP pg/mL 7,745.0*         Results from last 7 days   Lab Units 09/02/22  0022   PH, ARTERIAL pH units 7.336*   PCO2, ARTERIAL mm Hg 46.4*   PO2 ART mm Hg 101.6*   HCO3 ART mmol/L 24.8     Results from last 7 days   Lab Units 09/01/22  2357   PROCALCITONIN ng/mL 0.04                      Results from last 7 days   Lab Units 09/01/22  2358   COVID19  Detected*   ADENOVIRUS DETECTION BY PCR  Not Detected   CORONAVIRUS 229E  Not Detected   CORONAVIRUS HKU1  Not Detected   CORONAVIRUS NL63  Not Detected   CORONAVIRUS OC43  Not Detected   HUMAN METAPNEUMOVIRUS  Not Detected   HUMAN RHINOVIRUS/ENTEROVIRUS  Not Detected   INFLUENZA B PCR  Not Detected   PARAINFLUENZA 1  Not Detected   PARAINFLUENZA VIRUS 2  Not Detected   PARAINFLUENZA VIRUS 3  Not Detected   PARAINFLUENZA VIRUS 4  Not Detected   BORDETELLA PERTUSSIS PCR  Not Detected   BORDETELLA PARAPERTUSSIS PCR  Not Detected   CHLAMYDOPHILA PNEUMONIAE PCR  Not Detected   MYCOPLAMA PNEUMO PCR  Not Detected   RSV, PCR  Not Detected             Imaging:  Imaging Results (Most Recent)     Procedure Component Value Units Date/Time    CT Angiogram Chest [[id number]] Collected: 09/02/22 0351     Updated: 09/02/22 0351    Narrative:        Patient: MARIBELL DOUGLAS  Time Out: 03:50  Exam(s): CTA CHEST     EXAM:    CT Angiography Chest With Intravenous Contrast    CLINICAL HISTORY:     Reason for exam: Abrupt onset of respiratory distress. Covid-19   patient. Rule out PE.    TECHNIQUE:    Axial computed tomographic angiography images of the chest with   intravenous contrast.  CTDI is 47.9 mGy and DLP is 571.3 mGy-cm.  This CT   exam was performed according to the principle of ALARA (As Low As   Reasonably Achievable) by using one or more of the following dose   reduction techniques: automated exposure control, adjustment of the mA   and or kV according to patient size, and or use of iterative   reconstruction technique.    3D and MIP reconstructed images were created and reviewed.    COMPARISON:    Chest x-ray from September 1, 2022    FINDINGS:    Pulmonary arteries:  The pulmonary arterial tree is well opacified with   contrast.  No pulmonary emboli are identified.    Aorta:  The thoracic aorta is mildly calcified but nondilated.  There   is no aneurysm  or dissection.    Lungs:  Bronchial wall thickening centrally suggesting bronchitis.    Pleural space:  See below.      Heart:  Mild cardiomegaly.  There are small bilateral pleural effusions   measuring up to 1.4 cm in the right and mild bibasilar atelectasis versus   edema or pneumonia.    Bones joints:  No acute fracture.  No dislocation.    Soft tissues:  Unremarkable.    Lymph nodes:  Unremarkable.  No enlarged lymph nodes.    Gallbladder and bile ducts:  Limited images of the upper abdomen   demonstrates a 8 mm gallstone in the dependent portion of the gallbladder.    IMPRESSION:       1.  The pulmonary arterial tree is well opacified with contrast.  No   pulmonary emboli are identified.  2.  Mild cardiomegaly.  There are small bilateral pleural effusions   measuring up to 1.4 cm in the right and mild bibasilar pneumonia versus   atelectasis or edema.      Impression:          Electronically signed by Andrew Dotson MD on 09-02-22 at 0350    XR Chest 1 View [[id number]] Collected: 09/02/22 0049     Updated: 09/02/22 0049    Narrative:        Patient: MARIBELL DOUGLAS  Time Out: 00:48  Exam(s): FILM CXR 1 VIEW     EXAM:    XR Chest, 1 View    CLINICAL HISTORY:     Reason for exam: Hypoxia respiratory distress.    TECHNIQUE:    Frontal view of the chest.    COMPARISON:    June 12, 2017    FINDINGS:    Lungs:  Hazy increased density in the mid to lower lungs bilaterally   consistent with viral interstitial pneumonitis versus CHF, new since   previous.    Pleural space:  Unremarkable.  No pneumothorax.    Heart:  Mild cardiomegaly, new since previous.    Mediastinum:  Unremarkable.    Bones joints:  Unremarkable.    Upper abdomen:  There is no pneumoperitoneum under the diaphragm.    IMPRESSION:         Hazy increased density in the mid to lower lungs bilaterally consistent   with viral interstitial pneumonitis versus CHF, new since previous.      Impression:          Electronically signed by Andrew Dotson MD on  09-02-22 at 0048          I personally viewed and interpreted the patient's imaging studies.    Assessment:   1. Acute hypoxemic respiratory failure, resolved  2. Bilateral pleural effusion with atelectasis  3. Positive COVID-19 with not much impressive finding on the CAT scan to suspect COVID-19 pneumonia  4. Hyponatremia  5. Asthma with no obvious exacerbation  6. Mitral valve insufficiency  7. Cardiomyopathy with both ischemic and diastolic dysfunction      Plan:     The patient came in with respiratory distress and now she is on room air with sats in the 90s.  This is definitely not COVID-pneumonia by clinical presentation and not by CT scan finding as well  Patient is already being evaluated by cardiology and she is to have repeat echocardiogram with other work-up as deemed necessary from the cardiac standpoint  She is taking the Paxlovid and she is left with 1 more day, we will go ahead and allow the patient to finish her oral medication from home while making sure it does not interact with any of the new medication she is getting while in the hospital  No asthma exacerbation at this point to suggest any steroids  No steroid indicated from the COVID standpoint because the hypoxemia was due to congestive heart failure and she is currently not hypoxic  The white blood cell count was normal and there is no left shift, the procalcitonin was negative and no antibiotic are indicated or recommended  Discussed the results with the patient, films and labs and office records were reviewed  We will follow-up on her respiratory status        Thank you for allowing me to participate in the care of Natalie GENARO Venkatramez. Feel free to contact me directly with any further questions or concerns.    Marylou David MD  [facility]   09/02/22  14:45 EDT    Dictated utilizing Dragon dictation

## 2022-09-02 NOTE — ED PROVIDER NOTES
EMERGENCY DEPARTMENT ENCOUNTER    Room Number:  03/03  Date of encounter:  9/2/2022  PCP: Bosler, James William III, MD  Historian: Patient and EMS      HPI:  Chief Complaint: Acute respiratory failure/shortness of breath/COVID-19  A complete HPI/ROS/PMH/PSH/SH/FH are unobtainable due to: Patient is in respiratory distress and history is limited    Context: Natalie Rosen is a 76 y.o. female who brought to the emergency department for further evaluation of acute respiratory distress.  Patient was allegedly diagnosed with COVID-19 on 8/29/2022.  Patient states she felt short of breath all evening.  Earlier this evening she began to feel as if she was suffocating.  EMS was called to the scene.  Patient sats were initially reported by EMS to be in the low 90s.  They gave her a nebulizer treatment and the sats fell to the low 80s, upper 70s.  Patient was placed on a nonrebreather high flow and her oxygen levels still remain in the 80s.  She denies chest pain.  She is here for further evaluation.        PAST MEDICAL HISTORY  Active Ambulatory Problems     Diagnosis Date Noted   • Abnormal weight gain 02/08/2016   • Benign essential hypertension 02/08/2016   • Cardiomyopathy (HCC) 02/08/2016   • Stenosis of carotid artery 02/08/2016   • Fatigue 02/08/2016   • Hyperlipidemia 02/08/2016   • Hyperthyroidism 02/08/2016   • Non-toxic multinodular goiter 02/08/2016   • Carotid bruit 02/24/2016   • Mitral valve insufficiency 02/24/2016   • Atrophic vaginitis 09/27/2016   • Osteoarthritis, knee 01/12/2017   • DJD (degenerative joint disease) of knee 01/12/2017   • Neck pain on left side 09/08/2020   • Chronic bilateral low back pain without sciatica 09/08/2020   • Lumbar facet arthropathy 11/23/2020   • Muscle spasm 11/23/2020     Resolved Ambulatory Problems     Diagnosis Date Noted   • No Resolved Ambulatory Problems     Past Medical History:   Diagnosis Date   • Arthritis    • Asthma    • Cancer (HCC)    • Goiter    • PONV  (postoperative nausea and vomiting)    • Seasonal allergies    • Urinary frequency          PAST SURGICAL HISTORY  Past Surgical History:   Procedure Laterality Date   • CATARACT EXTRACTION     • COLONOSCOPY     • HYSTERECTOMY     • OTHER SURGICAL HISTORY      PT HAS HAD SKIN CA REMOVED FROM BACK AND FACE (UPPER LIP)    • CT TOTAL KNEE ARTHROPLASTY Right 2017    Procedure: RT TOTAL KNEE ARTHROPLASTY;  Surgeon: Doc Lance MD;  Location: [facility];  Service: Orthopedics   • CT TOTAL KNEE ARTHROPLASTY Left 2017    Procedure: LT TOTAL KNEE ARTHROPLASTY;  Surgeon: Doc Lance MD;  Location: [facility];  Service: Orthopedics         FAMILY HISTORY  Family History   Problem Relation Age of Onset   • Breast cancer Other    • Diabetes Other    • Cancer Mother    • Cancer Father    • Malig Hyperthermia Neg Hx          SOCIAL HISTORY  Social History     Socioeconomic History   • Marital status:    Tobacco Use   • Smoking status: Former Smoker     Packs/day: 1.00     Years: 25.00     Pack years: 25.00     Types: Cigarettes     Quit date: 1985     Years since quittin.7   • Smokeless tobacco: Never Used   Substance and Sexual Activity   • Alcohol use: Yes     Comment: DAILY COCKTAIL   • Drug use: No   • Sexual activity: Defer         ALLERGIES  Patient has no known allergies.        REVIEW OF SYSTEMS  Review of Systems   Unable to perform ROS: Acuity of condition        All systems reviewed and negative except for those discussed in HPI.       PHYSICAL EXAM    I have reviewed the triage vital signs and nursing notes.    ED Triage Vitals   Temp Heart Rate Resp BP SpO2   22 2354 22 2351 22 2351 22 2354 22 2351   97.7 °F (36.5 °C) 116 24 138/98 (!) 88 %      Temp src Heart Rate Source Patient Position BP Location FiO2 (%)   -- -- -- -- --              Physical Exam  GENERAL: Anxious, moderate distress   HENT: nares patent  EYES: no scleral icterus  CV:  regular rhythm, regular rate, no obvious murmur rubs or gallops.  RESPIRATORY: Significant increase in labored breathing.  Patient is tachypneic and using her accessory muscles extensively.  There are diffuse rales throughout my exam.   ABDOMEN: soft  MUSCULOSKELETAL: no deformity  NEURO: alert, moves all extremities, follows commands  SKIN: warm, dry        LAB RESULTS  Recent Results (from the past 24 hour(s))   Comprehensive Metabolic Panel    Collection Time: 09/01/22 11:57 PM    Specimen: Blood   Result Value Ref Range    Glucose 255 (H) 65 - 99 mg/dL    BUN 12 8 - 23 mg/dL    Creatinine 0.92 0.57 - 1.00 mg/dL    Sodium 128 (L) 136 - 145 mmol/L    Potassium 4.1 3.5 - 5.2 mmol/L    Chloride 88 (L) 98 - 107 mmol/L    CO2 27.3 22.0 - 29.0 mmol/L    Calcium 9.5 8.6 - 10.5 mg/dL    Total Protein 7.0 6.0 - 8.5 g/dL    Albumin 4.10 3.50 - 5.20 g/dL    ALT (SGPT) 28 1 - 33 U/L    AST (SGOT) 32 1 - 32 U/L    Alkaline Phosphatase 77 39 - 117 U/L    Total Bilirubin 0.6 0.0 - 1.2 mg/dL    Globulin 2.9 gm/dL    A/G Ratio 1.4 g/dL    BUN/Creatinine Ratio 13.0 7.0 - 25.0    Anion Gap 12.7 5.0 - 15.0 mmol/L    eGFR 65.1 >60.0 mL/min/1.73   Troponin    Collection Time: 09/01/22 11:57 PM    Specimen: Blood   Result Value Ref Range    Troponin T <0.010 0.000 - 0.030 ng/mL   BNP    Collection Time: 09/01/22 11:57 PM    Specimen: Blood   Result Value Ref Range    proBNP 7,745.0 (H) 0.0 - 1,800.0 pg/mL   CBC Auto Differential    Collection Time: 09/01/22 11:57 PM    Specimen: Blood   Result Value Ref Range    WBC 10.83 (H) 3.40 - 10.80 10*3/mm3    RBC 4.51 3.77 - 5.28 10*6/mm3    Hemoglobin 13.6 12.0 - 15.9 g/dL    Hematocrit 41.7 34.0 - 46.6 %    MCV 92.5 79.0 - 97.0 fL    MCH 30.2 26.6 - 33.0 pg    MCHC 32.6 31.5 - 35.7 g/dL    RDW 12.4 12.3 - 15.4 %    RDW-SD 41.5 37.0 - 54.0 fl    MPV 9.7 6.0 - 12.0 fL    Platelets 215 140 - 450 10*3/mm3    Neutrophil % 42.1 (L) 42.7 - 76.0 %    Lymphocyte % 46.3 (H) 19.6 - 45.3 %    Monocyte %  7.8 5.0 - 12.0 %    Eosinophil % 2.9 0.3 - 6.2 %    Basophil % 0.6 0.0 - 1.5 %    Immature Grans % 0.3 0.0 - 0.5 %    Neutrophils, Absolute 4.56 1.70 - 7.00 10*3/mm3    Lymphocytes, Absolute 5.01 (H) 0.70 - 3.10 10*3/mm3    Monocytes, Absolute 0.85 0.10 - 0.90 10*3/mm3    Eosinophils, Absolute 0.31 0.00 - 0.40 10*3/mm3    Basophils, Absolute 0.07 0.00 - 0.20 10*3/mm3    Immature Grans, Absolute 0.03 0.00 - 0.05 10*3/mm3    nRBC 0.0 0.0 - 0.2 /100 WBC   Respiratory Panel PCR w/COVID-19(SARS-CoV-2) JOANNA/CATHY/SCOTTIE/PAD/COR/MAD/CORINNA In-House, NP Swab in UTM/VTM, 3-4 HR TAT - Swab, Nasopharynx    Collection Time: 09/01/22 11:58 PM    Specimen: Nasopharynx; Swab   Result Value Ref Range    ADENOVIRUS, PCR Not Detected Not Detected    Coronavirus 229E Not Detected Not Detected    Coronavirus HKU1 Not Detected Not Detected    Coronavirus NL63 Not Detected Not Detected    Coronavirus OC43 Not Detected Not Detected    COVID19 Detected (C) Not Detected - Ref. Range    Human Metapneumovirus Not Detected Not Detected    Human Rhinovirus/Enterovirus Not Detected Not Detected    Influenza A PCR Not Detected Not Detected    Influenza B PCR Not Detected Not Detected    Parainfluenza Virus 1 Not Detected Not Detected    Parainfluenza Virus 2 Not Detected Not Detected    Parainfluenza Virus 3 Not Detected Not Detected    Parainfluenza Virus 4 Not Detected Not Detected    RSV, PCR Not Detected Not Detected    Bordetella pertussis pcr Not Detected Not Detected    Bordetella parapertussis PCR Not Detected Not Detected    Chlamydophila pneumoniae PCR Not Detected Not Detected    Mycoplasma pneumo by PCR Not Detected Not Detected   ECG 12 Lead    Collection Time: 09/02/22 12:16 AM   Result Value Ref Range    QT Interval 411 ms   Blood Gas, Arterial -    Collection Time: 09/02/22 12:22 AM    Specimen: Arterial Blood   Result Value Ref Range    Site Arterial: right radial     Isac's Test Positive     pH, Arterial 7.336 (L) 7.350 - 7.450 pH units     pCO2, Arterial 46.4 (H) 35.0 - 45.0 mm Hg    pO2, Arterial 101.6 (H) 80.0 - 100.0 mm Hg    HCO3, Arterial 24.8 22.0 - 28.0 mmol/L    Base Excess, Arterial -1.5 (L) 0.0 - 2.0 mmol/L    O2 Saturation Calculated 97.4 92.0 - 99.0 %    A-a DO2 0.3 mmHg    Barometric Pressure for Blood Gas 751.0 mmHg    Modality BiPap     FIO2 60 %    Ventilator Mode NIV     Set Tidal Volume 500     Set Mech Resp Rate 12     Rate 28 Breaths/minute    PEEP 5        Ordered the above labs and independently reviewed the results.        RADIOLOGY  XR Chest 1 View    Result Date: 9/2/2022  Patient: MARIBELL DOUGLAS  Time Out: 00:48 Exam(s): FILM CXR 1 VIEW EXAM:   XR Chest, 1 View CLINICAL HISTORY:    Reason for exam: Hypoxia respiratory distress. TECHNIQUE:   Frontal view of the chest. COMPARISON:   June 12, 2017 FINDINGS:   Lungs:  Hazy increased density in the mid to lower lungs bilaterally consistent with viral interstitial pneumonitis versus CHF, new since previous.   Pleural space:  Unremarkable.  No pneumothorax.   Heart:  Mild cardiomegaly, new since previous.   Mediastinum:  Unremarkable.   Bones joints:  Unremarkable.   Upper abdomen:  There is no pneumoperitoneum under the diaphragm. IMPRESSION:       Hazy increased density in the mid to lower lungs bilaterally consistent with viral interstitial pneumonitis versus CHF, new since previous.     Electronically signed by Andrew Dotson MD on 09-02-22 at 0048    CT Angiogram Chest    Result Date: 9/2/2022  Patient: MARIBELL DOUGLAS  Time Out: 03:50 Exam(s): CTA CHEST EXAM:   CT Angiography Chest With Intravenous Contrast CLINICAL HISTORY:    Reason for exam: Abrupt onset of respiratory distress. Covid-19 patient. Rule out PE. TECHNIQUE:   Axial computed tomographic angiography images of the chest with intravenous contrast.  CTDI is 47.9 mGy and DLP is 571.3 mGy-cm.  This CT exam was performed according to the principle of ALARA (As Low As Reasonably Achievable) by using one or more of the  following dose reduction techniques: automated exposure control, adjustment of the mA and or kV according to patient size, and or use of iterative reconstruction technique.   3D and MIP reconstructed images were created and reviewed. COMPARISON:   Chest x-ray from September 1, 2022 FINDINGS:   Pulmonary arteries:  The pulmonary arterial tree is well opacified with contrast.  No pulmonary emboli are identified.   Aorta:  The thoracic aorta is mildly calcified but nondilated.  There is no aneurysm or dissection.   Lungs:  Bronchial wall thickening centrally suggesting bronchitis.   Pleural space:  See below.    Heart:  Mild cardiomegaly.  There are small bilateral pleural effusions measuring up to 1.4 cm in the right and mild bibasilar atelectasis versus edema or pneumonia.   Bones joints:  No acute fracture.  No dislocation.   Soft tissues:  Unremarkable.   Lymph nodes:  Unremarkable.  No enlarged lymph nodes.   Gallbladder and bile ducts:  Limited images of the upper abdomen demonstrates a 8 mm gallstone in the dependent portion of the gallbladder. IMPRESSION:     1.  The pulmonary arterial tree is well opacified with contrast.  No pulmonary emboli are identified. 2.  Mild cardiomegaly.  There are small bilateral pleural effusions measuring up to 1.4 cm in the right and mild bibasilar pneumonia versus atelectasis or edema.     Electronically signed by Andrew Dotson MD on 09-02-22 at 0350      I ordered the above noted radiological studies. Reviewed by me and discussed with radiologist.  See dictation for official radiology interpretation.      PROCEDURES    Critical Care  Performed by: Robert Joshua III, PA  Authorized by: Jaylan Fermin MD     Critical care provider statement:     Critical care time was exclusive of:  Teaching time    Critical care was necessary to treat or prevent imminent or life-threatening deterioration of the following conditions:  Respiratory failure    Critical care was time  spent personally by me on the following activities:  Development of treatment plan with patient or surrogate, blood draw for specimens, evaluation of patient's response to treatment, examination of patient, interpretation of cardiac output measurements, obtaining history from patient or surrogate, ordering and performing treatments and interventions, ordering and review of laboratory studies, ordering and review of radiographic studies, pulse oximetry, review of old charts and discussions with consultants    I assumed direction of critical care for this patient from another provider in my specialty: yes            MEDICATIONS GIVEN IN ER    Medications   sodium chloride 0.9 % flush 10 mL (10 mL Intravenous Given 9/2/22 0013)   sodium chloride 0.9 % flush 10 mL (has no administration in time range)   sodium chloride 0.9 % flush 10 mL (has no administration in time range)   nitroglycerin (NITROSTAT) SL tablet 0.4 mg (has no administration in time range)   acetaminophen (TYLENOL) tablet 650 mg (has no administration in time range)     Or   acetaminophen (TYLENOL) 160 MG/5ML solution 650 mg (has no administration in time range)     Or   acetaminophen (TYLENOL) suppository 650 mg (has no administration in time range)   ondansetron (ZOFRAN) tablet 4 mg (has no administration in time range)     Or   ondansetron (ZOFRAN) injection 4 mg (has no administration in time range)   calcium carbonate (TUMS) chewable tablet 500 mg (200 mg elemental) (has no administration in time range)   furosemide (LASIX) injection 40 mg (has no administration in time range)   Enoxaparin Sodium (LOVENOX) syringe 40 mg (has no administration in time range)   dexamethasone (DECADRON) tablet 6 mg (has no administration in time range)     Or   dexamethasone (DECADRON) injection 6 mg (has no administration in time range)   Pharmacy Consult - Remdesivir (has no administration in time range)   remdesivir 200 mg in sodium chloride 0.9 % 290 mL IVPB  (powder vial) (has no administration in time range)     Followed by   remdesivir 100 mg in sodium chloride 0.9 % 250 mL IVPB (powder vial) (has no administration in time range)   dexamethasone (DECADRON) injection 6 mg (6 mg Intravenous Given 9/2/22 0011)   furosemide (LASIX) injection 80 mg (80 mg Intravenous Given 9/2/22 0237)   iopamidol (ISOVUE-370) 76 % injection 100 mL (95 mL Intravenous Given 9/2/22 0313)         PROGRESS, DATA ANALYSIS, CONSULTS, AND MEDICAL DECISION MAKING    All labs have been independently reviewed by me.  All radiology studies have been reviewed by me and discussed with radiologist dictating the report.   EKG's independently viewed and interpreted by me.  Discussion below represents my analysis of pertinent findings related to patient's condition, differential diagnosis, treatment plan and final disposition.    DDx: CHF, acute pulmonary edema, PE, COVID-19 pneumonia, COVID-19 with underlying bacterial pneumonia.  Reviewing the patient's past chart I can see in December 2016 she had an EF of 36 to 40%.  To further evaluate the patient will obtain CBC, CMP, troponin, EKG, BNP, portable chest x-ray, CT angio of the chest, respiratory panel to further evaluate.      ED Course as of 09/02/22 0546   Fri Sep 02, 2022   0010 Chest x-ray does show pulmonary edema or vascular congestion. [RC]   0020 EKG interpreted by myself.  Time 12:16 AM.  Sinus rhythm.  Heart rate 100.  Left ulnar branch block.  Negative Sgarbossa criteria. [TD]   0020 On medical chart review, old EKG obtained from 12/27/2016.  Sinus rhythm.  Heart rate 61.  Nonspecific ST changes. [TD]   0024 WBC(!): 10.83 [RC]   0025 RBC: 4.51 [RC]   0025 Hemoglobin: 13.6 [RC]   0025 Hematocrit: 41.7 [RC]   0025 Platelets: 215 [RC]   0057 pH, Arterial(!): 7.336 [TD]   0057 pCO2, Arterial(!): 46.4 [TD]   0108 COVID19(!!): Detected [TD]   0145 Glucose(!): 255 [RC]   0145 BUN: 12 [RC]   0145 Creatinine: 0.92 [RC]   0145 Sodium(!): 128 [RC]    0145 Potassium: 4.1 [RC]   0145 CO2: 27.3 [RC]   0145 Anion Gap: 12.7 [RC]   0145 proBNP(!): 7,745.0 [RC]   0145 Troponin T: <0.010 [RC]   0150 Patient feeling much better.  The BiPAP is been on for roughly 2 hours.  Suspect this is has cleared the airfields at least to a degree.  We will try the patient on a nonrebreather.  If she does fine on a nonrebreather will attempt to get her on high flow NC. [RC]   0151 CTA chest still pending [RC]   0151 BNP is elevated considering the x-ray we will go ahead and order 80 mg of Lasix. [RC]   0220 proBNP(!): 7,745.0 [RC]   0220 COVID19(!!): Detected [RC]   0520 Patient is now on 4 L high flow NC and appears to be in a much better place clinically than she was on her initial exam.  She appears stable for the floor at this time.  We will place a call to the hospitalist to discuss admission [RC]   0544 Discussed the patient's case with JOSEPH Fleming with [facility].  To admit to Dr. Loza's care for further management. [RC]   0544 Procalcitonin still pending at time of admission.  We will watch closely and if elevated will initiate antibiotics.  If negative will continue to treat symptomatically. [RC]      ED Course User Index  [RC] Robert Joshua III, PA  [TD] Edu Villavicencio II, MD           PPE: The patient wore a surgical mask throughout the entire patient encounter. I wore an N95.    AS OF 05:46 EDT VITALS:    BP - 173/90  HR - 83  TEMP - 97.7 °F (36.5 °C)  O2 SATS - 99%        DIAGNOSIS  Final diagnoses:   COVID-19   Acute pulmonary edema (HCC)   Acute respiratory failure with hypoxia (HCC)         DISPOSITION  ADMISSION    Discussed treatment plan and reason for admission with pt/family and admitting physician.  Pt/family voiced understanding of the plan for admission for further testing/treatment as needed.              Robert Joshua III, PA  09/02/22 0519

## 2022-09-02 NOTE — PROGRESS NOTES
[facility] Clinical Pharmacy Services: Medication Reconciliation     Natalie Rosen is a 76 y.o. female presenting to [facility] for Acute pulmonary edema (HCC) [J81.0]  Acute respiratory failure with hypoxia (HCC) [J96.01]  COVID-19 [U07.1]       She  has a past medical history of Arthritis, Asthma, Benign essential hypertension, Cancer (HCC), Cardiomyopathy (HCC), Goiter, Hyperlipidemia, Hyperthyroidism, Mitral valve insufficiency, PONV (postoperative nausea and vomiting), Seasonal allergies, and Urinary frequency.       Allergies as of 09/01/2022   • (No Known Allergies)          Medication information was obtained from: patient    Pharmacy and Phone Number:    Preferred Pharamcy:  [facility] [address] [facility]  [address] - [phone]  - [phone]   [address] [facility]   [facility] [address]  Phone: [phone] Fax: [phone]    [facility]  [address] [facility] [address]  Phone: [phone] Fax: [phone]       Prior to Admission Medications     Prescriptions Last Dose Informant Patient Reported? Taking?    albuterol sulfate  (90 Base) MCG/ACT inhaler Past Week Self Yes Yes    Inhale 2 puffs Every 6 (Six) Hours As Needed.    Carbinoxamine Maleate 4 MG tablet 9/1/2022 Self Yes Yes    Take 4 mg by mouth Daily.    carvedilol (COREG) 25 MG tablet 9/1/2022 Self No Yes    Take 1 tablet by mouth 2 (Two) Times a Day With Meals.    Diclofenac Sodium (VOLTAREN) 1 % gel gel 9/1/2022 Self No Yes    APPLY 4 GRAMS TO THE AFFECTED AREA FOUR TIMES A DAY AS DIRECTED    Patient taking differently:  Apply 4 g topically to the appropriate area as directed 4 (Four) Times a Day. back    hydroCHLOROthiazide (MICROZIDE) 12.5 MG capsule 9/1/2022 Self No Yes    TAKE ONE CAPSULE BY MOUTH EVERY MORNING    Patient taking differently:  Take 12.5 mg by mouth Every Morning.    ipratropium (ATROVENT) 0.06 % nasal spray 9/1/2022  Self Yes Yes    2 sprays into the nostril(s) as directed by provider every night at bedtime.    irbesartan (AVAPRO) 150 MG tablet 9/1/2022 Self Yes Yes    Take 150 mg by mouth Daily.    methIMAzole (TAPAZOLE) 5 MG tablet  Self No Yes    TAKE ONE TABLET BY MOUTH ONCE DAILY 6 DAYS OUT OF THE WEEK    Patient taking differently:  Take 5 mg by mouth Daily. TAKE ONE TABLET BY MOUTH ONCE DAILY 6 DAYS OUT OF THE WEEK (hold on Wednesday)    montelukast (SINGULAIR) 10 MG tablet 9/1/2022 Self Yes Yes    Take 1 tablet by mouth Every Morning.    Multiple Vitamins-Minerals (PRESERVISION AREDS 2 PO) 9/1/2022 Self Yes Yes    Take 1 capsule by mouth 2 (Two) Times a Day.    naproxen sodium (ALEVE) 220 MG tablet 9/1/2022 Self Yes Yes    Take 220 mg by mouth 2 (Two) Times a Day With Meals.    Nirmatrelvir&Ritonavir 150/100 (Paxlovid, 150/100,) 10 x 150 MG & 10 x 100MG tablet therapy pack tablet (for renal adjustment) 9/1/2022 Self Yes Yes    Take 2 tablets by mouth 2 (Two) Times a Day.    omeprazole (priLOSEC) 40 MG capsule Past Month Self Yes Yes    Take 40 mg by mouth Daily As Needed.    ondansetron (ZOFRAN) 4 MG tablet 9/1/2022 Self Yes Yes    Take 4 mg by mouth Daily. Takes every morning    pravastatin (PRAVACHOL) 40 MG tablet 9/1/2022 Self No Yes    TAKE ONE TABLET BY MOUTH EVERY NIGHT AT BEDTIME    triamcinolone (NASACORT) 55 MCG/ACT nasal inhaler 9/1/2022 Self Yes Yes    2 sprays into each nostril Every Morning.    vitamin B-6 (PYRIDOXINE) 50 MG tablet 9/1/2022 Self Yes Yes    Take 50 mg by mouth Daily.         Medication notes:        This medication list is complete to the best of my knowledge as of 9/2/2022       Please call if questions.     Jayna Dunham [facility]  Clinical Pharmacist

## 2022-09-02 NOTE — CONSULTS
Date of Hospital Visit: 2022  Date of consult: 2022  Encounter Provider: Drake Larios MD  Place of Service: [facility] CARDIOLOGY  Patient Name: Nataile Rosen  :1945  Referral Provider: Jose Loza MD    Chief complaint- Shortness of breath  Reason for consult: Congestive heart failure    History of Present Illness    This is a 76 year old woman normally followed by [facility], Dr. Malik Moore with a history of former smoker/asthma, HTN, HLD, hyperthyroidism,CHF, &  nonischemic cardiomyopathy.    He had a stress test in 2018 which was equivocal but probably normal exercise stress SPECT Myocardial Perfusion Imaging. No definite infarction. No definite ischemia. Her last echocardiogram was on 21 where EF was 44%, mild global hypokinesis with minor regional variation. She also had mild MR.    She had a follow up with Dr. Moore on 7/15/22 where she was to continue current medications carvedilol/losartan. It was noted that her MR was asymptomatic. There were plans for her to have repeat echocardiograms every coupe of years.     She came into the ER yesterday for shortness of breath after being diagnosed with covid19 on 22. EMS gave her nebulizer treatment and she did require bipap when she came to the ER. She was found to have pulmonary edema and she was treated with lasix, dexamethasone, and remdesivir.       Negative troponin x 1. proBNP of 7745.       XR chest on 22-  IMPRESSION:         Hazy increased density in the mid to lower lungs bilaterally consistent   with viral interstitial pneumonitis versus CHF, new since previous.    Previous Testing-  Echocardiogram on 21-   Summary    The ejection fraction biplane was calculated at 44%. Overall left    ventricular systolic function is mildly depressed. The left ventricle is    mild to moderately dilated. Normal left ventricle wall thickness. There is    mild  global hypokinesis with minor regional variation.    The right ventricular chamber size and systolic function are within normal    limits.    Mild mitral regurgitation is present.    Right ventricular systolic pressure of 30 mmHg. Normal pulmonary artery    pressure.    There is no evidence of pericardial effusion.     Echocardiogram on 12/19/19-  Summary    Left ventricle is mildly enlarged.    There is severe hypokinesis of the base of the septum.    Estimated left ventricular ejection fraction is 50%.    There is grade 1A diastolic dysfunction of the left ventricle.    There is mild mitral regurgitation.     Stress Test on 6/5/18-   Summary    Equivocal but probably normal exercise stress SPECT Myocardial Perfusion    Imaging. No definite infarction. No definite ischemia.      His mother says area of moderate, fixed hypoperfusion in the inferior wall    and inferior apex likely due to diaphragmatic attenuation artifact. Previous    infarct can cause a similar fixed defect but is unlikely given normal motion    of the inferior wall on gated SPECT images.      Summary of LV Function    Mildly abnormal LV systolic function.    Gated SPECT analysis demonstrates a post stress ejection fraction of 40 %.      Risk Stratification    This is a low risk study.     Echocardiogram on 12/15/16-  Interpretation Summary    · The left ventricular cavity is mildly dilated.  · Left ventricular wall segments contract abnormally. Refer to wall scoring diagram for more information.  · Mild mitral valve regurgitation is present  · Left atrial cavity size is mild-to-moderately dilated.  · Left ventricular diastolic dysfunction (grade I) consistent with impaired relaxation.  · Left ventricular function is moderately decreased.  · Estimated EF appears to be in the range of 36 - 40%.          Past Medical History:   Diagnosis Date   • Arthritis    • Asthma    • Benign essential hypertension    • Cancer (HCC)     MELANOMA   •  Cardiomyopathy (HCC)    • Goiter    • Hyperlipidemia    • Hyperthyroidism    • Mitral valve insufficiency    • PONV (postoperative nausea and vomiting)    • Seasonal allergies    • Urinary frequency        Past Surgical History:   Procedure Laterality Date   • CATARACT EXTRACTION     • COLONOSCOPY     • HYSTERECTOMY     • OTHER SURGICAL HISTORY      PT HAS HAD SKIN CA REMOVED FROM BACK AND FACE (UPPER LIP)    • PA TOTAL KNEE ARTHROPLASTY Right 1/12/2017    Procedure: RT TOTAL KNEE ARTHROPLASTY;  Surgeon: Doc Lance MD;  Location: [facility] OR;  Service: Orthopedics   • PA TOTAL KNEE ARTHROPLASTY Left 6/26/2017    Procedure: LT TOTAL KNEE ARTHROPLASTY;  Surgeon: Doc Lance MD;  Location: [facility] OR;  Service: Orthopedics       Medications Prior to Admission   Medication Sig Dispense Refill Last Dose   • albuterol sulfate  (90 Base) MCG/ACT inhaler Inhale 2 puffs Every 6 (Six) Hours As Needed.   Past Week at Unknown time   • Carbinoxamine Maleate 4 MG tablet Take 4 mg by mouth Daily.   9/1/2022 at Unknown time   • carvedilol (COREG) 25 MG tablet Take 1 tablet by mouth 2 (Two) Times a Day With Meals. 90 tablet 3 9/1/2022 at Unknown time   • Diclofenac Sodium (VOLTAREN) 1 % gel gel APPLY 4 GRAMS TO THE AFFECTED AREA FOUR TIMES A DAY AS DIRECTED (Patient taking differently: Apply 4 g topically to the appropriate area as directed 4 (Four) Times a Day. back) 100 g 5 9/1/2022 at Unknown time   • hydroCHLOROthiazide (MICROZIDE) 12.5 MG capsule TAKE ONE CAPSULE BY MOUTH EVERY MORNING (Patient taking differently: Take 12.5 mg by mouth Every Morning.) 90 capsule 0 9/1/2022 at Unknown time   • ipratropium (ATROVENT) 0.06 % nasal spray 2 sprays into the nostril(s) as directed by provider every night at bedtime.   9/1/2022 at Unknown time   • irbesartan (AVAPRO) 150 MG tablet Take 150 mg by mouth Daily.   9/1/2022 at Unknown time   • methIMAzole (TAPAZOLE) 5 MG tablet TAKE ONE TABLET BY MOUTH ONCE DAILY 6  DAYS OUT OF THE WEEK (Patient taking differently: Take 5 mg by mouth Daily. TAKE ONE TABLET BY MOUTH ONCE DAILY 6 DAYS OUT OF THE WEEK (hold on Wednesday)) 90 tablet 5    • montelukast (SINGULAIR) 10 MG tablet Take 1 tablet by mouth Every Morning.   9/1/2022 at Unknown time   • Multiple Vitamins-Minerals (PRESERVISION AREDS 2 PO) Take 1 capsule by mouth 2 (Two) Times a Day.   9/1/2022 at Unknown time   • naproxen sodium (ALEVE) 220 MG tablet Take 220 mg by mouth 2 (Two) Times a Day With Meals.   9/1/2022 at Unknown time   • Nirmatrelvir&Ritonavir 150/100 (Paxlovid, 150/100,) 10 x 150 MG & 10 x 100MG tablet therapy pack tablet (for renal adjustment) Take 2 tablets by mouth 2 (Two) Times a Day.   9/1/2022 at Unknown time   • omeprazole (priLOSEC) 40 MG capsule Take 40 mg by mouth Daily As Needed.   Past Month at Unknown time   • ondansetron (ZOFRAN) 4 MG tablet Take 4 mg by mouth Daily. Takes every morning   9/1/2022 at Unknown time   • pravastatin (PRAVACHOL) 40 MG tablet TAKE ONE TABLET BY MOUTH EVERY NIGHT AT BEDTIME 30 tablet 0 9/1/2022 at Unknown time   • triamcinolone (NASACORT) 55 MCG/ACT nasal inhaler 2 sprays into each nostril Every Morning.   9/1/2022 at Unknown time   • vitamin B-6 (PYRIDOXINE) 50 MG tablet Take 50 mg by mouth Daily.   9/1/2022 at Unknown time       Current Meds  Scheduled Meds:carvedilol, 25 mg, Oral, BID With Meals  dexamethasone, 6 mg, Oral, Daily   Or  dexamethasone, 6 mg, Intravenous, Daily  enoxaparin, 40 mg, Subcutaneous, Daily  fluticasone, 2 spray, Each Nare, Daily  furosemide, 40 mg, Intravenous, Q12H  ipratropium, 2 spray, Each Nare, Nightly  losartan, 50 mg, Oral, Q24H  [START ON 9/3/2022] methIMAzole, 5 mg, Oral, Once per day on Sun Mon Tue Thu Fri Sat  montelukast, 10 mg, Oral, QAM  multivitamin with minerals, 1 tablet, Oral, Daily  pantoprazole, 40 mg, Oral, QAM  pravastatin, 40 mg, Oral, Nightly  remdesivir, 200 mg, Intravenous, Q24H   Followed by  [START ON 9/3/2022]  "remdesivir, 100 mg, Intravenous, Q24H  sodium chloride, 10 mL, Intravenous, Q12H  vitamin B-6, 50 mg, Oral, Daily      Continuous Infusions:Pharmacy Consult - Remdesivir,   Pharmacy to Dose enoxaparin (LOVENOX),       PRN Meds:.•  acetaminophen **OR** acetaminophen **OR** acetaminophen  •  albuterol  •  calcium carbonate  •  nitroglycerin  •  ondansetron **OR** ondansetron  •  Pharmacy Consult - Remdesivir  •  Pharmacy to Dose enoxaparin (LOVENOX)  •  [COMPLETED] Insert peripheral IV **AND** sodium chloride  •  sodium chloride    Allergies as of 2022   • (No Known Allergies)       Social History     Socioeconomic History   • Marital status:    Tobacco Use   • Smoking status: Former Smoker     Packs/day: 1.00     Years: 25.00     Pack years: 25.00     Types: Cigarettes     Quit date: 1985     Years since quittin.7   • Smokeless tobacco: Never Used   Substance and Sexual Activity   • Alcohol use: Yes     Comment: DAILY COCKTAIL   • Drug use: No   • Sexual activity: Defer       Family History   Problem Relation Age of Onset   • Breast cancer Other    • Diabetes Other    • Cancer Mother    • Cancer Father    • Malig Hyperthermia Neg Hx        REVIEW OF SYSTEMS:   All systems reviewed and pertinent positives include in HPI otherwise negative review of systems.       Objective:   Temp:  [97.7 °F (36.5 °C)] 97.7 °F (36.5 °C)  Heart Rate:  [] 69  Resp:  [18-28] 18  BP: (132-191)/(73-98) 141/73  Body mass index is 27.22 kg/m².  Flowsheet Rows    Flowsheet Row First Filed Value   Admission Height 170.2 cm (67\") Documented at 2022 0900   Admission Weight 78.8 kg (173 lb 12.8 oz) Documented at 2022 0900        Vitals:    22 0807   BP: 141/73   Pulse: 69   Resp: 18   Temp:    SpO2: 100%       General Appearance:    Alert, cooperative, in no acute distress   Head:    Normocephalic, without obvious abnormality, atraumatic   Eyes:            Lids and lashes normal, conjunctivae and " sclerae normal, no   icterus, no pallor, corneas clear, PERRLA   Ears:    Ears appear intact with no abnormalities noted   Throat:   No oral lesions, no thrush, oral mucosa moist   Neck:   No adenopathy, supple, trachea midline, no thyromegaly, no   carotid bruit, no JVD   Back:     No kyphosis present, no scoliosis present, no skin lesions, erythema or scars, no tenderness to percussion or palpation, range of motion normal   Lungs:     Clear to auscultation,respirations regular, even and unlabored    Heart:    Regular rhythm and normal rate, normal S1 and S2, no murmur, no gallop, no rub, no click   Chest Wall:    No abnormalities observed   Abdomen:     Normal bowel sounds, no masses, no organomegaly, soft nontender, nondistended, no guarding, no rebound  tenderness   Extremities:   Moves all extremities well, no edema, no cyanosis, no redness   Pulses:   Pulses palpable and equal bilaterally. Normal radial, carotid, femoral, dorsalis pedis and posterior tibial pulses bilaterally. Normal abdominal aorta   Skin:  Neurology:   Psychiatric:   No bleeding, bruising or rash   Normal speech and cranial nerve exam, no focal deficit   Alert and oriented x 3, normal mood and affect                 Review of Data:      Results from last 7 days   Lab Units 09/01/22  2357   SODIUM mmol/L 128*   POTASSIUM mmol/L 4.1   CHLORIDE mmol/L 88*   CO2 mmol/L 27.3   BUN mg/dL 12   CREATININE mg/dL 0.92   CALCIUM mg/dL 9.5   BILIRUBIN mg/dL 0.6   ALK PHOS U/L 77   ALT (SGPT) U/L 28   AST (SGOT) U/L 32   GLUCOSE mg/dL 255*     Results from last 7 days   Lab Units 09/01/22  2357   TROPONIN T ng/mL <0.010     @LABRCNTbnp@  Results from last 7 days   Lab Units 09/01/22  2357   WBC 10*3/mm3 10.83*   HEMOGLOBIN g/dL 13.6   HEMATOCRIT % 41.7   PLATELETS 10*3/mm3 215             @LABRCNTIP(chol,trig,hdl,ldl)    EKG on 9/2/22-      I personally viewed and interpreted the patient's EKG/Telemetry data  )  Patient Active Problem List   Diagnosis    • Abnormal weight gain   • Benign essential hypertension   • Cardiomyopathy (HCC)   • Stenosis of carotid artery   • Fatigue   • Hyperlipidemia   • Hyperthyroidism   • Non-toxic multinodular goiter   • Carotid bruit   • Mitral valve insufficiency   • Atrophic vaginitis   • Osteoarthritis, knee   • DJD (degenerative joint disease) of knee   • Neck pain on left side   • Chronic bilateral low back pain without sciatica   • Lumbar facet arthropathy   • Muscle spasm   • COVID-19   • Acute on chronic combined systolic and diastolic CHF (congestive heart failure) ([facility])   • Acute respiratory failure with hypoxia (HCC)   • Asthma   • Hyponatremia         Assessment and Plan:    Ms. Rosen is a 76 years old lady with past medical history of ischemic cardiomyopathy/CHF, hypertension, hyperlipidemia, hyperthyroidism admitted with acute hypoxemic respiratory failure following presentation with shortness of breath.  She was diagnosed with COVID on 8/29/2022.  Noted to be in CHF with pulmonary edema and subsequently received IV diuresis with Lasix and started on dexamethasone and remdesivir.  She follows up with [facility]    1.  Acute hypoxemic respiratory failure from acute on chronic congestive heart failure .  COVID-19 positive but no significant pneumonia noted on chest x-ray  2.  Prior history of nonischemic cardiomyopathy  3.  Left bundle branch block-not mentioned on most recent visit with a primary cardiologist at [facility]. Seems to have resolved on telemetry.   4.  Essential hypertension that is fairly controlled.  5.  Hyponatremia-could be related to CHF.    Current hospital medications include: Carvedilol 25 mg p.o. twice daily, Lasix 40 mg IV twice daily, losartan 50 mg daily, pravastatin 40 mg daily  She is receiving treatment for COVID with a steroid and remdesivir.    She reports significantly improved shortness of breath and almost back to baseline.  Can switch diuretic to oral tomorrow.  Continue current  treatment.  May get echocardiogram later.      Thank you for consulting with  Cardiology and I will follow patient along.      Drake Larios MD  09/02/22  11:55 EDT.  Time spent in reviewing chart, discussion and examination:

## 2022-09-03 LAB
ALBUMIN SERPL-MCNC: 3.9 G/DL (ref 3.5–5.2)
ALBUMIN/GLOB SERPL: 1.8 G/DL
ALP SERPL-CCNC: 62 U/L (ref 39–117)
ALT SERPL W P-5'-P-CCNC: 19 U/L (ref 1–33)
ANION GAP SERPL CALCULATED.3IONS-SCNC: 12.4 MMOL/L (ref 5–15)
AST SERPL-CCNC: 20 U/L (ref 1–32)
BASOPHILS # BLD AUTO: 0.01 10*3/MM3 (ref 0–0.2)
BASOPHILS NFR BLD AUTO: 0.1 % (ref 0–1.5)
BILIRUB SERPL-MCNC: 0.4 MG/DL (ref 0–1.2)
BUN SERPL-MCNC: 20 MG/DL (ref 8–23)
BUN/CREAT SERPL: 26 (ref 7–25)
CALCIUM SPEC-SCNC: 9.6 MG/DL (ref 8.6–10.5)
CHLORIDE SERPL-SCNC: 90 MMOL/L (ref 98–107)
CO2 SERPL-SCNC: 28.6 MMOL/L (ref 22–29)
CREAT SERPL-MCNC: 0.77 MG/DL (ref 0.57–1)
CRP SERPL-MCNC: 1.8 MG/DL (ref 0–0.5)
D DIMER PPP FEU-MCNC: 0.54 MCGFEU/ML (ref 0–0.49)
DEPRECATED RDW RBC AUTO: 43.6 FL (ref 37–54)
EGFRCR SERPLBLD CKD-EPI 2021: 80.6 ML/MIN/1.73
EOSINOPHIL # BLD AUTO: 0 10*3/MM3 (ref 0–0.4)
EOSINOPHIL NFR BLD AUTO: 0 % (ref 0.3–6.2)
ERYTHROCYTE [DISTWIDTH] IN BLOOD BY AUTOMATED COUNT: 13.2 % (ref 12.3–15.4)
GLOBULIN UR ELPH-MCNC: 2.2 GM/DL
GLUCOSE SERPL-MCNC: 181 MG/DL (ref 65–99)
HCT VFR BLD AUTO: 37.4 % (ref 34–46.6)
HGB BLD-MCNC: 12.4 G/DL (ref 12–15.9)
IMM GRANULOCYTES # BLD AUTO: 0.02 10*3/MM3 (ref 0–0.05)
IMM GRANULOCYTES NFR BLD AUTO: 0.3 % (ref 0–0.5)
LYMPHOCYTES # BLD AUTO: 0.92 10*3/MM3 (ref 0.7–3.1)
LYMPHOCYTES NFR BLD AUTO: 11.9 % (ref 19.6–45.3)
MAGNESIUM SERPL-MCNC: 1.7 MG/DL (ref 1.6–2.4)
MCH RBC QN AUTO: 30.1 PG (ref 26.6–33)
MCHC RBC AUTO-ENTMCNC: 33.2 G/DL (ref 31.5–35.7)
MCV RBC AUTO: 90.8 FL (ref 79–97)
MONOCYTES # BLD AUTO: 0.46 10*3/MM3 (ref 0.1–0.9)
MONOCYTES NFR BLD AUTO: 6 % (ref 5–12)
NEUTROPHILS NFR BLD AUTO: 6.31 10*3/MM3 (ref 1.7–7)
NEUTROPHILS NFR BLD AUTO: 81.7 % (ref 42.7–76)
NRBC BLD AUTO-RTO: 0 /100 WBC (ref 0–0.2)
PLATELET # BLD AUTO: 159 10*3/MM3 (ref 140–450)
PMV BLD AUTO: 10 FL (ref 6–12)
POTASSIUM SERPL-SCNC: 3.5 MMOL/L (ref 3.5–5.2)
PROCALCITONIN SERPL-MCNC: 0.11 NG/ML (ref 0–0.25)
PROT SERPL-MCNC: 6.1 G/DL (ref 6–8.5)
QT INTERVAL: 417 MS
RBC # BLD AUTO: 4.12 10*6/MM3 (ref 3.77–5.28)
SODIUM SERPL-SCNC: 131 MMOL/L (ref 136–145)
WBC NRBC COR # BLD: 7.72 10*3/MM3 (ref 3.4–10.8)

## 2022-09-03 PROCEDURE — 93010 ELECTROCARDIOGRAM REPORT: CPT | Performed by: INTERNAL MEDICINE

## 2022-09-03 PROCEDURE — 85379 FIBRIN DEGRADATION QUANT: CPT | Performed by: NURSE PRACTITIONER

## 2022-09-03 PROCEDURE — 85025 COMPLETE CBC W/AUTO DIFF WBC: CPT | Performed by: NURSE PRACTITIONER

## 2022-09-03 PROCEDURE — 25010000002 MAGNESIUM SULFATE 2 GM/50ML SOLUTION: Performed by: INTERNAL MEDICINE

## 2022-09-03 PROCEDURE — 84145 PROCALCITONIN (PCT): CPT | Performed by: NURSE PRACTITIONER

## 2022-09-03 PROCEDURE — 86140 C-REACTIVE PROTEIN: CPT | Performed by: NURSE PRACTITIONER

## 2022-09-03 PROCEDURE — 36415 COLL VENOUS BLD VENIPUNCTURE: CPT | Performed by: NURSE PRACTITIONER

## 2022-09-03 PROCEDURE — 83735 ASSAY OF MAGNESIUM: CPT | Performed by: INTERNAL MEDICINE

## 2022-09-03 PROCEDURE — 80053 COMPREHEN METABOLIC PANEL: CPT | Performed by: NURSE PRACTITIONER

## 2022-09-03 PROCEDURE — 25010000002 ENOXAPARIN PER 10 MG: Performed by: NURSE PRACTITIONER

## 2022-09-03 PROCEDURE — 99232 SBSQ HOSP IP/OBS MODERATE 35: CPT | Performed by: INTERNAL MEDICINE

## 2022-09-03 PROCEDURE — 93005 ELECTROCARDIOGRAM TRACING: CPT | Performed by: INTERNAL MEDICINE

## 2022-09-03 PROCEDURE — 84132 ASSAY OF SERUM POTASSIUM: CPT | Performed by: INTERNAL MEDICINE

## 2022-09-03 RX ORDER — POTASSIUM CHLORIDE 1.5 G/1.77G
40 POWDER, FOR SOLUTION ORAL AS NEEDED
Status: DISCONTINUED | OUTPATIENT
Start: 2022-09-03 | End: 2022-09-04 | Stop reason: HOSPADM

## 2022-09-03 RX ORDER — POTASSIUM CHLORIDE 750 MG/1
40 TABLET, FILM COATED, EXTENDED RELEASE ORAL AS NEEDED
Status: DISCONTINUED | OUTPATIENT
Start: 2022-09-03 | End: 2022-09-04 | Stop reason: HOSPADM

## 2022-09-03 RX ORDER — MAGNESIUM SULFATE HEPTAHYDRATE 40 MG/ML
2 INJECTION, SOLUTION INTRAVENOUS ONCE
Status: COMPLETED | OUTPATIENT
Start: 2022-09-03 | End: 2022-09-03

## 2022-09-03 RX ORDER — BUMETANIDE 2 MG/1
2 TABLET ORAL ONCE
Status: COMPLETED | OUTPATIENT
Start: 2022-09-03 | End: 2022-09-03

## 2022-09-03 RX ORDER — POTASSIUM CHLORIDE 750 MG/1
40 TABLET, FILM COATED, EXTENDED RELEASE ORAL ONCE
Status: COMPLETED | OUTPATIENT
Start: 2022-09-03 | End: 2022-09-03

## 2022-09-03 RX ORDER — MAGNESIUM SULFATE 1 G/100ML
1 INJECTION INTRAVENOUS AS NEEDED
Status: DISCONTINUED | OUTPATIENT
Start: 2022-09-03 | End: 2022-09-04 | Stop reason: HOSPADM

## 2022-09-03 RX ORDER — MAGNESIUM SULFATE HEPTAHYDRATE 40 MG/ML
4 INJECTION, SOLUTION INTRAVENOUS AS NEEDED
Status: DISCONTINUED | OUTPATIENT
Start: 2022-09-03 | End: 2022-09-04 | Stop reason: HOSPADM

## 2022-09-03 RX ORDER — POTASSIUM CHLORIDE 7.45 MG/ML
10 INJECTION INTRAVENOUS
Status: DISCONTINUED | OUTPATIENT
Start: 2022-09-03 | End: 2022-09-04 | Stop reason: HOSPADM

## 2022-09-03 RX ORDER — MAGNESIUM SULFATE HEPTAHYDRATE 40 MG/ML
2 INJECTION, SOLUTION INTRAVENOUS AS NEEDED
Status: DISCONTINUED | OUTPATIENT
Start: 2022-09-03 | End: 2022-09-04 | Stop reason: HOSPADM

## 2022-09-03 RX ADMIN — MAGNESIUM SULFATE HEPTAHYDRATE 2 G: 2 INJECTION, SOLUTION INTRAVENOUS at 14:13

## 2022-09-03 RX ADMIN — ENOXAPARIN SODIUM 40 MG: 100 INJECTION SUBCUTANEOUS at 09:46

## 2022-09-03 RX ADMIN — FLUTICASONE PROPIONATE 2 SPRAY: 50 SPRAY, METERED NASAL at 09:49

## 2022-09-03 RX ADMIN — NIRMATRELVIR AND RITONAVIR 3 EACH: KIT at 05:59

## 2022-09-03 RX ADMIN — BUMETANIDE 2 MG: 2 TABLET ORAL at 17:23

## 2022-09-03 RX ADMIN — PANTOPRAZOLE SODIUM 40 MG: 40 TABLET, DELAYED RELEASE ORAL at 05:58

## 2022-09-03 RX ADMIN — Medication 10 ML: at 09:49

## 2022-09-03 RX ADMIN — Medication 50 MG: at 09:47

## 2022-09-03 RX ADMIN — METHIMAZOLE 5 MG: 5 TABLET ORAL at 09:47

## 2022-09-03 RX ADMIN — POTASSIUM CHLORIDE 40 MEQ: 750 TABLET, EXTENDED RELEASE ORAL at 18:18

## 2022-09-03 RX ADMIN — POTASSIUM CHLORIDE 40 MEQ: 750 TABLET, EXTENDED RELEASE ORAL at 14:14

## 2022-09-03 RX ADMIN — SPIRONOLACTONE 25 MG: 25 TABLET, FILM COATED ORAL at 09:47

## 2022-09-03 RX ADMIN — LOSARTAN POTASSIUM 50 MG: 50 TABLET, FILM COATED ORAL at 09:47

## 2022-09-03 RX ADMIN — IPRATROPIUM BROMIDE 2 SPRAY: 42 SPRAY, METERED NASAL at 19:42

## 2022-09-03 RX ADMIN — CARVEDILOL 25 MG: 25 TABLET, FILM COATED ORAL at 17:23

## 2022-09-03 RX ADMIN — PRAVASTATIN SODIUM 40 MG: 40 TABLET ORAL at 19:42

## 2022-09-03 RX ADMIN — MONTELUKAST SODIUM 10 MG: 10 TABLET, FILM COATED ORAL at 09:47

## 2022-09-03 RX ADMIN — CARVEDILOL 25 MG: 25 TABLET, FILM COATED ORAL at 09:47

## 2022-09-03 RX ADMIN — FUROSEMIDE 40 MG: 40 TABLET ORAL at 09:47

## 2022-09-03 NOTE — PROGRESS NOTES
Name: Natalie Rosen ADMIT: 2022   : 1945  PCP: Bosler, James William III, MD    MRN: [id number] LOS: 1 days   AGE/SEX: 76 y.o. female  ROOM: [facility]/   Subjective   Chief Complaint   Patient presents with   • Shortness of Breath      No CP. Dyspnea is better and no productive cough. No NVD. She reports she did have some UOP after the lasix this morning but not for quite a while until after taking it. No dysuria reported or flank pain.    Objective   Vital Signs  Temp:  [98.4 °F (36.9 °C)-98.9 °F (37.2 °C)] 98.8 °F (37.1 °C)  Heart Rate:  [76-81] 78  Resp:  [18] 18  BP: (133-151)/(54-77) 133/68  SpO2:  [94 %-95 %] 95 %  on   ;   Device (Oxygen Therapy): room air  Body mass index is 27.06 kg/m².    Physical Exam  Vitals and nursing note reviewed.   Constitutional:       General: She is not in acute distress.     Appearance: She is not diaphoretic.   HENT:      Head: Normocephalic and atraumatic.   Eyes:      General:         Right eye: No discharge.         Left eye: No discharge.      Conjunctiva/sclera: Conjunctivae normal.   Cardiovascular:      Rate and Rhythm: Normal rate and regular rhythm.      Pulses: Normal pulses.   Pulmonary:      Effort: Pulmonary effort is normal.      Breath sounds: No wheezing or rhonchi.   Abdominal:      General: There is no distension.      Palpations: Abdomen is soft.      Tenderness: There is no abdominal tenderness. There is no guarding or rebound.   Musculoskeletal:         General: No swelling.      Cervical back: Neck supple. No tenderness.      Right lower leg: No edema.      Left lower leg: No edema.   Skin:     General: Skin is warm and dry.   Neurological:      Mental Status: She is alert.      Cranial Nerves: No cranial nerve deficit.   Psychiatric:         Mood and Affect: Mood normal.         Behavior: Behavior normal.         Results Review:       I reviewed the patient's new clinical results.     I reviewed imaging, agree with interpretation.     I  reviewed telemetry/EKG results, sinus with repolarization abn      Results from last 7 days   Lab Units 09/03/22 0422 09/01/22  2357   WBC 10*3/mm3 7.72 10.83*   HEMOGLOBIN g/dL 12.4 13.6   PLATELETS 10*3/mm3 159 215     Results from last 7 days   Lab Units 09/03/22 0422 09/02/22 2132 09/01/22  2357   SODIUM mmol/L 131*  --  128*   POTASSIUM mmol/L 3.5 3.5 4.1   CHLORIDE mmol/L 90*  --  88*   CO2 mmol/L 28.6  --  27.3   BUN mg/dL 20  --  12   CREATININE mg/dL 0.77  --  0.92   GLUCOSE mg/dL 181*  --  255*   Estimated Creatinine Clearance: 68.1 mL/min (by C-G formula based on SCr of 0.77 mg/dL).  Results from last 7 days   Lab Units 09/03/22 0422 09/02/22 2132 09/01/22  2357   CALCIUM mg/dL 9.6  --  9.5   ALBUMIN g/dL 3.90  --  4.10   MAGNESIUM mg/dL 1.7 1.7  --           bumetanide, 2 mg, Oral, Once  carvedilol, 25 mg, Oral, BID With Meals  enoxaparin, 40 mg, Subcutaneous, Daily  fluticasone, 2 spray, Each Nare, Daily  ipratropium, 2 spray, Each Nare, Nightly  losartan, 50 mg, Oral, Q24H  methIMAzole, 5 mg, Oral, Once per day on Sun Mon Tue Thu Fri Sat  montelukast, 10 mg, Oral, QAM  multivitamin with minerals, 1 tablet, Oral, Daily  pantoprazole, 40 mg, Oral, QAM  pravastatin, 40 mg, Oral, Nightly  sodium chloride, 10 mL, Intravenous, Q12H  spironolactone, 25 mg, Oral, Daily  vitamin B-6, 50 mg, Oral, Daily       Diet Regular; Cardiac    Assessment & Plan      Active Hospital Problems    Diagnosis  POA   • **Acute respiratory failure with hypoxia (HCC) [J96.01]  Yes   • COVID-19 [U07.1]  Yes   • Acute on chronic combined systolic and diastolic CHF (congestive heart failure) (HCC) [I50.43]  Yes   • Asthma [J45.909]  Yes   • Hyponatremia [E87.1]  Yes   • Mitral valve insufficiency [I34.0]  Yes   • Benign essential hypertension [I10]  Yes   • Cardiomyopathy (HCC) [I42.9]  Yes   • Hyperthyroidism [E05.90]  Yes      Resolved Hospital Problems   No resolved problems to display.       · COVID: Has hypoxia so will  continue dexamethasone. DDimer was slightly elevated bu no clot on the cta. Will plan to repeat the ddimer and if remains elevated, then could proceed with duplex. No calf pain or edema on exam today. She has completed paxlovid.  · A/C CHF: Transition to bumex noted. Cardiology following.  · AHRF: Saturation 85-90% during exam on room air. Wean as tolerated. Pulmonology following.  · Hyperthyroidism: On tapazole.  · HTN: Acceptable acutely. Will monitor.  · PPx: Lovenox  · Disposition: Home/TBD    Nael Dominguez MD  [facility]  09/03/22  16:01 EDT    Dictated portions using Dragon dictation software.    During the entire encounter, I was wearing recommended PPE including face mask and eye protection. Hand sanitization was performed prior to entering room and upon exit.

## 2022-09-03 NOTE — PLAN OF CARE
Goal Outcome Evaluation:  Plan of Care Reviewed With: patient        Progress: improving  Outcome Evaluation: Voiding well per jorje. O2 remains on RA. Denies pain. VSS.

## 2022-09-03 NOTE — PROGRESS NOTES
"CC: Congestive heart failure    Interval History: Intermittent episodes of nonsustained VT noted this morning      Vital Signs  Temp:  [98.1 °F (36.7 °C)-98.9 °F (37.2 °C)] 98.6 °F (37 °C)  Heart Rate:  [76-81] 79  Resp:  [18] 18  BP: (121-151)/(54-84) 144/73    Intake/Output Summary (Last 24 hours) at 9/3/2022 1218  Last data filed at 9/3/2022 0601  Gross per 24 hour   Intake 240 ml   Output 1000 ml   Net -760 ml     Flowsheet Rows    Flowsheet Row First Filed Value   Admission Height 170.2 cm (67\") Documented at 09/02/2022 0900   Admission Weight 78.8 kg (173 lb 12.8 oz) Documented at 09/02/2022 0900          PHYSICAL EXAM:  General: No acute distress  Resp:NL Rate, symmetric chest expansion,unlabored, clear  CV:NL rate and rhythm, NL PMI, NL S1 and S2, no Murmur, no gallop, no rub, No JVD.   ABD:Nl sounds, no masses or tenderness, nondistended, no guarding or rebound  Neuro: alert,cooperative and oriented  Extr:Normal pedal pulses, No edema or cyanosis, moves all extremities      Results Review:    Results from last 7 days   Lab Units 09/03/22  0422   SODIUM mmol/L 131*   POTASSIUM mmol/L 3.5   CHLORIDE mmol/L 90*   CO2 mmol/L 28.6   BUN mg/dL 20   CREATININE mg/dL 0.77   GLUCOSE mg/dL 181*   CALCIUM mg/dL 9.6     Results from last 7 days   Lab Units 09/01/22  2357   TROPONIN T ng/mL <0.010     Results from last 7 days   Lab Units 09/03/22  0422   WBC 10*3/mm3 7.72   HEMOGLOBIN g/dL 12.4   HEMATOCRIT % 37.4   PLATELETS 10*3/mm3 159             Results from last 7 days   Lab Units 09/02/22  2132   MAGNESIUM mg/dL 1.7         I reviewed the patient's new clinical results.  I personally viewed and interpreted the patient's EKG/Telemetry data        Medication Review:   Meds reviewed         Assessment/Plan    1.  Acute hypoxemic respiratory failure from acute on chronic congestive heart failure .  COVID-19 positive but no significant pneumonia noted on chest x-ray  2.  Prior history of nonischemic cardiomyopathy  3. "  Left bundle branch block-new  Today EKG shows LVH with repolarization abnormality  4.  Essential hypertension that is fairly controlled.  5.  Hyponatremia-could be related to CHF.  Improved  6.  COVID-19 infection    Nonsustained irregular wide-complex runs with longest of 14 noted this morning.  Asymptomatic  Replete potassium and magnesium as needed  Did not respond well to oral Lasix 40 mg given this morning.  I will switch her to Bumex 2 mg  and see how it goes this afternoon    Otherwise she has significantly improved breathing since after admission.    I have discussed patient with her nurse    Drake Larios MD  09/03/22  12:18 EDT

## 2022-09-04 ENCOUNTER — APPOINTMENT (OUTPATIENT)
Dept: CARDIOLOGY | Facility: HOSPITAL | Age: 77
End: 2022-09-04

## 2022-09-04 ENCOUNTER — READMISSION MANAGEMENT (OUTPATIENT)
Dept: CALL CENTER | Facility: HOSPITAL | Age: 77
End: 2022-09-04

## 2022-09-04 VITALS
RESPIRATION RATE: 17 BRPM | WEIGHT: 168.5 LBS | HEIGHT: 67 IN | HEART RATE: 68 BPM | SYSTOLIC BLOOD PRESSURE: 123 MMHG | OXYGEN SATURATION: 94 % | BODY MASS INDEX: 26.45 KG/M2 | TEMPERATURE: 97.7 F | DIASTOLIC BLOOD PRESSURE: 69 MMHG

## 2022-09-04 PROBLEM — D89.831 CYTOKINE RELEASE SYNDROME, GRADE 1: Status: ACTIVE | Noted: 2022-09-04

## 2022-09-04 LAB
ALBUMIN SERPL-MCNC: 4 G/DL (ref 3.5–5.2)
ALBUMIN/GLOB SERPL: 1.5 G/DL
ALP SERPL-CCNC: 61 U/L (ref 39–117)
ALT SERPL W P-5'-P-CCNC: 18 U/L (ref 1–33)
ANION GAP SERPL CALCULATED.3IONS-SCNC: 12 MMOL/L (ref 5–15)
AST SERPL-CCNC: 18 U/L (ref 1–32)
BASOPHILS # BLD AUTO: 0.01 10*3/MM3 (ref 0–0.2)
BASOPHILS NFR BLD AUTO: 0.1 % (ref 0–1.5)
BILIRUB SERPL-MCNC: 0.4 MG/DL (ref 0–1.2)
BUN SERPL-MCNC: 27 MG/DL (ref 8–23)
BUN/CREAT SERPL: 34.6 (ref 7–25)
CALCIUM SPEC-SCNC: 9.3 MG/DL (ref 8.6–10.5)
CHLORIDE SERPL-SCNC: 96 MMOL/L (ref 98–107)
CO2 SERPL-SCNC: 28 MMOL/L (ref 22–29)
CREAT SERPL-MCNC: 0.78 MG/DL (ref 0.57–1)
CRP SERPL-MCNC: 0.88 MG/DL (ref 0–0.5)
DEPRECATED RDW RBC AUTO: 41.2 FL (ref 37–54)
EGFRCR SERPLBLD CKD-EPI 2021: 79.3 ML/MIN/1.73
EOSINOPHIL # BLD AUTO: 0 10*3/MM3 (ref 0–0.4)
EOSINOPHIL NFR BLD AUTO: 0 % (ref 0.3–6.2)
ERYTHROCYTE [DISTWIDTH] IN BLOOD BY AUTOMATED COUNT: 12.8 % (ref 12.3–15.4)
GLOBULIN UR ELPH-MCNC: 2.7 GM/DL
GLUCOSE SERPL-MCNC: 156 MG/DL (ref 65–99)
HCT VFR BLD AUTO: 38.1 % (ref 34–46.6)
HGB BLD-MCNC: 12.9 G/DL (ref 12–15.9)
IMM GRANULOCYTES # BLD AUTO: 0.07 10*3/MM3 (ref 0–0.05)
IMM GRANULOCYTES NFR BLD AUTO: 0.6 % (ref 0–0.5)
LYMPHOCYTES # BLD AUTO: 0.77 10*3/MM3 (ref 0.7–3.1)
LYMPHOCYTES NFR BLD AUTO: 6.4 % (ref 19.6–45.3)
MAGNESIUM SERPL-MCNC: 2.3 MG/DL (ref 1.6–2.4)
MCH RBC QN AUTO: 29.5 PG (ref 26.6–33)
MCHC RBC AUTO-ENTMCNC: 33.9 G/DL (ref 31.5–35.7)
MCV RBC AUTO: 87 FL (ref 79–97)
MONOCYTES # BLD AUTO: 0.89 10*3/MM3 (ref 0.1–0.9)
MONOCYTES NFR BLD AUTO: 7.4 % (ref 5–12)
NEUTROPHILS NFR BLD AUTO: 10.23 10*3/MM3 (ref 1.7–7)
NEUTROPHILS NFR BLD AUTO: 85.5 % (ref 42.7–76)
NRBC BLD AUTO-RTO: 0 /100 WBC (ref 0–0.2)
PLATELET # BLD AUTO: 182 10*3/MM3 (ref 140–450)
PMV BLD AUTO: 10 FL (ref 6–12)
POTASSIUM SERPL-SCNC: 4.3 MMOL/L (ref 3.5–5.2)
POTASSIUM SERPL-SCNC: 4.4 MMOL/L (ref 3.5–5.2)
PROT SERPL-MCNC: 6.7 G/DL (ref 6–8.5)
RBC # BLD AUTO: 4.38 10*6/MM3 (ref 3.77–5.28)
SODIUM SERPL-SCNC: 136 MMOL/L (ref 136–145)
WBC NRBC COR # BLD: 11.97 10*3/MM3 (ref 3.4–10.8)

## 2022-09-04 PROCEDURE — 80053 COMPREHEN METABOLIC PANEL: CPT | Performed by: NURSE PRACTITIONER

## 2022-09-04 PROCEDURE — 85025 COMPLETE CBC W/AUTO DIFF WBC: CPT | Performed by: NURSE PRACTITIONER

## 2022-09-04 PROCEDURE — 36415 COLL VENOUS BLD VENIPUNCTURE: CPT | Performed by: NURSE PRACTITIONER

## 2022-09-04 PROCEDURE — 83735 ASSAY OF MAGNESIUM: CPT | Performed by: INTERNAL MEDICINE

## 2022-09-04 PROCEDURE — 93246 EXT ECG>7D<15D RECORDING: CPT

## 2022-09-04 PROCEDURE — 86140 C-REACTIVE PROTEIN: CPT | Performed by: NURSE PRACTITIONER

## 2022-09-04 PROCEDURE — 99232 SBSQ HOSP IP/OBS MODERATE 35: CPT | Performed by: INTERNAL MEDICINE

## 2022-09-04 PROCEDURE — 25010000002 ENOXAPARIN PER 10 MG: Performed by: NURSE PRACTITIONER

## 2022-09-04 RX ORDER — BUMETANIDE 2 MG/1
2 TABLET ORAL EVERY OTHER DAY
Status: DISCONTINUED | OUTPATIENT
Start: 2022-09-05 | End: 2022-09-04 | Stop reason: HOSPADM

## 2022-09-04 RX ORDER — SPIRONOLACTONE 25 MG/1
25 TABLET ORAL DAILY
Qty: 30 TABLET | Refills: 0 | Status: SHIPPED | OUTPATIENT
Start: 2022-09-05 | End: 2022-10-07 | Stop reason: ALTCHOICE

## 2022-09-04 RX ORDER — BUMETANIDE 2 MG/1
2 TABLET ORAL EVERY OTHER DAY
Qty: 15 TABLET | Refills: 0 | Status: SHIPPED | OUTPATIENT
Start: 2022-09-05 | End: 2022-09-28

## 2022-09-04 RX ADMIN — PANTOPRAZOLE SODIUM 40 MG: 40 TABLET, DELAYED RELEASE ORAL at 05:03

## 2022-09-04 RX ADMIN — CARVEDILOL 25 MG: 25 TABLET, FILM COATED ORAL at 08:05

## 2022-09-04 RX ADMIN — ENOXAPARIN SODIUM 40 MG: 100 INJECTION SUBCUTANEOUS at 08:05

## 2022-09-04 RX ADMIN — Medication 10 ML: at 08:06

## 2022-09-04 RX ADMIN — MONTELUKAST SODIUM 10 MG: 10 TABLET, FILM COATED ORAL at 05:03

## 2022-09-04 RX ADMIN — FLUTICASONE PROPIONATE 2 SPRAY: 50 SPRAY, METERED NASAL at 08:06

## 2022-09-04 RX ADMIN — Medication 50 MG: at 08:06

## 2022-09-04 RX ADMIN — METHIMAZOLE 5 MG: 5 TABLET ORAL at 08:05

## 2022-09-04 RX ADMIN — SPIRONOLACTONE 25 MG: 25 TABLET, FILM COATED ORAL at 08:06

## 2022-09-04 RX ADMIN — LOSARTAN POTASSIUM 50 MG: 50 TABLET, FILM COATED ORAL at 08:06

## 2022-09-04 NOTE — PLAN OF CARE
Problem: Adult Inpatient Plan of Care  Goal: Plan of Care Review  Outcome: Ongoing, Progressing  Flowsheets (Taken 9/4/2022 1438)  Progress: improving  Plan of Care Reviewed With: patient  Outcome Evaluation: Feeling better, denies SOA/nausea/pain. Denies feelings of palpitations or flutters w/runs of VT. Encouraged more activity as tolerated, to be discharged home. Zio patch to be applied at d/c. Will closely monitor.   Goal Outcome Evaluation:  Plan of Care Reviewed With: patient        Progress: improving  Outcome Evaluation: Feeling better, denies SOA/nausea/pain. Denies feelings of palpitations or flutters w/runs of VT. Encouraged more activity as tolerated, to be discharged home. Zio patch to be applied at d/c. Will closely monitor.

## 2022-09-04 NOTE — DISCHARGE SUMMARY
"    Date of Admission: 9/1/2022  Date of Discharge:  9/4/2022  Primary Care Physician: Bosler, James William III, MD     Discharge Diagnosis:  Active Hospital Problems    Diagnosis  POA   • **Acute respiratory failure with hypoxia (HCC) [J96.01]  Yes   • Cytokine release syndrome, grade 1 [D89.831]  No   • COVID-19 [U07.1]  Yes   • Acute on chronic combined systolic and diastolic CHF (congestive heart failure) (HCC) [I50.43]  Yes   • Asthma [J45.909]  Yes   • Hyponatremia [E87.1]  Yes   • Mitral valve insufficiency [I34.0]  Yes   • Benign essential hypertension [I10]  Yes   • Cardiomyopathy (HCC) [I42.9]  Yes   • Hyperthyroidism [E05.90]  Yes      Resolved Hospital Problems   No resolved problems to display.       Presenting Problem/History of Present Illness:  Acute pulmonary edema (HCC) [J81.0]  Acute respiratory failure with hypoxia (HCC) [J96.01]  COVID-19 [U07.1]     Ms. Rosen is a 76 y.o. female former smoker with a history of HTN, asthma, NICM, hyperthyroidism, mild mitral regurg  that presents to [facility] complaining of shortness of breath. She was diagnosed with Covid 19 on 8/29/22, symptoms began 8/28 with fatigue, myalgias, cough. She began feeling short of breath last night when lying flat. She reports having a \"gurgling\" cough.  EMS was called, administered nebulizer treatment. Sats decreased to 80s, she was placed on NRB and brought to ED. She required Bipap in ED but was able to weaned to hi flow O2. She was found to have pulm edema, given IV lasix and started on dexamethasone and remdesivir for Covid. Her PCP had prescribed nirmatrelvir/ritonavir. She has hx of asthma, followed by [facility] pulmonology. On exam, she is much improved. Currently requiring O2 2L hi flow w/sats 99%.     Afebrile. HR controlled. BP up to 191 systolic. Sats 99% on O2 2L hi flow. Procal 0.04. WBC 10.83, Hgb 13.6. BNP 7745. Trop neg. Gluc 255, Na 128, Cl 88; remaining wnl. RVP + covid 19. ABG on Bipap 60% O2: pH " 7.336, pCO2 46.4, pO2 101.6. CXR: hazy increased density mid-lower lungs bilaterally c/w viral pneumonitis vs CHF. CTA chest: no PE; mild cardiomegaly; sm paola pl effusions, mild bibasilar pneumonia vs atelectasis or edema    Hospital Course:  The patient is a 76 y.o. female who presented with covid and acute on chronic combined heart failure causing acute hypoxia. She was admitted and cardiology and pulmonology evaluated. She had quick improvement with additional diuretic. She did complete her home paxlovid course and received dexamethasone while hypoxic. She is now taking aldactone and every other day bumex. She is going to have repeat labs drawn by cardiology later this week and will follow up with cardiology in clinic. Carlos Ao at discharge ordered.      Exam Today:  Constitutional:       General: She is not in acute distress.     Appearance: She is not diaphoretic.   HENT:      Head: Normocephalic and atraumatic.   Eyes:      General:         Right eye: No discharge.         Left eye: No discharge.      Conjunctiva/sclera: Conjunctivae normal.   Cardiovascular:      Rate and Rhythm: Normal rate and regular rhythm.      Pulses: Normal pulses.   Pulmonary:      Effort: Pulmonary effort is normal.      Breath sounds: No wheezing or rhonchi.   Abdominal:      General: There is no distension.      Palpations: Abdomen is soft.      Tenderness: There is no abdominal tenderness. There is no guarding or rebound.   Musculoskeletal:         General: No swelling.      Cervical back: Neck supple. No tenderness.      Right lower leg: No edema.      Left lower leg: No edema.   Skin:     General: Skin is warm and dry.   Neurological:      Mental Status: She is alert.      Cranial Nerves: No cranial nerve deficit.   Psychiatric:         Mood and Affect: Mood normal.         Behavior: Behavior normal.     Results:  CXR  Hazy increased density in the mid to lower lungs bilaterally consistent   with viral interstitial pneumonitis versus  CHF, new since previous.    CTA Chest  1.  The pulmonary arterial tree is well opacified with contrast.  No   pulmonary emboli are identified.  2.  Mild cardiomegaly.  There are small bilateral pleural effusions   measuring up to 1.4 cm in the right and mild bibasilar pneumonia versus   atelectasis or edema.    Procedures Performed:         Consults:   Consults     Date and Time Order Name Status Description    9/2/2022 10:36 AM Inpatient Cardiology Consult Completed     9/2/2022  5:31 AM Inpatient Pulmonology Consult Completed     9/2/2022  4:56 AM [facility] (on-call MD unless specified) Details             Discharge Disposition:  Home or Self Care    Discharge Medications:     Discharge Medications      New Medications      Instructions Start Date   bumetanide 2 MG tablet  Commonly known as: BUMEX   2 mg, Oral, Every Other Day   Start Date: September 5, 2022     spironolactone 25 MG tablet  Commonly known as: ALDACTONE   25 mg, Oral, Daily   Start Date: September 5, 2022        Changes to Medications      Instructions Start Date   Diclofenac Sodium 1 % gel gel  Commonly known as: VOLTAREN  What changed: See the new instructions.   APPLY 4 GRAMS TO THE AFFECTED AREA FOUR TIMES A DAY AS DIRECTED      methIMAzole 5 MG tablet  Commonly known as: TAPAZOLE  What changed:   · how much to take  · how to take this  · when to take this  · additional instructions   TAKE ONE TABLET BY MOUTH ONCE DAILY 6 DAYS OUT OF THE WEEK         Continue These Medications      Instructions Start Date   albuterol sulfate  (90 Base) MCG/ACT inhaler  Commonly known as: PROVENTIL HFA;VENTOLIN HFA;PROAIR HFA   2 puffs, Inhalation, Every 6 Hours PRN      Carbinoxamine Maleate 4 MG tablet   4 mg, Oral, Daily      carvedilol 25 MG tablet  Commonly known as: COREG   25 mg, Oral, 2 Times Daily With Meals      ipratropium 0.06 % nasal spray  Commonly known as: ATROVENT   2 sprays, Nasal, Every Night at Bedtime      irbesartan 150 MG tablet  Commonly  known as: AVAPRO   150 mg, Oral, Daily      montelukast 10 MG tablet  Commonly known as: SINGULAIR   1 tablet, Oral, Every Morning      omeprazole 40 MG capsule  Commonly known as: priLOSEC   40 mg, Oral, Daily PRN      ondansetron 4 MG tablet  Commonly known as: ZOFRAN   4 mg, Oral, Daily, Takes every morning      pravastatin 40 MG tablet  Commonly known as: PRAVACHOL   TAKE ONE TABLET BY MOUTH EVERY NIGHT AT BEDTIME      PRESERVISION AREDS 2 PO   1 capsule, Oral, 2 Times Daily      triamcinolone 55 MCG/ACT nasal inhaler  Commonly known as: NASACORT   2 sprays, Nasal, Every Morning      vitamin B-6 50 MG tablet  Commonly known as: PYRIDOXINE   50 mg, Oral, Daily         Stop These Medications    hydroCHLOROthiazide 12.5 MG capsule  Commonly known as: MICROZIDE     naproxen sodium 220 MG tablet  Commonly known as: ALEVE     Paxlovid (150/100) 10 x 150 MG & 10 x 100MG tablet therapy pack tablet (for renal adjustment)  Generic drug: Nirmatrelvir&Ritonavir 150/100            Discharge Diet:   Diet Instructions     Diet: Cardiac      Discharge Diet: Cardiac          Activity at Discharge:   Activity Instructions     Activity as Tolerated      Measure Weight Daily      Instruct patient on daily weights          Follow-up Appointments:  Additional Instructions for the Follow-ups that You Need to Schedule     Basic Metabolic Panel    Sep 08, 2022 (Approximate)      Release to patient: Routine Release            Follow-up Information     Bosler, James William III, MD Follow up.    Specialty: Internal Medicine  Contact information:  [address]  [facility] [id number] [phone]             Drake Larios MD Follow up.    Specialty: Cardiology  Contact information:  [address]  [facility] [id number] [phone]                         Test Results Pending at Discharge:       Nael Dominguez MD  09/04/22  14:51 EDT    Time Spent on Discharge Activities: >30 minutes    Dictated portions  using Dragon dictation software.  During the entire encounter, I was wearing recommended PPE including face mask and eye protection. Hand sanitization was performed prior to entering room and upon exit.

## 2022-09-04 NOTE — PROGRESS NOTES
"CC: Congestive heart failure    Interval History: No new acute events overnight      Vital Signs  Temp:  [97.1 °F (36.2 °C)-98.9 °F (37.2 °C)] 97.3 °F (36.3 °C)  Heart Rate:  [73-86] 86  Resp:  [17-18] 17  BP: (133-150)/(68-86) 138/78    Intake/Output Summary (Last 24 hours) at 9/4/2022 1310  Last data filed at 9/4/2022 0500  Gross per 24 hour   Intake 600 ml   Output 1950 ml   Net -1350 ml     Flowsheet Rows    Flowsheet Row First Filed Value   Admission Height 170.2 cm (67\") Documented at 09/02/2022 0900   Admission Weight 78.8 kg (173 lb 12.8 oz) Documented at 09/02/2022 0900          PHYSICAL EXAM:  General: No acute distress  Resp:NL Rate, symmetric chest expansion,unlabored, clear  CV:NL rate and rhythm, NL PMI, NL S1 and S2, no Murmur, no gallop, no rub, No JVD.   ABD:Nl sounds, no masses or tenderness, nondistended, no guarding or rebound  Neuro: alert,cooperative and oriented  Extr:Normal pedal pulses, No edema or cyanosis, moves all extremities      Results Review:    Results from last 7 days   Lab Units 09/04/22  0423   SODIUM mmol/L 136   POTASSIUM mmol/L 4.4   CHLORIDE mmol/L 96*   CO2 mmol/L 28.0   BUN mg/dL 27*   CREATININE mg/dL 0.78   GLUCOSE mg/dL 156*   CALCIUM mg/dL 9.3     Results from last 7 days   Lab Units 09/01/22  2357   TROPONIN T ng/mL <0.010     Results from last 7 days   Lab Units 09/04/22  0423   WBC 10*3/mm3 11.97*   HEMOGLOBIN g/dL 12.9   HEMATOCRIT % 38.1   PLATELETS 10*3/mm3 182             Results from last 7 days   Lab Units 09/04/22  0423   MAGNESIUM mg/dL 2.3         I reviewed the patient's new clinical results.  I personally viewed and interpreted the patient's EKG/Telemetry data        Medication Review:   Meds reviewed         Assessment/Plan    1.  Acute hypoxemic respiratory failure from acute on chronic congestive heart failure .  COVID-19 positive but no significant pneumonia noted on chest x-ray  2.  Prior history of nonischemic cardiomyopathy  3.  Left bundle branch " block-new  Today EKG shows LVH with repolarization abnormality  4.  Essential hypertension that is fairly controlled.  5.  Hyponatremia-could be related to CHF.  Improved  6.  COVID-19 infection  7.  Nonsustained VT.      Patient noted to have few episodes nonsustained ventricular runs in the morning-longer this morning was 21 beats.  Potassium and mag within range  She had good response with Bumex 2 mg given yesterday afternoon  She is asymptomatic with VT runs  Breathing almost back to baseline  On exam euvolemic.  She will continue to use Bumex every other day.  Check BMP after 5 days.  She will go home on 2-week ZIO.    Follow-up with her cardiologist in 2 weeks.    Cardiology will sign off but call with any questions          Drake Larios MD  09/04/22  13:10 EDT

## 2022-09-04 NOTE — ED PROVIDER NOTES
MD ATTESTATION NOTE    The VIDA and I have discussed this patient's history, physical exam, and treatment plan.    I provided a substantive portion of the care of this patient. I personally performed the physical exam, in its entirety. The attached note describes my personal findings.      Natalie Rosen is a 76 y.o. female who presents to the ED c/o shortness of breath.  She has difficulty providing me an accurate story.  States that she was diagnosed with COVID on 8/9/2022.      On exam:  GENERAL: distressed  HENT: nares patent  EYES: no scleral icterus  CV: regular rhythm, regular rate  RESPIRATORY: Tachypneic, supraclavicular retractions, bibasilar crackles  ABDOMEN: soft, nontender  MUSCULOSKELETAL: no deformity  NEURO: alert, moves all extremities, follows commands  SKIN: warm, dry    Labs  Recent Results (from the past 24 hour(s))   Potassium    Collection Time: 09/03/22 11:16 PM    Specimen: Blood   Result Value Ref Range    Potassium 4.3 3.5 - 5.2 mmol/L   Comprehensive Metabolic Panel    Collection Time: 09/04/22  4:23 AM    Specimen: Blood   Result Value Ref Range    Glucose 156 (H) 65 - 99 mg/dL    BUN 27 (H) 8 - 23 mg/dL    Creatinine 0.78 0.57 - 1.00 mg/dL    Sodium 136 136 - 145 mmol/L    Potassium 4.4 3.5 - 5.2 mmol/L    Chloride 96 (L) 98 - 107 mmol/L    CO2 28.0 22.0 - 29.0 mmol/L    Calcium 9.3 8.6 - 10.5 mg/dL    Total Protein 6.7 6.0 - 8.5 g/dL    Albumin 4.00 3.50 - 5.20 g/dL    ALT (SGPT) 18 1 - 33 U/L    AST (SGOT) 18 1 - 32 U/L    Alkaline Phosphatase 61 39 - 117 U/L    Total Bilirubin 0.4 0.0 - 1.2 mg/dL    Globulin 2.7 gm/dL    A/G Ratio 1.5 g/dL    BUN/Creatinine Ratio 34.6 (H) 7.0 - 25.0    Anion Gap 12.0 5.0 - 15.0 mmol/L    eGFR 79.3 >60.0 mL/min/1.73   C-reactive Protein    Collection Time: 09/04/22  4:23 AM    Specimen: Blood   Result Value Ref Range    C-Reactive Protein 0.88 (H) 0.00 - 0.50 mg/dL   Magnesium    Collection Time: 09/04/22  4:23 AM    Specimen: Blood   Result Value  Ref Range    Magnesium 2.3 1.6 - 2.4 mg/dL   CBC Auto Differential    Collection Time: 09/04/22  4:23 AM    Specimen: Blood   Result Value Ref Range    WBC 11.97 (H) 3.40 - 10.80 10*3/mm3    RBC 4.38 3.77 - 5.28 10*6/mm3    Hemoglobin 12.9 12.0 - 15.9 g/dL    Hematocrit 38.1 34.0 - 46.6 %    MCV 87.0 79.0 - 97.0 fL    MCH 29.5 26.6 - 33.0 pg    MCHC 33.9 31.5 - 35.7 g/dL    RDW 12.8 12.3 - 15.4 %    RDW-SD 41.2 37.0 - 54.0 fl    MPV 10.0 6.0 - 12.0 fL    Platelets 182 140 - 450 10*3/mm3    Neutrophil % 85.5 (H) 42.7 - 76.0 %    Lymphocyte % 6.4 (L) 19.6 - 45.3 %    Monocyte % 7.4 5.0 - 12.0 %    Eosinophil % 0.0 (L) 0.3 - 6.2 %    Basophil % 0.1 0.0 - 1.5 %    Immature Grans % 0.6 (H) 0.0 - 0.5 %    Neutrophils, Absolute 10.23 (H) 1.70 - 7.00 10*3/mm3    Lymphocytes, Absolute 0.77 0.70 - 3.10 10*3/mm3    Monocytes, Absolute 0.89 0.10 - 0.90 10*3/mm3    Eosinophils, Absolute 0.00 0.00 - 0.40 10*3/mm3    Basophils, Absolute 0.01 0.00 - 0.20 10*3/mm3    Immature Grans, Absolute 0.07 (H) 0.00 - 0.05 10*3/mm3    nRBC 0.0 0.0 - 0.2 /100 WBC   Holter Monitor - 72 Hour Up To 15 Days    Collection Time: 09/04/22  2:33 PM   Result Value Ref Range    Target HR (85%) 123 bpm    Max. Pred. HR (100%) 145 bpm       Radiology  No Radiology Exams Resulted Within Past 24 Hours    Medical Decision Making:  ED Course as of 09/04/22 1818   Fri Sep 02, 2022   0010 Chest x-ray does show pulmonary edema or vascular congestion. [RC]   0020 EKG interpreted by myself.  Time 12:16 AM.  Sinus rhythm.  Heart rate 100.  Left ulnar branch block.  Negative Sgarbossa criteria. [TD]   0020 On medical chart review, old EKG obtained from 12/27/2016.  Sinus rhythm.  Heart rate 61.  Nonspecific ST changes. [TD]   0024 WBC(!): 10.83 [RC]   0025 RBC: 4.51 [RC]   0025 Hemoglobin: 13.6 [RC]   0025 Hematocrit: 41.7 [RC]   0025 Platelets: 215 [RC]   0057 pH, Arterial(!): 7.336 [TD]   0057 pCO2, Arterial(!): 46.4 [TD]   0108 COVID19(!!): Detected [TD]   0145  Glucose(!): 255 [RC]   0145 BUN: 12 [RC]   0145 Creatinine: 0.92 [RC]   0145 Sodium(!): 128 [RC]   0145 Potassium: 4.1 [RC]   0145 CO2: 27.3 [RC]   0145 Anion Gap: 12.7 [RC]   0145 proBNP(!): 7,745.0 [RC]   0145 Troponin T: <0.010 [RC]   0150 Patient feeling much better.  The BiPAP is been on for roughly 2 hours.  Suspect this is has cleared the airfields at least to a degree.  We will try the patient on a nonrebreather.  If she does fine on a nonrebreather will attempt to get her on high flow NC. [RC]   0151 CTA chest still pending [RC]   0151 BNP is elevated considering the x-ray we will go ahead and order 80 mg of Lasix. [RC]   0220 proBNP(!): 7,745.0 [RC]   0220 COVID19(!!): Detected [RC]   0520 Patient is now on 4 L high flow NC and appears to be in a much better place clinically than she was on her initial exam.  She appears stable for the floor at this time.  We will place a call to the hospitalist to discuss admission [RC]   0544 Discussed the patient's case with JOSEPH Fleming with [facility].  To admit to Dr. Loza's care for further management. [RC]   0544 Procalcitonin still pending at time of admission.  We will watch closely and if elevated will initiate antibiotics.  If negative will continue to treat symptomatically. [RC]      ED Course User Index  [RC] Robert Joshua III, PA  [TD] Edu Villavicencio II, MD       Critical Care  Performed by: Edu Villavicencio II, MD  Authorized by: Edu Villavicencio II, MD     Critical care provider statement:     Critical care time (minutes):  35    Critical care was necessary to treat or prevent imminent or life-threatening deterioration of the following conditions:  Respiratory failure    Critical care was time spent personally by me on the following activities:  Ordering and performing treatments and interventions, ordering and review of laboratory studies, ordering and review of radiographic studies, pulse oximetry, re-evaluation of patient's  condition, development of treatment plan with patient or surrogate, evaluation of patient's response to treatment, examination of patient and obtaining history from patient or surrogate          PPE: Both the patient and I wore a surgical mask throughout the entire patient encounter. I wore protective goggles.     Diagnosis  Final diagnoses:   COVID-19   Acute pulmonary edema (HCC)   Acute respiratory failure with hypoxia (HCC)        Edu Villavicencio II, MD  09/04/22 [id number]

## 2022-09-04 NOTE — OUTREACH NOTE
Prep Survey    Flowsheet Row Responses   [facility] facility patient discharged from? [place]   Is LACE score < 7 ? No   Emergency Room discharge w/ pulse ox? No   Eligibility Readm Mgmt   Discharge diagnosis Acute hypoxemic respiratory failure from acute on chronic congestive heart failure,   COVID positive    Does the patient have one of the following disease processes/diagnoses(primary or secondary)? COVID-19   Does the patient have Home health ordered? No   Is there a DME ordered? No   Prep survey completed? Yes          JOSE ALBERTO CHAIREZ - Registered Nurse

## 2022-09-04 NOTE — PLAN OF CARE
Goal Outcome Evaluation:  Plan of Care Reviewed With: patient        Progress: improving  Outcome Evaluation: VSS. Weight loss noted this AM. Remains on RA. Denies pain. Hopes to d/c home

## 2022-09-05 ENCOUNTER — READMISSION MANAGEMENT (OUTPATIENT)
Dept: CALL CENTER | Facility: HOSPITAL | Age: 77
End: 2022-09-05

## 2022-09-05 NOTE — OUTREACH NOTE
COVID-19 Week 1 Survey    Flowsheet Row Responses   [facility] patient discharged from? [place]   Does the patient have one of the following disease processes/diagnoses(primary or secondary)? COVID-19   COVID-19 underlying condition? CHF   Call Number Call 1   Week 1 Call successful? Yes   Call start time [phone]   Call end time 1422   Discharge diagnosis Acute hypoxemic respiratory failure from acute on chronic congestive heart failure,   COVID positive    Meds reviewed with patient/caregiver? Yes   Is the patient having any side effects they believe may be caused by any medication additions or changes? No   Does the patient have all medications ordered at discharge? Yes   Is the patient taking all medications as directed (includes completed medication regime)? Yes   Does the patient have a primary care provider?  Yes   Does the patient have an appointment with their PCP or specialist within 7 days of discharge? Yes   Has the patient kept scheduled appointments due by today? N/A   Has home health visited the patient within 72 hours of discharge? N/A   Psychosocial issues? No   Did the patient receive a copy of their discharge instructions? Yes   Did the patient receive a copy of COVID-19 specific instructions? Yes   Nursing interventions Reviewed instructions with patient   What is the patient's perception of their health status since discharge? Improving   Does the patient have any of the following symptoms? None   Nursing Interventions Nurse provided patient education   Pulse Ox monitoring None   Is the patient/caregiver able to teach back steps to recovery at home? Set small, achievable goals for return to baseline health, Rest and rebuild strength, gradually increase activity, Eat a well-balance diet, Make a list of questions for provider's appointment   If the patient is a current smoker, are they able to teach back resources for cessation? Not a smoker   Is the patient/caregiver able to teach back the  hierarchy of who to call/visit for symptoms/problems? PCP, Specialist, Home health nurse, Urgent Care, ED, 911 Yes   Is the patient able to teach back Heart Failure diet management? Yes   Is the patient able to teach back Heart Failure Zones? No   Is the patient able to teach back signs and symptoms of worsening condition? (i.e. weight gain, shortness of air, etc.) Yes   COVID-19 call completed? Yes   Wrap up additional comments She is doing well, discussed fatigue, daily weight.          STEFANIE DAVEY - Registered Nurse

## 2022-09-05 NOTE — CASE MANAGEMENT/SOCIAL WORK
Case Management Discharge Note      Final Note: Home with spouse. No needs. Transport by private auto    Provided Post Acute Provider List?: N/A  Provided Post Acute Provider Quality & Resource List?: N/A    Selected Continued Care - Discharged on 9/4/2022 Admission date: 9/1/2022 - Discharge disposition: Home or Self Care    Destination    No services have been selected for the patient.              Durable Medical Equipment    No services have been selected for the patient.              Dialysis/Infusion    No services have been selected for the patient.              Home Medical Care    No services have been selected for the patient.              Therapy    No services have been selected for the patient.              Community Resources    No services have been selected for the patient.              Community & DME    No services have been selected for the patient.                  Transportation Services  Private: Car    Final Discharge Disposition Code: 01 - home or self-care

## 2022-09-06 ENCOUNTER — HOSPITAL ENCOUNTER (OUTPATIENT)
Dept: ULTRASOUND IMAGING | Facility: HOSPITAL | Age: 77
Discharge: HOME | End: 2022-09-06
Admitting: INTERNAL MEDICINE

## 2022-09-06 DIAGNOSIS — E04.2 NON-TOXIC MULTINODULAR GOITER: ICD-10-CM

## 2022-09-06 PROCEDURE — 76536 US EXAM OF HEAD AND NECK: CPT

## 2022-09-07 ENCOUNTER — READMISSION MANAGEMENT (OUTPATIENT)
Dept: CALL CENTER | Facility: HOSPITAL | Age: 77
End: 2022-09-07

## 2022-09-07 NOTE — OUTREACH NOTE
COVID-19 Week 1 Survey    Flowsheet Row Responses   [facility] patient discharged from? [place]   Does the patient have one of the following disease processes/diagnoses(primary or secondary)? COVID-19   COVID-19 underlying condition? CHF   Call Number Call 2   Week 1 Call successful? Yes   Call start time 1256   Call end time 1257   Discharge diagnosis Acute hypoxemic respiratory failure from acute on chronic congestive heart failure,   COVID positive    Meds reviewed with patient/caregiver? Yes   Is the patient having any side effects they believe may be caused by any medication additions or changes? No   Does the patient have all medications ordered at discharge? Yes   Is the patient taking all medications as directed (includes completed medication regime)? Yes   Does the patient have a primary care provider?  Yes   Does the patient have an appointment with their PCP or specialist within 7 days of discharge? Yes   Has the patient kept scheduled appointments due by today? Yes   Has home health visited the patient within 72 hours of discharge? N/A   Psychosocial issues? No   Did the patient receive a copy of their discharge instructions? Yes   Did the patient receive a copy of COVID-19 specific instructions? Yes   Nursing interventions Reviewed instructions with patient   What is the patient's perception of their health status since discharge? Improving   Does the patient have any of the following symptoms? None   Nursing Interventions Nurse provided patient education   Pulse Ox monitoring None   Is the patient/caregiver able to teach back steps to recovery at home? Set small, achievable goals for return to baseline health, Rest and rebuild strength, gradually increase activity, Eat a well-balance diet, Make a list of questions for provider's appointment   If the patient is a current smoker, are they able to teach back resources for cessation? Not a smoker   Is the patient/caregiver able to teach back the  hierarchy of who to call/visit for symptoms/problems? PCP, Specialist, Home health nurse, Urgent Care, ED, 911 Yes   Is the patient able to teach back Heart Failure diet management? Yes   Is the patient able to teach back Heart Failure Zones? No   Is the patient able to teach back signs and symptoms of worsening condition? (i.e. weight gain, shortness of air, etc.) Yes   [place] interventions: Take daily medications   COVID-19 call completed? Yes   Wrap up additional comments She is doing well, discussed fatigue          WOLF MICHELLE - Registered Nurse

## 2022-09-10 DIAGNOSIS — E05.90 HYPERTHYROIDISM: ICD-10-CM

## 2022-09-12 RX ORDER — METHIMAZOLE 5 MG/1
TABLET ORAL
Qty: 72 TABLET | Refills: 0 | Status: SHIPPED | OUTPATIENT
Start: 2022-09-12 | End: 2022-12-13 | Stop reason: SDUPTHER

## 2022-09-13 ENCOUNTER — TELEPHONE (OUTPATIENT)
Dept: CARDIOLOGY | Facility: CLINIC | Age: 77
End: 2022-09-13

## 2022-09-13 ENCOUNTER — APPOINTMENT (OUTPATIENT)
Dept: GENERAL RADIOLOGY | Facility: HOSPITAL | Age: 77
End: 2022-09-13

## 2022-09-13 ENCOUNTER — HOSPITAL ENCOUNTER (EMERGENCY)
Facility: HOSPITAL | Age: 77
Discharge: HOME | End: 2022-09-14
Attending: EMERGENCY MEDICINE | Admitting: EMERGENCY MEDICINE

## 2022-09-13 ENCOUNTER — LAB (OUTPATIENT)
Dept: LAB | Facility: HOSPITAL | Age: 77
End: 2022-09-13

## 2022-09-13 DIAGNOSIS — E87.1 HYPONATREMIA: ICD-10-CM

## 2022-09-13 DIAGNOSIS — I50.9 CONGESTIVE HEART FAILURE, UNSPECIFIED HF CHRONICITY, UNSPECIFIED HEART FAILURE TYPE: Primary | ICD-10-CM

## 2022-09-13 LAB
ANION GAP SERPL CALCULATED.3IONS-SCNC: 8.1 MMOL/L (ref 5–15)
BASOPHILS # BLD AUTO: 0.08 10*3/MM3 (ref 0–0.2)
BASOPHILS NFR BLD AUTO: 0.9 % (ref 0–1.5)
BUN SERPL-MCNC: 51 MG/DL (ref 8–23)
BUN/CREAT SERPL: 43.6 (ref 7–25)
CALCIUM SPEC-SCNC: 10.2 MG/DL (ref 8.6–10.5)
CHLORIDE SERPL-SCNC: 96 MMOL/L (ref 98–107)
CO2 SERPL-SCNC: 22.9 MMOL/L (ref 22–29)
CREAT SERPL-MCNC: 1.17 MG/DL (ref 0.57–1)
DEPRECATED RDW RBC AUTO: 43.1 FL (ref 37–54)
EGFRCR SERPLBLD CKD-EPI 2021: 48.8 ML/MIN/1.73
EOSINOPHIL # BLD AUTO: 0.18 10*3/MM3 (ref 0–0.4)
EOSINOPHIL NFR BLD AUTO: 1.9 % (ref 0.3–6.2)
ERYTHROCYTE [DISTWIDTH] IN BLOOD BY AUTOMATED COUNT: 12.6 % (ref 12.3–15.4)
GLUCOSE SERPL-MCNC: 113 MG/DL (ref 65–99)
HCT VFR BLD AUTO: 39.7 % (ref 34–46.6)
HGB BLD-MCNC: 13 G/DL (ref 12–15.9)
IMM GRANULOCYTES # BLD AUTO: 0.05 10*3/MM3 (ref 0–0.05)
IMM GRANULOCYTES NFR BLD AUTO: 0.5 % (ref 0–0.5)
LYMPHOCYTES # BLD AUTO: 2.41 10*3/MM3 (ref 0.7–3.1)
LYMPHOCYTES NFR BLD AUTO: 25.9 % (ref 19.6–45.3)
MCH RBC QN AUTO: 30.2 PG (ref 26.6–33)
MCHC RBC AUTO-ENTMCNC: 32.7 G/DL (ref 31.5–35.7)
MCV RBC AUTO: 92.1 FL (ref 79–97)
MONOCYTES # BLD AUTO: 1.07 10*3/MM3 (ref 0.1–0.9)
MONOCYTES NFR BLD AUTO: 11.5 % (ref 5–12)
NEUTROPHILS NFR BLD AUTO: 5.5 10*3/MM3 (ref 1.7–7)
NEUTROPHILS NFR BLD AUTO: 59.3 % (ref 42.7–76)
NRBC BLD AUTO-RTO: 0 /100 WBC (ref 0–0.2)
NT-PROBNP SERPL-MCNC: 1016 PG/ML (ref 0–1800)
PLATELET # BLD AUTO: 265 10*3/MM3 (ref 140–450)
PMV BLD AUTO: 9.6 FL (ref 6–12)
POTASSIUM SERPL-SCNC: 6.1 MMOL/L (ref 3.5–5.2)
RBC # BLD AUTO: 4.31 10*6/MM3 (ref 3.77–5.28)
SODIUM SERPL-SCNC: 127 MMOL/L (ref 136–145)
WBC NRBC COR # BLD: 9.29 10*3/MM3 (ref 3.4–10.8)

## 2022-09-13 PROCEDURE — 71045 X-RAY EXAM CHEST 1 VIEW: CPT

## 2022-09-13 PROCEDURE — 85025 COMPLETE CBC W/AUTO DIFF WBC: CPT | Performed by: EMERGENCY MEDICINE

## 2022-09-13 PROCEDURE — 36415 COLL VENOUS BLD VENIPUNCTURE: CPT

## 2022-09-13 PROCEDURE — 93010 ELECTROCARDIOGRAM REPORT: CPT | Performed by: INTERNAL MEDICINE

## 2022-09-13 PROCEDURE — 99284 EMERGENCY DEPT VISIT MOD MDM: CPT

## 2022-09-13 PROCEDURE — 84484 ASSAY OF TROPONIN QUANT: CPT | Performed by: EMERGENCY MEDICINE

## 2022-09-13 PROCEDURE — 80053 COMPREHEN METABOLIC PANEL: CPT

## 2022-09-13 PROCEDURE — 83880 ASSAY OF NATRIURETIC PEPTIDE: CPT | Performed by: EMERGENCY MEDICINE

## 2022-09-13 PROCEDURE — 93005 ELECTROCARDIOGRAM TRACING: CPT | Performed by: EMERGENCY MEDICINE

## 2022-09-13 RX ORDER — SODIUM CHLORIDE 0.9 % (FLUSH) 0.9 %
10 SYRINGE (ML) INJECTION AS NEEDED
Status: DISCONTINUED | OUTPATIENT
Start: 2022-09-13 | End: 2022-09-14 | Stop reason: HOSPADM

## 2022-09-13 RX ORDER — ASPIRIN 81 MG/1
81 TABLET, CHEWABLE ORAL DAILY
COMMUNITY

## 2022-09-14 ENCOUNTER — READMISSION MANAGEMENT (OUTPATIENT)
Dept: CALL CENTER | Facility: HOSPITAL | Age: 77
End: 2022-09-14

## 2022-09-14 VITALS
TEMPERATURE: 98.7 F | HEART RATE: 72 BPM | HEIGHT: 67 IN | WEIGHT: 164.5 LBS | OXYGEN SATURATION: 98 % | SYSTOLIC BLOOD PRESSURE: 132 MMHG | RESPIRATION RATE: 16 BRPM | BODY MASS INDEX: 25.82 KG/M2 | DIASTOLIC BLOOD PRESSURE: 70 MMHG

## 2022-09-14 LAB
ALBUMIN SERPL-MCNC: 3.9 G/DL (ref 3.5–5.2)
ALBUMIN/GLOB SERPL: 1.5 G/DL
ALP SERPL-CCNC: 56 U/L (ref 39–117)
ALT SERPL W P-5'-P-CCNC: 22 U/L (ref 1–33)
ANION GAP SERPL CALCULATED.3IONS-SCNC: 13.6 MMOL/L (ref 5–15)
AST SERPL-CCNC: 15 U/L (ref 1–32)
BILIRUB SERPL-MCNC: 0.4 MG/DL (ref 0–1.2)
BUN SERPL-MCNC: 65 MG/DL (ref 8–23)
BUN/CREAT SERPL: 56.5 (ref 7–25)
CALCIUM SPEC-SCNC: 9.9 MG/DL (ref 8.6–10.5)
CHLORIDE SERPL-SCNC: 92 MMOL/L (ref 98–107)
CO2 SERPL-SCNC: 21.4 MMOL/L (ref 22–29)
CREAT SERPL-MCNC: 1.15 MG/DL (ref 0.57–1)
EGFRCR SERPLBLD CKD-EPI 2021: 49.8 ML/MIN/1.73
GLOBULIN UR ELPH-MCNC: 2.6 GM/DL
GLUCOSE SERPL-MCNC: 99 MG/DL (ref 65–99)
POTASSIUM SERPL-SCNC: 4.9 MMOL/L (ref 3.5–5.2)
PROT SERPL-MCNC: 6.5 G/DL (ref 6–8.5)
QT INTERVAL: 432 MS
SODIUM SERPL-SCNC: 127 MMOL/L (ref 136–145)
TROPONIN T SERPL-MCNC: <0.01 NG/ML (ref 0–0.03)

## 2022-09-14 NOTE — OUTREACH NOTE
COVID-19 Week 2 Survey    Flowsheet Row Responses   [facility] patient discharged from? [facility]   Does the patient have one of the following disease processes/diagnoses(primary or secondary)? COVID-19   COVID-19 underlying condition? CHF   Call Number Call 1   COVID-19 Week 2: Call 1 attempt successful? Yes   Call start time 0901   Call end time 0911   Discharge diagnosis Acute hypoxemic respiratory failure from acute on chronic congestive heart failure,   COVID positive    Person spoke with today (if not patient) and relationship patient   Meds reviewed with patient/caregiver? Yes   Does the patient have all medications ordered at discharge? Yes   Is the patient taking all medications as directed (includes completed medication regime)? Yes   Does the patient have a primary care provider?  Yes   Comments regarding PCP James William Bosler, III, MD   Has the patient kept scheduled appointments due by today? Yes   Has home health visited the patient within 72 hours of discharge? N/A   Psychosocial issues? No   Did the patient receive a copy of their discharge instructions? Yes   Did the patient receive a copy of COVID-19 specific instructions? Yes   Nursing interventions Reviewed instructions with patient   What is the patient's perception of their health status since discharge? New symptoms unrelated to diagnosis  [Patient with return ER visit yesterday per Dr's instructions due to high potassium on lab results. ]   Does the patient have any of the following symptoms? Shortness of breath, Cough  [Mild cough, some shortness of breath with activity]   Nursing Interventions Nurse provided patient education   Pulse Ox monitoring None   Is the patient/caregiver able to teach back steps to recovery at home? Set small, achievable goals for return to baseline health, Rest and rebuild strength, gradually increase activity, Eat a well-balance diet   If the patient is a current smoker, are they able to teach back  resources for cessation? Not a smoker   Is the patient/caregiver able to teach back the hierarchy of who to call/visit for symptoms/problems? PCP, Specialist, Home health nurse, Urgent Care, ED, 911 Yes   Is the patient able to teach back Heart Failure diet management? Yes   Is the patient able to teach back Heart Failure Zones? No   Is the patient able to teach back signs and symptoms of worsening condition? (i.e. weight gain, shortness of air, etc.) Yes   COVID-19 call completed? Yes   Wrap up additional comments Patient reports that she still has not gotten her strength back.           VONNIE GRAVES - Registered Nurse

## 2022-09-14 NOTE — TELEPHONE ENCOUNTER
I am the on-call APRN tonight with G.  Received a call that patient has a critical potassium of 6.1.  Discussed with Dr. Castro who agrees that patient needs further evaluation in the ER.  Called patient and notified of critical lab result and she was advised to go to the ER now for further evaluation.  She verbalized understanding of importance and plans to go to [facility] now. Report given to ER triage RN.

## 2022-09-14 NOTE — ED NOTES
Patient to ED via EMS from home c/o abnormal lab. Patient had labs drawn today and was told her potassium was 6.1. Patient denies any chest pain, does state some shortness of breath.

## 2022-09-15 DIAGNOSIS — E87.1 HYPONATREMIA: Primary | ICD-10-CM

## 2022-09-21 ENCOUNTER — READMISSION MANAGEMENT (OUTPATIENT)
Dept: CALL CENTER | Facility: HOSPITAL | Age: 77
End: 2022-09-21

## 2022-09-25 LAB
MAXIMAL PREDICTED HEART RATE: 145 BPM
STRESS TARGET HR: 123 BPM

## 2022-09-25 PROCEDURE — 93248 EXT ECG>7D<15D REV&INTERPJ: CPT | Performed by: INTERNAL MEDICINE

## 2022-09-26 ENCOUNTER — TELEPHONE (OUTPATIENT)
Dept: CARDIOLOGY | Facility: CLINIC | Age: 77
End: 2022-09-26

## 2022-09-26 NOTE — TELEPHONE ENCOUNTER
Pt notified of results and verbalized understanding. She states she is staying with our cardiology group.  Pt she has not having any palpitations, however she states since 9/22/22 her BP has been low and she feels dizzy, lethargic, has pain that starts in her shoulders and goes up to the neck.  She also that she has blurry vision but has not fallen or passed out.    Date BP HR   9/23/2022 86/55 79   9/24/2022 81/52 76   9/25/2022 106/59 76   9/26/2022 85/52 79     Cardiac meds as follows:  Started on Spionolactone 25mg daily and Bumex 2mg every other day in the hospital  Irbesartan 150mg daily  Carvedilol 25mg BID  Pravastatin 40mg at night     She also states someone mentioned that she may need a pacemaker.    Pt is scheduled to [facility] 9/28/22    What recommendations do you have for this pt?    Griselda Timmons RN  [facility] Triage Department

## 2022-09-26 NOTE — TELEPHONE ENCOUNTER
----- Message from Drake Larios MD sent at 9/26/2022 12:04 PM EDT -----  Please notify patient that she still has frequent PVCs of about 4.5% and nonsustained ventricular tachycardia( lasting less than 10 seconds) that should not be causing problems.  She is already on good medical regimen with Coreg.  If she is having palpitations we may add another medicine.  I am not sure which she is planning to follow-up with.  Her cardiologist from [place] should have access to results of Holter. Let me know if she  has any questions or concerns.  Thank you

## 2022-09-26 NOTE — TELEPHONE ENCOUNTER
Called and spoke with patient.    BP now 120/58, HR 67    Feeling well now, no current symptoms (no dizziness or fatigue or shoulder pain).    Neck pain random at rest, not exertional or anginal, occurs in back of neck and feels improvement in neck pain with weighted shoulder wrap--- sounds musculoskeletal and she will discuss further with PCP.    Daily weights down since d/c 169 => 164.  Denies current symptoms of heart failure.  She has not had blood pressure cuff checked for accuracy before.  She will    Bring BP cuff with her to appointment on 9/28.  She declines a sooner appointment though I did offer her 1 tomorrow.    Hold bumex for now, but take a dose if gains more than 2 to 3 pounds from 1 morning to the next or more than 5 pounds in a week.    Cut irbesartan dose in half and hold for systolic blood pressure less than 100.    She will bring updated heart rate and blood pressure log as well as her cuff with her to her appointment on the 28th.  We will also check labs at that time.  Call sooner for high or low blood pressure readings or other issues or concerns.

## 2022-09-26 NOTE — PROGRESS NOTES
Please notify patient that she still has frequent PVCs of about 4.5% and nonsustained ventricular tachycardia( lasting less than 10 seconds) that should not be causing problems.  She is already on good medical regimen with Coreg.  If she is having palpitations we may add another medicine.  I am not sure which she is planning to follow-up with.  Her cardiologist from [place] should have access to results of Holter. Let me know if she  has any questions or concerns.  Thank you

## 2022-09-28 ENCOUNTER — READMISSION MANAGEMENT (OUTPATIENT)
Dept: CALL CENTER | Facility: HOSPITAL | Age: 77
End: 2022-09-28

## 2022-09-28 ENCOUNTER — OFFICE VISIT (OUTPATIENT)
Dept: CARDIOLOGY | Facility: CLINIC | Age: 77
End: 2022-09-28

## 2022-09-28 ENCOUNTER — HOSPITAL ENCOUNTER (OUTPATIENT)
Dept: CARDIOLOGY | Facility: HOSPITAL | Age: 77
Discharge: HOME | End: 2022-09-28
Admitting: NURSE PRACTITIONER

## 2022-09-28 ENCOUNTER — TELEPHONE (OUTPATIENT)
Dept: CARDIOLOGY | Facility: CLINIC | Age: 77
End: 2022-09-28

## 2022-09-28 VITALS
HEART RATE: 78 BPM | DIASTOLIC BLOOD PRESSURE: 78 MMHG | WEIGHT: 166 LBS | SYSTOLIC BLOOD PRESSURE: 104 MMHG | HEIGHT: 67 IN | BODY MASS INDEX: 26.06 KG/M2

## 2022-09-28 DIAGNOSIS — I49.3 PVC (PREMATURE VENTRICULAR CONTRACTION): ICD-10-CM

## 2022-09-28 DIAGNOSIS — I50.22 CHRONIC SYSTOLIC CHF (CONGESTIVE HEART FAILURE): Primary | ICD-10-CM

## 2022-09-28 DIAGNOSIS — G47.10 HYPERSOMNOLENCE: ICD-10-CM

## 2022-09-28 DIAGNOSIS — I47.29 NONSUSTAINED VENTRICULAR TACHYCARDIA: ICD-10-CM

## 2022-09-28 DIAGNOSIS — I10 ESSENTIAL HYPERTENSION: ICD-10-CM

## 2022-09-28 LAB
ANION GAP SERPL CALCULATED.3IONS-SCNC: 11 MMOL/L (ref 5–15)
BUN SERPL-MCNC: 41 MG/DL (ref 8–23)
BUN/CREAT SERPL: 34.2 (ref 7–25)
CALCIUM SPEC-SCNC: 9.8 MG/DL (ref 8.6–10.5)
CHLORIDE SERPL-SCNC: 96 MMOL/L (ref 98–107)
CO2 SERPL-SCNC: 23 MMOL/L (ref 22–29)
CREAT SERPL-MCNC: 1.2 MG/DL (ref 0.57–1)
EGFRCR SERPLBLD CKD-EPI 2021: 47.3 ML/MIN/1.73
GLUCOSE SERPL-MCNC: 126 MG/DL (ref 65–99)
MAGNESIUM SERPL-MCNC: 2 MG/DL (ref 1.6–2.4)
NT-PROBNP SERPL-MCNC: 1817 PG/ML (ref 0–1800)
POTASSIUM SERPL-SCNC: 5 MMOL/L (ref 3.5–5.2)
SODIUM SERPL-SCNC: 130 MMOL/L (ref 136–145)

## 2022-09-28 PROCEDURE — 80048 BASIC METABOLIC PNL TOTAL CA: CPT | Performed by: NURSE PRACTITIONER

## 2022-09-28 PROCEDURE — 93000 ELECTROCARDIOGRAM COMPLETE: CPT | Performed by: NURSE PRACTITIONER

## 2022-09-28 PROCEDURE — 99214 OFFICE O/P EST MOD 30 MIN: CPT | Performed by: NURSE PRACTITIONER

## 2022-09-28 PROCEDURE — 36415 COLL VENOUS BLD VENIPUNCTURE: CPT

## 2022-09-28 PROCEDURE — 83880 ASSAY OF NATRIURETIC PEPTIDE: CPT | Performed by: NURSE PRACTITIONER

## 2022-09-28 PROCEDURE — 83735 ASSAY OF MAGNESIUM: CPT | Performed by: NURSE PRACTITIONER

## 2022-09-28 NOTE — OUTREACH NOTE
COVID-19 Week 4 Survey    Flowsheet Row Responses   [facility] patient discharged from? [place]   Does the patient have one of the following disease processes/diagnoses(primary or secondary)? COVID-19   COVID-19 underlying condition? CHF   Call Number Call 1   COVID-19 Week 4: Call 1 attempt successful? Yes   Call start time 1522   Call end time 1526   Discharge diagnosis Acute hypoxemic respiratory failure from acute on chronic congestive heart failure,   COVID positive    Meds reviewed with patient/caregiver? Yes   Is the patient having any side effects they believe may be caused by any medication additions or changes? No   Does the patient have all medications ordered at discharge? Yes   Prescription comments Reports Bumex discontinued at cardiology f/u and BP medications held for a few days with plans to restart at lower dosage--patient to call cardiology with readings and also has f/u appt   Is the patient taking all medications as directed (includes completed medication regime)? Yes   Has the patient kept scheduled appointments due by today? Yes   Comments Cardiology f/u completed and has PCP appt next week   Is the patient still receiving Home Health Services? N/A   What is the patient's perception of their health status since discharge? Improving  [Reports Covid type s/s improved but having some issues with symptomatic hypotension--dizzy, lightheaded, Echo is ordered and changes in medications made.  Pt aware to montior QD wts and s/s edema as bumex d/c'd.  No questions at time of call.]   Does the patient have any of the following symptoms? None   Nursing Interventions Nurse provided patient education   Pulse Ox monitoring None   Is the patient/caregiver able to teach back the hierarchy of who to call/visit for symptoms/problems? PCP, Specialist, Home health nurse, Urgent Care, ED, 911 Yes   Is the patient interested in additional calls from an ambulatory ?  NOTE:  applies to high risk  patients requiring additional follow-up. Rebecca ANDERSON - Registered Nurse

## 2022-09-28 NOTE — PROGRESS NOTES
Date of Office Visit: 2022  Encounter Provider: Nora Patricio, NORMA, JOSEPH  Place of Service: [facility] CARDIOLOGY  Patient Name: Natalie Rosen  :1945        Subjective:     Chief Complaint:  Nonischemic cardiomyopathy, PVCs      History of Present Illness:  Natalie Rosen is a 76 y.o. female patient of Dr. Larios.  This patient is new to me and I have reviewed her records.    Patient has a history of hypertension, hyperlipidemia, former smoker, asthma, CHF, nonischemic cardiomyopathy, PVCs.    Patient previously followed with Dr. Moore at [place].  She had a stress test in 2018 which was equivocal but felt to likely be a normal exercise perfusion imaging.  No definite infarction or ischemia.  Echo 2021 showed EF of 44% with mild global hypokinesis with minor regional variation and mild MR.  She saw Dr. Moore 2022 at which point she was continued on carvedilol and losartan.  She was seen at [facility] ER 2022 with shortness of breath after being diagnosed with COVID-19 a few days prior.  She did require BiPAP when she went into the ER and neb treatment.  She was found to have pulmonary edema and was treated with Lasix, dexamethasone, remdesivir.  proBNP was elevated at 7745.  Troponin was negative.  She had some issues with low sodium.  Monitor was placed at discharge which showed frequent PVCs occurring 4.5% of the time with some nonsustained ventricular tachycardia lasting less than 10 seconds.  Coreg was continued.      Patient presents to office today for follow-up appointment.  Patient reports she is doing well overall since hospital discharge.  She has been having a lot of fatigue but this is chronic for her.  She denies any chest pain or discomfort, shortness of breath, shortness of breath with exertion, palpitations, edema, syncope, near syncope, falls, or abnormal bleeding.  She has been having some lightheadedness associated with  low blood pressure readings at home.  She stopped the Bumex about 2 days ago.  She felt the irbesartan was too hard to cut in half so she has been taking this every other day.  She reports she only drinks three 8 ounce cups of water a day.  Her blood pressure cuff in the office today does not appear accurate.  Manual reading was 100/76 and blood pressure on her cuff was 85 systolic and then 88 on recheck.  Her cuff is about 5 years old and we discussed purchasing new blood pressure cuff.  She does report that she was first diagnosed with cardiomyopathy around 2006 or 2007.  She does believe she had an EKG last year through her PCP.            Past Medical History:   Diagnosis Date   • Arthritis    • Asthma    • Benign essential hypertension    • Cancer (HCC)     MELANOMA   • Cardiomyopathy (HCC)    • CHF (congestive heart failure) (HCC)    • Goiter    • Heart murmur    • Hyperlipidemia    • Hyperthyroidism    • Mitral valve insufficiency    • PONV (postoperative nausea and vomiting)    • Seasonal allergies    • Urinary frequency      Past Surgical History:   Procedure Laterality Date   • CATARACT EXTRACTION     • COLONOSCOPY     • HYSTERECTOMY     • OTHER SURGICAL HISTORY      PT HAS HAD SKIN CA REMOVED FROM BACK AND FACE (UPPER LIP)    • HI TOTAL KNEE ARTHROPLASTY Right 1/12/2017    Procedure: RT TOTAL KNEE ARTHROPLASTY;  Surgeon: Doc Lance MD;  Location: [facility];  Service: Orthopedics   • HI TOTAL KNEE ARTHROPLASTY Left 6/26/2017    Procedure: LT TOTAL KNEE ARTHROPLASTY;  Surgeon: Doc Lance MD;  Location: [facility];  Service: Orthopedics     Outpatient Medications Prior to Visit   Medication Sig Dispense Refill   • Acetaminophen (TYLENOL EXTRA STRENGTH PO) Take 1 tablet by mouth 4 (Four) Times a Day.     • albuterol sulfate  (90 Base) MCG/ACT inhaler Inhale 2 puffs Every 6 (Six) Hours As Needed.     • Apoaequorin (PREVAGEN PO) Take 1 tablet by mouth Daily.     • aspirin 81 MG  chewable tablet Chew 81 mg Daily.     • Carbinoxamine Maleate 4 MG tablet Take 4 mg by mouth Daily.     • carvedilol (COREG) 25 MG tablet Take 1 tablet by mouth 2 (Two) Times a Day With Meals. 90 tablet 3   • Diclofenac Sodium (VOLTAREN) 1 % gel gel APPLY 4 GRAMS TO THE AFFECTED AREA FOUR TIMES A DAY AS DIRECTED (Patient taking differently: Apply 4 g topically to the appropriate area as directed 4 (Four) Times a Day. back) 100 g 5   • ipratropium (ATROVENT) 0.06 % nasal spray 2 sprays into the nostril(s) as directed by provider every night at bedtime.     • irbesartan (AVAPRO) 150 MG tablet Take 150 mg by mouth Daily.     • methIMAzole (TAPAZOLE) 5 MG tablet TAKE ONE TABLET BY MOUTH DAILY SIX DAYS OUT OF THE WEEK 72 tablet 0   • montelukast (SINGULAIR) 10 MG tablet Take 1 tablet by mouth Every Morning.     • Multiple Vitamins-Minerals (PRESERVISION AREDS 2 PO) Take 1 capsule by mouth 2 (Two) Times a Day.     • omeprazole (priLOSEC) 40 MG capsule Take 40 mg by mouth Daily As Needed.     • ondansetron (ZOFRAN) 4 MG tablet Take 4 mg by mouth Daily. Takes every morning     • pravastatin (PRAVACHOL) 40 MG tablet TAKE ONE TABLET BY MOUTH EVERY NIGHT AT BEDTIME 30 tablet 0   • Probiotic Product (PROBIOTIC PO) Take 1 tablet by mouth Daily.     • spironolactone (ALDACTONE) 25 MG tablet Take 1 tablet by mouth Daily. 30 tablet 0   • triamcinolone (NASACORT) 55 MCG/ACT nasal inhaler 2 sprays into each nostril Every Morning.     • vitamin B-6 (PYRIDOXINE) 50 MG tablet Take 50 mg by mouth Daily.     • bumetanide (BUMEX) 2 MG tablet Take 1 tablet by mouth Every Other Day. 15 tablet 0     No facility-administered medications prior to visit.       Allergies as of 09/28/2022   • (No Known Allergies)     Social History     Socioeconomic History   • Marital status:    Tobacco Use   • Smoking status: Former Smoker     Packs/day: 1.00     Years: 25.00     Pack years: 25.00     Types: Cigarettes     Quit date: 12/27/1985     Years  "since quittin.7   • Smokeless tobacco: Never Used   Substance and Sexual Activity   • Alcohol use: Yes     Comment: DAILY COCKTAIL   • Drug use: No   • Sexual activity: Defer     Family History   Problem Relation Age of Onset   • Breast cancer Other    • Diabetes Other    • Cancer Mother    • Cancer Father    • Malig Hyperthermia Neg Hx        Review of Systems   Constitutional: Positive for malaise/fatigue (Chronic).   Cardiovascular:        SEE HPI   Respiratory: Negative for shortness of breath.    Hematologic/Lymphatic: Negative for bleeding problem.   Musculoskeletal: Negative for falls.   Gastrointestinal: Negative for melena.   Genitourinary: Negative for hematuria.   Neurological: Positive for dizziness (When BP low).   Psychiatric/Behavioral: Negative for altered mental status.          Objective:     Vitals:    22 1040   BP: 104/78   Pulse: 78   Weight: 75.3 kg (166 lb)   Height: 170.2 cm (67\")     Body mass index is 26 kg/m².        PHYSICAL EXAM:  Constitutional:       General: Not in acute distress.     Appearance: Well-developed. Not diaphoretic.   Eyes:      Pupils: Pupils are equal, round, and reactive to light.   HENT:      Head: Normocephalic and atraumatic.   Neck:      Vascular: No JVD.   Pulmonary:      Effort: Pulmonary effort is normal. No respiratory distress.      Breath sounds: Normal breath sounds. No wheezing. No rales.   Cardiovascular:      Normal rate. Regular rhythm.      Murmurs: There is no murmur.      No gallop. No click. No rub.   Edema:     Peripheral edema absent.   Abdominal:      General: Bowel sounds are normal.      Palpations: Abdomen is soft.   Musculoskeletal:         General: No tenderness or deformity. Skin:     General: Skin is warm and dry.      Findings: No erythema or rash.   Neurological:      Mental Status: Alert and oriented to person, place, and time.   Psychiatric:         Behavior: Behavior normal.             ECG 12 Lead    Date/Time: 2022 " 11:05 AM  Performed by: Nora Patricio DNP, APRN  Authorized by: Nora Patricio DNP, JOSEPH   Comparison: compared with previous ECG from 9/3/2022  Rhythm: sinus rhythm  BPM: 78  Conduction: non-specific intraventricular conduction delay  Other findings comments: LVH with repolarization abnormality    Clinical impression: abnormal EKG  Comments: No significant changes from previous EKG              Assessment:       Diagnosis Plan   1. Chronic systolic CHF (congestive heart failure) (HCC)  Basic Metabolic Panel    proBNP   2. Nonsustained ventricular tachycardia (HCC)  Magnesium         Plan:     1. Nonischemic cardiomyopathy: EF calculated at 46% on 2019 echo.  Calculated at 44% on 2/2021 echo.  Stress perfusion study in 2020 showed diaphragmatic attenuation and felt to likely be a normal exercise stress perfusion study with no definite ischemia and no definite infarction.   On carvedilol, irbesartan, spironolactone.  She appears euvolemic on exam.  She stopped Bumex a couple of days ago and she was not on this prior to COVID-19 infection.  We will stop this but she will call if she gains more than 2 to 3 pounds in 1 day or 5 pounds in a week or if she develops shortness of breath or swelling.  Blood pressure has been low.  She will hold irbesartan pending labs and will make further recommendations based on lab results.  Recheck BMP and proBNP today.  We will also look at rechecking echocardiogram.  2. History of intermittent left bundle branch block: She is wondering if she may be candidate for resynchronization therapy.  Will recheck echo as above and go from there. ADDENDUM: Dr. Larios reviewed recent CTA chest and there was no visible coronary artery calcification present.  3. PVCs with nonsustained VT: On carvedilol.  Avoid stimulants.  Also recommend home sleep study.  4. Hypertension: With recent hypotension.  Plan as above.  5. Hyponatremia: may need nephrology referral based on lab results  today.   6. Hyperthyroidism with multinodular goiter: Follows with endocrinology.  On tapazole.   7. Hx COVID-19 infection  8. Chronic fatigue/hypersomnolence: Sleep history reviewed with patient.  Decision to order home sleep study reviewed with patient.  She is on board with plan.    Patient to follow-up with Dr. Larios in 6 to 8 weeks or follow-up sooner if needed for any new, recurrent, or worsening symptoms or concerns.  Discussed in detail signs/symptoms that warrant sooner call or follow-up to the office or ER visit.      ADDENDUM: Creatinine slightly more elevated than it was a couple of weeks ago.  Sodium with some improvement though still low.  Potassium is starting to get a little high at 5.0.  proBNP was borderline elevated however clinically she is euvolemic.  We will have her only take bumex as needed for weight gain of more than 2 to 3 pounds in 1 day or 5 pounds in a week or SOA/ edema symptoms.  Stop spironolactone.  Continue current dose carvedilol.  I am going to have her take half a tablet of the irbesartan for the next couple of days to ensure blood pressure tolerates it and if blood pressure high enough then okay to increase back to a full tablet but would like to avoid hypotension.  Await echo results and will consider change from irbesartan to entresto if EF still decreased.  She will stop by the outpatient lab, hospital entrance a, for BMP in 1-2 weeks.  She will call sooner for any issues or concerns or systolic blood pressure below 100.  She does have a new blood pressure cuff arriving tomorrow.  The above plan of care was discussed with Dr. Harriet MD.           Your medication list          Accurate as of September 28, 2022 11:53 AM. If you have any questions, ask your nurse or doctor.            CHANGE how you take these medications      Instructions Last Dose Given Next Dose Due   Diclofenac Sodium 1 % gel gel  Commonly known as: VOLTAREN  What changed: See the new  instructions.      APPLY 4 GRAMS TO THE AFFECTED AREA FOUR TIMES A DAY AS DIRECTED          CONTINUE taking these medications      Instructions Last Dose Given Next Dose Due   albuterol sulfate  (90 Base) MCG/ACT inhaler  Commonly known as: PROVENTIL HFA;VENTOLIN HFA;PROAIR HFA      Inhale 2 puffs Every 6 (Six) Hours As Needed.       aspirin 81 MG chewable tablet      Chew 81 mg Daily.       Carbinoxamine Maleate 4 MG tablet      Take 4 mg by mouth Daily.       carvedilol 25 MG tablet  Commonly known as: COREG      Take 1 tablet by mouth 2 (Two) Times a Day With Meals.       ipratropium 0.06 % nasal spray  Commonly known as: ATROVENT      2 sprays into the nostril(s) as directed by provider every night at bedtime.       irbesartan 150 MG tablet  Commonly known as: AVAPRO      Take 150 mg by mouth Daily.       methIMAzole 5 MG tablet  Commonly known as: TAPAZOLE      TAKE ONE TABLET BY MOUTH DAILY SIX DAYS OUT OF THE WEEK       montelukast 10 MG tablet  Commonly known as: SINGULAIR      Take 1 tablet by mouth Every Morning.       omeprazole 40 MG capsule  Commonly known as: priLOSEC      Take 40 mg by mouth Daily As Needed.       ondansetron 4 MG tablet  Commonly known as: ZOFRAN      Take 4 mg by mouth Daily. Takes every morning       pravastatin 40 MG tablet  Commonly known as: PRAVACHOL      TAKE ONE TABLET BY MOUTH EVERY NIGHT AT BEDTIME       PRESERVISION AREDS 2 PO      Take 1 capsule by mouth 2 (Two) Times a Day.       PREVAGEN PO      Take 1 tablet by mouth Daily.       PROBIOTIC PO      Take 1 tablet by mouth Daily.       spironolactone 25 MG tablet  Commonly known as: ALDACTONE      Take 1 tablet by mouth Daily.       triamcinolone 55 MCG/ACT nasal inhaler  Commonly known as: NASACORT      2 sprays into each nostril Every Morning.       TYLENOL EXTRA STRENGTH PO      Take 1 tablet by mouth 4 (Four) Times a Day.       vitamin B-6 50 MG tablet  Commonly known as: PYRIDOXINE      Take 50 mg by mouth  Daily.          STOP taking these medications    bumetanide 2 MG tablet  Commonly known as: BUMEX  Stopped by: Nora Patricio DNP, APRN               The above medication changes may not have been made by this provider.  Patient's medication list was updated to reflect medications they are currently taking including medication changes made by other providers.            Thanks,    Nora Patricio DNP, APRN  09/28/2022         Dictated utilizing Dragon dictation

## 2022-10-07 ENCOUNTER — TELEPHONE (OUTPATIENT)
Dept: CARDIOLOGY | Facility: CLINIC | Age: 77
End: 2022-10-07

## 2022-10-07 NOTE — TELEPHONE ENCOUNTER
Please remind patient to get repeat BMP at the outpatient lab 1 day next week and to send updated heart rate and blood pressure readings via [facility] or she can call with these readings.   Thanks!

## 2022-10-11 NOTE — TELEPHONE ENCOUNTER
I called pt unable to reach. I left a detailed message with a reminder about labs drawn and send in update hr & bp log to my chart.   I asked her to call if any questions or concerns.   Luis NINO

## 2022-10-12 ENCOUNTER — LAB (OUTPATIENT)
Dept: LAB | Facility: HOSPITAL | Age: 77
End: 2022-10-12

## 2022-10-12 ENCOUNTER — APPOINTMENT (OUTPATIENT)
Dept: WOMENS IMAGING | Facility: HOSPITAL | Age: 77
End: 2022-10-12

## 2022-10-12 DIAGNOSIS — I50.22 CHRONIC SYSTOLIC CHF (CONGESTIVE HEART FAILURE): ICD-10-CM

## 2022-10-12 DIAGNOSIS — I10 ESSENTIAL HYPERTENSION: ICD-10-CM

## 2022-10-12 LAB
ANION GAP SERPL CALCULATED.3IONS-SCNC: 8 MMOL/L (ref 5–15)
BUN SERPL-MCNC: 17 MG/DL (ref 8–23)
BUN/CREAT SERPL: 20.2 (ref 7–25)
CALCIUM SPEC-SCNC: 9.4 MG/DL (ref 8.6–10.5)
CHLORIDE SERPL-SCNC: 102 MMOL/L (ref 98–107)
CO2 SERPL-SCNC: 26 MMOL/L (ref 22–29)
CREAT SERPL-MCNC: 0.84 MG/DL (ref 0.57–1)
EGFRCR SERPLBLD CKD-EPI 2021: 72.1 ML/MIN/1.73
GLUCOSE SERPL-MCNC: 100 MG/DL (ref 65–99)
POTASSIUM SERPL-SCNC: 4.4 MMOL/L (ref 3.5–5.2)
SODIUM SERPL-SCNC: 136 MMOL/L (ref 136–145)

## 2022-10-12 PROCEDURE — 80048 BASIC METABOLIC PNL TOTAL CA: CPT

## 2022-10-12 PROCEDURE — 77063 BREAST TOMOSYNTHESIS BI: CPT | Performed by: RADIOLOGY

## 2022-10-12 PROCEDURE — 77067 SCR MAMMO BI INCL CAD: CPT | Performed by: RADIOLOGY

## 2022-10-12 PROCEDURE — 36415 COLL VENOUS BLD VENIPUNCTURE: CPT

## 2022-10-12 RX ORDER — BUMETANIDE 1 MG/1
1 TABLET ORAL DAILY
Qty: 30 TABLET | Refills: 0 | Status: SHIPPED | OUTPATIENT
Start: 2022-10-12 | End: 2022-11-22

## 2022-10-13 ENCOUNTER — HOSPITAL ENCOUNTER (OUTPATIENT)
Dept: CARDIOLOGY | Facility: HOSPITAL | Age: 77
Discharge: HOME | End: 2022-10-13
Admitting: NURSE PRACTITIONER

## 2022-10-13 VITALS
HEIGHT: 67 IN | HEART RATE: 83 BPM | DIASTOLIC BLOOD PRESSURE: 72 MMHG | BODY MASS INDEX: 26.06 KG/M2 | WEIGHT: 166 LBS | SYSTOLIC BLOOD PRESSURE: 120 MMHG

## 2022-10-13 DIAGNOSIS — I50.22 CHRONIC SYSTOLIC CHF (CONGESTIVE HEART FAILURE): ICD-10-CM

## 2022-10-13 LAB
AORTIC ARCH: 2.9 CM
ASCENDING AORTA: 3.5 CM
BH CV ECHO MEAS - ACS: 1.7 CM
BH CV ECHO MEAS - AO MAX PG: 10.1 MMHG
BH CV ECHO MEAS - AO MEAN PG: 4 MMHG
BH CV ECHO MEAS - AO ROOT DIAM: 3.2 CM
BH CV ECHO MEAS - AO V2 MAX: 159 CM/SEC
BH CV ECHO MEAS - AO V2 VTI: 30 CM
BH CV ECHO MEAS - AVA(I,D): 2.24 CM2
BH CV ECHO MEAS - EDV(CUBED): 216 ML
BH CV ECHO MEAS - EDV(MOD-SP2): 100 ML
BH CV ECHO MEAS - EDV(MOD-SP4): 133 ML
BH CV ECHO MEAS - EF(MOD-BP): 46 %
BH CV ECHO MEAS - EF(MOD-SP2): 46 %
BH CV ECHO MEAS - EF(MOD-SP4): 45.1 %
BH CV ECHO MEAS - ESV(CUBED): 140.6 ML
BH CV ECHO MEAS - ESV(MOD-SP2): 54 ML
BH CV ECHO MEAS - ESV(MOD-SP4): 73 ML
BH CV ECHO MEAS - FS: 13.3 %
BH CV ECHO MEAS - IVS/LVPW: 0.93 CM
BH CV ECHO MEAS - IVSD: 0.7 CM
BH CV ECHO MEAS - LAT PEAK E' VEL: 7.8 CM/SEC
BH CV ECHO MEAS - LV DIASTOLIC VOL/BSA (35-75): 71.2 CM2
BH CV ECHO MEAS - LV MASS(C)D: 164.9 GRAMS
BH CV ECHO MEAS - LV MAX PG: 4.4 MMHG
BH CV ECHO MEAS - LV MEAN PG: 3 MMHG
BH CV ECHO MEAS - LV SYSTOLIC VOL/BSA (12-30): 39.1 CM2
BH CV ECHO MEAS - LV V1 MAX: 105 CM/SEC
BH CV ECHO MEAS - LV V1 VTI: 21.4 CM
BH CV ECHO MEAS - LVIDD: 6 CM
BH CV ECHO MEAS - LVIDS: 5.2 CM
BH CV ECHO MEAS - LVOT AREA: 3.1 CM2
BH CV ECHO MEAS - LVOT DIAM: 2 CM
BH CV ECHO MEAS - LVPWD: 0.75 CM
BH CV ECHO MEAS - MED PEAK E' VEL: 5.6 CM/SEC
BH CV ECHO MEAS - MR MAX PG: 74.6 MMHG
BH CV ECHO MEAS - MR MAX VEL: 432 CM/SEC
BH CV ECHO MEAS - MV A DUR: 0.12 SEC
BH CV ECHO MEAS - MV A MAX VEL: 90 CM/SEC
BH CV ECHO MEAS - MV DEC SLOPE: 213 CM/SEC2
BH CV ECHO MEAS - MV DEC TIME: 0.15 MSEC
BH CV ECHO MEAS - MV E MAX VEL: 47.1 CM/SEC
BH CV ECHO MEAS - MV E/A: 0.52
BH CV ECHO MEAS - MV MAX PG: 3 MMHG
BH CV ECHO MEAS - MV MEAN PG: 1 MMHG
BH CV ECHO MEAS - MV P1/2T: 80.3 MSEC
BH CV ECHO MEAS - MV V2 VTI: 15 CM
BH CV ECHO MEAS - MVA(P1/2T): 2.7 CM2
BH CV ECHO MEAS - MVA(VTI): 4.5 CM2
BH CV ECHO MEAS - PA ACC TIME: 0.13 SEC
BH CV ECHO MEAS - PA PR(ACCEL): 19.6 MMHG
BH CV ECHO MEAS - PA V2 MAX: 111 CM/SEC
BH CV ECHO MEAS - PULM A REVS DUR: 0.11 SEC
BH CV ECHO MEAS - PULM A REVS VEL: 47.6 CM/SEC
BH CV ECHO MEAS - PULM DIAS VEL: 46.3 CM/SEC
BH CV ECHO MEAS - PULM S/D: 1.4
BH CV ECHO MEAS - PULM SYS VEL: 64.7 CM/SEC
BH CV ECHO MEAS - QP/QS: 0.53
BH CV ECHO MEAS - RV MAX PG: 2.41 MMHG
BH CV ECHO MEAS - RV V1 MAX: 77.7 CM/SEC
BH CV ECHO MEAS - RV V1 VTI: 15.8 CM
BH CV ECHO MEAS - RVOT DIAM: 1.7 CM
BH CV ECHO MEAS - SI(MOD-SP2): 24.6 ML/M2
BH CV ECHO MEAS - SI(MOD-SP4): 32.1 ML/M2
BH CV ECHO MEAS - SUP REN AO DIAM: 2.1 CM
BH CV ECHO MEAS - SV(LVOT): 67.2 ML
BH CV ECHO MEAS - SV(MOD-SP2): 46 ML
BH CV ECHO MEAS - SV(MOD-SP4): 60 ML
BH CV ECHO MEAS - SV(RVOT): 35.9 ML
BH CV ECHO MEAS - TAPSE (>1.6): 2.26 CM
BH CV ECHO MEASUREMENTS AVERAGE E/E' RATIO: 7.03
BH CV XLRA - RV BASE: 3 CM
BH CV XLRA - RV LENGTH: 6.4 CM
BH CV XLRA - RV MID: 2.6 CM
BH CV XLRA - TDI S': 15.2 CM/SEC
LEFT ATRIUM VOLUME INDEX: 28 ML/M2
MAXIMAL PREDICTED HEART RATE: 144 BPM
SINUS: 2.8 CM
STJ: 3 CM
STRESS TARGET HR: 122 BPM

## 2022-10-13 PROCEDURE — 93306 TTE W/DOPPLER COMPLETE: CPT | Performed by: INTERNAL MEDICINE

## 2022-10-13 PROCEDURE — 93306 TTE W/DOPPLER COMPLETE: CPT

## 2022-10-14 DIAGNOSIS — R92.8 ABNORMAL MAMMOGRAM: Primary | ICD-10-CM

## 2022-10-18 ENCOUNTER — TELEPHONE (OUTPATIENT)
Dept: CARDIOLOGY | Facility: CLINIC | Age: 77
End: 2022-10-18

## 2022-10-18 DIAGNOSIS — I50.22 CHRONIC SYSTOLIC CHF (CONGESTIVE HEART FAILURE): Primary | ICD-10-CM

## 2022-10-18 RX ORDER — SACUBITRIL AND VALSARTAN 24; 26 MG/1; MG/1
1 TABLET, FILM COATED ORAL 2 TIMES DAILY
Qty: 60 TABLET | Refills: 0 | Status: SHIPPED | OUTPATIENT
Start: 2022-10-18 | End: 2022-11-14

## 2022-10-18 NOTE — TELEPHONE ENCOUNTER
DR. SERRANO--- do you feel wall motion on recent echo is similar to previous?  I believe you had reviewed CT chest and did not feel she had significant coronary calcification?  Did not tolerate spironolactone due to high K+.  Anything further at this time?

## 2022-10-18 NOTE — TELEPHONE ENCOUNTER
Called and discussed echo results with patient.  She is feeling well.  Taking 25mg carvedilol BID.  Holding irbesartan due to low BP.   ON bumex 1mg daily.  Weight down, BP improved, swelling improved.  Denies chest pain or dyspnea.  BP running 120s/70s.      Will stop irbesartan (which she has not taken recently anyways).   Start low dose entresto.  Call with updated HR and BP readings 1 week or sooner for SBP <110 or other issues or concerns.   Labs 1 week.  If develops s/s dehydration will decrease bumex to 1/2 tablet and continue to monitor daily weights and symptoms.  Will further adjust medications based on BP response and labs in 1 week.

## 2022-10-19 ENCOUNTER — APPOINTMENT (OUTPATIENT)
Dept: WOMENS IMAGING | Facility: HOSPITAL | Age: 77
End: 2022-10-19

## 2022-10-19 PROCEDURE — 77065 DX MAMMO INCL CAD UNI: CPT | Performed by: RADIOLOGY

## 2022-10-19 PROCEDURE — G0279 TOMOSYNTHESIS, MAMMO: HCPCS | Performed by: RADIOLOGY

## 2022-10-24 ENCOUNTER — LAB (OUTPATIENT)
Dept: LAB | Facility: HOSPITAL | Age: 77
End: 2022-10-24

## 2022-10-24 DIAGNOSIS — I50.22 CHRONIC SYSTOLIC CHF (CONGESTIVE HEART FAILURE): ICD-10-CM

## 2022-10-24 LAB
ANION GAP SERPL CALCULATED.3IONS-SCNC: 9.7 MMOL/L (ref 5–15)
BUN SERPL-MCNC: 17 MG/DL (ref 8–23)
BUN/CREAT SERPL: 17.2 (ref 7–25)
CALCIUM SPEC-SCNC: 9.6 MG/DL (ref 8.6–10.5)
CHLORIDE SERPL-SCNC: 99 MMOL/L (ref 98–107)
CO2 SERPL-SCNC: 32.3 MMOL/L (ref 22–29)
CREAT SERPL-MCNC: 0.99 MG/DL (ref 0.57–1)
EGFRCR SERPLBLD CKD-EPI 2021: 59.2 ML/MIN/1.73
GLUCOSE SERPL-MCNC: 114 MG/DL (ref 65–99)
POTASSIUM SERPL-SCNC: 4.3 MMOL/L (ref 3.5–5.2)
SODIUM SERPL-SCNC: 141 MMOL/L (ref 136–145)

## 2022-10-24 PROCEDURE — 83880 ASSAY OF NATRIURETIC PEPTIDE: CPT | Performed by: NURSE PRACTITIONER

## 2022-10-24 PROCEDURE — 36415 COLL VENOUS BLD VENIPUNCTURE: CPT

## 2022-10-24 PROCEDURE — 80048 BASIC METABOLIC PNL TOTAL CA: CPT

## 2022-10-25 ENCOUNTER — TELEPHONE (OUTPATIENT)
Dept: CARDIOLOGY | Facility: CLINIC | Age: 77
End: 2022-10-25

## 2022-10-25 DIAGNOSIS — I50.22 CHRONIC SYSTOLIC CHF (CONGESTIVE HEART FAILURE): Primary | ICD-10-CM

## 2022-10-25 LAB — NT-PROBNP SERPL-MCNC: 733 PG/ML (ref 0–1800)

## 2022-10-25 NOTE — TELEPHONE ENCOUNTER
Called and spoke with patient.  Blood pressure overall well controlled but somewhat labile, will get an occasional low reading.  Yesterday she got a reading of 87/51 however reports on immediate recheck BP was 96/63. Maybe slight lightheadedness with this.  Later in the day blood pressure was normal at 120 systolic.  She did have to take an irbesartan dose the day prior due to high blood pressure.  We discussed importance of not taking irbesartan as she is on Entresto.  If she has high blood pressure again we will try an extra Entresto pill as well.  She is going to send some updated readings in 1 week and if blood pressure stable would really like to increase her Entresto dose.  She will call sooner for low readings or issues or concerns.    Weight was 169 at hospital discharge, per patient.  Was 177 before going into hospital.  Recently has been running around 1 70-1 71.  She reports her bilateral lower extremity swelling is dependent, none in the morning, shoes a little snug by the end of the day, no pitting edema. Mild ESPINOZA, overall improved with bumex.  Have added proBNP onto labs that she had drawn yesterday.  Okay to take extra Bumex dose today.  Continue to check daily weights and call if she gains more than 2 to 3 pounds in 1 day or 5 pounds in a week or for increased swelling or shortness of breath symptoms.  Strongly recommended knee-high compression socks as well as most of her edema sounds dependent in nature.

## 2022-10-25 NOTE — PROGRESS NOTES
Please let patient know that her fluid level test was normal.  Okay if she already took an extra dose of Bumex today but would have her just take 1 a day for now thereafter.  Would use the knee-high compression socks and call if she has worsening swelling or if it fails to improve or for worsening dyspnea or weight gain.

## 2022-10-25 NOTE — TELEPHONE ENCOUNTER
----- Message from Karla Esquivel MA sent at 10/25/2022  8:35 AM EDT -----  Regarding: FW: Blood Pressure Readings   Contact: [phone]    ----- Message -----  From: Natalie Rosen  Sent: 10/24/2022   4:01 PM EDT  To: Anais Hall [facility]  Subject: Blood Pressure Readings                          10/24/22 - Natalie Rosen  Follow up of blood pressure & heart rate readings   10/13       101/59       82  10/14         84/54       78 10:00 AM                     123/65       75 11:00 AM  10/15       110/61       85  10/16      128/63        79  10/17      110/61        85  10/18      123/62        83  10/19      133/79        84  10/20      123/78        92  10/21      129/70        81  10/23      150/73        79      11:00 AM   Took Irbesartan 150 mg                     121/64       84   1:00 PM  10/24        87/51        94 10:30 AM                    120/71        96   3:45 PM  Had lab work done today at [facility].    Weight is fluctuating 170-171 range. I feel that I am retaining fluid, mainly in my feet.

## 2022-10-27 ENCOUNTER — TELEPHONE (OUTPATIENT)
Dept: OBSTETRICS AND GYNECOLOGY | Age: 77
End: 2022-10-27

## 2022-10-27 NOTE — TELEPHONE ENCOUNTER
[facility] calls recommending  left breast stereotactic bx, please advise if you will place orders, report in chart. Pt of nereida Hurley

## 2022-10-28 DIAGNOSIS — R92.8 ABNORMAL MAMMOGRAM: Primary | ICD-10-CM

## 2022-10-28 NOTE — TELEPHONE ENCOUNTER
Nabila Barakat knows how to order this and said she will show you, there is no order number for this type of follow up

## 2022-10-28 NOTE — TELEPHONE ENCOUNTER
"I have been working on this for 45 minutes and keep getting a hard stop It is asking me to select \"when these orders should be used: Lumbar medial branch block bilateral l3-s1 (1-2 weeks apart) and Bilateral L3-S1 radiofrequency ablation lumbar. \": I don't know what any of that means nor how it is related to a breast biopsy so I do not feel comfortable signing it. Please ask another NP or the ordering provider to help with this. Everyone has left the office for the day."

## 2022-10-31 DIAGNOSIS — R92.1 BREAST CALCIFICATION, LEFT: Primary | ICD-10-CM

## 2022-10-31 NOTE — TELEPHONE ENCOUNTER
Pt of nereida, please see messages, [facility] calls recommending  left breast stereotactic bx   Cyclophosphamide Counseling:  I discussed with the patient the risks of cyclophosphamide including but not limited to hair loss, hormonal abnormalities, decreased fertility, abdominal pain, diarrhea, nausea and vomiting, bone marrow suppression and infection. The patient understands that monitoring is required while taking this medication.

## 2022-11-07 ENCOUNTER — APPOINTMENT (OUTPATIENT)
Dept: WOMENS IMAGING | Facility: HOSPITAL | Age: 77
End: 2022-11-07

## 2022-11-07 PROCEDURE — 88305 TISSUE EXAM BY PATHOLOGIST: CPT

## 2022-11-07 PROCEDURE — A4648 IMPLANTABLE TISSUE MARKER: HCPCS | Performed by: RADIOLOGY

## 2022-11-07 PROCEDURE — 19081 BX BREAST 1ST LESION STRTCTC: CPT | Performed by: RADIOLOGY

## 2022-11-10 ENCOUNTER — APPOINTMENT (OUTPATIENT)
Dept: SLEEP MEDICINE | Facility: HOSPITAL | Age: 77
End: 2022-11-10

## 2022-11-12 DIAGNOSIS — I50.22 CHRONIC SYSTOLIC CHF (CONGESTIVE HEART FAILURE): ICD-10-CM

## 2022-11-14 RX ORDER — SACUBITRIL AND VALSARTAN 24; 26 MG/1; MG/1
TABLET, FILM COATED ORAL
Qty: 60 TABLET | Refills: 0 | Status: SHIPPED | OUTPATIENT
Start: 2022-11-14 | End: 2022-11-22

## 2022-11-15 DIAGNOSIS — D24.2 FIBROADENOMA OF BREAST, LEFT: Primary | ICD-10-CM

## 2022-11-21 ENCOUNTER — TELEPHONE (OUTPATIENT)
Dept: ENDOCRINOLOGY | Age: 77
End: 2022-11-21

## 2022-11-21 DIAGNOSIS — R73.03 PRE-DIABETES: ICD-10-CM

## 2022-11-21 DIAGNOSIS — E05.90 HYPERTHYROIDISM: Primary | ICD-10-CM

## 2022-11-22 ENCOUNTER — OFFICE VISIT (OUTPATIENT)
Dept: CARDIOLOGY | Facility: CLINIC | Age: 77
End: 2022-11-22

## 2022-11-22 ENCOUNTER — LAB (OUTPATIENT)
Dept: LAB | Facility: HOSPITAL | Age: 77
End: 2022-11-22

## 2022-11-22 VITALS
WEIGHT: 172 LBS | BODY MASS INDEX: 27 KG/M2 | HEART RATE: 80 BPM | DIASTOLIC BLOOD PRESSURE: 60 MMHG | HEIGHT: 67 IN | SYSTOLIC BLOOD PRESSURE: 118 MMHG

## 2022-11-22 DIAGNOSIS — I47.29 NSVT (NONSUSTAINED VENTRICULAR TACHYCARDIA): ICD-10-CM

## 2022-11-22 DIAGNOSIS — M79.606 TENDERNESS OF LOWER EXTREMITY, UNSPECIFIED LATERALITY: ICD-10-CM

## 2022-11-22 DIAGNOSIS — R60.0 BILATERAL LOWER EXTREMITY EDEMA: ICD-10-CM

## 2022-11-22 DIAGNOSIS — R73.03 PRE-DIABETES: ICD-10-CM

## 2022-11-22 DIAGNOSIS — E05.90 HYPERTHYROIDISM: ICD-10-CM

## 2022-11-22 DIAGNOSIS — R42 DIZZINESS: ICD-10-CM

## 2022-11-22 DIAGNOSIS — I42.8 NICM (NONISCHEMIC CARDIOMYOPATHY): Primary | ICD-10-CM

## 2022-11-22 LAB
ALBUMIN SERPL-MCNC: 4.3 G/DL (ref 3.5–5.2)
ALBUMIN/GLOB SERPL: 1.6 G/DL
ALP SERPL-CCNC: 67 U/L (ref 39–117)
ALT SERPL W P-5'-P-CCNC: 22 U/L (ref 1–33)
ANION GAP SERPL CALCULATED.3IONS-SCNC: 8.8 MMOL/L (ref 5–15)
AST SERPL-CCNC: 32 U/L (ref 1–32)
BILIRUB SERPL-MCNC: 0.6 MG/DL (ref 0–1.2)
BUN SERPL-MCNC: 19 MG/DL (ref 8–23)
BUN/CREAT SERPL: 24.1 (ref 7–25)
CALCIUM SPEC-SCNC: 9.5 MG/DL (ref 8.6–10.5)
CHLORIDE SERPL-SCNC: 103 MMOL/L (ref 98–107)
CHOLEST SERPL-MCNC: 191 MG/DL (ref 0–200)
CO2 SERPL-SCNC: 28.2 MMOL/L (ref 22–29)
CREAT SERPL-MCNC: 0.79 MG/DL (ref 0.57–1)
EGFRCR SERPLBLD CKD-EPI 2021: 77.6 ML/MIN/1.73
FOLATE SERPL-MCNC: 5.89 NG/ML (ref 4.78–24.2)
GLOBULIN UR ELPH-MCNC: 2.7 GM/DL
GLUCOSE SERPL-MCNC: 98 MG/DL (ref 65–99)
HBA1C MFR BLD: 5.1 % (ref 4.8–5.6)
HDLC SERPL-MCNC: 71 MG/DL (ref 40–60)
LDLC SERPL CALC-MCNC: 94 MG/DL (ref 0–100)
LDLC/HDLC SERPL: 1.26 {RATIO}
POTASSIUM SERPL-SCNC: 4.6 MMOL/L (ref 3.5–5.2)
PROT SERPL-MCNC: 7 G/DL (ref 6–8.5)
SODIUM SERPL-SCNC: 140 MMOL/L (ref 136–145)
T3FREE SERPL-MCNC: 3.03 PG/ML (ref 2–4.4)
T4 FREE SERPL-MCNC: 0.68 NG/DL (ref 0.93–1.7)
TRIGL SERPL-MCNC: 152 MG/DL (ref 0–150)
TSH SERPL DL<=0.05 MIU/L-ACNC: 1.26 UIU/ML (ref 0.27–4.2)
VIT B12 BLD-MCNC: 367 PG/ML (ref 211–946)
VLDLC SERPL-MCNC: 26 MG/DL (ref 5–40)

## 2022-11-22 PROCEDURE — 83036 HEMOGLOBIN GLYCOSYLATED A1C: CPT

## 2022-11-22 PROCEDURE — 82746 ASSAY OF FOLIC ACID SERUM: CPT

## 2022-11-22 PROCEDURE — 84439 ASSAY OF FREE THYROXINE: CPT

## 2022-11-22 PROCEDURE — 80061 LIPID PANEL: CPT

## 2022-11-22 PROCEDURE — 93000 ELECTROCARDIOGRAM COMPLETE: CPT | Performed by: NURSE PRACTITIONER

## 2022-11-22 PROCEDURE — 80053 COMPREHEN METABOLIC PANEL: CPT

## 2022-11-22 PROCEDURE — 82607 VITAMIN B-12: CPT

## 2022-11-22 PROCEDURE — 36415 COLL VENOUS BLD VENIPUNCTURE: CPT

## 2022-11-22 PROCEDURE — 99214 OFFICE O/P EST MOD 30 MIN: CPT | Performed by: NURSE PRACTITIONER

## 2022-11-22 PROCEDURE — 84443 ASSAY THYROID STIM HORMONE: CPT

## 2022-11-22 PROCEDURE — 84481 FREE ASSAY (FT-3): CPT

## 2022-11-22 RX ORDER — BUMETANIDE 1 MG/1
TABLET ORAL
Qty: 30 TABLET | Refills: 0 | Status: SHIPPED | OUTPATIENT
Start: 2022-11-22 | End: 2022-12-06

## 2022-11-22 RX ORDER — LOSARTAN POTASSIUM 25 MG/1
12.5 TABLET ORAL DAILY
Qty: 15 TABLET | Refills: 1 | Status: SHIPPED | OUTPATIENT
Start: 2022-11-22 | End: 2022-12-06

## 2022-11-22 RX ORDER — AMIODARONE HYDROCHLORIDE 200 MG/1
200 TABLET ORAL DAILY
Qty: 30 TABLET | Refills: 1 | Status: SHIPPED | OUTPATIENT
Start: 2022-11-29 | End: 2022-11-27 | Stop reason: HOSPADM

## 2022-11-22 RX ORDER — AMIODARONE HYDROCHLORIDE 200 MG/1
200 TABLET ORAL 2 TIMES DAILY
Qty: 14 TABLET | Refills: 0 | Status: SHIPPED | OUTPATIENT
Start: 2022-11-22 | End: 2022-11-27 | Stop reason: HOSPADM

## 2022-11-22 NOTE — PROGRESS NOTES
Date of Office Visit: 2022  Encounter Provider: JOSEPH Issa  Place of Service: [facility] CARDIOLOGY  Patient Name: Natalie Rosen  :1945    Chief Complaint   Patient presents with   • Cardiomyopathy   :     HPI: Natalie Rosen is a 77 y.o. female.  She is a patient with hypertension, hyperlipidemia, and nonischemic cardiomyopathy.  It appears she was previously followed by [facility].     In September of this year, she presented with shortness of breath following a recent COVID diagnosis.  She was treated with dexamethasone and remdesivir.  Symptoms improved with IV diuresis.  She had a few episodes of nonsustained ventricular tachycardia during which she was asymptomatic.  She was seen in consultation by Dr. Larios who recommended a ZIO at discharge.  This demonstrated normal sinus rhythm with a 4.5% PVC burden and 40 NSVT episodes lasting up to 10 beats.  There was also evidence of second-degree AV block.   She was last seen in the office by JOSEPH Constantino following that hospitalization at which time she was overall doing well.  She did report increased fatigue which was a chronic issue.  Her blood pressure was borderline low for which it was recommended she hold the irbesartan.  A repeat echocardiogram was ordered.  On 10/13, this demonstrated decreased LV systolic function with an EF of 36 to 40%.  Ultimately, she was switched to Entresto.   She contacted the office yesterday to report lightheadedness along with the swelling and pain in her lower extremities.  She was scheduled to see me today.   She continues struggling with dizziness which has been an ongoing issue for her for the last several months.  It is not necessarily positional.  Reportedly she will be sitting in her chair dizziness will just come over her.  It actually occurred while sitting in the waiting room.  Additionally, she reports low blood pressures ranging from  the 90s to low 100s.  She has actually not taken the Entresto yet today.  For the last 1 to 2 weeks, she has been wearing compression stockings for lower extremity swelling.  Subsequently, she has developed a lot of tenderness in her shins and calves.  The compression stockings have not improved with the swelling.  She denies any sodium indiscretion.  She denies any chest pain, shortness of breath, palpitations, or syncope.    Past Medical History:   Diagnosis Date   • Arthritis    • Asthma    • Benign essential hypertension    • Cancer (HCC)     MELANOMA   • Cardiomyopathy (HCC)    • CHF (congestive heart failure) (HCC)    • Goiter    • Heart murmur    • Hyperlipidemia    • Hyperthyroidism    • Mitral valve insufficiency    • PONV (postoperative nausea and vomiting)    • Seasonal allergies    • Urinary frequency        Past Surgical History:   Procedure Laterality Date   • CATARACT EXTRACTION     • COLONOSCOPY     • HYSTERECTOMY     • OTHER SURGICAL HISTORY      PT HAS HAD SKIN CA REMOVED FROM BACK AND FACE (UPPER LIP)    • MA TOTAL KNEE ARTHROPLASTY Right 2017    Procedure: RT TOTAL KNEE ARTHROPLASTY;  Surgeon: Doc Lance MD;  Location: [facility]  Service: Orthopedics   • MA TOTAL KNEE ARTHROPLASTY Left 2017    Procedure: LT TOTAL KNEE ARTHROPLASTY;  Surgeon: Doc Lance MD;  Location: [facility]  Service: Orthopedics       Social History     Socioeconomic History   • Marital status:    Tobacco Use   • Smoking status: Former     Packs/day: 1.00     Years: 25.00     Pack years: 25.00     Types: Cigarettes     Quit date: 1985     Years since quittin.9   • Smokeless tobacco: Never   Substance and Sexual Activity   • Alcohol use: Yes     Comment: DAILY COCKTAIL   • Drug use: No   • Sexual activity: Defer       Family History   Problem Relation Age of Onset   • Breast cancer Other    • Diabetes Other    • Cancer Mother    • Cancer Father    • Malig Hyperthermia Neg Hx         Review of Systems   Constitutional: Negative.   Cardiovascular: Positive for leg swelling. Negative for chest pain, dyspnea on exertion, orthopnea, paroxysmal nocturnal dyspnea and syncope.   Respiratory: Negative.    Hematologic/Lymphatic: Negative for bleeding problem.   Musculoskeletal: Negative for falls.   Gastrointestinal: Negative for melena.   Neurological: Positive for dizziness. Negative for light-headedness.       No Known Allergies      Current Outpatient Medications:   •  Acetaminophen (TYLENOL EXTRA STRENGTH PO), Take 1 tablet by mouth 4 (Four) Times a Day., Disp: , Rfl:   •  albuterol sulfate  (90 Base) MCG/ACT inhaler, Inhale 2 puffs Every 6 (Six) Hours As Needed., Disp: , Rfl:   •  Apoaequorin (PREVAGEN PO), Take 1 tablet by mouth Daily., Disp: , Rfl:   •  aspirin 81 MG chewable tablet, Chew 81 mg Daily., Disp: , Rfl:   •  bumetanide (BUMEX) 1 MG tablet, TAKE ONE TABLET BY MOUTH DAILY, Disp: 30 tablet, Rfl: 0  •  Carbinoxamine Maleate 4 MG tablet, Take 4 mg by mouth Daily., Disp: , Rfl:   •  carvedilol (COREG) 25 MG tablet, Take 1 tablet by mouth 2 (Two) Times a Day With Meals., Disp: 90 tablet, Rfl: 3  •  Diclofenac Sodium (VOLTAREN) 1 % gel gel, APPLY 4 GRAMS TO THE AFFECTED AREA FOUR TIMES A DAY AS DIRECTED (Patient taking differently: Apply 4 g topically to the appropriate area as directed 4 (Four) Times a Day. back), Disp: 100 g, Rfl: 5  •  ipratropium (ATROVENT) 0.06 % nasal spray, 2 sprays into the nostril(s) as directed by provider every night at bedtime., Disp: , Rfl:   •  methIMAzole (TAPAZOLE) 5 MG tablet, TAKE ONE TABLET BY MOUTH DAILY SIX DAYS OUT OF THE WEEK, Disp: 72 tablet, Rfl: 0  •  montelukast (SINGULAIR) 10 MG tablet, Take 1 tablet by mouth Every Morning., Disp: , Rfl:   •  Multiple Vitamins-Minerals (PRESERVISION AREDS 2 PO), Take 1 capsule by mouth 2 (Two) Times a Day., Disp: , Rfl:   •  omeprazole (priLOSEC) 40 MG capsule, Take 40 mg by mouth Daily As Needed.,  "Disp: , Rfl:   •  ondansetron (ZOFRAN) 4 MG tablet, Take 4 mg by mouth Daily. Takes every morning, Disp: , Rfl:   •  pravastatin (PRAVACHOL) 40 MG tablet, TAKE ONE TABLET BY MOUTH EVERY NIGHT AT BEDTIME, Disp: 30 tablet, Rfl: 0  •  Probiotic Product (PROBIOTIC PO), Take 1 tablet by mouth Daily., Disp: , Rfl:   •  triamcinolone (NASACORT) 55 MCG/ACT nasal inhaler, 2 sprays into each nostril Every Morning., Disp: , Rfl:   •  vitamin B-6 (PYRIDOXINE) 50 MG tablet, Take 50 mg by mouth Daily., Disp: , Rfl:   •  amiodarone (PACERONE) 200 MG tablet, Take 1 tablet by mouth 2 (Two) Times a Day., Disp: 14 tablet, Rfl: 0  •  [START ON 11/29/2022] amiodarone (PACERONE) 200 MG tablet, Take 1 tablet by mouth Daily., Disp: 30 tablet, Rfl: 1  •  losartan (Cozaar) 25 MG tablet, Take 0.5 tablets by mouth Daily., Disp: 15 tablet, Rfl: 1      Objective:     Vitals:    11/22/22 1352   BP: 118/60   Pulse: 80   Weight: 78 kg (172 lb)   Height: 170.2 cm (67\")     Body mass index is 26.94 kg/m².    PHYSICAL EXAM:    Neck:      Vascular: No JVD.   Pulmonary:      Effort: Pulmonary effort is normal.      Breath sounds: Normal breath sounds.   Cardiovascular:      Normal rate. Regular rhythm.      Murmurs: There is no murmur.      No gallop. No click. No rub.   Pulses:     Intact distal pulses.   Edema:     Peripheral edema present.          ECG 12 Lead    Date/Time: 11/22/2022 1:54 PM  Performed by: nAiyah Fournier APRN  Authorized by: Aniyah Fournier APRN   Comparison: compared with previous ECG from 9/28/2022  Similar to previous ECG  Rhythm: sinus rhythm  Ectopy: unifocal PVCs  Rate: normal  BPM: 80                Assessment:       Diagnosis Plan   1. NICM (nonischemic cardiomyopathy) (HCC)  ECG 12 Lead      2. Tenderness of lower extremity, unspecified laterality  Duplex Venous Lower Extremity - Bilateral CAR      3. Bilateral lower extremity edema  Duplex Venous Lower Extremity - Bilateral CAR      4. NSVT (nonsustained " ventricular tachycardia)  Holter Monitor - 48 Hour      5. Dizziness          Orders Placed This Encounter   Procedures   • Holter Monitor - 48 Hour     Standing Status:   Future     Standing Expiration Date:   11/22/2023     Scheduling Instructions:      Please schedule 12/1     Order Specific Question:   Reason for exam?     Answer:   Ventricular Arrhythmia     Order Specific Question:   Ventricular arrhythmia specification?     Answer:   VTach     Order Specific Question:   Ventricular arrhythmia specification?     Answer:   PVC   • ECG 12 Lead     This order was created via procedure documentation     Order Specific Question:   Release to patient     Answer:   Routine Release          Plan:       1.  Nonischemic cardiomyopathy.  EF 36 to 40%.  She denies any symptoms of heart failure and appears euvolemic on the current dose of Bumex.  She is on carvedilol and Entresto.  Of note, she has not tolerated spironolactone in the past due to hyperkalemia.  Given her reports of low blood pressure and dizziness, I recommended she hold the Entresto.  She may not be able to tolerate it and may need to be transitioned back to an ARB.      2.  Lower extremity tenderness and edema.  I suspect the tenderness is related to the compression stockings.  Given that they are not helping with the swelling, I recommended she stop wearing them.  Although my suspicion for DVT is low, I think it would be reasonable to check a doppler.  I offered to complete this STAT today, but she prefers to schedule it instead.        3.  NSVT.  This was noted during her hospitalization in September.  Subsequently, she wore a ZIO which demonstrated 40 NSVT episodes lasting up to 10 beats along with evidence of second degree AV block.  She is already on max dose of carvedilol.  I do wonder if this is contributing to her symptoms of dizziness.  She may need to see EP.  I will discuss with Dr. Larios.      Overall I think she stable.  Further  recommendations will be made pending the results of the above.      ADDENDUM:  I discussed the plan of care with Dr. Larios.  He agrees that she is not tolerating the Entresto and recommended switching her to losartan 12.5 mg daily.  In addition, he would like for her to start amiodarone.  She will take 200 g twice daily for 1 week followed by 200 mg daily.  She will have an EKG in 1 week.  Lastly, she will have a 48-hour monitor placed on 12/1.  She has an appointment to see Dr. Larios on 12/6.  I spoke with the patient regarding all of these instructions and she verbalized understanding.      As always, it has been a pleasure to participate in your patient's care.      Sincerely,         JOSEPH Welch

## 2022-11-25 ENCOUNTER — APPOINTMENT (OUTPATIENT)
Dept: GENERAL RADIOLOGY | Facility: HOSPITAL | Age: 77
End: 2022-11-25

## 2022-11-25 ENCOUNTER — HOSPITAL ENCOUNTER (OUTPATIENT)
Facility: HOSPITAL | Age: 77
Setting detail: OBSERVATION
Discharge: HOME | End: 2022-11-27
Attending: EMERGENCY MEDICINE | Admitting: EMERGENCY MEDICINE

## 2022-11-25 DIAGNOSIS — R42 DIZZINESS: Primary | ICD-10-CM

## 2022-11-25 DIAGNOSIS — I47.29 NSVT (NONSUSTAINED VENTRICULAR TACHYCARDIA): ICD-10-CM

## 2022-11-25 LAB
ALBUMIN SERPL-MCNC: 4.1 G/DL (ref 3.5–5.2)
ALBUMIN/GLOB SERPL: 1.5 G/DL
ALP SERPL-CCNC: 63 U/L (ref 39–117)
ALT SERPL W P-5'-P-CCNC: 19 U/L (ref 1–33)
ANION GAP SERPL CALCULATED.3IONS-SCNC: 10.1 MMOL/L (ref 5–15)
AST SERPL-CCNC: 26 U/L (ref 1–32)
BACTERIA UR QL AUTO: ABNORMAL /HPF
BASOPHILS # BLD AUTO: 0.07 10*3/MM3 (ref 0–0.2)
BASOPHILS NFR BLD AUTO: 1.2 % (ref 0–1.5)
BILIRUB SERPL-MCNC: 0.7 MG/DL (ref 0–1.2)
BILIRUB UR QL STRIP: NEGATIVE
BUN SERPL-MCNC: 17 MG/DL (ref 8–23)
BUN/CREAT SERPL: 19.8 (ref 7–25)
CALCIUM SPEC-SCNC: 9.4 MG/DL (ref 8.6–10.5)
CHLORIDE SERPL-SCNC: 103 MMOL/L (ref 98–107)
CHOLEST SERPL-MCNC: 197 MG/DL (ref 0–200)
CLARITY UR: CLEAR
CO2 SERPL-SCNC: 27.9 MMOL/L (ref 22–29)
COLOR UR: YELLOW
CREAT SERPL-MCNC: 0.86 MG/DL (ref 0.57–1)
DEPRECATED RDW RBC AUTO: 54 FL (ref 37–54)
EGFRCR SERPLBLD CKD-EPI 2021: 70.1 ML/MIN/1.73
EOSINOPHIL # BLD AUTO: 0.07 10*3/MM3 (ref 0–0.4)
EOSINOPHIL NFR BLD AUTO: 1.2 % (ref 0.3–6.2)
ERYTHROCYTE [DISTWIDTH] IN BLOOD BY AUTOMATED COUNT: 14.7 % (ref 12.3–15.4)
GLOBULIN UR ELPH-MCNC: 2.7 GM/DL
GLUCOSE SERPL-MCNC: 106 MG/DL (ref 65–99)
GLUCOSE UR STRIP-MCNC: NEGATIVE MG/DL
HCT VFR BLD AUTO: 37.7 % (ref 34–46.6)
HDLC SERPL-MCNC: 72 MG/DL (ref 40–60)
HGB BLD-MCNC: 12.1 G/DL (ref 12–15.9)
HGB UR QL STRIP.AUTO: NEGATIVE
HOLD SPECIMEN: NORMAL
HOLD SPECIMEN: NORMAL
HYALINE CASTS UR QL AUTO: ABNORMAL /LPF
IMM GRANULOCYTES # BLD AUTO: 0.01 10*3/MM3 (ref 0–0.05)
IMM GRANULOCYTES NFR BLD AUTO: 0.2 % (ref 0–0.5)
KETONES UR QL STRIP: ABNORMAL
LDLC SERPL CALC-MCNC: 105 MG/DL (ref 0–100)
LDLC/HDLC SERPL: 1.42 {RATIO}
LEUKOCYTE ESTERASE UR QL STRIP.AUTO: ABNORMAL
LYMPHOCYTES # BLD AUTO: 1.75 10*3/MM3 (ref 0.7–3.1)
LYMPHOCYTES NFR BLD AUTO: 30.6 % (ref 19.6–45.3)
MAGNESIUM SERPL-MCNC: 1.9 MG/DL (ref 1.6–2.4)
MCH RBC QN AUTO: 31.5 PG (ref 26.6–33)
MCHC RBC AUTO-ENTMCNC: 32.1 G/DL (ref 31.5–35.7)
MCV RBC AUTO: 98.2 FL (ref 79–97)
MONOCYTES # BLD AUTO: 0.61 10*3/MM3 (ref 0.1–0.9)
MONOCYTES NFR BLD AUTO: 10.7 % (ref 5–12)
NEUTROPHILS NFR BLD AUTO: 3.2 10*3/MM3 (ref 1.7–7)
NEUTROPHILS NFR BLD AUTO: 56.1 % (ref 42.7–76)
NITRITE UR QL STRIP: NEGATIVE
NRBC BLD AUTO-RTO: 0 /100 WBC (ref 0–0.2)
PH UR STRIP.AUTO: 6.5 [PH] (ref 5–8)
PLATELET # BLD AUTO: 185 10*3/MM3 (ref 140–450)
PMV BLD AUTO: 9.4 FL (ref 6–12)
POTASSIUM SERPL-SCNC: 4.3 MMOL/L (ref 3.5–5.2)
PROT SERPL-MCNC: 6.8 G/DL (ref 6–8.5)
PROT UR QL STRIP: ABNORMAL
QT INTERVAL: 412 MS
RBC # BLD AUTO: 3.84 10*6/MM3 (ref 3.77–5.28)
RBC # UR STRIP: ABNORMAL /HPF
REF LAB TEST METHOD: ABNORMAL
SODIUM SERPL-SCNC: 141 MMOL/L (ref 136–145)
SP GR UR STRIP: 1.02 (ref 1–1.03)
SQUAMOUS #/AREA URNS HPF: ABNORMAL /HPF
TRIGL SERPL-MCNC: 115 MG/DL (ref 0–150)
TROPONIN T SERPL-MCNC: <0.01 NG/ML (ref 0–0.03)
UROBILINOGEN UR QL STRIP: ABNORMAL
VLDLC SERPL-MCNC: 20 MG/DL (ref 5–40)
WBC # UR STRIP: ABNORMAL /HPF
WBC NRBC COR # BLD: 5.71 10*3/MM3 (ref 3.4–10.8)
WHOLE BLOOD HOLD COAG: NORMAL
WHOLE BLOOD HOLD SPECIMEN: NORMAL

## 2022-11-25 PROCEDURE — 84484 ASSAY OF TROPONIN QUANT: CPT

## 2022-11-25 PROCEDURE — 99284 EMERGENCY DEPT VISIT MOD MDM: CPT

## 2022-11-25 PROCEDURE — 81001 URINALYSIS AUTO W/SCOPE: CPT | Performed by: EMERGENCY MEDICINE

## 2022-11-25 PROCEDURE — 93005 ELECTROCARDIOGRAM TRACING: CPT

## 2022-11-25 PROCEDURE — 83735 ASSAY OF MAGNESIUM: CPT

## 2022-11-25 PROCEDURE — 80053 COMPREHEN METABOLIC PANEL: CPT

## 2022-11-25 PROCEDURE — 93005 ELECTROCARDIOGRAM TRACING: CPT | Performed by: EMERGENCY MEDICINE

## 2022-11-25 PROCEDURE — G0378 HOSPITAL OBSERVATION PER HR: HCPCS

## 2022-11-25 PROCEDURE — 85025 COMPLETE CBC W/AUTO DIFF WBC: CPT

## 2022-11-25 PROCEDURE — 71045 X-RAY EXAM CHEST 1 VIEW: CPT

## 2022-11-25 PROCEDURE — 36415 COLL VENOUS BLD VENIPUNCTURE: CPT

## 2022-11-25 PROCEDURE — 80061 LIPID PANEL: CPT

## 2022-11-25 PROCEDURE — 93010 ELECTROCARDIOGRAM REPORT: CPT | Performed by: STUDENT IN AN ORGANIZED HEALTH CARE EDUCATION/TRAINING PROGRAM

## 2022-11-25 RX ORDER — IPRATROPIUM BROMIDE 42 UG/1
2 SPRAY, METERED NASAL 3 TIMES DAILY
Status: DISCONTINUED | OUTPATIENT
Start: 2022-11-26 | End: 2022-11-27 | Stop reason: HOSPADM

## 2022-11-25 RX ORDER — METHIMAZOLE 5 MG/1
5 TABLET ORAL DAILY
Status: DISCONTINUED | OUTPATIENT
Start: 2022-11-26 | End: 2022-11-27 | Stop reason: HOSPADM

## 2022-11-25 RX ORDER — SODIUM CHLORIDE 0.9 % (FLUSH) 0.9 %
10 SYRINGE (ML) INJECTION EVERY 12 HOURS SCHEDULED
Status: DISCONTINUED | OUTPATIENT
Start: 2022-11-25 | End: 2022-11-27 | Stop reason: HOSPADM

## 2022-11-25 RX ORDER — LOSARTAN POTASSIUM 25 MG/1
12.5 TABLET ORAL DAILY
Status: DISCONTINUED | OUTPATIENT
Start: 2022-11-26 | End: 2022-11-27

## 2022-11-25 RX ORDER — SODIUM CHLORIDE 0.9 % (FLUSH) 0.9 %
10 SYRINGE (ML) INJECTION AS NEEDED
Status: DISCONTINUED | OUTPATIENT
Start: 2022-11-25 | End: 2022-11-27 | Stop reason: HOSPADM

## 2022-11-25 RX ORDER — SODIUM CHLORIDE 9 MG/ML
40 INJECTION, SOLUTION INTRAVENOUS AS NEEDED
Status: DISCONTINUED | OUTPATIENT
Start: 2022-11-25 | End: 2022-11-27 | Stop reason: HOSPADM

## 2022-11-25 RX ORDER — PRAVASTATIN SODIUM 40 MG
40 TABLET ORAL NIGHTLY
Status: DISCONTINUED | OUTPATIENT
Start: 2022-11-25 | End: 2022-11-27 | Stop reason: HOSPADM

## 2022-11-25 RX ORDER — BUMETANIDE 2 MG/1
1 TABLET ORAL DAILY
Status: DISCONTINUED | OUTPATIENT
Start: 2022-11-26 | End: 2022-11-27 | Stop reason: HOSPADM

## 2022-11-25 RX ORDER — ASPIRIN 81 MG/1
81 TABLET, CHEWABLE ORAL DAILY
Status: DISCONTINUED | OUTPATIENT
Start: 2022-11-26 | End: 2022-11-27 | Stop reason: HOSPADM

## 2022-11-25 RX ORDER — PANTOPRAZOLE SODIUM 40 MG/1
40 TABLET, DELAYED RELEASE ORAL EVERY MORNING
Status: DISCONTINUED | OUTPATIENT
Start: 2022-11-26 | End: 2022-11-27 | Stop reason: HOSPADM

## 2022-11-25 RX ORDER — MONTELUKAST SODIUM 10 MG/1
10 TABLET ORAL EVERY MORNING
Status: DISCONTINUED | OUTPATIENT
Start: 2022-11-26 | End: 2022-11-27 | Stop reason: HOSPADM

## 2022-11-25 RX ORDER — AMIODARONE HYDROCHLORIDE 200 MG/1
200 TABLET ORAL 2 TIMES DAILY
Status: DISCONTINUED | OUTPATIENT
Start: 2022-11-25 | End: 2022-11-26

## 2022-11-25 RX ADMIN — Medication 10 ML: at 21:00

## 2022-11-25 RX ADMIN — AMIODARONE HYDROCHLORIDE 200 MG: 200 TABLET ORAL at 23:34

## 2022-11-25 RX ADMIN — PRAVASTATIN SODIUM 40 MG: 40 TABLET ORAL at 23:34

## 2022-11-26 ENCOUNTER — APPOINTMENT (OUTPATIENT)
Dept: CARDIOLOGY | Facility: HOSPITAL | Age: 77
End: 2022-11-26

## 2022-11-26 ENCOUNTER — APPOINTMENT (OUTPATIENT)
Dept: NUCLEAR MEDICINE | Facility: HOSPITAL | Age: 77
End: 2022-11-26

## 2022-11-26 PROBLEM — D89.831 CYTOKINE RELEASE SYNDROME, GRADE 1: Status: RESOLVED | Noted: 2022-09-04 | Resolved: 2022-11-26

## 2022-11-26 PROBLEM — J96.01 ACUTE RESPIRATORY FAILURE WITH HYPOXIA: Status: RESOLVED | Noted: 2022-09-02 | Resolved: 2022-11-26

## 2022-11-26 PROBLEM — I50.43 ACUTE ON CHRONIC COMBINED SYSTOLIC AND DIASTOLIC CHF (CONGESTIVE HEART FAILURE) (HCC): Status: RESOLVED | Noted: 2022-09-02 | Resolved: 2022-11-26

## 2022-11-26 PROBLEM — U07.1 COVID-19: Status: RESOLVED | Noted: 2022-09-02 | Resolved: 2022-11-26

## 2022-11-26 PROBLEM — E87.1 HYPONATREMIA: Status: RESOLVED | Noted: 2022-09-02 | Resolved: 2022-11-26

## 2022-11-26 LAB
ANION GAP SERPL CALCULATED.3IONS-SCNC: 9 MMOL/L (ref 5–15)
BH CV LOWER VASCULAR LEFT COMMON FEMORAL AUGMENT: NORMAL
BH CV LOWER VASCULAR LEFT COMMON FEMORAL COMPETENT: NORMAL
BH CV LOWER VASCULAR LEFT COMMON FEMORAL COMPRESS: NORMAL
BH CV LOWER VASCULAR LEFT COMMON FEMORAL PHASIC: NORMAL
BH CV LOWER VASCULAR LEFT COMMON FEMORAL SPONT: NORMAL
BH CV LOWER VASCULAR LEFT DISTAL FEMORAL COMPRESS: NORMAL
BH CV LOWER VASCULAR LEFT GASTRONEMIUS COMPRESS: NORMAL
BH CV LOWER VASCULAR LEFT GREATER SAPH AK COMPRESS: NORMAL
BH CV LOWER VASCULAR LEFT GREATER SAPH BK COMPRESS: NORMAL
BH CV LOWER VASCULAR LEFT LESSER SAPH COMPRESS: NORMAL
BH CV LOWER VASCULAR LEFT MID FEMORAL AUGMENT: NORMAL
BH CV LOWER VASCULAR LEFT MID FEMORAL COMPETENT: NORMAL
BH CV LOWER VASCULAR LEFT MID FEMORAL COMPRESS: NORMAL
BH CV LOWER VASCULAR LEFT MID FEMORAL PHASIC: NORMAL
BH CV LOWER VASCULAR LEFT MID FEMORAL SPONT: NORMAL
BH CV LOWER VASCULAR LEFT PERONEAL COMPRESS: NORMAL
BH CV LOWER VASCULAR LEFT POPLITEAL AUGMENT: NORMAL
BH CV LOWER VASCULAR LEFT POPLITEAL COMPETENT: NORMAL
BH CV LOWER VASCULAR LEFT POPLITEAL COMPRESS: NORMAL
BH CV LOWER VASCULAR LEFT POPLITEAL PHASIC: NORMAL
BH CV LOWER VASCULAR LEFT POPLITEAL SPONT: NORMAL
BH CV LOWER VASCULAR LEFT POSTERIOR TIBIAL COMPRESS: NORMAL
BH CV LOWER VASCULAR LEFT PROFUNDA FEMORAL COMPRESS: NORMAL
BH CV LOWER VASCULAR LEFT PROXIMAL FEMORAL COMPRESS: NORMAL
BH CV LOWER VASCULAR LEFT SAPHENOFEMORAL JUNCTION COMPRESS: NORMAL
BH CV LOWER VASCULAR LEFT SOLEAL COMPRESS: NORMAL
BH CV LOWER VASCULAR RIGHT COMMON FEMORAL AUGMENT: NORMAL
BH CV LOWER VASCULAR RIGHT COMMON FEMORAL COMPETENT: NORMAL
BH CV LOWER VASCULAR RIGHT COMMON FEMORAL COMPRESS: NORMAL
BH CV LOWER VASCULAR RIGHT COMMON FEMORAL PHASIC: NORMAL
BH CV LOWER VASCULAR RIGHT COMMON FEMORAL SPONT: NORMAL
BH CV LOWER VASCULAR RIGHT DISTAL FEMORAL COMPRESS: NORMAL
BH CV LOWER VASCULAR RIGHT GASTRONEMIUS COMPRESS: NORMAL
BH CV LOWER VASCULAR RIGHT GREATER SAPH AK COMPRESS: NORMAL
BH CV LOWER VASCULAR RIGHT GREATER SAPH BK COMPRESS: NORMAL
BH CV LOWER VASCULAR RIGHT LESSER SAPH COMPRESS: NORMAL
BH CV LOWER VASCULAR RIGHT MID FEMORAL AUGMENT: NORMAL
BH CV LOWER VASCULAR RIGHT MID FEMORAL COMPETENT: NORMAL
BH CV LOWER VASCULAR RIGHT MID FEMORAL COMPRESS: NORMAL
BH CV LOWER VASCULAR RIGHT MID FEMORAL PHASIC: NORMAL
BH CV LOWER VASCULAR RIGHT MID FEMORAL SPONT: NORMAL
BH CV LOWER VASCULAR RIGHT PERONEAL COMPRESS: NORMAL
BH CV LOWER VASCULAR RIGHT POPLITEAL AUGMENT: NORMAL
BH CV LOWER VASCULAR RIGHT POPLITEAL COMPETENT: NORMAL
BH CV LOWER VASCULAR RIGHT POPLITEAL COMPRESS: NORMAL
BH CV LOWER VASCULAR RIGHT POPLITEAL PHASIC: NORMAL
BH CV LOWER VASCULAR RIGHT POPLITEAL SPONT: NORMAL
BH CV LOWER VASCULAR RIGHT POSTERIOR TIBIAL COMPRESS: NORMAL
BH CV LOWER VASCULAR RIGHT PROFUNDA FEMORAL COMPRESS: NORMAL
BH CV LOWER VASCULAR RIGHT PROXIMAL FEMORAL COMPRESS: NORMAL
BH CV LOWER VASCULAR RIGHT SAPHENOFEMORAL JUNCTION COMPRESS: NORMAL
BUN SERPL-MCNC: 15 MG/DL (ref 8–23)
BUN/CREAT SERPL: 24.6 (ref 7–25)
CALCIUM SPEC-SCNC: 8.8 MG/DL (ref 8.6–10.5)
CHLORIDE SERPL-SCNC: 106 MMOL/L (ref 98–107)
CO2 SERPL-SCNC: 26 MMOL/L (ref 22–29)
CREAT SERPL-MCNC: 0.61 MG/DL (ref 0.57–1)
DEPRECATED RDW RBC AUTO: 49.8 FL (ref 37–54)
EGFRCR SERPLBLD CKD-EPI 2021: 92.8 ML/MIN/1.73
ERYTHROCYTE [DISTWIDTH] IN BLOOD BY AUTOMATED COUNT: 14.3 % (ref 12.3–15.4)
GLUCOSE SERPL-MCNC: 90 MG/DL (ref 65–99)
HCT VFR BLD AUTO: 31.4 % (ref 34–46.6)
HGB BLD-MCNC: 10.3 G/DL (ref 12–15.9)
MAXIMAL PREDICTED HEART RATE: 144 BPM
MCH RBC QN AUTO: 31.5 PG (ref 26.6–33)
MCHC RBC AUTO-ENTMCNC: 32.8 G/DL (ref 31.5–35.7)
MCV RBC AUTO: 96 FL (ref 79–97)
PLATELET # BLD AUTO: 152 10*3/MM3 (ref 140–450)
PMV BLD AUTO: 9.6 FL (ref 6–12)
POTASSIUM SERPL-SCNC: 3.6 MMOL/L (ref 3.5–5.2)
QT INTERVAL: 527 MS
RBC # BLD AUTO: 3.27 10*6/MM3 (ref 3.77–5.28)
SODIUM SERPL-SCNC: 141 MMOL/L (ref 136–145)
STRESS TARGET HR: 122 BPM
TROPONIN T SERPL-MCNC: <0.01 NG/ML (ref 0–0.03)
WBC NRBC COR # BLD: 4.62 10*3/MM3 (ref 3.4–10.8)

## 2022-11-26 PROCEDURE — 93325 DOPPLER ECHO COLOR FLOW MAPG: CPT

## 2022-11-26 PROCEDURE — 93308 TTE F-UP OR LMTD: CPT

## 2022-11-26 PROCEDURE — 93010 ELECTROCARDIOGRAM REPORT: CPT | Performed by: INTERNAL MEDICINE

## 2022-11-26 PROCEDURE — 93005 ELECTROCARDIOGRAM TRACING: CPT

## 2022-11-26 PROCEDURE — G0378 HOSPITAL OBSERVATION PER HR: HCPCS

## 2022-11-26 PROCEDURE — 25010000002 PERFLUTREN (DEFINITY) 8.476 MG IN SODIUM CHLORIDE (PF) 0.9 % 10 ML INJECTION: Performed by: INTERNAL MEDICINE

## 2022-11-26 PROCEDURE — 93970 EXTREMITY STUDY: CPT

## 2022-11-26 PROCEDURE — 85027 COMPLETE CBC AUTOMATED: CPT

## 2022-11-26 PROCEDURE — 93308 TTE F-UP OR LMTD: CPT | Performed by: INTERNAL MEDICINE

## 2022-11-26 PROCEDURE — 93321 DOPPLER ECHO F-UP/LMTD STD: CPT

## 2022-11-26 PROCEDURE — 93321 DOPPLER ECHO F-UP/LMTD STD: CPT | Performed by: INTERNAL MEDICINE

## 2022-11-26 PROCEDURE — 99214 OFFICE O/P EST MOD 30 MIN: CPT | Performed by: INTERNAL MEDICINE

## 2022-11-26 PROCEDURE — 80048 BASIC METABOLIC PNL TOTAL CA: CPT

## 2022-11-26 PROCEDURE — 93325 DOPPLER ECHO COLOR FLOW MAPG: CPT | Performed by: INTERNAL MEDICINE

## 2022-11-26 PROCEDURE — 84484 ASSAY OF TROPONIN QUANT: CPT

## 2022-11-26 RX ORDER — CARVEDILOL 25 MG/1
25 TABLET ORAL 2 TIMES DAILY WITH MEALS
Status: DISCONTINUED | OUTPATIENT
Start: 2022-11-26 | End: 2022-11-27 | Stop reason: HOSPADM

## 2022-11-26 RX ORDER — AMIODARONE HYDROCHLORIDE 200 MG/1
200 TABLET ORAL
Status: DISCONTINUED | OUTPATIENT
Start: 2022-11-27 | End: 2022-11-26

## 2022-11-26 RX ADMIN — PRAVASTATIN SODIUM 40 MG: 40 TABLET ORAL at 20:49

## 2022-11-26 RX ADMIN — MONTELUKAST SODIUM 10 MG: 10 TABLET, FILM COATED ORAL at 12:52

## 2022-11-26 RX ADMIN — Medication 10 ML: at 12:46

## 2022-11-26 RX ADMIN — CARVEDILOL 25 MG: 25 TABLET, FILM COATED ORAL at 19:06

## 2022-11-26 RX ADMIN — BUMETANIDE 1 MG: 2 TABLET ORAL at 12:45

## 2022-11-26 RX ADMIN — PERFLUTREN 3 ML: 6.52 INJECTION, SUSPENSION INTRAVENOUS at 18:29

## 2022-11-26 RX ADMIN — LOSARTAN POTASSIUM 12.5 MG: 25 TABLET, FILM COATED ORAL at 12:45

## 2022-11-26 RX ADMIN — METHIMAZOLE 5 MG: 5 TABLET ORAL at 12:45

## 2022-11-27 ENCOUNTER — APPOINTMENT (OUTPATIENT)
Dept: NUCLEAR MEDICINE | Facility: HOSPITAL | Age: 77
End: 2022-11-27

## 2022-11-27 ENCOUNTER — READMISSION MANAGEMENT (OUTPATIENT)
Dept: CALL CENTER | Facility: HOSPITAL | Age: 77
End: 2022-11-27

## 2022-11-27 VITALS
BODY MASS INDEX: 26.84 KG/M2 | SYSTOLIC BLOOD PRESSURE: 146 MMHG | RESPIRATION RATE: 17 BRPM | WEIGHT: 171 LBS | HEART RATE: 70 BPM | TEMPERATURE: 98.2 F | DIASTOLIC BLOOD PRESSURE: 64 MMHG | OXYGEN SATURATION: 96 % | HEIGHT: 67 IN

## 2022-11-27 LAB
BH CV ECHO MEAS - EDV(CUBED): 250 ML
BH CV ECHO MEAS - EDV(MOD-SP2): 122 ML
BH CV ECHO MEAS - EDV(MOD-SP4): 143 ML
BH CV ECHO MEAS - EF(MOD-BP): 37.3 %
BH CV ECHO MEAS - EF(MOD-SP2): 36.1 %
BH CV ECHO MEAS - EF(MOD-SP4): 37.1 %
BH CV ECHO MEAS - ESV(CUBED): 124.4 ML
BH CV ECHO MEAS - ESV(MOD-SP2): 78 ML
BH CV ECHO MEAS - ESV(MOD-SP4): 90 ML
BH CV ECHO MEAS - FS: 20.8 %
BH CV ECHO MEAS - IVS/LVPW: 0.9 CM
BH CV ECHO MEAS - IVSD: 0.9 CM
BH CV ECHO MEAS - LV DIASTOLIC VOL/BSA (35-75): 75.6 CM2
BH CV ECHO MEAS - LV MASS(C)D: 251.3 GRAMS
BH CV ECHO MEAS - LV SYSTOLIC VOL/BSA (12-30): 47.6 CM2
BH CV ECHO MEAS - LVIDD: 6.3 CM
BH CV ECHO MEAS - LVIDS: 5 CM
BH CV ECHO MEAS - LVPWD: 1 CM
BH CV ECHO MEAS - SI(MOD-SP2): 23.3 ML/M2
BH CV ECHO MEAS - SI(MOD-SP4): 28 ML/M2
BH CV ECHO MEAS - SV(MOD-SP2): 44 ML
BH CV ECHO MEAS - SV(MOD-SP4): 53 ML
BH CV NUCLEAR PRIOR STUDY: 2
BH CV REST NUCLEAR ISOTOPE DOSE: 11.8 MCI
BH CV STRESS BP STAGE 1: NORMAL
BH CV STRESS DURATION MIN STAGE 1: 2
BH CV STRESS DURATION SEC STAGE 1: 52
BH CV STRESS GRADE STAGE 1: 10
BH CV STRESS HR STAGE 1: 125
BH CV STRESS METS STAGE 1: 5
BH CV STRESS NUCLEAR ISOTOPE DOSE: 34.6 MCI
BH CV STRESS PROTOCOL 1: NORMAL
BH CV STRESS RECOVERY BP: NORMAL MMHG
BH CV STRESS RECOVERY HR: 83 BPM
BH CV STRESS SPEED STAGE 1: 1.7
BH CV STRESS STAGE 1: 1
GLUCOSE BLDC GLUCOMTR-MCNC: 103 MG/DL (ref 70–130)
LV EF NUC BP: 27 %
MAXIMAL PREDICTED HEART RATE: 144 BPM
MAXIMAL PREDICTED HEART RATE: 144 BPM
PERCENT MAX PREDICTED HR: 89.58 %
STRESS BASELINE BP: NORMAL MMHG
STRESS BASELINE HR: 74 BPM
STRESS PERCENT HR: 105 %
STRESS POST ESTIMATED WORKLOAD: 4.6 METS
STRESS POST EXERCISE DUR MIN: 2 MIN
STRESS POST EXERCISE DUR SEC: 52 SEC
STRESS POST PEAK BP: NORMAL MMHG
STRESS POST PEAK HR: 129 BPM
STRESS TARGET HR: 122 BPM
STRESS TARGET HR: 122 BPM

## 2022-11-27 PROCEDURE — 93018 CV STRESS TEST I&R ONLY: CPT | Performed by: INTERNAL MEDICINE

## 2022-11-27 PROCEDURE — 78452 HT MUSCLE IMAGE SPECT MULT: CPT

## 2022-11-27 PROCEDURE — G0378 HOSPITAL OBSERVATION PER HR: HCPCS

## 2022-11-27 PROCEDURE — 82962 GLUCOSE BLOOD TEST: CPT

## 2022-11-27 PROCEDURE — 93016 CV STRESS TEST SUPVJ ONLY: CPT | Performed by: INTERNAL MEDICINE

## 2022-11-27 PROCEDURE — 0 TECHNETIUM SESTAMIBI: Performed by: EMERGENCY MEDICINE

## 2022-11-27 PROCEDURE — A9500 TC99M SESTAMIBI: HCPCS | Performed by: EMERGENCY MEDICINE

## 2022-11-27 PROCEDURE — 93017 CV STRESS TEST TRACING ONLY: CPT

## 2022-11-27 PROCEDURE — 78452 HT MUSCLE IMAGE SPECT MULT: CPT | Performed by: INTERNAL MEDICINE

## 2022-11-27 RX ORDER — LOSARTAN POTASSIUM 25 MG/1
25 TABLET ORAL DAILY
Status: DISCONTINUED | OUTPATIENT
Start: 2022-11-28 | End: 2022-11-27 | Stop reason: HOSPADM

## 2022-11-27 RX ADMIN — Medication 10 ML: at 10:55

## 2022-11-27 RX ADMIN — ASPIRIN 81 MG: 81 TABLET, CHEWABLE ORAL at 10:52

## 2022-11-27 RX ADMIN — TECHNETIUM TC 99M SESTAMIBI 1 DOSE: 1 INJECTION INTRAVENOUS at 07:35

## 2022-11-27 RX ADMIN — Medication 10 ML: at 06:50

## 2022-11-27 RX ADMIN — MONTELUKAST SODIUM 10 MG: 10 TABLET, FILM COATED ORAL at 10:54

## 2022-11-27 RX ADMIN — PANTOPRAZOLE SODIUM 40 MG: 40 TABLET, DELAYED RELEASE ORAL at 10:54

## 2022-11-27 RX ADMIN — TECHNETIUM TC 99M SESTAMIBI 1 DOSE: 1 INJECTION INTRAVENOUS at 10:00

## 2022-11-28 ENCOUNTER — TELEPHONE (OUTPATIENT)
Dept: CARDIOLOGY | Facility: CLINIC | Age: 77
End: 2022-11-28

## 2022-11-28 DIAGNOSIS — I49.3 PVC (PREMATURE VENTRICULAR CONTRACTION): Primary | ICD-10-CM

## 2022-11-28 NOTE — OUTREACH NOTE
Prep Survey    Flowsheet Row Responses   [facility]thodoxy facility patient discharged from? [facility]   Is LACE score < 7 ? No   Emergency Room discharge w/ pulse ox? No   Eligibility Readm Mgmt   Discharge diagnosis Congestive heart failure   Does the patient have one of the following disease processes/diagnoses(primary or secondary)? CHF   Does the patient have Home health ordered? No   Is there a DME ordered? No   Prep survey completed? Yes          DEREK FREIRE - Registered Nurse

## 2022-11-28 NOTE — TELEPHONE ENCOUNTER
----- Message from Peggy Ivory sent at 11/28/2022  3:06 PM EST -----  Regarding: RE: Dx code not covered my Medicare  If you place a new order with the updated code- I can then link it to the current one scheduled for tomorrow.    -kk    ----- Message -----  From: Aniyah Fournier APRN  Sent: 11/28/2022   2:34 PM EST  To: Peggy Ivory  Subject: RE: Dx code not covered my Medicare              What do I need to do for this?    ----- Message -----  From: Peggy Ivory  Sent: 11/28/2022   2:33 PM EST  To: JOSEPH Bruce  Subject: RE: Dx code not covered my Medicare              Hello,    If the ICD-10 is I49.3 for the PVCs then yes that would be covered by Medicare for a holter.    -kk   ----- Message -----  From: Aniyah Fournier APRN  Sent: 11/28/2022   2:23 PM EST  To: Peggy Ivory  Subject: RE: Dx code not covered my Medicare              What about frequent PVCs?    ----- Message -----  From: Peggy Ivory  Sent: 11/28/2022   2:12 PM EST  To: JOSEPH Bruce, Karla Esquivel MA  Subject: Dx code not covered my Medicare                  Hello,     The dx code listed for this test, I47.29 is not Medicare compliant for billing purposes. Is there a different or additional dx code that could be added to the order to ensure pt's insurance will cover this holter? If so, please place a new order with the updated dx code and we will get switched for the pt.     Thank you,   Bibiana FIELD   Pre-Cert

## 2022-11-29 ENCOUNTER — APPOINTMENT (OUTPATIENT)
Dept: CARDIOLOGY | Facility: HOSPITAL | Age: 77
End: 2022-11-29

## 2022-11-29 ENCOUNTER — READMISSION MANAGEMENT (OUTPATIENT)
Dept: CALL CENTER | Facility: HOSPITAL | Age: 77
End: 2022-11-29

## 2022-11-29 NOTE — OUTREACH NOTE
CHF Week 1 Survey    Flowsheet Row Responses   [facility] patient discharged from? [facility]   Does the patient have one of the following disease processes/diagnoses(primary or secondary)? CHF   CHF Week 1 attempt successful? Yes   Call start time 1440   Call end time 1447   Discharge diagnosis Congestive heart failure   Meds reviewed with patient/caregiver? Yes   Does the patient have all medications ordered at discharge? N/A   Does the patient have a primary care provider?  Yes   Does the patient have an appointment with their PCP within 7 days of discharge? Yes   Comments regarding PCP will also begin Cardio rehab   Has the patient kept scheduled appointments due by today? N/A   Pulse Ox monitoring None   Psychosocial issues? No   Comments Pt reports no further dizziness/lightheadedness. Denies SOA or edema. She is inquiring about cardiac rehab. Advised to call Cardio office tomorrow if has not heard from rehab.   Did the patient receive a copy of their discharge instructions? Yes   Nursing interventions Reviewed instructions with patient   What is the patient's perception of their health status since discharge? Improving   Nursing interventions Nurse provided patient education   CHF Zone this Call Green Zone   Green Zone No new or worsening shortness of breath, Patient reports doing well   Green Zone Interventions Meds as directed, Follow up visits planned    CHF Week 1 call completed? Yes   Revoked No further contact(revokes)-requires comment   Graduated/Revoked comments stable   Call end time 1447          TERRY DAVEY - Registered Nurse

## 2022-12-06 ENCOUNTER — OFFICE VISIT (OUTPATIENT)
Dept: CARDIOLOGY | Facility: CLINIC | Age: 77
End: 2022-12-06

## 2022-12-06 ENCOUNTER — LAB (OUTPATIENT)
Dept: LAB | Facility: HOSPITAL | Age: 77
End: 2022-12-06

## 2022-12-06 VITALS
OXYGEN SATURATION: 100 % | HEART RATE: 81 BPM | HEIGHT: 67 IN | DIASTOLIC BLOOD PRESSURE: 52 MMHG | WEIGHT: 176 LBS | SYSTOLIC BLOOD PRESSURE: 108 MMHG | BODY MASS INDEX: 27.62 KG/M2

## 2022-12-06 DIAGNOSIS — I47.29 NSVT (NONSUSTAINED VENTRICULAR TACHYCARDIA): ICD-10-CM

## 2022-12-06 DIAGNOSIS — I42.8 NICM (NONISCHEMIC CARDIOMYOPATHY): Primary | ICD-10-CM

## 2022-12-06 DIAGNOSIS — I42.8 NICM (NONISCHEMIC CARDIOMYOPATHY): ICD-10-CM

## 2022-12-06 LAB
ANION GAP SERPL CALCULATED.3IONS-SCNC: 7 MMOL/L (ref 5–15)
BUN SERPL-MCNC: 19 MG/DL (ref 8–23)
BUN/CREAT SERPL: 22.9 (ref 7–25)
CALCIUM SPEC-SCNC: 9.6 MG/DL (ref 8.6–10.5)
CHLORIDE SERPL-SCNC: 102 MMOL/L (ref 98–107)
CO2 SERPL-SCNC: 30 MMOL/L (ref 22–29)
CREAT SERPL-MCNC: 0.83 MG/DL (ref 0.57–1)
EGFRCR SERPLBLD CKD-EPI 2021: 73.2 ML/MIN/1.73
GLUCOSE SERPL-MCNC: 113 MG/DL (ref 65–99)
MAGNESIUM SERPL-MCNC: 2 MG/DL (ref 1.6–2.4)
NT-PROBNP SERPL-MCNC: 673 PG/ML (ref 0–1800)
POTASSIUM SERPL-SCNC: 4.4 MMOL/L (ref 3.5–5.2)
SODIUM SERPL-SCNC: 139 MMOL/L (ref 136–145)

## 2022-12-06 PROCEDURE — 36415 COLL VENOUS BLD VENIPUNCTURE: CPT

## 2022-12-06 PROCEDURE — 83735 ASSAY OF MAGNESIUM: CPT

## 2022-12-06 PROCEDURE — 83880 ASSAY OF NATRIURETIC PEPTIDE: CPT

## 2022-12-06 PROCEDURE — 99214 OFFICE O/P EST MOD 30 MIN: CPT | Performed by: INTERNAL MEDICINE

## 2022-12-06 PROCEDURE — 80048 BASIC METABOLIC PNL TOTAL CA: CPT

## 2022-12-06 PROCEDURE — 93000 ELECTROCARDIOGRAM COMPLETE: CPT | Performed by: INTERNAL MEDICINE

## 2022-12-06 RX ORDER — METOPROLOL SUCCINATE 50 MG/1
50 TABLET, EXTENDED RELEASE ORAL DAILY
Qty: 90 TABLET | Refills: 3 | Status: SHIPPED | OUTPATIENT
Start: 2022-12-06

## 2022-12-06 RX ORDER — AMIODARONE HYDROCHLORIDE 200 MG/1
200 TABLET ORAL DAILY
Qty: 90 TABLET | Refills: 3 | Status: SHIPPED | OUTPATIENT
Start: 2022-12-06 | End: 2023-03-20 | Stop reason: HOSPADM

## 2022-12-06 RX ORDER — BUMETANIDE 1 MG/1
1 TABLET ORAL 3 TIMES WEEKLY
Start: 2022-12-06 | End: 2023-03-09 | Stop reason: SDUPTHER

## 2022-12-06 RX ORDER — LISINOPRIL 5 MG/1
5 TABLET ORAL EVERY EVENING
Qty: 90 TABLET | Refills: 3 | Status: SHIPPED | OUTPATIENT
Start: 2022-12-06 | End: 2022-12-28

## 2022-12-06 NOTE — PROGRESS NOTES
Please notify Natalie that her blood work from today shows a normal kidney function and electrolyte levels.  Let me know if she has any questions.  Thank you

## 2022-12-06 NOTE — PROGRESS NOTES
PATIENTINFORMATION    Date of Office Visit: 2022  Encounter Provider: Drake Larios MD  Place of Service: [facility] CARDIOLOGY  Patient Name: Natalie Rosen  : 1945    Subjective:     Encounter Date:2022      Patient ID: Natalie Rosen is a 77 y.o. female.    No chief complaint on file.    HPI  Ms. Rosen is a pleasant 77 years old lady who came to cardiology clinic for follow-up visit.   She was recently admitted with worsening dizziness.  She has had issues with dizziness for several months now.  She is not sure about any known exacerbating or relieving factors but she will suddenly feel  lightheaded and weak.  Episodes could last for few hours.  She denies passing out.  Usually occurs during activities.  She denies any palpitations.  She has not been walking regularly or exercising because of lightheadedness.  She denies significant shortness of breath, orthopnea, PND, palpitations or syncope.  No chest pain.  Home blood pressure readings look normal but in office today blood pressure was 108/52 with heart rate of 81.     She had treadmill test that she did only for 3 minutes and stopped because of shortness of breath.  No significant arrhythmias noted during treadmill test.      ROS  All systems reviewed and negative except as noted in HPI.    Past Medical History:   Diagnosis Date   • Arthritis    • Asthma    • Benign essential hypertension    • Cancer (HCC)     MELANOMA   • Cardiomyopathy (HCC)    • CHF (congestive heart failure) (HCC)    • Goiter    • Heart murmur    • Hyperlipidemia    • Hyperthyroidism    • Mitral valve insufficiency    • PONV (postoperative nausea and vomiting)    • Seasonal allergies    • Urinary frequency        Past Surgical History:   Procedure Laterality Date   • CATARACT EXTRACTION     • COLONOSCOPY     • HYSTERECTOMY     • OTHER SURGICAL HISTORY      PT HAS HAD SKIN CA REMOVED FROM BACK AND FACE (UPPER LIP)    • [place] TOTAL KNEE  ARTHROPLASTY Right 2017    Procedure: RT TOTAL KNEE ARTHROPLASTY;  Surgeon: Doc Lance MD;  Location: [facility] OR;  Service: Orthopedics   • AR TOTAL KNEE ARTHROPLASTY Left 2017    Procedure: LT TOTAL KNEE ARTHROPLASTY;  Surgeon: Doc Lance MD;  Location: [facility];  Service: Orthopedics       Social History     Socioeconomic History   • Marital status:    Tobacco Use   • Smoking status: Former     Packs/day: 1.00     Years: 25.00     Pack years: 25.00     Types: Cigarettes     Quit date: 1985     Years since quittin.9   • Smokeless tobacco: Never   Substance and Sexual Activity   • Alcohol use: Yes     Comment: DAILY COCKTAIL   • Drug use: No   • Sexual activity: Defer       Family History   Problem Relation Age of Onset   • Breast cancer Other    • Diabetes Other    • Cancer Mother    • Cancer Father    • Malig Hyperthermia Neg Hx            ECG 12 Lead    Date/Time: 2022 7:43 AM  Performed by: Drake Larios MD  Authorized by: Drake Larios MD   Comparison: compared with previous ECG from 2022  Similar to previous ECG  Comparison to previous ECG: Frequent PVCs noted on this tracing  Rhythm: sinus rhythm  Ectopy: unifocal PVCs  Rate: normal  Conduction: conduction normal  ST Segments: ST segments normal  T Waves: T waves normal  QRS axis: normal  Other: no other findings    Clinical impression: abnormal EKG               Objective:     There were no vitals taken for this visit. There is no height or weight on file to calculate BMI.     Constitutional:       General: Not in acute distress.     Appearance: Well-developed. Not diaphoretic.   Eyes:      Pupils: Pupils are equal, round, and reactive to light.   HENT:      Head: Normocephalic and atraumatic.   Neck:      Thyroid: No thyromegaly.   Pulmonary:      Effort: Pulmonary effort is normal. No respiratory distress.      Breath sounds: Normal breath sounds. No wheezing. No rales.   Chest:       Chest wall: Not tender to palpatation.   Cardiovascular:      Normal rate. Regular rhythm.      No gallop.   Pulses:     Intact distal pulses.   Edema:     Peripheral edema absent.   Abdominal:      General: Bowel sounds are normal. There is no distension.      Palpations: Abdomen is soft.      Tenderness: There is no guarding.   Musculoskeletal: Normal range of motion.         General: No deformity.      Cervical back: Normal range of motion and neck supple. Skin:     General: Skin is warm and dry.      Findings: No rash.   Neurological:      Mental Status: Alert and oriented to person, place, and time.      Cranial Nerves: No cranial nerve deficit.      Deep Tendon Reflexes: Reflexes are normal and symmetric.   Psychiatric:         Judgment: Judgment normal.         Review Of Data: I have reviewed pertinent recent labs, images and documents and pertinent findings included in HPI or assessment below.    Lipid Panel    Lipid Panel 11/22/22 11/25/22   Total Cholesterol 191 197   Triglycerides 152 (A) 115   HDL Cholesterol 71 (A) 72 (A)   VLDL Cholesterol 26 20   LDL Cholesterol  94 105 (A)   LDL/HDL Ratio 1.26 1.42   (A) Abnormal value                Assessment/Plan:         1.  Nonischemic cardiomyopathy with most recent left ventricular ejection fraction of 37.5%./Chronic systolic congestive heart failure  2.  Frequent PVCs with frequent nonsustained VT on Holter monitoring  3.  Dizziness    Ms. Rosen continues to have issues with dizziness.  Suspect likely to be due to hypotensive episodes.  She is not in overt heart failure and not overtly volume overloaded during exam today.  EKG shows sinus with frequent PVCs.  She has been on Coreg 25 mg p.o. twice daily for more than a decade and recently restarted on losartan 25 in the morning.  Heart rate in the 80s.  I will switch it to once daily extended release metoprolol in the morning and lisinopril 5 mg at night.  Also restart amiodarone 200 mg p.o.  daily.  She will use Bumex less frequently and as needed instead of daily dosing.  Check BMP and mag level  I will refer her to cardiac rehab   Repeat holter during follow up visit     Return to clinic in 1 month or sooner with any concerning symptoms    Diagnosis and plan of care discussed with patient and verbalized understanding.            Your medication list          Accurate as of December 6, 2022  7:47 AM. If you have any questions, ask your nurse or doctor.            CHANGE how you take these medications      Instructions Last Dose Given Next Dose Due   Diclofenac Sodium 1 % gel gel  Commonly known as: VOLTAREN  What changed: See the new instructions.      APPLY 4 GRAMS TO THE AFFECTED AREA FOUR TIMES A DAY AS DIRECTED          CONTINUE taking these medications      Instructions Last Dose Given Next Dose Due   albuterol sulfate  (90 Base) MCG/ACT inhaler  Commonly known as: PROVENTIL HFA;VENTOLIN HFA;PROAIR HFA      Inhale 2 puffs Every 6 (Six) Hours As Needed.       aspirin 81 MG chewable tablet      Chew 81 mg Daily.       bumetanide 1 MG tablet  Commonly known as: BUMEX      TAKE ONE TABLET BY MOUTH DAILY       Carbinoxamine Maleate 4 MG tablet      Take 4 mg by mouth Daily.       carvedilol 25 MG tablet  Commonly known as: COREG      Take 1 tablet by mouth 2 (Two) Times a Day With Meals.       ipratropium 0.06 % nasal spray  Commonly known as: ATROVENT      2 sprays into the nostril(s) as directed by provider every night at bedtime.       losartan 25 MG tablet  Commonly known as: Cozaar      Take 0.5 tablets by mouth Daily.       methIMAzole 5 MG tablet  Commonly known as: TAPAZOLE      TAKE ONE TABLET BY MOUTH DAILY SIX DAYS OUT OF THE WEEK       montelukast 10 MG tablet  Commonly known as: SINGULAIR      Take 1 tablet by mouth Every Morning.       omeprazole 40 MG capsule  Commonly known as: priLOSEC      Take 40 mg by mouth Daily As Needed.       ondansetron 4 MG tablet  Commonly known as:  ZOFRAN      Take 4 mg by mouth Daily. Takes every morning       pravastatin 40 MG tablet  Commonly known as: PRAVACHOL      TAKE ONE TABLET BY MOUTH EVERY NIGHT AT BEDTIME       PRESERVISION AREDS 2 PO      Take 1 capsule by mouth 2 (Two) Times a Day.       PREVAGEN PO      Take 1 tablet by mouth Daily.       PROBIOTIC PO      Take 1 tablet by mouth Daily.       triamcinolone 55 MCG/ACT nasal inhaler  Commonly known as: NASACORT      2 sprays into each nostril Every Morning.       TYLENOL EXTRA STRENGTH PO      Take 1 tablet by mouth 4 (Four) Times a Day.       vitamin B-6 50 MG tablet  Commonly known as: PYRIDOXINE      Take 50 mg by mouth Daily.                  Drake Larios MD  12/06/22  07:47 EST

## 2022-12-07 ENCOUNTER — TELEPHONE (OUTPATIENT)
Dept: CARDIOLOGY | Facility: CLINIC | Age: 77
End: 2022-12-07

## 2022-12-08 ENCOUNTER — TELEPHONE (OUTPATIENT)
Dept: CARDIAC REHAB | Facility: HOSPITAL | Age: 77
End: 2022-12-08

## 2022-12-08 NOTE — TELEPHONE ENCOUNTER
"We have received your cardiac rehab referral for your heart failure pt.  Unfortunately according to the Medicare/Medicaid (CMS) guidelines in order to be eligible for Cardiac Rehab pts have to be \"stable, chronic HF as defined as pts with LVEF of 35% or less and NYHA Class II to IV symptoms despite being on optimal heart failure therapy for at least six weeks.  Stable pts are defined as pts who have not had recent (< or = 6 wks) or planned (<or = 6 mnths) major cardiovascular hospitalizations or procedures.\"  Please refer this pt in the future when she meets the guidelines. As always, thank you for the referral!  "

## 2022-12-13 ENCOUNTER — OFFICE VISIT (OUTPATIENT)
Dept: ENDOCRINOLOGY | Age: 77
End: 2022-12-13

## 2022-12-13 VITALS
OXYGEN SATURATION: 97 % | SYSTOLIC BLOOD PRESSURE: 120 MMHG | DIASTOLIC BLOOD PRESSURE: 60 MMHG | TEMPERATURE: 96.9 F | HEIGHT: 67 IN | WEIGHT: 174.6 LBS | HEART RATE: 89 BPM | BODY MASS INDEX: 27.4 KG/M2

## 2022-12-13 DIAGNOSIS — E05.90 HYPERTHYROIDISM: Primary | ICD-10-CM

## 2022-12-13 DIAGNOSIS — E04.2 NON-TOXIC MULTINODULAR GOITER: ICD-10-CM

## 2022-12-13 PROCEDURE — 99214 OFFICE O/P EST MOD 30 MIN: CPT | Performed by: INTERNAL MEDICINE

## 2022-12-13 RX ORDER — METHIMAZOLE 5 MG/1
TABLET ORAL
Qty: 72 TABLET | Refills: 0 | Status: SHIPPED | OUTPATIENT
Start: 2022-12-13 | End: 2023-03-06

## 2022-12-13 NOTE — PROGRESS NOTES
"Chief Complaint  Hyperthyroidism (Patient was admitted into ER day after thanksgiving for lightheaded/No hx of thyroid in family)    Subjective            History of Present Illness  Natalie Rosen,77 y.o. is here as a follow-up for the evaluation of hyperthyroidism.    # Pt was admitted to the observation unit with c/o dizzy spells and cardiology adjusted her BP meds and her BP is now stable      Today in visit patient reports that she has been on methimazole at various dosages over the years because of her thyroid levels being up and down.  Currently she is on methimazole 5 mg 6 days a week.      Also on amiodarone - 200 mg po daily.   Her energy levels are okay, weight has been stable.  No history of tremors, racing of heart or diagnosis of A. fib or cardiac arrhythmias.  No history of frequent fractures.         Reviewed primary care physician's/consulting physician documentation and lab results     I have reviewed the patient's allergies, medicines, past medical hx, family hx and social hx in detail.    Objective   Vital Signs:   /60   Pulse 89   Temp 96.9 °F (36.1 °C) (Temporal)   Ht 170.2 cm (67\")   Wt 79.2 kg (174 lb 9.6 oz)   SpO2 97%   BMI 27.35 kg/m²     Physical Exam   General appearance - no distress  Eyes- anicteric sclera  Ear nose and throat-external ears and nose normal.    Respiratory-normal chest on inspection.  No respiratory distress noted.  Skin-no rashes.  Neuro-alert and oriented x3            Result Review :   The following data was reviewed by: Andrew Carlin MD on 12/13/2022:  Lab on 12/06/2022   Component Date Value Ref Range Status   • proBNP 12/06/2022 673.0  0.0 - 1,800.0 pg/mL Final   • Glucose 12/06/2022 113 (H)  65 - 99 mg/dL Final   • BUN 12/06/2022 19  8 - 23 mg/dL Final   • Creatinine 12/06/2022 0.83  0.57 - 1.00 mg/dL Final   • Sodium 12/06/2022 139  136 - 145 mmol/L Final   • Potassium 12/06/2022 4.4  3.5 - 5.2 mmol/L Final   • Chloride 12/06/2022 102  98 - 107 " mmol/L Final   • CO2 12/06/2022 30.0 (H)  22.0 - 29.0 mmol/L Final   • Calcium 12/06/2022 9.6  8.6 - 10.5 mg/dL Final   • BUN/Creatinine Ratio 12/06/2022 22.9  7.0 - 25.0 Final   • Anion Gap 12/06/2022 7.0  5.0 - 15.0 mmol/L Final   • eGFR 12/06/2022 73.2  >60.0 mL/min/1.73 Final    National Kidney Foundation and American Society of Nephrology (ASN) Task Force recommended calculation based on the Chronic Kidney Disease Epidemiology Collaboration (CKD-EPI) equation refit without adjustment for race.   • Magnesium 12/06/2022 2.0  1.6 - 2.4 mg/dL Final     Data reviewed: PCP and endocrine notes        I reviewed the patient's new clinical results and mentioned them above in HPI and in plan as well.          Assessment and Plan    Problem List Items Addressed This Visit        Other    Hyperthyroidism - Primary    Overview     Description: DIAGNOSED 2012         Relevant Medications    methIMAzole (TAPAZOLE) 5 MG tablet    Other Relevant Orders    T3, Free    T4, Free    TSH    TSH    T4, Free    T3, Free    Basic Metabolic Panel    Lipid Panel    Vitamin B12 & Folate    Non-toxic multinodular goiter    Relevant Medications    methIMAzole (TAPAZOLE) 5 MG tablet    Other Relevant Orders    T3, Free    T4, Free    TSH    US Thyroid    TSH    T4, Free    T3, Free    Basic Metabolic Panel    Lipid Panel    Vitamin B12 & Folate     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  Continue methimazole 5 mg 6 days a week.  Reviewed blood work-up on January 24, 2023 when the patient is going to the cardiologist office.    Based on the levels will make any assessment of methimazole needs to be adjusted.    Doing the repeat blood work-up as free T4 was slightly low on 11/22/2022 work-up.    Follow Up   No follow-ups on file.    Refills/Meds sent to pharmacy    Interpreted the blood work-up/imaging results performed by the primary care/consulting physician -    Patient was given instructions and counseling regarding her condition or for  health maintenance advice. Please see specific information pulled into the AVS if appropriate.

## 2022-12-27 ENCOUNTER — TELEPHONE (OUTPATIENT)
Dept: CARDIOLOGY | Facility: CLINIC | Age: 77
End: 2022-12-27

## 2022-12-27 NOTE — TELEPHONE ENCOUNTER
Patient called back. /76 HR 67. She said she feels a little bit better, but she is also wondering if she may have some kind of virus since she is having stomach issues as well.     Lila Kimble RN  Triage [facility] [place]

## 2022-12-27 NOTE — TELEPHONE ENCOUNTER
Pt called this morning to report elevated BP over the past week.    Pt tells me she's taking her medications as prescribed with the exception of the bumex.  Pt tells me she stopped this medication a few weeks ago because she wasn't having any swelling.  I asked her if she's been eating saltier foods than she normally does over the holidays, but pt tells me that she doesn't add salt to any of her meals.    Pt states that for the last week her BP has been running 150s/70-80s, HR 80s.  This morning, her BP is 167/91 with HR 96.  She endorses feeling shaky, warm, and a mild lightheadedness.    Of note, pt tells me that she checks her BP prior to taking her medications in the morning.  I asked her if she ever checks it after taking her meds, but she tells me she does not.  I asked pt to call our office back around noon today with a report of her BP today post medications.  She is agreeable.    Do you have any recommendations for this patient?    Thank you,    Taniya Henry RN  [facility] [place]  12/27/22  10:40 EST

## 2022-12-28 RX ORDER — LISINOPRIL 10 MG/1
10 TABLET ORAL EVERY EVENING
Start: 2022-12-28 | End: 2023-01-24

## 2022-12-29 NOTE — TELEPHONE ENCOUNTER
Notified patient of recommendations. Patient verbalized understanding.    Lila Kimble RN  [place] [facility] [place]

## 2023-01-10 ENCOUNTER — OFFICE (OUTPATIENT)
Dept: URBAN - METROPOLITAN AREA CLINIC 76 | Facility: CLINIC | Age: 78
End: 2023-01-10

## 2023-01-10 VITALS
DIASTOLIC BLOOD PRESSURE: 70 MMHG | WEIGHT: 172 LBS | OXYGEN SATURATION: 96 % | HEIGHT: 68 IN | HEART RATE: 78 BPM | SYSTOLIC BLOOD PRESSURE: 130 MMHG

## 2023-01-10 DIAGNOSIS — K59.00 CONSTIPATION, UNSPECIFIED: ICD-10-CM

## 2023-01-10 DIAGNOSIS — K21.9 GASTRO-ESOPHAGEAL REFLUX DISEASE WITHOUT ESOPHAGITIS: ICD-10-CM

## 2023-01-10 PROCEDURE — 99213 OFFICE O/P EST LOW 20 MIN: CPT | Performed by: NURSE PRACTITIONER

## 2023-01-24 ENCOUNTER — OFFICE VISIT (OUTPATIENT)
Dept: CARDIOLOGY | Facility: CLINIC | Age: 78
End: 2023-01-24
Payer: MEDICARE

## 2023-01-24 VITALS
WEIGHT: 172 LBS | HEART RATE: 66 BPM | SYSTOLIC BLOOD PRESSURE: 124 MMHG | BODY MASS INDEX: 26.07 KG/M2 | DIASTOLIC BLOOD PRESSURE: 68 MMHG | OXYGEN SATURATION: 98 % | HEIGHT: 68 IN

## 2023-01-24 DIAGNOSIS — E04.2 NON-TOXIC MULTINODULAR GOITER: ICD-10-CM

## 2023-01-24 DIAGNOSIS — I42.8 NICM (NONISCHEMIC CARDIOMYOPATHY): Primary | ICD-10-CM

## 2023-01-24 DIAGNOSIS — E05.90 HYPERTHYROIDISM: ICD-10-CM

## 2023-01-24 DIAGNOSIS — I50.22 CHRONIC SYSTOLIC (CONGESTIVE) HEART FAILURE: ICD-10-CM

## 2023-01-24 PROCEDURE — 99214 OFFICE O/P EST MOD 30 MIN: CPT | Performed by: INTERNAL MEDICINE

## 2023-01-24 PROCEDURE — 93000 ELECTROCARDIOGRAM COMPLETE: CPT | Performed by: INTERNAL MEDICINE

## 2023-01-24 RX ORDER — LISINOPRIL 10 MG/1
10 TABLET ORAL 2 TIMES DAILY
Qty: 180 TABLET | Refills: 3 | Status: SHIPPED | OUTPATIENT
Start: 2023-01-24 | End: 2023-03-20 | Stop reason: HOSPADM

## 2023-01-24 RX ORDER — ESCITALOPRAM OXALATE 5 MG/1
5 TABLET ORAL DAILY
COMMUNITY
Start: 2023-01-08

## 2023-01-24 RX ORDER — LISINOPRIL 20 MG/1
20 TABLET ORAL 2 TIMES DAILY
Qty: 180 TABLET | Refills: 3 | Status: SHIPPED | OUTPATIENT
Start: 2023-01-24 | End: 2023-01-24

## 2023-01-24 NOTE — PROGRESS NOTES
PATIENTINFORMATION    Date of Office Visit: 2023  Encounter Provider: Drake Larios MD  Place of Service: [facility] CARDIOLOGY  Patient Name: Natalie Rosen  : 1945    Subjective:     Encounter Date:2023      Patient ID: Natalie Rosen is a 77 y.o. female.    No chief complaint on file.    HPI  Ms. Rosen is a pleasant 77 years old lady who came to cardiology clinic for follow-up visit.  She reports significantly improved dizzy spells since last clinic visit.  She checks blood pressure and heart rate diligently at home.  Blood pressure has started to run on the higher side with systolic in the 140s and 150s occasionally that is not usual for her.  In the past she had issues with low blood pressure.  She is compliant with metoprolol and lisinopril.  She takes takes Bumex 3 times weekly.  She is reasonably active.  She denies any significant shortness of breath, orthopnea, PND, palpitations, presyncope syncope or significant lower extremity swelling.  Denies recent ER visit or hospitalization.      ROS  All systems reviewed and negative except as noted in HPI.    Past Medical History:   Diagnosis Date   • Arthritis    • Asthma    • Benign essential hypertension    • Cancer (HCC)     MELANOMA   • Cardiomyopathy (HCC)    • CHF (congestive heart failure) (HCC)    • Goiter    • Heart murmur    • Hyperlipidemia    • Hyperthyroidism    • Hypothyroidism    • Mitral valve insufficiency    • PONV (postoperative nausea and vomiting)    • Seasonal allergies    • Urinary frequency        Past Surgical History:   Procedure Laterality Date   • CATARACT EXTRACTION     • COLONOSCOPY     • HYSTERECTOMY     • OTHER SURGICAL HISTORY      PT HAS HAD SKIN CA REMOVED FROM BACK AND FACE (UPPER LIP)    • OR ARTHRP KNE CONDYLE&PLATU MEDIAL&LAT COMPARTMENTS Right 2017    Procedure: RT TOTAL KNEE ARTHROPLASTY;  Surgeon: Doc Lance MD;  Location: [facility];  Service:  "Orthopedics   • FL ARTHRP KNE CONDYLE&PLATU MEDIAL&LAT COMPARTMENTS Left 2017    Procedure: LT TOTAL KNEE ARTHROPLASTY;  Surgeon: Doc Lance MD;  Location: [facility];  Service: Orthopedics       Social History     Socioeconomic History   • Marital status:    Tobacco Use   • Smoking status: Former     Packs/day: 1.00     Years: 25.00     Pack years: 25.00     Types: Cigarettes     Quit date: 1985     Years since quittin.1   • Smokeless tobacco: Never   Substance and Sexual Activity   • Alcohol use: Yes     Alcohol/week: 6.0 standard drinks     Types: 6 Drinks containing 0.5 oz of alcohol per week     Comment: DAILY COCKTAIL   • Drug use: Never   • Sexual activity: Defer       Family History   Problem Relation Age of Onset   • Breast cancer Other    • Diabetes Other    • Cancer Mother         Breast Cancer   • Cancer Father         Prostate Cancer   • Malig Hyperthermia Neg Hx            ECG 12 Lead    Date/Time: 2023 11:30 AM  Performed by: Drake Larios MD  Authorized by: Drake Larios MD   Comparison: compared with previous ECG from 2022  Comparison to previous ECG: PVCs resolved on this tracing   Rhythm: sinus rhythm  Rate: normal  Conduction: non-specific intraventricular conduction delay  ST Segments: ST segments normal  T Waves: T waves normal  QRS axis: normal  Other findings: non-specific ST-T wave changes    Clinical impression: abnormal EKG               Objective:     /68   Pulse 66   Ht 172.7 cm (68\")   Wt 78 kg (172 lb)   SpO2 98%   BMI 26.15 kg/m²  Body mass index is 26.15 kg/m².     Constitutional:       General: Not in acute distress.     Appearance: Well-developed. Not diaphoretic.   Eyes:      Pupils: Pupils are equal, round, and reactive to light.   HENT:      Head: Normocephalic and atraumatic.   Neck:      Thyroid: No thyromegaly.   Pulmonary:      Effort: Pulmonary effort is normal. No respiratory distress.      Breath " sounds: Normal breath sounds. No wheezing. No rales.   Chest:      Chest wall: Not tender to palpatation.   Cardiovascular:      Normal rate. Regular rhythm.      No gallop.   Pulses:     Intact distal pulses.   Abdominal:      General: Bowel sounds are normal. There is no distension.      Palpations: Abdomen is soft.      Tenderness: There is no guarding.   Musculoskeletal: Normal range of motion.         General: No deformity.      Cervical back: Normal range of motion and neck supple. Skin:     General: Skin is warm and dry.      Findings: No rash.   Neurological:      Mental Status: Alert and oriented to person, place, and time.      Cranial Nerves: No cranial nerve deficit.      Deep Tendon Reflexes: Reflexes are normal and symmetric.   Psychiatric:         Judgment: Judgment normal.         Review Of Data: I have reviewed pertinent recent labs, images and documents and pertinent findings included in HPI or assessment below.    Lipid Panel    Lipid Panel 11/22/22 11/25/22   Total Cholesterol 191 197   Triglycerides 152 (A) 115   HDL Cholesterol 71 (A) 72 (A)   VLDL Cholesterol 26 20   LDL Cholesterol  94 105 (A)   LDL/HDL Ratio 1.26 1.42   (A) Abnormal value                Assessment/Plan:         1.   Chronic combined systolic and diastolic congestive heart failure. Nonischemic cardiomyopathy with most recent left ventricular ejection fraction of 37.5%./Chronic systolic congestive heart failure   NYHA class II symptoms  Not overtly volume overloaded.  2.  Frequent PVCs with frequent nonsustained VT on Holter monitoring  No PVCs noted during office EKG today.  Significantly improved since after amiodarone  3.  Dizziness-significantly improved  4.  Essential hypertension-blood pressure has started to run on the higher side during home monitoring  BP in office was 124/68 mmHg.  She will start taking lisinopril 10 in the morning and 10 in the evening instead of 10 10 mg once daily.    Referral to cardiac  rehab  She will get echo and 24-hour Holter in 2 months.  She will call clinic with any concerning symptoms in the meantime.  Diagnosis and plan of care discussed with patient and verbalized understanding.            Your medication list          Accurate as of January 24, 2023  6:02 PM. If you have any questions, ask your nurse or doctor.            CHANGE how you take these medications      Instructions Last Dose Given Next Dose Due   Diclofenac Sodium 1 % gel gel  Commonly known as: VOLTAREN  What changed: See the new instructions.      APPLY 4 GRAMS TO THE AFFECTED AREA FOUR TIMES A DAY AS DIRECTED       lisinopril 20 MG tablet  Commonly known as: TIFFANY DE LA FUENTERITRUNG  What changed:   · medication strength  · how much to take  · when to take this  Changed by: Drake Larios MD      Take 1 tablet by mouth 2 (Two) Times a Day.          CONTINUE taking these medications      Instructions Last Dose Given Next Dose Due   albuterol sulfate  (90 Base) MCG/ACT inhaler  Commonly known as: PROVENTIL HFA;VENTOLIN HFA;PROAIR HFA      Inhale 2 puffs Every 6 (Six) Hours As Needed.       amiodarone 200 MG tablet  Commonly known as: Pacerone      Take 1 tablet by mouth Daily.       aspirin 81 MG chewable tablet      Chew 81 mg Daily.       bumetanide 1 MG tablet  Commonly known as: BUMEX      Take 1 tablet by mouth 3 (Three) Times a Week.       Carbinoxamine Maleate 4 MG tablet      Take 4 mg by mouth Daily.       escitalopram 5 MG tablet  Commonly known as: LEXAPRO      Take 5 mg by mouth Daily.       ipratropium 0.06 % nasal spray  Commonly known as: ATROVENT      2 sprays into the nostril(s) as directed by provider every night at bedtime.       methIMAzole 5 MG tablet  Commonly known as: TAPAZOLE      TAKE ONE TABLET BY MOUTH DAILY SIX DAYS OUT OF THE WEEK       metoprolol succinate XL 50 MG 24 hr tablet  Commonly known as: TOPROL-XL      Take 1 tablet by mouth Daily.       montelukast 10 MG tablet  Commonly known  as: SINGULAIR      Take 1 tablet by mouth Every Morning.       omeprazole 40 MG capsule  Commonly known as: priLOSEC      Take 40 mg by mouth Daily As Needed.       ondansetron 4 MG tablet  Commonly known as: ZOFRAN      Take 4 mg by mouth Daily. Takes every morning       pravastatin 40 MG tablet  Commonly known as: PRAVACHOL      TAKE ONE TABLET BY MOUTH EVERY NIGHT AT BEDTIME       PRESERVISION AREDS 2 PO      Take 1 capsule by mouth 2 (Two) Times a Day.       PREVAGEN PO      Take 1 tablet by mouth Daily.       PROBIOTIC PO      Take 1 tablet by mouth Daily.       triamcinolone 55 MCG/ACT nasal inhaler  Commonly known as: NASACORT      2 sprays into each nostril Every Morning.       TYLENOL EXTRA STRENGTH PO      Take 1 tablet by mouth 4 (Four) Times a Day.       vitamin B-6 50 MG tablet  Commonly known as: PYRIDOXINE      Take 50 mg by mouth Daily.             Where to Get Your Medications      These medications were sent to [id number] - [place], [place] - [address] AT [facility] [address] - [phone]  - [phone]   [address], [facility] [address]    Phone: [phone]   · lisinopril 20 MG tablet             Drake Larios MD  01/24/23  18:02 EST

## 2023-01-25 LAB
BUN SERPL-MCNC: 17 MG/DL (ref 8–23)
BUN/CREAT SERPL: 20 (ref 7–25)
CALCIUM SERPL-MCNC: 9.4 MG/DL (ref 8.6–10.5)
CHLORIDE SERPL-SCNC: 99 MMOL/L (ref 98–107)
CHOLEST SERPL-MCNC: 185 MG/DL (ref 0–200)
CO2 SERPL-SCNC: 30.4 MMOL/L (ref 22–29)
CREAT SERPL-MCNC: 0.85 MG/DL (ref 0.57–1)
EGFRCR SERPLBLD CKD-EPI 2021: 71.1 ML/MIN/1.73
FOLATE SERPL-MCNC: 6.92 NG/ML (ref 4.78–24.2)
GLUCOSE SERPL-MCNC: 131 MG/DL (ref 65–99)
HDLC SERPL-MCNC: 76 MG/DL (ref 40–60)
IMP & REVIEW OF LAB RESULTS: NORMAL
LDLC SERPL CALC-MCNC: 95 MG/DL (ref 0–100)
POTASSIUM SERPL-SCNC: 4.9 MMOL/L (ref 3.5–5.2)
SODIUM SERPL-SCNC: 140 MMOL/L (ref 136–145)
T3FREE SERPL-MCNC: 2.3 PG/ML (ref 2–4.4)
T4 FREE SERPL-MCNC: 1.11 NG/DL (ref 0.93–1.7)
TRIGL SERPL-MCNC: 75 MG/DL (ref 0–150)
TSH SERPL DL<=0.005 MIU/L-ACNC: 0.97 UIU/ML (ref 0.27–4.2)
VIT B12 SERPL-MCNC: 318 PG/ML (ref 211–946)
VLDLC SERPL CALC-MCNC: 14 MG/DL (ref 5–40)

## 2023-02-08 ENCOUNTER — TELEPHONE (OUTPATIENT)
Dept: CARDIOLOGY | Facility: CLINIC | Age: 78
End: 2023-02-08
Payer: MEDICARE

## 2023-02-08 NOTE — TELEPHONE ENCOUNTER
Bree at [facility] cardiac rehab ([phone], [place]) called to notify that Natalie Rosen does not currently qualify to participate in Cardiac Rehab (phase 2).  Patient's ejection fraction on most recent echo (11/27/22) was 37.3%.  To participate, EF must be less than 35%.  Notified Bree that patient is scheduled for echo on 2/22.  Bree stated if EF is 35 or less on echo, patient will then qualify for phase 2 cardiac rehab.  Bree stated she will contact patient to discuss this as well.  If patient's EF is greater than 35 on next echo, patient can participate in phase 3 cardiac rehab (non-monitored exercise, out of pocket expense).     Please let me know if there is anything else you would like me to do for this patient.    Thank you,  Mickie MARTEL RN  Triage Nurse [facility] [place]

## 2023-02-17 DIAGNOSIS — I47.29 NSVT (NONSUSTAINED VENTRICULAR TACHYCARDIA): Primary | ICD-10-CM

## 2023-02-22 ENCOUNTER — HOSPITAL ENCOUNTER (OUTPATIENT)
Dept: CARDIOLOGY | Facility: HOSPITAL | Age: 78
Discharge: HOME | End: 2023-02-22
Admitting: INTERNAL MEDICINE
Payer: MEDICARE

## 2023-02-22 VITALS
DIASTOLIC BLOOD PRESSURE: 50 MMHG | WEIGHT: 172 LBS | BODY MASS INDEX: 26.07 KG/M2 | SYSTOLIC BLOOD PRESSURE: 100 MMHG | HEART RATE: 70 BPM | HEIGHT: 68 IN

## 2023-02-22 DIAGNOSIS — I42.8 NICM (NONISCHEMIC CARDIOMYOPATHY): ICD-10-CM

## 2023-02-22 LAB
AORTIC ARCH: 2.6 CM
ASCENDING AORTA: 2.7 CM
BH CV ECHO LEFT VENTRICLE GLOBAL LONGITUDINAL STRAIN: -17.4 %
BH CV ECHO MEAS - ACS: 1.84 CM
BH CV ECHO MEAS - AO MAX PG: 9.4 MMHG
BH CV ECHO MEAS - AO MEAN PG: 5.4 MMHG
BH CV ECHO MEAS - AO ROOT DIAM: 2.8 CM
BH CV ECHO MEAS - AO V2 MAX: 153.2 CM/SEC
BH CV ECHO MEAS - AO V2 VTI: 34.9 CM
BH CV ECHO MEAS - AVA(I,D): 1.61 CM2
BH CV ECHO MEAS - EDV(CUBED): 177.7 ML
BH CV ECHO MEAS - EDV(MOD-SP2): 232 ML
BH CV ECHO MEAS - EDV(MOD-SP4): 240 ML
BH CV ECHO MEAS - EF(MOD-BP): 49.2 %
BH CV ECHO MEAS - EF(MOD-SP2): 48.7 %
BH CV ECHO MEAS - EF(MOD-SP4): 52.1 %
BH CV ECHO MEAS - EF_3D-VOL: 46 %
BH CV ECHO MEAS - ESV(CUBED): 104.4 ML
BH CV ECHO MEAS - ESV(MOD-SP2): 119 ML
BH CV ECHO MEAS - ESV(MOD-SP4): 115 ML
BH CV ECHO MEAS - FS: 16.2 %
BH CV ECHO MEAS - IVS/LVPW: 0.81 CM
BH CV ECHO MEAS - IVSD: 0.74 CM
BH CV ECHO MEAS - LA 3D VOL INDEX: 47
BH CV ECHO MEAS - LAT PEAK E' VEL: 13.2 CM/SEC
BH CV ECHO MEAS - LV DIASTOLIC VOL/BSA (35-75): 125.2 CM2
BH CV ECHO MEAS - LV MASS(C)D: 173.1 GRAMS
BH CV ECHO MEAS - LV MAX PG: 2.46 MMHG
BH CV ECHO MEAS - LV MEAN PG: 1.55 MMHG
BH CV ECHO MEAS - LV SYSTOLIC VOL/BSA (12-30): 60 CM2
BH CV ECHO MEAS - LV V1 MAX: 78.4 CM/SEC
BH CV ECHO MEAS - LV V1 VTI: 18.7 CM
BH CV ECHO MEAS - LVIDD: 5.6 CM
BH CV ECHO MEAS - LVIDS: 4.7 CM
BH CV ECHO MEAS - LVOT AREA: 3 CM2
BH CV ECHO MEAS - LVOT DIAM: 1.96 CM
BH CV ECHO MEAS - LVPWD: 0.91 CM
BH CV ECHO MEAS - MED PEAK E' VEL: 6 CM/SEC
BH CV ECHO MEAS - MR MAX PG: 98.7 MMHG
BH CV ECHO MEAS - MR MAX VEL: 496.8 CM/SEC
BH CV ECHO MEAS - MR MEAN PG: 66.4 MMHG
BH CV ECHO MEAS - MR MEAN VEL: 388.8 CM/SEC
BH CV ECHO MEAS - MR VTI: 180.7 CM
BH CV ECHO MEAS - MV A DUR: 0.16 SEC
BH CV ECHO MEAS - MV A MAX VEL: 86.5 CM/SEC
BH CV ECHO MEAS - MV DEC SLOPE: 820.6 CM/SEC2
BH CV ECHO MEAS - MV DEC TIME: 0.12 MSEC
BH CV ECHO MEAS - MV E MAX VEL: 111 CM/SEC
BH CV ECHO MEAS - MV E/A: 1.28
BH CV ECHO MEAS - MV MAX PG: 6.1 MMHG
BH CV ECHO MEAS - MV MEAN PG: 2.04 MMHG
BH CV ECHO MEAS - MV P1/2T: 45.1 MSEC
BH CV ECHO MEAS - MV V2 VTI: 25 CM
BH CV ECHO MEAS - MVA(P1/2T): 4.9 CM2
BH CV ECHO MEAS - MVA(VTI): 2.24 CM2
BH CV ECHO MEAS - PA ACC TIME: 0.12 SEC
BH CV ECHO MEAS - PA PR(ACCEL): 24.3 MMHG
BH CV ECHO MEAS - PA V2 MAX: 107.2 CM/SEC
BH CV ECHO MEAS - PI END-D VEL: 105.9 CM/SEC
BH CV ECHO MEAS - PULM A REVS DUR: 0.17 SEC
BH CV ECHO MEAS - PULM A REVS VEL: 29.4 CM/SEC
BH CV ECHO MEAS - PULM DIAS VEL: 52.8 CM/SEC
BH CV ECHO MEAS - PULM S/D: 0.9
BH CV ECHO MEAS - PULM SYS VEL: 47.3 CM/SEC
BH CV ECHO MEAS - QP/QS: 0.66
BH CV ECHO MEAS - RAP SYSTOLE: 3 MMHG
BH CV ECHO MEAS - RV MAX PG: 1.32 MMHG
BH CV ECHO MEAS - RV V1 MAX: 57.4 CM/SEC
BH CV ECHO MEAS - RV V1 VTI: 12.8 CM
BH CV ECHO MEAS - RVOT DIAM: 1.92 CM
BH CV ECHO MEAS - RVSP: 41.3 MMHG
BH CV ECHO MEAS - SI(MOD-SP2): 58.9 ML/M2
BH CV ECHO MEAS - SI(MOD-SP4): 65.2 ML/M2
BH CV ECHO MEAS - SUP REN AO DIAM: 1.8 CM
BH CV ECHO MEAS - SV(LVOT): 56.2 ML
BH CV ECHO MEAS - SV(MOD-SP2): 113 ML
BH CV ECHO MEAS - SV(MOD-SP4): 125 ML
BH CV ECHO MEAS - SV(RVOT): 37.3 ML
BH CV ECHO MEAS - TR MAX PG: 38.3 MMHG
BH CV ECHO MEAS - TR MAX VEL: 309.5 CM/SEC
BH CV ECHO MEASUREMENTS AVERAGE E/E' RATIO: 11.56
BH CV XLRA - RV BASE: 3 CM
BH CV XLRA - RV LENGTH: 7 CM
BH CV XLRA - RV MID: 2.19 CM
BH CV XLRA - TDI S': 13.2 CM/SEC
LEFT ATRIUM VOLUME INDEX: 53.6 ML/M2
MAXIMAL PREDICTED HEART RATE: 144 BPM
SINUS: 2.34 CM
STJ: 1.99 CM
STRESS TARGET HR: 122 BPM

## 2023-02-22 PROCEDURE — 25010000002 PERFLUTREN (DEFINITY) 8.476 MG IN SODIUM CHLORIDE (PF) 0.9 % 10 ML INJECTION: Performed by: INTERNAL MEDICINE

## 2023-02-22 PROCEDURE — 93306 TTE W/DOPPLER COMPLETE: CPT | Performed by: INTERNAL MEDICINE

## 2023-02-22 PROCEDURE — 93306 TTE W/DOPPLER COMPLETE: CPT

## 2023-02-22 PROCEDURE — 93356 MYOCRD STRAIN IMG SPCKL TRCK: CPT

## 2023-02-22 PROCEDURE — 93356 MYOCRD STRAIN IMG SPCKL TRCK: CPT | Performed by: INTERNAL MEDICINE

## 2023-02-22 RX ADMIN — PERFLUTREN 2 ML: 6.52 INJECTION, SUSPENSION INTRAVENOUS at 09:48

## 2023-02-22 NOTE — PROGRESS NOTES
Please notify Natalie that echocardiogram shows improvement of heart function.  It is returning to normal and encourage to continue current care.  I will call her with results of Holter.  Let me know if she has any questions.

## 2023-02-23 ENCOUNTER — TELEPHONE (OUTPATIENT)
Dept: CARDIOLOGY | Facility: CLINIC | Age: 78
End: 2023-02-23
Payer: MEDICARE

## 2023-02-23 NOTE — TELEPHONE ENCOUNTER
----- Message from Drake Larios MD sent at 2/22/2023  4:17 PM EST -----  Please notify Natalie that echocardiogram shows improvement of heart function.  It is returning to normal and encourage to continue current care.  I will call her with results of Holter.  Let me know if she has any questions.

## 2023-03-04 DIAGNOSIS — E05.90 HYPERTHYROIDISM: ICD-10-CM

## 2023-03-06 RX ORDER — METHIMAZOLE 5 MG/1
TABLET ORAL
Qty: 72 TABLET | Refills: 0 | Status: SHIPPED | OUTPATIENT
Start: 2023-03-06

## 2023-03-09 RX ORDER — BUMETANIDE 1 MG/1
1 TABLET ORAL 3 TIMES WEEKLY
Qty: 90 TABLET | Refills: 1 | Status: ON HOLD | OUTPATIENT
Start: 2023-03-10 | End: 2023-03-20 | Stop reason: SDUPTHER

## 2023-03-17 ENCOUNTER — APPOINTMENT (OUTPATIENT)
Dept: CT IMAGING | Facility: HOSPITAL | Age: 78
DRG: 291 | End: 2023-03-17
Payer: MEDICARE

## 2023-03-17 ENCOUNTER — HOSPITAL ENCOUNTER (INPATIENT)
Facility: HOSPITAL | Age: 78
LOS: 3 days | Discharge: HOME | DRG: 291 | End: 2023-03-20
Attending: EMERGENCY MEDICINE | Admitting: INTERNAL MEDICINE
Payer: MEDICARE

## 2023-03-17 ENCOUNTER — APPOINTMENT (OUTPATIENT)
Dept: GENERAL RADIOLOGY | Facility: HOSPITAL | Age: 78
DRG: 291 | End: 2023-03-17
Payer: MEDICARE

## 2023-03-17 DIAGNOSIS — I50.9 ACUTE ON CHRONIC CONGESTIVE HEART FAILURE, UNSPECIFIED HEART FAILURE TYPE: ICD-10-CM

## 2023-03-17 DIAGNOSIS — I44.7 LEFT BUNDLE BRANCH BLOCK: ICD-10-CM

## 2023-03-17 DIAGNOSIS — I50.43 ACUTE ON CHRONIC COMBINED SYSTOLIC AND DIASTOLIC CHF (CONGESTIVE HEART FAILURE): ICD-10-CM

## 2023-03-17 DIAGNOSIS — R09.02 HYPOXIA: Primary | ICD-10-CM

## 2023-03-17 LAB
ALBUMIN SERPL-MCNC: 4.2 G/DL (ref 3.5–5.2)
ALBUMIN/GLOB SERPL: 1.3 G/DL
ALP SERPL-CCNC: 74 U/L (ref 39–117)
ALT SERPL W P-5'-P-CCNC: 26 U/L (ref 1–33)
ANION GAP SERPL CALCULATED.3IONS-SCNC: 12.8 MMOL/L (ref 5–15)
ANION GAP SERPL CALCULATED.3IONS-SCNC: 17 MMOL/L (ref 5–15)
APTT PPP: 26.9 SECONDS (ref 22.7–35.4)
ARTERIAL PATENCY WRIST A: POSITIVE
ARTERIAL PATENCY WRIST A: POSITIVE
AST SERPL-CCNC: 46 U/L (ref 1–32)
ATMOSPHERIC PRESS: 745.9 MMHG
ATMOSPHERIC PRESS: 746.3 MMHG
B PARAPERT DNA SPEC QL NAA+PROBE: NOT DETECTED
B PERT DNA SPEC QL NAA+PROBE: NOT DETECTED
BASE EXCESS BLDA CALC-SCNC: -5.2 MMOL/L (ref 0–2)
BASE EXCESS BLDA CALC-SCNC: 0.9 MMOL/L (ref 0–2)
BASOPHILS # BLD AUTO: 0.04 10*3/MM3 (ref 0–0.2)
BASOPHILS # BLD MANUAL: 0.16 10*3/MM3 (ref 0–0.2)
BASOPHILS NFR BLD AUTO: 0.5 % (ref 0–1.5)
BASOPHILS NFR BLD MANUAL: 1 % (ref 0–1.5)
BDY SITE: ABNORMAL
BDY SITE: ABNORMAL
BILIRUB SERPL-MCNC: 0.4 MG/DL (ref 0–1.2)
BUN SERPL-MCNC: 15 MG/DL (ref 8–23)
BUN SERPL-MCNC: 15 MG/DL (ref 8–23)
BUN/CREAT SERPL: 17 (ref 7–25)
BUN/CREAT SERPL: 23.1 (ref 7–25)
C PNEUM DNA NPH QL NAA+NON-PROBE: NOT DETECTED
CALCIUM SPEC-SCNC: 8.6 MG/DL (ref 8.6–10.5)
CALCIUM SPEC-SCNC: 8.9 MG/DL (ref 8.6–10.5)
CHLORIDE SERPL-SCNC: 102 MMOL/L (ref 98–107)
CHLORIDE SERPL-SCNC: 102 MMOL/L (ref 98–107)
CO2 SERPL-SCNC: 23 MMOL/L (ref 22–29)
CO2 SERPL-SCNC: 26.2 MMOL/L (ref 22–29)
CREAT SERPL-MCNC: 0.65 MG/DL (ref 0.57–1)
CREAT SERPL-MCNC: 0.88 MG/DL (ref 0.57–1)
D-LACTATE SERPL-SCNC: 1.2 MMOL/L (ref 0.5–2)
D-LACTATE SERPL-SCNC: 1.7 MMOL/L (ref 0.5–2)
D-LACTATE SERPL-SCNC: 3.6 MMOL/L (ref 0.5–2)
DEPRECATED RDW RBC AUTO: 48.8 FL (ref 37–54)
DEPRECATED RDW RBC AUTO: 52.5 FL (ref 37–54)
EGFRCR SERPLBLD CKD-EPI 2021: 68.2 ML/MIN/1.73
EGFRCR SERPLBLD CKD-EPI 2021: 91.4 ML/MIN/1.73
EOSINOPHIL # BLD AUTO: 0.02 10*3/MM3 (ref 0–0.4)
EOSINOPHIL # BLD MANUAL: 0.64 10*3/MM3 (ref 0–0.4)
EOSINOPHIL NFR BLD AUTO: 0.2 % (ref 0.3–6.2)
EOSINOPHIL NFR BLD MANUAL: 4 % (ref 0.3–6.2)
ERYTHROCYTE [DISTWIDTH] IN BLOOD BY AUTOMATED COUNT: 14.1 % (ref 12.3–15.4)
ERYTHROCYTE [DISTWIDTH] IN BLOOD BY AUTOMATED COUNT: 14.2 % (ref 12.3–15.4)
FLUAV SUBTYP SPEC NAA+PROBE: NOT DETECTED
FLUBV RNA ISLT QL NAA+PROBE: NOT DETECTED
GEN 5 2HR TROPONIN T REFLEX: 51 NG/L
GLOBULIN UR ELPH-MCNC: 3.2 GM/DL
GLUCOSE SERPL-MCNC: 108 MG/DL (ref 65–99)
GLUCOSE SERPL-MCNC: 260 MG/DL (ref 65–99)
HADV DNA SPEC NAA+PROBE: NOT DETECTED
HCO3 BLDA-SCNC: 26.8 MMOL/L (ref 22–28)
HCO3 BLDA-SCNC: 27.5 MMOL/L (ref 22–28)
HCOV 229E RNA SPEC QL NAA+PROBE: NOT DETECTED
HCOV HKU1 RNA SPEC QL NAA+PROBE: NOT DETECTED
HCOV NL63 RNA SPEC QL NAA+PROBE: NOT DETECTED
HCOV OC43 RNA SPEC QL NAA+PROBE: NOT DETECTED
HCT VFR BLD AUTO: 40.4 % (ref 34–46.6)
HCT VFR BLD AUTO: 47.8 % (ref 34–46.6)
HGB BLD-MCNC: 14 G/DL (ref 12–15.9)
HGB BLD-MCNC: 15.1 G/DL (ref 12–15.9)
HMPV RNA NPH QL NAA+NON-PROBE: NOT DETECTED
HPIV1 RNA ISLT QL NAA+PROBE: NOT DETECTED
HPIV2 RNA SPEC QL NAA+PROBE: NOT DETECTED
HPIV3 RNA NPH QL NAA+PROBE: NOT DETECTED
HPIV4 P GENE NPH QL NAA+PROBE: NOT DETECTED
IMM GRANULOCYTES # BLD AUTO: 0.05 10*3/MM3 (ref 0–0.05)
IMM GRANULOCYTES NFR BLD AUTO: 0.6 % (ref 0–0.5)
INHALED O2 CONCENTRATION: 100 %
INHALED O2 CONCENTRATION: 100 %
INR PPP: 1.01 (ref 0.9–1.1)
INR PPP: 1.02 (ref 0.9–1.1)
LYMPHOCYTES # BLD AUTO: 0.85 10*3/MM3 (ref 0.7–3.1)
LYMPHOCYTES # BLD MANUAL: 8.66 10*3/MM3 (ref 0.7–3.1)
LYMPHOCYTES NFR BLD AUTO: 10 % (ref 19.6–45.3)
LYMPHOCYTES NFR BLD MANUAL: 9.1 % (ref 5–12)
M PNEUMO IGG SER IA-ACNC: NOT DETECTED
MAGNESIUM SERPL-MCNC: 2.2 MG/DL (ref 1.6–2.4)
MCH RBC QN AUTO: 31.4 PG (ref 26.6–33)
MCH RBC QN AUTO: 32.4 PG (ref 26.6–33)
MCHC RBC AUTO-ENTMCNC: 31.6 G/DL (ref 31.5–35.7)
MCHC RBC AUTO-ENTMCNC: 34.7 G/DL (ref 31.5–35.7)
MCV RBC AUTO: 93.5 FL (ref 79–97)
MCV RBC AUTO: 99.4 FL (ref 79–97)
MODALITY: ABNORMAL
MODALITY: ABNORMAL
MONOCYTES # BLD AUTO: 0.61 10*3/MM3 (ref 0.1–0.9)
MONOCYTES # BLD: 1.45 10*3/MM3 (ref 0.1–0.9)
MONOCYTES NFR BLD AUTO: 7.2 % (ref 5–12)
NEUTROPHILS # BLD AUTO: 4.97 10*3/MM3 (ref 1.7–7)
NEUTROPHILS NFR BLD AUTO: 6.94 10*3/MM3 (ref 1.7–7)
NEUTROPHILS NFR BLD AUTO: 81.5 % (ref 42.7–76)
NEUTROPHILS NFR BLD MANUAL: 31.3 % (ref 42.7–76)
NRBC BLD AUTO-RTO: 0 /100 WBC (ref 0–0.2)
NT-PROBNP SERPL-MCNC: 3687 PG/ML (ref 0–1800)
O2 A-A PPRESDIFF RESPIRATORY: 0.2 MMHG
O2 A-A PPRESDIFF RESPIRATORY: 0.3 MMHG
PCO2 BLDA: 50.1 MM HG (ref 35–45)
PCO2 BLDA: 81.2 MM HG (ref 35–45)
PH BLDA: 7.13 PH UNITS (ref 7.35–7.45)
PH BLDA: 7.35 PH UNITS (ref 7.35–7.45)
PLAT MORPH BLD: NORMAL
PLATELET # BLD AUTO: 194 10*3/MM3 (ref 140–450)
PLATELET # BLD AUTO: 261 10*3/MM3 (ref 140–450)
PMV BLD AUTO: 9.2 FL (ref 6–12)
PMV BLD AUTO: 9.8 FL (ref 6–12)
PO2 BLDA: 105.3 MM HG (ref 80–100)
PO2 BLDA: 219.3 MM HG (ref 80–100)
POTASSIUM SERPL-SCNC: 3.6 MMOL/L (ref 3.5–5.2)
POTASSIUM SERPL-SCNC: 4 MMOL/L (ref 3.5–5.2)
PROCALCITONIN SERPL-MCNC: 0.03 NG/ML (ref 0–0.25)
PROT SERPL-MCNC: 7.4 G/DL (ref 6–8.5)
PROTHROMBIN TIME: 13.4 SECONDS (ref 11.7–14.2)
PROTHROMBIN TIME: 13.5 SECONDS (ref 11.7–14.2)
QT INTERVAL: 395 MS
QT INTERVAL: 401 MS
QT INTERVAL: 431 MS
RBC # BLD AUTO: 4.32 10*6/MM3 (ref 3.77–5.28)
RBC # BLD AUTO: 4.81 10*6/MM3 (ref 3.77–5.28)
RBC MORPH BLD: NORMAL
RHINOVIRUS RNA SPEC NAA+PROBE: NOT DETECTED
RSV RNA NPH QL NAA+NON-PROBE: NOT DETECTED
SAO2 % BLDCOA: 95.3 % (ref 92–99)
SAO2 % BLDCOA: 99.7 % (ref 92–99)
SARS-COV-2 RNA NPH QL NAA+NON-PROBE: NOT DETECTED
SET MECH RESP RATE: 22
SODIUM SERPL-SCNC: 141 MMOL/L (ref 136–145)
SODIUM SERPL-SCNC: 142 MMOL/L (ref 136–145)
TOTAL RATE: 22 BREATHS/MINUTE
TOTAL RATE: 36 BREATHS/MINUTE
TROPONIN T DELTA: 36 NG/L
TROPONIN T SERPL HS-MCNC: 15 NG/L
VARIANT LYMPHS NFR BLD MANUAL: 54.5 % (ref 19.6–45.3)
VENTILATOR MODE: ABNORMAL
VT ON VENT VENT: 450 ML
WBC MORPH BLD: NORMAL
WBC NRBC COR # BLD: 15.89 10*3/MM3 (ref 3.4–10.8)
WBC NRBC COR # BLD: 8.51 10*3/MM3 (ref 3.4–10.8)

## 2023-03-17 PROCEDURE — 84145 PROCALCITONIN (PCT): CPT | Performed by: EMERGENCY MEDICINE

## 2023-03-17 PROCEDURE — 85007 BL SMEAR W/DIFF WBC COUNT: CPT | Performed by: EMERGENCY MEDICINE

## 2023-03-17 PROCEDURE — 85730 THROMBOPLASTIN TIME PARTIAL: CPT | Performed by: EMERGENCY MEDICINE

## 2023-03-17 PROCEDURE — 36415 COLL VENOUS BLD VENIPUNCTURE: CPT

## 2023-03-17 PROCEDURE — 83735 ASSAY OF MAGNESIUM: CPT | Performed by: EMERGENCY MEDICINE

## 2023-03-17 PROCEDURE — 99285 EMERGENCY DEPT VISIT HI MDM: CPT

## 2023-03-17 PROCEDURE — 71045 X-RAY EXAM CHEST 1 VIEW: CPT

## 2023-03-17 PROCEDURE — 82803 BLOOD GASES ANY COMBINATION: CPT

## 2023-03-17 PROCEDURE — 94799 UNLISTED PULMONARY SVC/PX: CPT

## 2023-03-17 PROCEDURE — 93005 ELECTROCARDIOGRAM TRACING: CPT | Performed by: EMERGENCY MEDICINE

## 2023-03-17 PROCEDURE — 83880 ASSAY OF NATRIURETIC PEPTIDE: CPT | Performed by: EMERGENCY MEDICINE

## 2023-03-17 PROCEDURE — 25010000002 ENOXAPARIN PER 10 MG: Performed by: INTERNAL MEDICINE

## 2023-03-17 PROCEDURE — 25510000001 IOPAMIDOL PER 1 ML: Performed by: INTERNAL MEDICINE

## 2023-03-17 PROCEDURE — 0202U NFCT DS 22 TRGT SARS-COV-2: CPT | Performed by: INTERNAL MEDICINE

## 2023-03-17 PROCEDURE — 85610 PROTHROMBIN TIME: CPT | Performed by: NURSE PRACTITIONER

## 2023-03-17 PROCEDURE — 83605 ASSAY OF LACTIC ACID: CPT | Performed by: NURSE PRACTITIONER

## 2023-03-17 PROCEDURE — 36600 WITHDRAWAL OF ARTERIAL BLOOD: CPT

## 2023-03-17 PROCEDURE — 99222 1ST HOSP IP/OBS MODERATE 55: CPT | Performed by: INTERNAL MEDICINE

## 2023-03-17 PROCEDURE — 94761 N-INVAS EAR/PLS OXIMETRY MLT: CPT

## 2023-03-17 PROCEDURE — 84484 ASSAY OF TROPONIN QUANT: CPT | Performed by: EMERGENCY MEDICINE

## 2023-03-17 PROCEDURE — 94660 CPAP INITIATION&MGMT: CPT

## 2023-03-17 PROCEDURE — 80053 COMPREHEN METABOLIC PANEL: CPT | Performed by: EMERGENCY MEDICINE

## 2023-03-17 PROCEDURE — 71275 CT ANGIOGRAPHY CHEST: CPT

## 2023-03-17 PROCEDURE — 83605 ASSAY OF LACTIC ACID: CPT | Performed by: EMERGENCY MEDICINE

## 2023-03-17 PROCEDURE — 85025 COMPLETE CBC W/AUTO DIFF WBC: CPT | Performed by: EMERGENCY MEDICINE

## 2023-03-17 PROCEDURE — 25010000002 FUROSEMIDE PER 20 MG: Performed by: INTERNAL MEDICINE

## 2023-03-17 PROCEDURE — 85610 PROTHROMBIN TIME: CPT | Performed by: EMERGENCY MEDICINE

## 2023-03-17 PROCEDURE — 85025 COMPLETE CBC W/AUTO DIFF WBC: CPT | Performed by: NURSE PRACTITIONER

## 2023-03-17 PROCEDURE — 93010 ELECTROCARDIOGRAM REPORT: CPT | Performed by: INTERNAL MEDICINE

## 2023-03-17 PROCEDURE — 87040 BLOOD CULTURE FOR BACTERIA: CPT | Performed by: EMERGENCY MEDICINE

## 2023-03-17 RX ORDER — ACETAMINOPHEN 325 MG/1
650 TABLET ORAL EVERY 4 HOURS PRN
Status: DISCONTINUED | OUTPATIENT
Start: 2023-03-17 | End: 2023-03-20 | Stop reason: HOSPADM

## 2023-03-17 RX ORDER — ACETAMINOPHEN 160 MG/5ML
650 SOLUTION ORAL EVERY 4 HOURS PRN
Status: DISCONTINUED | OUTPATIENT
Start: 2023-03-17 | End: 2023-03-20 | Stop reason: HOSPADM

## 2023-03-17 RX ORDER — SODIUM CHLORIDE 0.9 % (FLUSH) 0.9 %
10 SYRINGE (ML) INJECTION AS NEEDED
Status: DISCONTINUED | OUTPATIENT
Start: 2023-03-17 | End: 2023-03-20 | Stop reason: HOSPADM

## 2023-03-17 RX ORDER — FUROSEMIDE 10 MG/ML
40 INJECTION INTRAMUSCULAR; INTRAVENOUS
Status: DISCONTINUED | OUTPATIENT
Start: 2023-03-17 | End: 2023-03-17

## 2023-03-17 RX ORDER — AMIODARONE HYDROCHLORIDE 200 MG/1
100 TABLET ORAL
Status: DISCONTINUED | OUTPATIENT
Start: 2023-03-17 | End: 2023-03-20 | Stop reason: HOSPADM

## 2023-03-17 RX ORDER — ENOXAPARIN SODIUM 100 MG/ML
40 INJECTION SUBCUTANEOUS DAILY
Status: DISCONTINUED | OUTPATIENT
Start: 2023-03-17 | End: 2023-03-20 | Stop reason: HOSPADM

## 2023-03-17 RX ORDER — METOPROLOL SUCCINATE 50 MG/1
50 TABLET, EXTENDED RELEASE ORAL
Status: DISCONTINUED | OUTPATIENT
Start: 2023-03-17 | End: 2023-03-20 | Stop reason: HOSPADM

## 2023-03-17 RX ORDER — METHIMAZOLE 5 MG/1
5 TABLET ORAL DAILY
Status: DISCONTINUED | OUTPATIENT
Start: 2023-03-17 | End: 2023-03-20 | Stop reason: HOSPADM

## 2023-03-17 RX ORDER — POTASSIUM CHLORIDE 750 MG/1
40 TABLET, FILM COATED, EXTENDED RELEASE ORAL
Status: DISCONTINUED | OUTPATIENT
Start: 2023-03-17 | End: 2023-03-17 | Stop reason: SDUPTHER

## 2023-03-17 RX ORDER — ASPIRIN 81 MG/1
81 TABLET, CHEWABLE ORAL DAILY
Status: DISCONTINUED | OUTPATIENT
Start: 2023-03-18 | End: 2023-03-20 | Stop reason: HOSPADM

## 2023-03-17 RX ORDER — MONTELUKAST SODIUM 10 MG/1
10 TABLET ORAL EVERY MORNING
Status: DISCONTINUED | OUTPATIENT
Start: 2023-03-18 | End: 2023-03-20 | Stop reason: HOSPADM

## 2023-03-17 RX ORDER — CETIRIZINE HYDROCHLORIDE 10 MG/1
10 TABLET ORAL DAILY
COMMUNITY

## 2023-03-17 RX ORDER — NITROGLYCERIN 0.4 MG/1
0.4 TABLET SUBLINGUAL
Status: DISCONTINUED | OUTPATIENT
Start: 2023-03-17 | End: 2023-03-20 | Stop reason: HOSPADM

## 2023-03-17 RX ORDER — ALBUTEROL SULFATE 2.5 MG/3ML
2.5 SOLUTION RESPIRATORY (INHALATION) EVERY 6 HOURS PRN
Status: DISCONTINUED | OUTPATIENT
Start: 2023-03-17 | End: 2023-03-20 | Stop reason: HOSPADM

## 2023-03-17 RX ORDER — ALBUTEROL SULFATE 90 UG/1
2 AEROSOL, METERED RESPIRATORY (INHALATION) EVERY 6 HOURS PRN
Status: DISCONTINUED | OUTPATIENT
Start: 2023-03-17 | End: 2023-03-17

## 2023-03-17 RX ORDER — SODIUM CHLORIDE 0.9 % (FLUSH) 0.9 %
10 SYRINGE (ML) INJECTION EVERY 12 HOURS SCHEDULED
Status: DISCONTINUED | OUTPATIENT
Start: 2023-03-17 | End: 2023-03-20 | Stop reason: HOSPADM

## 2023-03-17 RX ORDER — SODIUM CHLORIDE 9 MG/ML
40 INJECTION, SOLUTION INTRAVENOUS AS NEEDED
Status: DISCONTINUED | OUTPATIENT
Start: 2023-03-17 | End: 2023-03-20 | Stop reason: HOSPADM

## 2023-03-17 RX ORDER — CETIRIZINE HYDROCHLORIDE 10 MG/1
10 TABLET ORAL DAILY
Status: DISCONTINUED | OUTPATIENT
Start: 2023-03-18 | End: 2023-03-20 | Stop reason: HOSPADM

## 2023-03-17 RX ORDER — LISINOPRIL 10 MG/1
10 TABLET ORAL EVERY 12 HOURS SCHEDULED
Status: DISCONTINUED | OUTPATIENT
Start: 2023-03-17 | End: 2023-03-18

## 2023-03-17 RX ORDER — FUROSEMIDE 10 MG/ML
80 INJECTION INTRAMUSCULAR; INTRAVENOUS ONCE
Status: DISCONTINUED | OUTPATIENT
Start: 2023-03-17 | End: 2023-03-17

## 2023-03-17 RX ORDER — POTASSIUM CHLORIDE 750 MG/1
40 TABLET, FILM COATED, EXTENDED RELEASE ORAL
Status: COMPLETED | OUTPATIENT
Start: 2023-03-17 | End: 2023-03-17

## 2023-03-17 RX ORDER — BUMETANIDE 1 MG/1
1 TABLET ORAL 2 TIMES DAILY
Status: DISCONTINUED | OUTPATIENT
Start: 2023-03-18 | End: 2023-03-20 | Stop reason: HOSPADM

## 2023-03-17 RX ORDER — ONDANSETRON 2 MG/ML
4 INJECTION INTRAMUSCULAR; INTRAVENOUS EVERY 6 HOURS PRN
Status: DISCONTINUED | OUTPATIENT
Start: 2023-03-17 | End: 2023-03-20 | Stop reason: HOSPADM

## 2023-03-17 RX ORDER — PRAVASTATIN SODIUM 40 MG
40 TABLET ORAL NIGHTLY
Status: DISCONTINUED | OUTPATIENT
Start: 2023-03-17 | End: 2023-03-20 | Stop reason: HOSPADM

## 2023-03-17 RX ORDER — ACETAMINOPHEN 650 MG/1
650 SUPPOSITORY RECTAL EVERY 4 HOURS PRN
Status: DISCONTINUED | OUTPATIENT
Start: 2023-03-17 | End: 2023-03-20 | Stop reason: HOSPADM

## 2023-03-17 RX ADMIN — Medication 10 ML: at 09:53

## 2023-03-17 RX ADMIN — PRAVASTATIN SODIUM 40 MG: 40 TABLET ORAL at 20:19

## 2023-03-17 RX ADMIN — LISINOPRIL 10 MG: 10 TABLET ORAL at 10:41

## 2023-03-17 RX ADMIN — METHIMAZOLE 5 MG: 5 TABLET ORAL at 14:53

## 2023-03-17 RX ADMIN — POTASSIUM CHLORIDE 40 MEQ: 750 TABLET, EXTENDED RELEASE ORAL at 17:18

## 2023-03-17 RX ADMIN — ENOXAPARIN SODIUM 40 MG: 100 INJECTION SUBCUTANEOUS at 14:53

## 2023-03-17 RX ADMIN — Medication 10 ML: at 20:19

## 2023-03-17 RX ADMIN — POTASSIUM CHLORIDE 40 MEQ: 750 TABLET, EXTENDED RELEASE ORAL at 10:41

## 2023-03-17 RX ADMIN — LISINOPRIL 10 MG: 10 TABLET ORAL at 20:19

## 2023-03-17 RX ADMIN — METOPROLOL SUCCINATE 50 MG: 25 TABLET, EXTENDED RELEASE ORAL at 10:41

## 2023-03-17 RX ADMIN — IOPAMIDOL 95 ML: 755 INJECTION, SOLUTION INTRAVENOUS at 06:44

## 2023-03-17 RX ADMIN — FUROSEMIDE 40 MG: 10 INJECTION, SOLUTION INTRAMUSCULAR; INTRAVENOUS at 09:52

## 2023-03-17 RX ADMIN — AMIODARONE HYDROCHLORIDE 100 MG: 200 TABLET ORAL at 10:41

## 2023-03-17 NOTE — H&P
Patient Name:  Natalie Rosen  YOB: 1945  MRN:  [id number]  Admit Date:  3/17/2023  Patient Care Team:  Bosler, James William III, MD as PCP - General (Internal Medicine)  Gerardo Julian MD as Obstetrician (Obstetrics and Gynecology)      Subjective   History Present Illness     Chief Complaint   Patient presents with   • Shortness of Breath       Ms. Rosen is a 77 y.o. with a history of heart failure, NSVT, asthma, hypertension, hyperlipidemia, hyperthyroidism that presents to [facility] complaining of worsening shortness of breath.  She reports that she had been having some cough and nasal drainage but then acutely got worse day prior to presentation.  She is feeling improved now but she was in respiratory distress upon presentation to the emergency room and ultimately required BiPAP.  This has been successfully weaned back to nasal cannula after additional diuretic.  She is not reporting any productive cough now and does not feel like she has had any wheezing.  She has not had any fevers although she does report some chills.  No chest pain reported.  Not having any nausea vomiting diarrhea or difficulty urinating.  She has not noticed any worsening edema peripherally.    History of Present Illness  Review of Systems   Constitutional: Positive for chills. Negative for diaphoresis and fever.   HENT: Negative for sore throat and trouble swallowing.    Eyes: Negative for pain and visual disturbance.   Respiratory: Positive for cough and shortness of breath. Negative for wheezing.    Cardiovascular: Negative for chest pain, palpitations and leg swelling.   Gastrointestinal: Negative for constipation, diarrhea, nausea and vomiting.   Endocrine: Negative for cold intolerance and heat intolerance.   Genitourinary: Negative for difficulty urinating and dysuria.   Musculoskeletal: Negative for neck pain and neck stiffness.   Skin: Negative for pallor and rash.   Allergic/Immunologic:  Negative for environmental allergies and food allergies.   Neurological: Negative for seizures and syncope.   Hematological: Negative for adenopathy. Does not bruise/bleed easily.   Psychiatric/Behavioral: Negative for agitation and confusion.        Personal History     Past Medical History:   Diagnosis Date   • Arthritis    • Asthma    • Benign essential hypertension    • Cancer (HCC)     MELANOMA   • Cardiomyopathy (HCC)    • CHF (congestive heart failure) (HCC)    • Goiter    • Heart murmur    • Hyperlipidemia    • Hyperthyroidism    • Hypothyroidism    • Mitral valve insufficiency    • PONV (postoperative nausea and vomiting)    • Seasonal allergies    • Urinary frequency      Past Surgical History:   Procedure Laterality Date   • CATARACT EXTRACTION     • COLONOSCOPY     • HYSTERECTOMY     • OTHER SURGICAL HISTORY      PT HAS HAD SKIN CA REMOVED FROM BACK AND FACE (UPPER LIP)    • MT ARTHRP KNE CONDYLE&PLATU MEDIAL&LAT COMPARTMENTS Right 2017    Procedure: RT TOTAL KNEE ARTHROPLASTY;  Surgeon: Doc Lance MD;  Location: [facility];  Service: Orthopedics   • MT ARTHRP KNE CONDYLE&PLATU MEDIAL&LAT COMPARTMENTS Left 2017    Procedure: LT TOTAL KNEE ARTHROPLASTY;  Surgeon: Doc Lance MD;  Location: [facility];  Service: Orthopedics     Family History   Problem Relation Age of Onset   • Breast cancer Other    • Diabetes Other    • Cancer Mother         Breast Cancer   • Cancer Father         Prostate Cancer   • Malig Hyperthermia Neg Hx      Social History     Tobacco Use   • Smoking status: Former     Packs/day: 1.00     Years: 25.00     Pack years: 25.00     Types: Cigarettes     Quit date: 1985     Years since quittin.2   • Smokeless tobacco: Never   Substance Use Topics   • Alcohol use: Yes     Alcohol/week: 6.0 standard drinks     Types: 6 Drinks containing 0.5 oz of alcohol per week     Comment: DAILY COCKTAIL   • Drug use: Never     No current  facility-administered medications on file prior to encounter.     Current Outpatient Medications on File Prior to Encounter   Medication Sig Dispense Refill   • Acetaminophen (TYLENOL EXTRA STRENGTH PO) Take 1 tablet by mouth 4 (Four) Times a Day.     • albuterol sulfate  (90 Base) MCG/ACT inhaler Inhale 2 puffs Every 6 (Six) Hours As Needed.     • amiodarone (Pacerone) 200 MG tablet Take 1 tablet by mouth Daily. 90 tablet 3   • Apoaequorin (PREVAGEN PO) Take 1 tablet by mouth Daily.     • aspirin 81 MG chewable tablet Chew 1 tablet Daily.     • bumetanide (BUMEX) 1 MG tablet Take 1 tablet by mouth 3 (Three) Times a Week. (Patient taking differently: Take 2 tablets by mouth 3 (Three) Times a Week. 1-3 times per week) 90 tablet 1   • cetirizine (zyrTEC) 10 MG tablet Take 1 tablet by mouth Daily.     • Diclofenac Sodium (VOLTAREN) 1 % gel gel APPLY 4 GRAMS TO THE AFFECTED AREA FOUR TIMES A DAY AS DIRECTED (Patient taking differently: Apply 4 g topically to the appropriate area as directed 4 (Four) Times a Day. back) 100 g 5   • escitalopram (LEXAPRO) 5 MG tablet Take 1 tablet by mouth Daily.     • lisinopril (PRINIVIL,ZESTRIL) 10 MG tablet Take 1 tablet by mouth 2 (Two) Times a Day. 180 tablet 3   • methIMAzole (TAPAZOLE) 5 MG tablet TAKE 1 TABLET BY MOUTH DAILY SIX DAYS OUT OF THE WEEK 72 tablet 0   • metoprolol succinate XL (TOPROL-XL) 50 MG 24 hr tablet Take 1 tablet by mouth Daily. 90 tablet 3   • montelukast (SINGULAIR) 10 MG tablet Take 1 tablet by mouth Every Morning.     • Multiple Vitamins-Minerals (PRESERVISION AREDS 2 PO) Take 1 capsule by mouth 2 (Two) Times a Day. Preser Vision Eye Vitamin     • omeprazole (priLOSEC) 40 MG capsule Take 1 capsule by mouth Daily As Needed.     • pravastatin (PRAVACHOL) 40 MG tablet TAKE ONE TABLET BY MOUTH EVERY NIGHT AT BEDTIME 30 tablet 0   • Probiotic Product (PROBIOTIC PO) Take 1 tablet by mouth Daily.     • triamcinolone (NASACORT) 55 MCG/ACT nasal inhaler 2  sprays into the nostril(s) as directed by provider Every Morning.     • vitamin B-6 (PYRIDOXINE) 50 MG tablet Take 1 tablet by mouth Daily.     • Carbinoxamine Maleate 4 MG tablet Take 4 mg by mouth Daily.     • ipratropium (ATROVENT) 0.06 % nasal spray 2 sprays into the nostril(s) as directed by provider every night at bedtime.     • ondansetron (ZOFRAN) 4 MG tablet Take 1 tablet by mouth Daily. Takes every morning       No Known Allergies    Objective    Objective     Vital Signs  Temp:  [96.6 °F (35.9 °C)] 96.6 °F (35.9 °C)  Heart Rate:  [] 96  Resp:  [22-38] 22  BP: (129-169)/() 144/95  SpO2:  [76 %-100 %] 93 %  on  Flow (L/min):  [2-15] 2;   Device (Oxygen Therapy): room air  Body mass index is 26.21 kg/m².    Physical Exam  Vitals and nursing note reviewed.   Constitutional:       Appearance: She is ill-appearing. She is not diaphoretic.   HENT:      Head: Normocephalic and atraumatic.   Eyes:      General:         Right eye: No discharge.         Left eye: No discharge.      Conjunctiva/sclera: Conjunctivae normal.   Cardiovascular:      Rate and Rhythm: Normal rate and regular rhythm.      Pulses: Normal pulses.   Pulmonary:      Breath sounds: Rales present. No wheezing.   Abdominal:      General: There is no distension.      Palpations: Abdomen is soft.      Tenderness: There is no abdominal tenderness. There is no guarding or rebound.   Musculoskeletal:         General: No tenderness.      Cervical back: Neck supple. No tenderness.      Right lower leg: No edema.      Left lower leg: No edema.   Skin:     General: Skin is warm and dry.   Neurological:      Mental Status: She is alert.      Cranial Nerves: No cranial nerve deficit.   Psychiatric:         Mood and Affect: Mood normal.         Behavior: Behavior normal.         Results Review:  I reviewed the patient's new clinical results.  I reviewed the patient's new imaging results and agree with the interpretation.  I reviewed the patient's  other test results and agree with the interpretation  I personally viewed and interpreted the patient's EKG/Telemetry data  I reviewed prior records.      Lab Results (last 24 hours)     Procedure Component Value Units Date/Time    CBC & Differential [[id number]]  (Abnormal) Collected: 03/17/23 0332    Specimen: Blood from Arm, Right Updated: 03/17/23 0434    Narrative:      The following orders were created for panel order CBC & Differential.  Procedure                               Abnormality         Status                     ---------                               -----------         ------                     CBC Auto Differential[[id number]]        Abnormal            Final result                 Please view results for these tests on the individual orders.    Comprehensive Metabolic Panel [[id number]]  (Abnormal) Collected: 03/17/23 0332    Specimen: Blood from Arm, Right Updated: 03/17/23 0402     Glucose 260 mg/dL      BUN 15 mg/dL      Creatinine 0.88 mg/dL      Sodium 142 mmol/L      Potassium 3.6 mmol/L      Comment: Slight hemolysis detected by analyzer. Results may be affected.        Chloride 102 mmol/L      CO2 23.0 mmol/L      Calcium 8.9 mg/dL      Total Protein 7.4 g/dL      Albumin 4.2 g/dL      ALT (SGPT) 26 U/L      AST (SGOT) 46 U/L      Alkaline Phosphatase 74 U/L      Total Bilirubin 0.4 mg/dL      Globulin 3.2 gm/dL      A/G Ratio 1.3 g/dL      BUN/Creatinine Ratio 17.0     Anion Gap 17.0 mmol/L      eGFR 68.2 mL/min/1.73     Narrative:      GFR Normal >60  Chronic Kidney Disease <60  Kidney Failure <15    The GFR formula is only valid for adults with stable renal function between ages 18 and 70.    Protime-INR [[id number]]  (Normal) Collected: 03/17/23 0332    Specimen: Blood from Arm, Right Updated: 03/17/23 0352     Protime 13.5 Seconds      INR 1.02    aPTT [[id number]]  (Normal) Collected: 03/17/23 0332    Specimen: Blood from Arm, Right Updated: 03/17/23 0352     PTT 26.9 seconds     BNP  "[[id number]]  (Abnormal) Collected: 03/17/23 0332    Specimen: Blood from Arm, Right Updated: 03/17/23 0409     proBNP 3,687.0 pg/mL     Narrative:      Among patients with dyspnea, NT-proBNP is highly sensitive for the detection of acute congestive heart failure. In addition NT-proBNP of <300 pg/ml effectively rules out acute congestive heart failure with 99% negative predictive value.    Results may be falsely decreased if patient taking Biotin.      High Sensitivity Troponin T [[id number]]  (Abnormal) Collected: 03/17/23 0332    Specimen: Blood from Arm, Right Updated: 03/17/23 0409     HS Troponin T 15 ng/L     Narrative:      High Sensitive Troponin T Reference Range:  <10.0 ng/L- Negative Female for AMI  <15.0 ng/L- Negative Male for AMI  >=10 - Abnormal Female indicating possible myocardial injury.  >=15 - Abnormal Male indicating possible myocardial injury.   Clinicians would have to utilize clinical acumen, EKG, Troponin, and serial changes to determine if it is an Acute Myocardial Infarction or myocardial injury due to an underlying chronic condition.         Procalcitonin [[id number]]  (Normal) Collected: 03/17/23 0332    Specimen: Blood from Arm, Right Updated: 03/17/23 0409     Procalcitonin 0.03 ng/mL     Narrative:      As a Marker for Sepsis (Non-Neonates):    1. <0.5 ng/mL represents a low risk of severe sepsis and/or septic shock.  2. >2 ng/mL represents a high risk of severe sepsis and/or septic shock.    As a Marker for Lower Respiratory Tract Infections that require antibiotic therapy:    PCT on Admission    Antibiotic Therapy       6-12 Hrs later    >0.5                Strongly Recommended  >0.25 - <0.5        Recommended   0.1 - 0.25          Discouraged              Remeasure/reassess PCT  <0.1                Strongly Discouraged     Remeasure/reassess PCT    As 28 day mortality risk marker: \"Change in Procalcitonin Result\" (>80% or <=80%) if Day 0 (or Day 1) and Day 4 values are available. " Refer to http://[email or web address]-pct-calculator.com    Change in PCT <=80%  A decrease of PCT levels below or equal to 80% defines a positive change in PCT test result representing a higher risk for 28-day all-cause mortality of patients diagnosed with severe sepsis for septic shock.    Change in PCT >80%  A decrease of PCT levels of more than 80% defines a negative change in PCT result representing a lower risk for 28-day all-cause mortality of patients diagnosed with severe sepsis or septic shock.       Lactic Acid, Plasma [[id number]]  (Abnormal) Collected: 03/17/23 0332    Specimen: Blood from Arm, Right Updated: 03/17/23 0402     Lactate 3.6 mmol/L     Magnesium [[id number]]  (Normal) Collected: 03/17/23 0332    Specimen: Blood from Arm, Right Updated: 03/17/23 0402     Magnesium 2.2 mg/dL     CBC Auto Differential [[id number]]  (Abnormal) Collected: 03/17/23 0332    Specimen: Blood from Arm, Right Updated: 03/17/23 0434     WBC 15.89 10*3/mm3      RBC 4.81 10*6/mm3      Hemoglobin 15.1 g/dL      Hematocrit 47.8 %      MCV 99.4 fL      MCH 31.4 pg      MCHC 31.6 g/dL      RDW 14.2 %      RDW-SD 52.5 fl      MPV 9.8 fL      Platelets 261 10*3/mm3     Manual Differential [[id number]]  (Abnormal) Collected: 03/17/23 0332    Specimen: Blood from Arm, Right Updated: 03/17/23 0434     Neutrophil % 31.3 %      Lymphocyte % 54.5 %      Monocyte % 9.1 %      Eosinophil % 4.0 %      Basophil % 1.0 %      Neutrophils Absolute 4.97 10*3/mm3      Lymphocytes Absolute 8.66 10*3/mm3      Monocytes Absolute 1.45 10*3/mm3      Eosinophils Absolute 0.64 10*3/mm3      Basophils Absolute 0.16 10*3/mm3      RBC Morphology Normal     WBC Morphology Normal     Platelet Morphology Normal    Blood Gas, Arterial - [[id number]]  (Abnormal) Collected: 03/17/23 0335    Specimen: Arterial Blood Updated: 03/17/23 0340     Site Arterial: left radial     Isac's Test Positive     pH, Arterial 7.126 pH units      Comment: Meter: [id number] :  CR Martins FaithCritical:Notify Dr DR HUYNH (17-Mar-23 03:38:46)Read back ok        pCO2, Arterial 81.2 mm Hg      Comment: Critical:Notify Dr DR HUYNH (17-Mar-23 03:38:46)Read back ok        pO2, Arterial 105.3 mm Hg      HCO3, Arterial 26.8 mmol/L      Base Excess, Arterial -5.2 mmol/L      O2 Saturation Calculated 95.3 %      A-a DO2 0.2 mmHg      Barometric Pressure for Blood Gas 746.3 mmHg      Modality NRB     FIO2 100 %      Rate 36 Breaths/minute     Blood Culture - Blood, Arm, Right [[id number]] Collected: 03/17/23 0336    Specimen: Blood from Arm, Right Updated: 03/17/23 0345    Blood Culture - Blood, Arm, Left [[id number]] Collected: 03/17/23 0336    Specimen: Blood from Arm, Left Updated: 03/17/23 0345    Blood Gas, Arterial - [[id number]]  (Abnormal) Collected: 03/17/23 0524    Specimen: Arterial Blood Updated: 03/17/23 0531     Site Arterial: left radial     Isac's Test Positive     pH, Arterial 7.347 pH units      pCO2, Arterial 50.1 mm Hg      pO2, Arterial 219.3 mm Hg      HCO3, Arterial 27.5 mmol/L      Base Excess, Arterial 0.9 mmol/L      O2 Saturation Calculated 99.7 %      Comment: KATHERINE MIN-10 MAX-22 Meter: [id number] : CR Galeas        A-a DO2 0.3 mmHg      Barometric Pressure for Blood Gas 745.9 mmHg      Modality BiPap     FIO2 100 %      Ventilator Mode NIV     Set Tidal Volume 450     Set Mech Resp Rate 22     Rate 22 Breaths/minute     STAT Lactic Acid, Reflex [[id number]]  (Normal) Collected: 03/17/23 0553    Specimen: Blood Updated: 03/17/23 0622     Lactate 1.7 mmol/L     High Sensitivity Troponin T 2Hr [[id number]]  (Abnormal) Collected: 03/17/23 0553    Specimen: Blood Updated: 03/17/23 0646     HS Troponin T 51 ng/L      Troponin T Delta 36 ng/L     Narrative:      High Sensitive Troponin T Reference Range:  <10.0 ng/L- Negative Female for AMI  <15.0 ng/L- Negative Male for AMI  >=10 - Abnormal Female indicating possible myocardial injury.  >=15 -  Abnormal Male indicating possible myocardial injury.   Clinicians would have to utilize clinical acumen, EKG, Troponin, and serial changes to determine if it is an Acute Myocardial Infarction or myocardial injury due to an underlying chronic condition.         Basic Metabolic Panel [[id number]]  (Abnormal) Collected: 03/17/23 0742    Specimen: Blood Updated: 03/17/23 0811     Glucose 108 mg/dL      BUN 15 mg/dL      Creatinine 0.65 mg/dL      Sodium 141 mmol/L      Potassium 4.0 mmol/L      Chloride 102 mmol/L      CO2 26.2 mmol/L      Calcium 8.6 mg/dL      BUN/Creatinine Ratio 23.1     Anion Gap 12.8 mmol/L      eGFR 91.4 mL/min/1.73     Narrative:      GFR Normal >60  Chronic Kidney Disease <60  Kidney Failure <15    The GFR formula is only valid for adults with stable renal function between ages 18 and 70.    CBC Auto Differential [[id number]]  (Abnormal) Collected: 03/17/23 0742    Specimen: Blood Updated: 03/17/23 0821     WBC 8.51 10*3/mm3      RBC 4.32 10*6/mm3      Hemoglobin 14.0 g/dL      Hematocrit 40.4 %      MCV 93.5 fL      MCH 32.4 pg      MCHC 34.7 g/dL      RDW 14.1 %      RDW-SD 48.8 fl      MPV 9.2 fL      Platelets 194 10*3/mm3      Neutrophil % 81.5 %      Lymphocyte % 10.0 %      Monocyte % 7.2 %      Eosinophil % 0.2 %      Basophil % 0.5 %      Immature Grans % 0.6 %      Neutrophils, Absolute 6.94 10*3/mm3      Lymphocytes, Absolute 0.85 10*3/mm3      Monocytes, Absolute 0.61 10*3/mm3      Eosinophils, Absolute 0.02 10*3/mm3      Basophils, Absolute 0.04 10*3/mm3      Immature Grans, Absolute 0.05 10*3/mm3      nRBC 0.0 /100 WBC     Lactic Acid, Plasma [[id number]]  (Normal) Collected: 03/17/23 0742    Specimen: Blood Updated: 03/17/23 0809     Lactate 1.2 mmol/L     Protime-INR [[id number]]  (Normal) Collected: 03/17/23 0742    Specimen: Blood Updated: 03/17/23 0809     Protime 13.4 Seconds      INR 1.01          Imaging Results (Last 24 Hours)     Procedure Component Value Units Date/Time     CT Angiogram Chest [[id number]] Collected: 03/17/23 0727     Updated: 03/17/23 0727    Narrative:      CT ANGIOGRAM CHEST WITH CONTRAST, PULMONARY EMBOLISM PROTOCOL     HISTORY: Shortness of breath, tachycardia and hypoxia.     TECHNIQUE: Spiral CT images were obtained from the lung apices to the  diaphragmatic domes following administration of intravenous contrast in  the angiographic phase. Coronal, sagittal and 3-D volume rendered  reformats were then obtained.     COMPARISON: 09/02/2022     FINDINGS: The pulmonary arteries are well opacified with intravenous  contrast.There are no convincing filling defects to suggest pulmonary  artery thromboembolism. No pericardial effusion is seen. Small  mediastinal lymph nodes without pathological enlargement. Small right  and trace left pleural effusion. The central airways are patent.  Bilateral lung fields demonstrate areas of scattered ground-glass  density and interstitial thickening. Dense area of consolidation in the  bilateral lung bases. Scattered areas of consolidation are also seen  within the right middle lobe. There is a nodular density within the left  lower lobe on image 101/102 measuring up to 5 mm. The findings are most  suggestive of pulmonary edema.     The visualized upper abdomen does not demonstrate an acute abnormality.  Mild reflux of contrast is seen within the IVC. No pathological axillary  lymphadenopathy. Patchy sclerosis is seen within the T1 vertebral body,  unchanged from CT dated 09/02/2022.       Impression:      1. No convincing evidence of pulmonary thromboembolism.  2. Aerated lung fields demonstrate bilateral scattered areas of  ground-glass density, patchy areas of basilar consolidation and  interstitial thickening in a pattern that likely represents pulmonary  edema. Small right and trace left pleural effusion is present.  3. Sclerotic focus seen within the T1 vertebral body. This is  indeterminate. Correlation for history of any  primary neoplasm with  propensity for sclerotic metastases. Initial further workup may be  recommended with a bone scan.     Radiation dose reduction techniques were utilized, including automated  exposure control and exposure modulation based on body size.          XR Chest 1 View [[id number]] Collected: 03/17/23 0508     Updated: 03/17/23 0508    Narrative:        Patient: MARIBELL DOUGLAS  Time Out: 05:07  Exam(s): FILM CXR 1 VIEW     EXAM:    XR Chest, 1 View    CLINICAL HISTORY:     Reason for exam: soa, hx chf.    TECHNIQUE:    Frontal view of the chest.    COMPARISON:    No relevant prior studies available.    FINDINGS:    Lungs:  Mild interstitial changes in the lungs which may be due to mild   interstitial pulmonary edema.    Pleural space:  Unremarkable.  No pneumothorax.    Heart:  Unremarkable.  No cardiomegaly.    Mediastinum:  Unremarkable.    Bones joints:  Degenerative changes in the spine.    IMPRESSION:         Mild interstitial changes in the lungs which may be due to mild   interstitial pulmonary edema.      Impression:          Electronically signed by Lc Melgar MD on 03-17-23 at 0507          Results for orders placed during the hospital encounter of 02/22/23    Adult Transthoracic Echo Complete w/ Color, Spectral and Contrast if Necessary Per Protocol    Interpretation Summary  •  Left ventricular systolic function is low normal. Calculated left ventricular EF = 49.2%  •  The following left ventricular wall segments are hypokinetic: basal inferolateral, apical septal, basal inferoseptal, mid inferoseptal, mid anteroseptal and basal inferoseptal.  •  Left ventricular diastolic function was normal.  •  Moderate mitral valve regurgitation is present.  •  Calculated right ventricular systolic pressure from tricuspid regurgitation is 41 mmHg.  •  Mild pulmonary hypertension is present.      ECG 12 Lead Rhythm Change   Preliminary Result   HEART RATE= 123  bpm   RR Interval= 488  ms   NE  Interval=   ms   P Horizontal Axis=   deg   P Front Axis=   deg   QRSD Interval= 159  ms   QT Interval= 395  ms   QRS Axis= -45  deg   T Wave Axis= 87  deg   - ABNORMAL ECG -   Atrial flutter with 2:1 AV block   Left bundle branch block   Electronically Signed By:    Date and Time of Study: 2023-03-17 07:01:40      ECG 12 Lead Dyspnea   Preliminary Result   HEART RATE= 115  bpm   RR Interval= 522  ms   RI Interval=   ms   P Horizontal Axis=   deg   P Front Axis=   deg   QRSD Interval= 178  ms   QT Interval= 401  ms   QRS Axis= -50  deg   T Wave Axis= 98  deg   - ABNORMAL ECG -   Atrial fibrillation   Left bundle branch block   Electronically Signed By:    Date and Time of Study: 2023-03-17 05:27:56      ECG 12 Lead Dyspnea   Preliminary Result   HEART RATE= 96  bpm   RR Interval= 625  ms   RI Interval= 164  ms   P Horizontal Axis= -55  deg   P Front Axis= 53  deg   QRSD Interval= 170  ms   QT Interval= 431  ms   QRS Axis= -33  deg   T Wave Axis= 108  deg   - ABNORMAL ECG -   Sinus rhythm   Ventricular premature complex   Probable left atrial enlargement   Left bundle branch block   Electronically Signed By:    Date and Time of Study: 2023-03-17 03:42:48           Assessment/Plan     Active Hospital Problems    Diagnosis  POA   • **Acute on chronic combined systolic and diastolic CHF (congestive heart failure) (HCC) [I50.43]  Yes   • NSVT (nonsustained ventricular tachycardia) [I47.29]  Yes   • Acute respiratory failure with hypoxia (HCC) [J96.01]  Yes   • Asthma [J45.909]  Yes   • Hyperthyroidism [E05.90]  Yes   • Hyperlipidemia [E78.5]  Yes      Resolved Hospital Problems   No resolved problems to display.       Ms. Rosen is a 77 y.o.     · Acute on chronic systolic heart failure: Continue diuresis with improvement in her respiratory symptoms from that.  There was also a questionable potential infiltrate.  Her leukocytosis has improved today and she is currently afebrile.  Procalcitonin not elevated.  Will screen  respiratory viral panel.  Continue to wean oxygen as tolerated.  Consult cardiology.  · Acute hypoxic respiratory failure secondary to above  · Asthma: No acute bronchospasm on exam.  Plan resumption of home regimen and monitoring.  · NSVT: She is on amiodarone for this.  Continue telemetry and appears she has a rate dependent left bundle branch . follow-up cardiology recommendations.  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  Will repeat thyroid studies, resume home medication, and monitor.  · T1 lesion: There was an MRI from 3 years ago that did not show any T1 lesion.  There was degenerative change at T1/T2 noted.  Will order bone scan.  · PPx: Lovenox  · I discussed the patient's findings and my recommendations with patient.    Nael Dominguez MD  [facility]  03/17/23  11:02 EDT

## 2023-03-17 NOTE — ED PROVIDER NOTES
EMERGENCY DEPARTMENT ENCOUNTER    Room Number:  15/15  Date of encounter:  3/17/2023  PCP: Bosler, James William III, MD  Patient Care Team:  Bosler, James William III, MD as PCP - General (Internal Medicine)  LinkGerardo MD as Obstetrician (Obstetrics and Gynecology)   Independent Historians: EMS, patient    HPI:  Chief Complaint: Dyspnea    A complete HPI/ROS/PMH/PSH/SH/FH are unobtainable due to: Patient respiratory distress    Chronic or social conditions impacting patient care (Social Determinants of Health): None  (Financial Resource Strain / Food Insecurity / Transportation Needs / Physical Activity / Stress / Social Connections / Intimate Partner Violence / Housing Stability)    Context: Natalie Rosen is a 77 y.o. female who presents to the ED in respiratory distress.  EMS called for shortness of breath this morning.  Patient reports that this started a few hours ago.  Patient hypoxic on arrival.  Attempted to start patient on BiPAP but was unsuccessful secondary machine breaking.  Patient arrives on nebulizer satting in the mid 80s.  Patient clear respiratory distress.  Patient is diaphoretic.  Patient history of ischemic cardiomyopathy.  Patient with rate dependent left bundle branch block observed in the past.    Review of prior external notes (non-ED): I have reviewed patient's discharge summary from 9/4/2022 as well as multiple cardiac clinic notes from Dr. Catracho Gregg.    Review of prior external test results outside of this encounter: I have reviewed patient's BMP and CBC from 1/24/2023      PAST MEDICAL HISTORY  Active Ambulatory Problems     Diagnosis Date Noted   • NICM (nonischemic cardiomyopathy) (HCC) 02/08/2016   • Stenosis of carotid artery 02/08/2016   • Hyperlipidemia 02/08/2016   • Hyperthyroidism 02/08/2016   • Non-toxic multinodular goiter 02/08/2016   • Carotid bruit 02/24/2016   • Mitral valve insufficiency 02/24/2016   • Atrophic vaginitis 09/27/2016   • Osteoarthritis, knee  01/12/2017   • DJD (degenerative joint disease) of knee 01/12/2017   • Neck pain on left side 09/08/2020   • Chronic bilateral low back pain without sciatica 09/08/2020   • Lumbar facet arthropathy 11/23/2020   • Muscle spasm 11/23/2020   • Asthma 09/02/2022   • Dizziness 11/22/2022   • NSVT (nonsustained ventricular tachycardia) 11/22/2022     Resolved Ambulatory Problems     Diagnosis Date Noted   • Abnormal weight gain 02/08/2016   • Fatigue 02/08/2016   • COVID-19 09/02/2022   • Acute on chronic combined systolic and diastolic CHF (congestive heart failure) (HCC) 09/02/2022   • Acute respiratory failure with hypoxia (HCC) 09/02/2022   • Hyponatremia 09/02/2022   • Cytokine release syndrome, grade 1 09/04/2022     Past Medical History:   Diagnosis Date   • Arthritis    • Benign essential hypertension    • Cancer (HCC)    • Cardiomyopathy (HCC)    • CHF (congestive heart failure) (HCC)    • Goiter    • Heart murmur    • Hypothyroidism 2014   • PONV (postoperative nausea and vomiting)    • Seasonal allergies    • Urinary frequency        The patient has a COVID HM Topic on their chart, and they are fully vaccinated.    PAST SURGICAL HISTORY  Past Surgical History:   Procedure Laterality Date   • CATARACT EXTRACTION     • COLONOSCOPY     • HYSTERECTOMY     • OTHER SURGICAL HISTORY      PT HAS HAD SKIN CA REMOVED FROM BACK AND FACE (UPPER LIP)    • OR ARTHRP KNE CONDYLE&PLATU MEDIAL&LAT COMPARTMENTS Right 01/12/2017    Procedure: RT TOTAL KNEE ARTHROPLASTY;  Surgeon: Doc Lance MD;  Location: [facility] OR;  Service: Orthopedics   • OR ARTHRP KNE CONDYLE&PLATU MEDIAL&LAT COMPARTMENTS Left 06/26/2017    Procedure: LT TOTAL KNEE ARTHROPLASTY;  Surgeon: Doc Lance MD;  Location: [facility] OR;  Service: Orthopedics         FAMILY HISTORY  Family History   Problem Relation Age of Onset   • Breast cancer Other    • Diabetes Other    • Cancer Mother         Breast Cancer   • Cancer Father         Prostate  Cancer   • Malig Hyperthermia Neg Hx          SOCIAL HISTORY  Social History     Socioeconomic History   • Marital status:    Tobacco Use   • Smoking status: Former     Packs/day: 1.00     Years: 25.00     Pack years: 25.00     Types: Cigarettes     Quit date: 1985     Years since quittin.2   • Smokeless tobacco: Never   Substance and Sexual Activity   • Alcohol use: Yes     Alcohol/week: 6.0 standard drinks     Types: 6 Drinks containing 0.5 oz of alcohol per week     Comment: DAILY COCKTAIL   • Drug use: Never   • Sexual activity: Defer         ALLERGIES  Patient has no known allergies.        REVIEW OF SYSTEMS  Review of Systems     All systems reviewed and negative except for those discussed in HPI.       PHYSICAL EXAM    I have reviewed the triage vital signs and nursing notes.    ED Triage Vitals   Temp Heart Rate Resp BP SpO2   23 0325 23 0324 23 0324 23 0324 23 0320   96.6 °F (35.9 °C) 89 (!) 38 (!) 169/102 (!) 76 %      Temp src Heart Rate Source Patient Position BP Location FiO2 (%)   23 0325 23 0339 23 0401 23 0401 --   Tympanic Monitor Sitting Left arm        Physical Exam  GENERAL: alert, moderate distress  SKIN: Warm, dry  HENT: Normocephalic, atraumatic  EYES: no scleral icterus  CV: regular rhythm, regular rate  RESPIRATORY: Increased respiratory effort, tachypneic, lungs coarse bilaterally.  Patient in moderate respiratory distress patient with difficulty speaking secondary to SOA.  ABDOMEN: soft, nontender, nondistended  MUSCULOSKELETAL: no deformity  NEURO: alert, moves all extremities, follows commands          LAB RESULTS  Recent Results (from the past 24 hour(s))   Comprehensive Metabolic Panel    Collection Time: 23  3:32 AM    Specimen: Arm, Right; Blood   Result Value Ref Range    Glucose 260 (H) 65 - 99 mg/dL    BUN 15 8 - 23 mg/dL    Creatinine 0.88 0.57 - 1.00 mg/dL    Sodium 142 136 - 145 mmol/L    Potassium  3.6 3.5 - 5.2 mmol/L    Chloride 102 98 - 107 mmol/L    CO2 23.0 22.0 - 29.0 mmol/L    Calcium 8.9 8.6 - 10.5 mg/dL    Total Protein 7.4 6.0 - 8.5 g/dL    Albumin 4.2 3.5 - 5.2 g/dL    ALT (SGPT) 26 1 - 33 U/L    AST (SGOT) 46 (H) 1 - 32 U/L    Alkaline Phosphatase 74 39 - 117 U/L    Total Bilirubin 0.4 0.0 - 1.2 mg/dL    Globulin 3.2 gm/dL    A/G Ratio 1.3 g/dL    BUN/Creatinine Ratio 17.0 7.0 - 25.0    Anion Gap 17.0 (H) 5.0 - 15.0 mmol/L    eGFR 68.2 >60.0 mL/min/1.73   Protime-INR    Collection Time: 03/17/23  3:32 AM    Specimen: Arm, Right; Blood   Result Value Ref Range    Protime 13.5 11.7 - 14.2 Seconds    INR 1.02 0.90 - 1.10   aPTT    Collection Time: 03/17/23  3:32 AM    Specimen: Arm, Right; Blood   Result Value Ref Range    PTT 26.9 22.7 - 35.4 seconds   BNP    Collection Time: 03/17/23  3:32 AM    Specimen: Arm, Right; Blood   Result Value Ref Range    proBNP 3,687.0 (H) 0.0 - 1,800.0 pg/mL   High Sensitivity Troponin T    Collection Time: 03/17/23  3:32 AM    Specimen: Arm, Right; Blood   Result Value Ref Range    HS Troponin T 15 (H) <10 ng/L   Procalcitonin    Collection Time: 03/17/23  3:32 AM    Specimen: Arm, Right; Blood   Result Value Ref Range    Procalcitonin 0.03 0.00 - 0.25 ng/mL   Lactic Acid, Plasma    Collection Time: 03/17/23  3:32 AM    Specimen: Arm, Right; Blood   Result Value Ref Range    Lactate 3.6 (C) 0.5 - 2.0 mmol/L   Magnesium    Collection Time: 03/17/23  3:32 AM    Specimen: Arm, Right; Blood   Result Value Ref Range    Magnesium 2.2 1.6 - 2.4 mg/dL   CBC Auto Differential    Collection Time: 03/17/23  3:32 AM    Specimen: Arm, Right; Blood   Result Value Ref Range    WBC 15.89 (H) 3.40 - 10.80 10*3/mm3    RBC 4.81 3.77 - 5.28 10*6/mm3    Hemoglobin 15.1 12.0 - 15.9 g/dL    Hematocrit 47.8 (H) 34.0 - 46.6 %    MCV 99.4 (H) 79.0 - 97.0 fL    MCH 31.4 26.6 - 33.0 pg    MCHC 31.6 31.5 - 35.7 g/dL    RDW 14.2 12.3 - 15.4 %    RDW-SD 52.5 37.0 - 54.0 fl    MPV 9.8 6.0 - 12.0 fL     Platelets 261 140 - 450 10*3/mm3   Manual Differential    Collection Time: 03/17/23  3:32 AM    Specimen: Arm, Right; Blood   Result Value Ref Range    Neutrophil % 31.3 (L) 42.7 - 76.0 %    Lymphocyte % 54.5 (H) 19.6 - 45.3 %    Monocyte % 9.1 5.0 - 12.0 %    Eosinophil % 4.0 0.3 - 6.2 %    Basophil % 1.0 0.0 - 1.5 %    Neutrophils Absolute 4.97 1.70 - 7.00 10*3/mm3    Lymphocytes Absolute 8.66 (H) 0.70 - 3.10 10*3/mm3    Monocytes Absolute 1.45 (H) 0.10 - 0.90 10*3/mm3    Eosinophils Absolute 0.64 (H) 0.00 - 0.40 10*3/mm3    Basophils Absolute 0.16 0.00 - 0.20 10*3/mm3    RBC Morphology Normal Normal    WBC Morphology Normal Normal    Platelet Morphology Normal Normal   Blood Gas, Arterial -    Collection Time: 03/17/23  3:35 AM    Specimen: Arterial Blood   Result Value Ref Range    Site Arterial: left radial     Isac's Test Positive     pH, Arterial 7.126 (C) 7.350 - 7.450 pH units    pCO2, Arterial 81.2 (C) 35.0 - 45.0 mm Hg    pO2, Arterial 105.3 (H) 80.0 - 100.0 mm Hg    HCO3, Arterial 26.8 22.0 - 28.0 mmol/L    Base Excess, Arterial -5.2 (L) 0.0 - 2.0 mmol/L    O2 Saturation Calculated 95.3 92.0 - 99.0 %    A-a DO2 0.2 mmHg    Barometric Pressure for Blood Gas 746.3 mmHg    Modality NRB     FIO2 100 %    Rate 36 Breaths/minute   ECG 12 Lead Dyspnea    Collection Time: 03/17/23  3:42 AM   Result Value Ref Range    QT Interval 431 ms   Blood Gas, Arterial -    Collection Time: 03/17/23  5:24 AM    Specimen: Arterial Blood   Result Value Ref Range    Site Arterial: left radial     Isac's Test Positive     pH, Arterial 7.347 (L) 7.350 - 7.450 pH units    pCO2, Arterial 50.1 (H) 35.0 - 45.0 mm Hg    pO2, Arterial 219.3 (H) 80.0 - 100.0 mm Hg    HCO3, Arterial 27.5 22.0 - 28.0 mmol/L    Base Excess, Arterial 0.9 0.0 - 2.0 mmol/L    O2 Saturation Calculated 99.7 (H) 92.0 - 99.0 %    A-a DO2 0.3 mmHg    Barometric Pressure for Blood Gas 745.9 mmHg    Modality BiPap     FIO2 100 %    Ventilator Mode NIV     Set  Tidal Volume 450     Set [facility] Resp Rate 22     Rate 22 Breaths/minute   ECG 12 Lead Dyspnea    Collection Time: 03/17/23  5:27 AM   Result Value Ref Range    QT Interval 401 ms   STAT Lactic Acid, Reflex    Collection Time: 03/17/23  5:53 AM    Specimen: Blood   Result Value Ref Range    Lactate 1.7 0.5 - 2.0 mmol/L   High Sensitivity Troponin T 2Hr    Collection Time: 03/17/23  5:53 AM    Specimen: Blood   Result Value Ref Range    HS Troponin T 51 (H) <10 ng/L    Troponin T Delta 36 (C) >=-4 - <+4 ng/L   ECG 12 Lead Rhythm Change    Collection Time: 03/17/23  7:01 AM   Result Value Ref Range    QT Interval 395 ms       Ordered the above labs and independently reviewed the results.        RADIOLOGY  XR Chest 1 View    Result Date: 3/17/2023  Patient: MARIBELL DOUGLAS  Time Out: 05:07 Exam(s): FILM CXR 1 VIEW EXAM:   XR Chest, 1 View CLINICAL HISTORY:    Reason for exam: soa, hx chf. TECHNIQUE:   Frontal view of the chest. COMPARISON:   No relevant prior studies available. FINDINGS:   Lungs:  Mild interstitial changes in the lungs which may be due to mild interstitial pulmonary edema.   Pleural space:  Unremarkable.  No pneumothorax.   Heart:  Unremarkable.  No cardiomegaly.   Mediastinum:  Unremarkable.   Bones joints:  Degenerative changes in the spine. IMPRESSION:       Mild interstitial changes in the lungs which may be due to mild interstitial pulmonary edema.     Electronically signed by Lc Melgar MD on 03-17-23 at 0507      I ordered the above noted radiological studies. Reviewed by me and discussed with radiologist.  See dictation for official radiology interpretation.      PROCEDURES    Critical Care  Performed by: Edu Garcia MD  Authorized by: Jason Milton MD     Critical care provider statement:     Critical care time (minutes):  65    Critical care was necessary to treat or prevent imminent or life-threatening deterioration of the following conditions:  Respiratory failure     Critical care was time spent personally by me on the following activities:  Development of treatment plan with patient or surrogate, evaluation of patient's response to treatment, examination of patient, ordering and performing treatments and interventions, ordering and review of laboratory studies, ordering and review of radiographic studies, pulse oximetry, re-evaluation of patient's condition and review of old charts          MEDICATIONS GIVEN IN ER    Medications   sodium chloride 0.9 % flush 10 mL (has no administration in time range)   sodium chloride 0.9 % flush 10 mL (has no administration in time range)   sodium chloride 0.9 % flush 10 mL (has no administration in time range)   sodium chloride 0.9 % infusion 40 mL (has no administration in time range)   nitroglycerin (NITROSTAT) SL tablet 0.4 mg (has no administration in time range)   acetaminophen (TYLENOL) tablet 650 mg (has no administration in time range)     Or   acetaminophen (TYLENOL) 160 MG/5ML solution 650 mg (has no administration in time range)     Or   acetaminophen (TYLENOL) suppository 650 mg (has no administration in time range)   ondansetron (ZOFRAN) injection 4 mg (has no administration in time range)   iopamidol (ISOVUE-370) 76 % injection 100 mL (95 mL Intravenous Given 3/17/23 0644)         ORDERS PLACED DURING THIS VISIT:  Orders Placed This Encounter   Procedures   • Critical Care   • Blood Culture - Blood,   • Blood Culture - Blood,   • XR Chest 1 View   • CT Angiogram Chest   • Comprehensive Metabolic Panel   • Protime-INR   • aPTT   • Blood Gas, Arterial -   • BNP   • High Sensitivity Troponin T   • Procalcitonin   • Lactic Acid, Plasma   • Magnesium   • CBC Auto Differential   • Blood Gas, Arterial -   • Blood Gas, Arterial -   • Manual Differential   • STAT Lactic Acid, Reflex   • High Sensitivity Troponin T 2Hr   • Blood Gas, Arterial -   • Potassium   • Magnesium   • High Sensitivity Troponin T   • Blood Gas, Arterial -   •  Basic Metabolic Panel   • CBC Auto Differential   • Lactic Acid, Plasma   • Protime-INR   • Diet: Cardiac Diets; Healthy Heart (2-3 Na+); Texture: Regular Texture (IDDSI 7); Fluid Consistency: Thin (IDDSI 0)   • Monitor Blood Pressure   • Vital Signs   • Intake & Output   • Weigh Patient   • Oral Care   • Place Sequential Compression Device   • Maintain Sequential Compression Device   • Telemetry - Maintain IV Access   • Continuous Cardiac Monitoring   • May Be Off Telemetry for Tests   • Pulse Oximetry, Continuous   • Up With Assistance   • Code Status and Medical Interventions:   • LHA (on-call MD unless specified) Details   • Inpatient Cardiology Consult   • NIPPV (CPAP or BIPAP)   • Oxygen Therapy- Nasal Cannula; Titrate for SPO2: 90% - 95%   • Oxygen Therapy- Nasal Cannula; Titrate for SPO2: 90% - 95%   • ECG 12 Lead Dyspnea   • ECG 12 Lead Dyspnea   • ECG 12 Lead Chest Pain   • ECG 12 Lead Rhythm Change   • Insert Peripheral IV   • Insert Peripheral IV   • Inpatient Admission   • CBC & Differential         PROGRESS, DATA ANALYSIS, CONSULTS, AND MEDICAL DECISION MAKING    All labs have been independently interpreted by me.  All radiology studies have been reviewed by me and discussed with radiologist dictating the report.   EKG's independently viewed and interpreted by me.  Discussion below represents my analysis of pertinent findings related to patient's condition, differential diagnosis, treatment plan and final disposition.    My differential diagnosis includes but is not limited to:  Asthma, COPD, pneumonia, pulmonary embolus, acute respiratory distress syndrome, pneumothorax, pleural effusion, pulmonary fibrosis, congestive heart failure, myocardial infarction, DKA, uremia, acidosis, sepsis, anemia, drug related, hyperventilation, CNS disease      ED Course as of 03/17/23 0709   Fri Mar 17, 2023   0430 Reevaluation: Patient tolerating BiPAP well.  Breathing much better.  States that shortness of breath has  largely resolved.  Denying chest pain nausea vomiting abdominal pain or any other complaint. [TJ]   0537 Patient with reassuring blood gas transitioned off BiPAP.  Tolerating nasal cannula well. [TJ]   0540 Suspect that patient's elevated lactic acid represents hypoxia.  Pro-Erick is negative.  Patient is symptom-free on BiPAP.  Denies any cough congestion recently. [TJ]   0656 EKG          EKG time: 0342  Rhythm/Rate: Sinus rhythm  P waves and [place]: Normal  QRS, axis: Left bundle branch block  ST and T waves: Left bundle branch block -negative Sgarbossa criteria    Interpreted Contemporaneously by me, independently viewed  Left bundle branch block now evidenced compared to prior EKG KRYSTEN cardiac arrest, 30 systolic    There is well-known to   [TJ]   0709 proBNP(!): 3,687.0 [TJ]   0709 HS Troponin T(!): 15 [TJ]   0709 Lactate(!!): 3.6 [TJ]   0709 WBC(!): 15.89 [TJ]   0709 pH, Arterial(!): 7.347 [TJ]   0709 pCO2, Arterial(!): 50.1 [TJ]   0709 Lactate: 1.7 [TJ]      ED Course User Index  [TJ] Edu Garcia MD       I interpreted the cardiac monitor rhythm and my independent interpretation is: normal sinus rhythm.     PPE: The patient wore a mask and I wore an N95 mask throughout the entire patient encounter.       AS OF 07:09 EDT VITALS:    BP - 129/86  HR - 115  TEMP - 96.6 °F (35.9 °C) (Tympanic)  O2 SATS - 100%        DIAGNOSIS  Final diagnoses:   Hypoxia   Acute on chronic congestive heart failure, unspecified heart failure type (HCC)   Left bundle branch block         DISPOSITION  ED Disposition     ED Disposition   Decision to Admit    Condition   --    Comment   Level of Care: Telemetry [5]   Diagnosis: Hypoxia [324903]   Admitting Physician: ANTELMO PRESTON [[id number]]   Certification: I Certify That Inpatient Hospital Services Are Medically Necessary For Greater Than 2 Midnights                  Note Disclaimer: At [facility], we believe that sharing information builds trust and better  relationships. You are receiving this note because you recently visited [facility]. It is possible you will see health information before a provider has talked with you about it. This kind of information can be easy to misunderstand. To help you fully understand what it means for your health, we urge you to discuss this note with your provider.       Edu Garcia MD  03/17/23 0708       Edu Garcia MD  03/17/23 0709

## 2023-03-17 NOTE — ED TRIAGE NOTES
To ER via EMS c/o SOA x 2 hours. Pt pale, diaphoretic on arrival.  Unable to speak due to SOA.    Duoneb MN per EMS.  Lasix  40 mg IV    Triage staff in appropriate PPE.

## 2023-03-17 NOTE — CONSULTS
Date of Hospital Visit: 3/17/2023  Date of consult: 3/17/2023  Encounter Provider: Drake Larios MD  Place of Service: [facility] CARDIOLOGY  Patient Name: Natalie Rosen  :1945  Referral Provider: Jason Milton*    Chief complaint-shortness of breath    Reason for consult: Congestive heart failure    History of Present Illness  Mr. Rosen is  a 77 years old lady with past medical history of nonischemic cardiomyopathy came to the ER with acute shortness of breath.  She has had cough and drainage for the past 1 week without significant shortness of breath but she woke up early this morning with severe shortness of breath, orthopnea, worsened coughing, palpitations that prompted her to come to the ER.  She received a dose of IV Lasix with significantly improved symptoms.  At time of my exam she was wearing supplemental oxygen through nasal cannula and was lying almost flat in bed.  Examination was unremarkable otherwise.  She admits she has not been taking Bumex recently- scheduled to take 3 times weekly  Denies any fever, chills, chest pain or extremity swelling.    Past Medical History:   Diagnosis Date   • Arthritis    • Asthma    • Benign essential hypertension    • Cancer (HCC)     MELANOMA   • Cardiomyopathy (HCC)    • CHF (congestive heart failure) (HCC)    • Goiter    • Heart murmur    • Hyperlipidemia    • Hyperthyroidism    • Hypothyroidism    • Mitral valve insufficiency    • PONV (postoperative nausea and vomiting)    • Seasonal allergies    • Urinary frequency        Past Surgical History:   Procedure Laterality Date   • CATARACT EXTRACTION     • COLONOSCOPY     • HYSTERECTOMY     • OTHER SURGICAL HISTORY      PT HAS HAD SKIN CA REMOVED FROM BACK AND FACE (UPPER LIP)    • OR ARTHRP KNE CONDYLE&PLATU MEDIAL&LAT COMPARTMENTS Right 2017    Procedure: RT TOTAL KNEE ARTHROPLASTY;  Surgeon: Doc Lance MD;  Location: [facility] OR;   "Service: Orthopedics   • MA ARTHRP KNE CONDYLE&PLATU MEDIAL&LAT COMPARTMENTS Left 2017    Procedure: LT TOTAL KNEE ARTHROPLASTY;  Surgeon: Doc Lance MD;  Location: [facility] MAIN OR;  Service: Orthopedics       (Not in a hospital admission)      Current Meds  Scheduled Meds:furosemide, 40 mg, Intravenous, BID  sodium chloride, 10 mL, Intravenous, Q12H      Continuous Infusions:   PRN Meds:.•  acetaminophen **OR** acetaminophen **OR** acetaminophen  •  nitroglycerin  •  ondansetron  •  [COMPLETED] Insert Peripheral IV **AND** sodium chloride  •  sodium chloride  •  sodium chloride    Allergies as of 2023   • (No Known Allergies)       Social History     Socioeconomic History   • Marital status:    Tobacco Use   • Smoking status: Former     Packs/day: 1.00     Years: 25.00     Pack years: 25.00     Types: Cigarettes     Quit date: 1985     Years since quittin.2   • Smokeless tobacco: Never   Substance and Sexual Activity   • Alcohol use: Yes     Alcohol/week: 6.0 standard drinks     Types: 6 Drinks containing 0.5 oz of alcohol per week     Comment: DAILY COCKTAIL   • Drug use: Never   • Sexual activity: Defer       Family History   Problem Relation Age of Onset   • Breast cancer Other    • Diabetes Other    • Cancer Mother         Breast Cancer   • Cancer Father         Prostate Cancer   • Malig Hyperthermia Neg Hx        REVIEW OF SYSTEMS:   All systems reviewed and pertinent positives include in HPI otherwise negative review of systems.       Objective:   Temp:  [96.6 °F (35.9 °C)] 96.6 °F (35.9 °C)  Heart Rate:  [] 86  Resp:  [22-38] 22  BP: (129-169)/() 144/95  Body mass index is 26.21 kg/m².  Flowsheet Rows    Flowsheet Row First Filed Value   Admission Height 172.7 cm (68\") Documented at 2023 0351   Admission Weight 78.2 kg (172 lb 6.4 oz) Documented at 2023 0351        Vitals:    23 0746   BP:    Pulse: 86   Resp:    Temp:    SpO2: 100% "       General Appearance:    Alert, cooperative, in no acute distress   Head:    Normocephalic, without obvious abnormality, atraumatic   Eyes:            Lids and lashes normal, conjunctivae and sclerae normal, no   icterus, no pallor, corneas clear, PERRLA   Ears:    Ears appear intact with no abnormalities noted   Throat:   No oral lesions, no thrush, oral mucosa moist   Neck:   No adenopathy, supple, trachea midline, no thyromegaly, no   carotid bruit, no JVD   Back:     No kyphosis present, no scoliosis present, no skin lesions, erythema or scars, no tenderness to percussion or palpation, range of motion normal   Lungs:     Clear to auscultation,respirations regular, even and unlabored    Heart:    Regular rhythm and normal rate, normal S1 and S2, no murmur, no gallop, no rub, no click   Chest Wall:    No abnormalities observed   Abdomen:     Normal bowel sounds, no masses, no organomegaly, soft nontender, nondistended, no guarding, no rebound  tenderness   Extremities:   Moves all extremities well, no edema, no cyanosis, no redness   Pulses:   Pulses palpable and equal bilaterally. Normal radial, carotid, femoral, dorsalis pedis and posterior tibial pulses bilaterally. Normal abdominal aorta   Skin:  Neurology:   Psychiatric:   No bleeding, bruising or rash   Normal speech and cranial nerve exam, no focal deficit   Alert and oriented x 3, normal mood and affect                 Review of Data:      Results from last 7 days   Lab Units 03/17/23  0742 03/17/23  0332   SODIUM mmol/L 141 142   POTASSIUM mmol/L 4.0 3.6   CHLORIDE mmol/L 102 102   CO2 mmol/L 26.2 23.0   BUN mg/dL 15 15   CREATININE mg/dL 0.65 0.88   CALCIUM mg/dL 8.6 8.9   BILIRUBIN mg/dL  --  0.4   ALK PHOS U/L  --  74   ALT (SGPT) U/L  --  26   AST (SGOT) U/L  --  46*   GLUCOSE mg/dL 108* 260*     Results from last 7 days   Lab Units 03/17/23  0553 03/17/23  0332   HSTROP T ng/L 51* 15*     @LABRCNTbnp@  Results from last 7 days   Lab Units  03/17/23  0742 03/17/23  0332   WBC 10*3/mm3 8.51 15.89*   HEMOGLOBIN g/dL 14.0 15.1   HEMATOCRIT % 40.4 47.8*   PLATELETS 10*3/mm3 194 261     Results from last 7 days   Lab Units 03/17/23  0742 03/17/23  0332   INR  1.01 1.02   APTT seconds  --  26.9     Results from last 7 days   Lab Units 03/17/23  0332   MAGNESIUM mg/dL 2.2     @LABRCNTIP(chol,trig,hdl,ldl)    I personally viewed and interpreted the patient's EKG/Telemetry data  )  Patient Active Problem List   Diagnosis   • NICM (nonischemic cardiomyopathy) (HCC)   • Stenosis of carotid artery   • Hyperlipidemia   • Hyperthyroidism   • Non-toxic multinodular goiter   • Carotid bruit   • Mitral valve insufficiency   • Atrophic vaginitis   • Osteoarthritis, knee   • DJD (degenerative joint disease) of knee   • Neck pain on left side   • Chronic bilateral low back pain without sciatica   • Lumbar facet arthropathy   • Muscle spasm   • Asthma   • Dizziness   • NSVT (nonsustained ventricular tachycardia)   • Hypoxia         Assessment and Plan:    Ms. Rosen came early this morning with severe shortness of breath probably from underlying pulmonary edema.  She had severe respiratory acidosis, hypoxemia.  CTA negative for PE.  EKG sinus tachycardia with left bundle branch block.  Mildly elevated high-sensitivity troponin,.  Elevated proBNP.  Other lab abnormalities include hyperglycemia and leukocytosis.  No evidence for pneumonia    1. Acute hypoxemic and hypercarbic respiratory failure likely from acute on chronic combined systolic and diastolic congestive heart failure from nonischemic cardiomyopathy-likely precipitated by missing Bumex doses and also?  Respiratory tract infection.  2. Frequent PVCs/nonsustained VT-well-controlled with amiodarone  3. Rate dependent left bundle branch block  4. Mild to moderate MR/mild pulmonary hypertension    She had excellent response with  IV Lasix.  Can switch to oral Bumex.  Continue home medications.     Drake DAVEY  MD Harriet  03/17/23  09:25 EDT.  Time spent in reviewing chart, discussion and examination:

## 2023-03-18 ENCOUNTER — APPOINTMENT (OUTPATIENT)
Dept: NUCLEAR MEDICINE | Facility: HOSPITAL | Age: 78
DRG: 291 | End: 2023-03-18
Payer: MEDICARE

## 2023-03-18 LAB
ANION GAP SERPL CALCULATED.3IONS-SCNC: 8.8 MMOL/L (ref 5–15)
BUN SERPL-MCNC: 17 MG/DL (ref 8–23)
BUN/CREAT SERPL: 23.3 (ref 7–25)
CALCIUM SPEC-SCNC: 9.1 MG/DL (ref 8.6–10.5)
CHLORIDE SERPL-SCNC: 103 MMOL/L (ref 98–107)
CO2 SERPL-SCNC: 28.2 MMOL/L (ref 22–29)
CREAT SERPL-MCNC: 0.73 MG/DL (ref 0.57–1)
DEPRECATED RDW RBC AUTO: 48.8 FL (ref 37–54)
EGFRCR SERPLBLD CKD-EPI 2021: 85.4 ML/MIN/1.73
ERYTHROCYTE [DISTWIDTH] IN BLOOD BY AUTOMATED COUNT: 14.2 % (ref 12.3–15.4)
GLUCOSE SERPL-MCNC: 106 MG/DL (ref 65–99)
HCT VFR BLD AUTO: 35.8 % (ref 34–46.6)
HGB BLD-MCNC: 11.6 G/DL (ref 12–15.9)
MAGNESIUM SERPL-MCNC: 1.8 MG/DL (ref 1.6–2.4)
MCH RBC QN AUTO: 30.4 PG (ref 26.6–33)
MCHC RBC AUTO-ENTMCNC: 32.4 G/DL (ref 31.5–35.7)
MCV RBC AUTO: 94 FL (ref 79–97)
PLATELET # BLD AUTO: 186 10*3/MM3 (ref 140–450)
PMV BLD AUTO: 9.8 FL (ref 6–12)
POTASSIUM SERPL-SCNC: 4.1 MMOL/L (ref 3.5–5.2)
RBC # BLD AUTO: 3.81 10*6/MM3 (ref 3.77–5.28)
SODIUM SERPL-SCNC: 140 MMOL/L (ref 136–145)
TSH SERPL DL<=0.05 MIU/L-ACNC: 1.94 UIU/ML (ref 0.27–4.2)
URATE SERPL-MCNC: 6.7 MG/DL (ref 2.4–5.7)
WBC NRBC COR # BLD: 7.05 10*3/MM3 (ref 3.4–10.8)

## 2023-03-18 PROCEDURE — 84443 ASSAY THYROID STIM HORMONE: CPT | Performed by: INTERNAL MEDICINE

## 2023-03-18 PROCEDURE — 78306 BONE IMAGING WHOLE BODY: CPT

## 2023-03-18 PROCEDURE — 83735 ASSAY OF MAGNESIUM: CPT | Performed by: INTERNAL MEDICINE

## 2023-03-18 PROCEDURE — A9503 TC99M MEDRONATE: HCPCS | Performed by: INTERNAL MEDICINE

## 2023-03-18 PROCEDURE — 84550 ASSAY OF BLOOD/URIC ACID: CPT | Performed by: INTERNAL MEDICINE

## 2023-03-18 PROCEDURE — 85027 COMPLETE CBC AUTOMATED: CPT | Performed by: INTERNAL MEDICINE

## 2023-03-18 PROCEDURE — 0 TECHNETIUM MEDRONATE KIT: Performed by: INTERNAL MEDICINE

## 2023-03-18 PROCEDURE — 99232 SBSQ HOSP IP/OBS MODERATE 35: CPT | Performed by: INTERNAL MEDICINE

## 2023-03-18 PROCEDURE — 36415 COLL VENOUS BLD VENIPUNCTURE: CPT | Performed by: INTERNAL MEDICINE

## 2023-03-18 PROCEDURE — 80048 BASIC METABOLIC PNL TOTAL CA: CPT | Performed by: INTERNAL MEDICINE

## 2023-03-18 PROCEDURE — 25010000002 ENOXAPARIN PER 10 MG: Performed by: INTERNAL MEDICINE

## 2023-03-18 RX ORDER — LOSARTAN POTASSIUM 50 MG/1
50 TABLET ORAL
Status: DISCONTINUED | OUTPATIENT
Start: 2023-03-19 | End: 2023-03-20 | Stop reason: HOSPADM

## 2023-03-18 RX ORDER — TC 99M MEDRONATE 20 MG/10ML
24 INJECTION, POWDER, LYOPHILIZED, FOR SOLUTION INTRAVENOUS
Status: COMPLETED | OUTPATIENT
Start: 2023-03-18 | End: 2023-03-18

## 2023-03-18 RX ORDER — FLUTICASONE PROPIONATE 50 MCG
2 SPRAY, SUSPENSION (ML) NASAL NIGHTLY
Status: DISCONTINUED | OUTPATIENT
Start: 2023-03-18 | End: 2023-03-20 | Stop reason: HOSPADM

## 2023-03-18 RX ORDER — SPIRONOLACTONE 25 MG/1
25 TABLET ORAL DAILY
Status: DISCONTINUED | OUTPATIENT
Start: 2023-03-18 | End: 2023-03-20 | Stop reason: HOSPADM

## 2023-03-18 RX ADMIN — FLUTICASONE PROPIONATE 2 SPRAY: 50 SPRAY, METERED NASAL at 20:05

## 2023-03-18 RX ADMIN — LISINOPRIL 10 MG: 10 TABLET ORAL at 08:36

## 2023-03-18 RX ADMIN — PRAVASTATIN SODIUM 40 MG: 40 TABLET ORAL at 20:05

## 2023-03-18 RX ADMIN — CETIRIZINE HYDROCHLORIDE 10 MG: 10 TABLET ORAL at 08:36

## 2023-03-18 RX ADMIN — BUMETANIDE 1 MG: 1 TABLET ORAL at 08:37

## 2023-03-18 RX ADMIN — METHIMAZOLE 5 MG: 5 TABLET ORAL at 08:36

## 2023-03-18 RX ADMIN — Medication 10 ML: at 08:38

## 2023-03-18 RX ADMIN — ASPIRIN 81 MG: 81 TABLET, CHEWABLE ORAL at 08:36

## 2023-03-18 RX ADMIN — MONTELUKAST SODIUM 10 MG: 10 TABLET, FILM COATED ORAL at 08:36

## 2023-03-18 RX ADMIN — ENOXAPARIN SODIUM 40 MG: 100 INJECTION SUBCUTANEOUS at 08:37

## 2023-03-18 RX ADMIN — AMIODARONE HYDROCHLORIDE 100 MG: 200 TABLET ORAL at 08:37

## 2023-03-18 RX ADMIN — Medication 10 ML: at 20:05

## 2023-03-18 RX ADMIN — Medication 24 MILLICURIE: at 08:05

## 2023-03-18 RX ADMIN — SPIRONOLACTONE 25 MG: 25 TABLET ORAL at 16:24

## 2023-03-18 RX ADMIN — ACETAMINOPHEN 650 MG: 325 TABLET, FILM COATED ORAL at 20:05

## 2023-03-18 RX ADMIN — METOPROLOL SUCCINATE 50 MG: 25 TABLET, EXTENDED RELEASE ORAL at 08:36

## 2023-03-18 RX ADMIN — BUMETANIDE 1 MG: 1 TABLET ORAL at 20:05

## 2023-03-18 NOTE — PROGRESS NOTES
Name: Natalie Rosen ADMIT: 3/17/2023   : 1945  PCP: Bosler, James William III, MD    MRN: [id number] LOS: 1 days   AGE/SEX: 77 y.o. female  ROOM: [facility]     Subjective   Subjective   Chief Complaint   Patient presents with   • Shortness of Breath     She reports she had a lot of clear to white sputum overnight with coughing.  She did not notice any nasal drip this morning.  She is not reporting any chest pain or palpitations.  Overall her dyspnea feels like it is improving.  No nausea vomiting or diarrhea.     Objective   Objective   Vital Signs  Temp:  [97.9 °F (36.6 °C)-98.6 °F (37 °C)] 98.6 °F (37 °C)  Heart Rate:  [84-95] 95  Resp:  [17-20] 17  BP: (137-154)/(69-91) 145/91  SpO2:  [90 %-95 %] 90 %  on   ;   Device (Oxygen Therapy): room air  Body mass index is 25.82 kg/m².    Physical Exam  Vitals and nursing note reviewed.   Constitutional:       General: She is not in acute distress.     Appearance: She is not diaphoretic.   HENT:      Head: Normocephalic and atraumatic.   Eyes:      General:         Right eye: No discharge.         Left eye: No discharge.   Cardiovascular:      Rate and Rhythm: Normal rate and regular rhythm.      Pulses: Normal pulses.   Pulmonary:      Effort: Pulmonary effort is normal.      Breath sounds: No wheezing or rhonchi.   Abdominal:      General: There is no distension.      Palpations: Abdomen is soft.      Tenderness: There is no abdominal tenderness. There is no guarding or rebound.   Musculoskeletal:         General: No swelling or tenderness.      Cervical back: Neck supple. No tenderness.   Skin:     General: Skin is warm and dry.   Neurological:      Mental Status: She is alert. Mental status is at baseline.   Psychiatric:         Mood and Affect: Mood normal.         Behavior: Behavior normal.       Results Review  I reviewed the patient's new clinical results.    Results from last 7 days   Lab Units 23  0603 23  0742 23  0332   WBC  10*3/mm3 7.05 8.51 15.89*   HEMOGLOBIN g/dL 11.6* 14.0 15.1   PLATELETS 10*3/mm3 186 194 261     Results from last 7 days   Lab Units 03/18/23  0603 03/17/23  0742 03/17/23  0332   SODIUM mmol/L 140 141 142   POTASSIUM mmol/L 4.1 4.0 3.6   CHLORIDE mmol/L 103 102 102   CO2 mmol/L 28.2 26.2 23.0   BUN mg/dL 17 15 15   CREATININE mg/dL 0.73 0.65 0.88   GLUCOSE mg/dL 106* 108* 260*   EGFR mL/min/1.73 85.4 91.4 68.2     Results from last 7 days   Lab Units 03/17/23  0332   ALBUMIN g/dL 4.2   BILIRUBIN mg/dL 0.4   ALK PHOS U/L 74   AST (SGOT) U/L 46*   ALT (SGPT) U/L 26     Results from last 7 days   Lab Units 03/18/23  0603 03/17/23  0742 03/17/23  0332   CALCIUM mg/dL 9.1 8.6 8.9   ALBUMIN g/dL  --   --  4.2   MAGNESIUM mg/dL 1.8  --  2.2     Results from last 7 days   Lab Units 03/17/23  0742 03/17/23  0553 03/17/23  0332   PROCALCITONIN ng/mL  --   --  0.03   LACTATE mmol/L 1.2 1.7 3.6*     No results found for: HGBA1C, POCGLU    XR Chest 1 View    Result Date: 3/17/2023  Electronically signed by Lc Melgar MD on 03-17-23 at 0507    CT Angiogram Chest    Result Date: 3/17/2023  1. No convincing evidence of pulmonary thromboembolism. 2. Aerated lung fields demonstrate bilateral scattered areas of ground-glass density, patchy areas of basilar consolidation and interstitial thickening in a pattern that likely represents pulmonary edema. Small right and trace left pleural effusion is present. 3. Sclerotic focus seen within the T1 vertebral body. This is indeterminate. Correlation for history of any primary neoplasm with propensity for sclerotic metastases. Initial further workup may be recommended with a bone scan.  Radiation dose reduction techniques were utilized, including automated exposure control and exposure modulation based on body size.         I have personally reviewed all medications:  Scheduled Medications  amiodarone, 100 mg, Oral, Q24H  aspirin, 81 mg, Oral, Daily  bumetanide, 1 mg, Oral,  BID  cetirizine, 10 mg, Oral, Daily  enoxaparin, 40 mg, Subcutaneous, Daily  lisinopril, 10 mg, Oral, Q12H  methIMAzole, 5 mg, Oral, Daily  metoprolol succinate XL, 50 mg, Oral, Q24H  montelukast, 10 mg, Oral, QAM  pravastatin, 40 mg, Oral, Nightly  sodium chloride, 10 mL, Intravenous, Q12H  spironolactone, 25 mg, Oral, Daily      Infusions     Diet  Diet: Cardiac Diets; Healthy Heart (2-3 Na+); Texture: Regular Texture (IDDSI 7); Fluid Consistency: Thin (IDDSI 0)    I have personally reviewed:  [x]  Laboratory   [x]  Microbiology   []  Radiology   [x]  EKG/Telemetry  []  Cardiology/Vascular   []  Pathology    []  Records       Assessment/Plan     Active Hospital Problems    Diagnosis  POA   • **Acute on chronic combined systolic and diastolic CHF (congestive heart failure) ([facility]) [I50.43]  Yes   • NSVT (nonsustained ventricular tachycardia) [I47.29]  Yes   • Acute respiratory failure with hypoxia ([facility]) [J96.01]  Yes   • Asthma [J45.909]  Yes   • Hyperthyroidism [E05.90]  Yes   • Hyperlipidemia [E78.5]  Yes      Resolved Hospital Problems   No resolved problems to display.       77 y.o. female admitted with Acute on chronic combined systolic and diastolic CHF (congestive heart failure) ([facility]).    • Acute on chronic systolic heart failure: Continuing diuretics.  Wean O2 as able.  Cardiology following.  • Acute hypoxic respiratory failure secondary to above  • Asthma: No acute bronchospasm.  For the sputum overnight it does sound like it has cleared up this morning on exam.  Her Pro-Erick was negative as well as respiratory viral panel.  Continue cetirizine and will start nightly nasal spray.  Monitor.  • History of NSVT: On amiodarone  • Hypertension: Acceptable acutely.  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

## 2023-03-18 NOTE — PROGRESS NOTES
"CC: Congestive heart failure    Interval History: No new acute events overnight      Vital Signs  Temp:  [97.9 °F (36.6 °C)-98.6 °F (37 °C)] 98.6 °F (37 °C)  Heart Rate:  [84-95] 95  Resp:  [17-20] 17  BP: (137-154)/(69-91) 145/91    Intake/Output Summary (Last 24 hours) at 3/18/2023 1112  Last data filed at 3/18/2023 0525  Gross per 24 hour   Intake --   Output 1150 ml   Net -1150 ml     Flowsheet Rows    Flowsheet Row First Filed Value   Admission Height 172.7 cm (68\") Documented at 03/17/2023 0351   Admission Weight 78.2 kg (172 lb 6.4 oz) Documented at 03/17/2023 0351          PHYSICAL EXAM:  General: No acute distress  Resp:NL Rate, symmetric chest expansion,unlabored, clear  CV:NL rate and rhythm, NL PMI, NL S1 and S2, no Murmur, no gallop, no rub, No JVD.   ABD:Nl sounds, no masses or tenderness, nondistended, no guarding or rebound  Neuro: alert,cooperative and oriented  Extr:Normal pedal pulses, No edema or cyanosis, moves all extremities      Results Review:    Results from last 7 days   Lab Units 03/18/23  0603   SODIUM mmol/L 140   POTASSIUM mmol/L 4.1   CHLORIDE mmol/L 103   CO2 mmol/L 28.2   BUN mg/dL 17   CREATININE mg/dL 0.73   GLUCOSE mg/dL 106*   CALCIUM mg/dL 9.1     Results from last 7 days   Lab Units 03/17/23  0553 03/17/23  0332   HSTROP T ng/L 51* 15*     Results from last 7 days   Lab Units 03/18/23  0603   WBC 10*3/mm3 7.05   HEMOGLOBIN g/dL 11.6*   HEMATOCRIT % 35.8   PLATELETS 10*3/mm3 186     Results from last 7 days   Lab Units 03/17/23  0742 03/17/23  0332   INR  1.01 1.02   APTT seconds  --  26.9         Results from last 7 days   Lab Units 03/18/23  0603   MAGNESIUM mg/dL 1.8         I reviewed the patient's new clinical results.  I personally viewed and interpreted the patient's EKG/Telemetry data        Medication Review:   Meds reviewed         Assessment/Plan       1. Acute hypoxemic and hypercarbic respiratory failure likely from acute on chronic combined systolic and diastolic " congestive heart failure from nonischemic cardiomyopathy-likely precipitated by missing Bumex doses and also?  Respiratory tract infection.  2. Frequent PVCs/nonsustained VT-well-controlled with amiodarone  3. Rate dependent left bundle branch block  4. Mild to moderate MR/mild pulmonary hypertension    Significantly improved with diuresis and breathing almost back to baseline.  No acute events overnight.  Getting a bone scan today for evaluation of incidentally noted sclerotic lesion on CT of chest.  She has had subacute cough started about 2 weeks ago which could be related to lisinopril.  I have switched lisinopril to losartan.  Further recommendations per primary team.  I have added Aldactone to current regimen.    Okay to DC patient home from cardiology standpoint.    Drake Larios MD  03/18/23  11:12 EDT

## 2023-03-18 NOTE — PLAN OF CARE
Goal Outcome Evaluation:  Plan of Care Reviewed With: patient        Progress: no change  Outcome Evaluation: Patients vitals stable. Patient denies pain. Patient asleep most of shift without any other complaints. Patient reports did have coughing this shift. Will continue to monitor.

## 2023-03-19 LAB
ANION GAP SERPL CALCULATED.3IONS-SCNC: 12 MMOL/L (ref 5–15)
BUN SERPL-MCNC: 16 MG/DL (ref 8–23)
BUN/CREAT SERPL: 23.2 (ref 7–25)
CALCIUM SPEC-SCNC: 9.1 MG/DL (ref 8.6–10.5)
CHLORIDE SERPL-SCNC: 98 MMOL/L (ref 98–107)
CO2 SERPL-SCNC: 29 MMOL/L (ref 22–29)
CREAT SERPL-MCNC: 0.69 MG/DL (ref 0.57–1)
DEPRECATED RDW RBC AUTO: 49.4 FL (ref 37–54)
EGFRCR SERPLBLD CKD-EPI 2021: 90.1 ML/MIN/1.73
ERYTHROCYTE [DISTWIDTH] IN BLOOD BY AUTOMATED COUNT: 14.3 % (ref 12.3–15.4)
GLUCOSE SERPL-MCNC: 110 MG/DL (ref 65–99)
HCT VFR BLD AUTO: 38.1 % (ref 34–46.6)
HGB BLD-MCNC: 13 G/DL (ref 12–15.9)
MAGNESIUM SERPL-MCNC: 2.9 MG/DL (ref 1.6–2.4)
MCH RBC QN AUTO: 32 PG (ref 26.6–33)
MCHC RBC AUTO-ENTMCNC: 34.1 G/DL (ref 31.5–35.7)
MCV RBC AUTO: 93.8 FL (ref 79–97)
PLATELET # BLD AUTO: 191 10*3/MM3 (ref 140–450)
PMV BLD AUTO: 9.9 FL (ref 6–12)
POTASSIUM SERPL-SCNC: 3.6 MMOL/L (ref 3.5–5.2)
RBC # BLD AUTO: 4.06 10*6/MM3 (ref 3.77–5.28)
SODIUM SERPL-SCNC: 139 MMOL/L (ref 136–145)
URATE SERPL-MCNC: 6.7 MG/DL (ref 2.4–5.7)
WBC NRBC COR # BLD: 6.02 10*3/MM3 (ref 3.4–10.8)

## 2023-03-19 PROCEDURE — 80048 BASIC METABOLIC PNL TOTAL CA: CPT | Performed by: INTERNAL MEDICINE

## 2023-03-19 PROCEDURE — 85027 COMPLETE CBC AUTOMATED: CPT | Performed by: INTERNAL MEDICINE

## 2023-03-19 PROCEDURE — 25010000002 ENOXAPARIN PER 10 MG: Performed by: INTERNAL MEDICINE

## 2023-03-19 PROCEDURE — 84550 ASSAY OF BLOOD/URIC ACID: CPT | Performed by: INTERNAL MEDICINE

## 2023-03-19 PROCEDURE — 83735 ASSAY OF MAGNESIUM: CPT | Performed by: INTERNAL MEDICINE

## 2023-03-19 PROCEDURE — 99232 SBSQ HOSP IP/OBS MODERATE 35: CPT | Performed by: NURSE PRACTITIONER

## 2023-03-19 RX ADMIN — ENOXAPARIN SODIUM 40 MG: 100 INJECTION SUBCUTANEOUS at 08:17

## 2023-03-19 RX ADMIN — CETIRIZINE HYDROCHLORIDE 10 MG: 10 TABLET ORAL at 08:17

## 2023-03-19 RX ADMIN — ASPIRIN 81 MG: 81 TABLET, CHEWABLE ORAL at 08:17

## 2023-03-19 RX ADMIN — BUMETANIDE 1 MG: 1 TABLET ORAL at 20:11

## 2023-03-19 RX ADMIN — MONTELUKAST SODIUM 10 MG: 10 TABLET, FILM COATED ORAL at 08:16

## 2023-03-19 RX ADMIN — METHIMAZOLE 5 MG: 5 TABLET ORAL at 08:17

## 2023-03-19 RX ADMIN — BUMETANIDE 1 MG: 1 TABLET ORAL at 08:16

## 2023-03-19 RX ADMIN — PRAVASTATIN SODIUM 40 MG: 40 TABLET ORAL at 20:11

## 2023-03-19 RX ADMIN — LOSARTAN POTASSIUM 50 MG: 50 TABLET, FILM COATED ORAL at 08:16

## 2023-03-19 RX ADMIN — Medication 10 ML: at 09:44

## 2023-03-19 RX ADMIN — AMIODARONE HYDROCHLORIDE 100 MG: 200 TABLET ORAL at 08:22

## 2023-03-19 RX ADMIN — SPIRONOLACTONE 25 MG: 25 TABLET ORAL at 08:16

## 2023-03-19 RX ADMIN — Medication 10 ML: at 20:11

## 2023-03-19 RX ADMIN — FLUTICASONE PROPIONATE 2 SPRAY: 50 SPRAY, METERED NASAL at 20:13

## 2023-03-19 RX ADMIN — METOPROLOL SUCCINATE 50 MG: 25 TABLET, EXTENDED RELEASE ORAL at 08:16

## 2023-03-19 NOTE — PROGRESS NOTES
Hospital Follow Up    LOS:  LOS: 2 days   Patient Name: Natalie Rosen  Age/Sex: 77 y.o. female  : 1945  MRN: [id number]    Day of Service: 23   Length of Stay: 2  Encounter Provider: JOSEPH Montana  Place of Service: [facility] CARDIOLOGY  Patient Care Team:  Bosler, James William III, MD as PCP - General (Internal Medicine)  Gerardo Julian MD as Obstetrician (Obstetrics and Gynecology)    Subjective:     Chief Complaint: CHF    Interval History: No shortness of air today. SR on monitor    Objective:     Objective:  Temp:  [97.9 °F (36.6 °C)-98.6 °F (37 °C)] 98 °F (36.7 °C)  Heart Rate:  [75-83] 75  Resp:  [18] 18  BP: (110-161)/(67-88) 110/69     Intake/Output Summary (Last 24 hours) at 3/19/2023 1438  Last data filed at 3/19/2023 1331  Gross per 24 hour   Intake 240 ml   Output 2850 ml   Net -2610 ml     Body mass index is 24.62 kg/m².      23  1156 23  0525 23  0525   Weight: 76.7 kg (169 lb) 77 kg (169 lb 12.8 oz) 73.4 kg (161 lb 14.4 oz)     Weight change: -3.221 kg (-7 lb 1.6 oz)    Physical Exam:   General Appearance:    Awake alert and oriented in no acute distress.   Color:  Skin:  Neuro:  HEENT:    Lungs:     Pink  Warm and dry  No focal, motor or sensory deficits  Neck supple, pupils equal, round and reactive. No JVD, No Bruit  Clear to auscultation,respirations regular, even and                  unlabored    Heart:    Regular rate and rhythm, S1 and S2, no murmur, no gallop, no rub. No edema, DP/PT pulses are 2+   Chest Wall:    No abnormalities observed   Abdomen:     Normal bowel sounds, no masses, no organomegaly, soft        non-tender, non-distended, no guarding, no ascites noted   Extremities:   Moves all extremities well, no edema, no cyanosis, no redness       Lab Review:   Results from last 7 days   Lab Units 23  0556 23  0603 23  0742 23  0332   SODIUM mmol/L 139 140   < > 142   POTASSIUM mmol/L  3.6 4.1   < > 3.6   CHLORIDE mmol/L 98 103   < > 102   CO2 mmol/L 29.0 28.2   < > 23.0   BUN mg/dL 16 17   < > 15   CREATININE mg/dL 0.69 0.73   < > 0.88   GLUCOSE mg/dL 110* 106*   < > 260*   CALCIUM mg/dL 9.1 9.1   < > 8.9   AST (SGOT) U/L  --   --   --  46*   ALT (SGPT) U/L  --   --   --  26    < > = values in this interval not displayed.     Results from last 7 days   Lab Units 03/17/23  0553 03/17/23  0332   HSTROP T ng/L 51* 15*     Results from last 7 days   Lab Units 03/19/23  0556 03/18/23  0603   WBC 10*3/mm3 6.02 7.05   HEMOGLOBIN g/dL 13.0 11.6*   HEMATOCRIT % 38.1 35.8   PLATELETS 10*3/mm3 191 186     Results from last 7 days   Lab Units 03/17/23  0742 03/17/23  0332   INR  1.01 1.02   APTT seconds  --  26.9     Results from last 7 days   Lab Units 03/19/23  0556 03/18/23  0603   MAGNESIUM mg/dL 2.9* 1.8           Invalid input(s): LDLCALC  Results from last 7 days   Lab Units 03/17/23  0332   PROBNP pg/mL 3,687.0*     Results from last 7 days   Lab Units 03/18/23  0603   TSH uIU/mL 1.940     I reviewed the patient's new clinical results.  I personally viewed and interpreted the patient's EKG  Current Medications:   Scheduled Meds:amiodarone, 100 mg, Oral, Q24H  aspirin, 81 mg, Oral, Daily  bumetanide, 1 mg, Oral, BID  cetirizine, 10 mg, Oral, Daily  enoxaparin, 40 mg, Subcutaneous, Daily  fluticasone, 2 spray, Each Nare, Nightly  losartan, 50 mg, Oral, Q24H  methIMAzole, 5 mg, Oral, Daily  metoprolol succinate XL, 50 mg, Oral, Q24H  montelukast, 10 mg, Oral, QAM  pravastatin, 40 mg, Oral, Nightly  sodium chloride, 10 mL, Intravenous, Q12H  spironolactone, 25 mg, Oral, Daily      Continuous Infusions:     Allergies:  No Known Allergies    Assessment:       Acute on chronic combined systolic and diastolic CHF (congestive heart failure) (HCC)    Hyperlipidemia    Hyperthyroidism    Acute respiratory failure with hypoxia ([facility])    Asthma    NSVT (nonsustained ventricular tachycardia)    1. Acute hypoxemic  and hypercarbaric respiratory failure- improved with diuresis  2.Acute on Chronic Systolic CHF  3. PVC's/NSVT- controlled with amiodarone  4. Pulmonary hypertension, mild to moderate MR  5. Abnormal CT with Bone lesion- normal bone scan    Plan:           JOSEPH Montana  03/19/23  14:38 EDT  Electronically signed by JOSEPH Montana, 03/19/23, 2:38 PM EDT. \

## 2023-03-19 NOTE — PROGRESS NOTES
"DAILY PROGRESS NOTE  [facility]    Patient Identification:  Name: Natalie Rosen  Age: 77 y.o.  Sex: female  :  1945  MRN: [id number]         Primary Care Physician: Bosler, James William III, MD      Subjective  Patient with no acute complaints.  Notes she is breathing much easier and feeling much better.    Objective:  General Appearance:  Comfortable, well-appearing, in no acute distress and not in pain.    Vital signs: (most recent): Blood pressure 110/69, pulse 75, temperature 98 °F (36.7 °C), temperature source Oral, resp. rate 18, height 172.7 cm (68\"), weight 73.4 kg (161 lb 14.4 oz), SpO2 97 %, not currently breastfeeding.    Lungs:  Normal effort and normal respiratory rate.  Breath sounds clear to auscultation.    Heart: Normal rate.  Regular rhythm.    Extremities: There is no dependent edema.    Neurological: Patient is alert and oriented to person, place and time.    Skin:  Warm and dry.                Vital signs in last 24 hours:  Temp:  [97.9 °F (36.6 °C)-98.6 °F (37 °C)] 98 °F (36.7 °C)  Heart Rate:  [75-83] 75  Resp:  [18] 18  BP: (110-161)/(67-88) 110/69    Intake/Output:    Intake/Output Summary (Last 24 hours) at 3/19/2023 1718  Last data filed at 3/19/2023 1331  Gross per 24 hour   Intake 240 ml   Output 2050 ml   Net -1810 ml         Results from last 7 days   Lab Units 23  0556 23  0603 23  0742 23  0332   WBC 10*3/mm3 6.02 7.05 8.51 15.89*   HEMOGLOBIN g/dL 13.0 11.6* 14.0 15.1   PLATELETS 10*3/mm3 191 186 194 261     Results from last 7 days   Lab Units 23  0556 23  0603 23  0742 23  0332   SODIUM mmol/L 139 140 141 142   POTASSIUM mmol/L 3.6 4.1 4.0 3.6   CHLORIDE mmol/L 98 103 102 102   CO2 mmol/L 29.0 28.2 26.2 23.0   BUN mg/dL 16 17 15 15   CREATININE mg/dL 0.69 0.73 0.65 0.88   GLUCOSE mg/dL 110* 106* 108* 260*   Estimated Creatinine Clearance: 80.4 mL/min (by C-G formula based on SCr of 0.69 mg/dL).  Results " from last 7 days   Lab Units 03/19/23  0556 03/18/23  0603 03/17/23  0742 03/17/23  0332   CALCIUM mg/dL 9.1 9.1 8.6 8.9   ALBUMIN g/dL  --   --   --  4.2   MAGNESIUM mg/dL 2.9* 1.8  --  2.2     Results from last 7 days   Lab Units 03/17/23  0332   ALBUMIN g/dL 4.2   BILIRUBIN mg/dL 0.4   ALK PHOS U/L 74   AST (SGOT) U/L 46*   ALT (SGPT) U/L 26       Assessment:    Acute on chronic combined systolic and diastolic CHF (congestive heart failure) (HCC)    Hyperlipidemia    Hyperthyroidism    Acute respiratory failure with hypoxia (HCC)    Asthma    NSVT (nonsustained ventricular tachycardia)    77 y.o. female admitted with Acute on chronic combined systolic and diastolic CHF (congestive heart failure) ([facility]).     • Acute on chronic systolic heart failure: Now on oral diuretics.  Cardiology evaluation appreciated.    • Acute hypoxic respiratory failure:  secondary to above.  Resolved.  • Asthma: No acute bronchospasm.  For the sputum overnight it does sound like it has cleared up this morning on exam.  Her Pro-Erick was negative as well as respiratory viral panel.  Continue cetirizine and will start nightly nasal spray.  Monitor.  • History of NSVT: On amiodarone  • Hypertension: Acceptable acutely.  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  • Ppx: Lovenox    All problems new to this examiner.    Plan:  Please see above.  Much improved.  Discharge when okay with cardiology.  Discussed with patient.      Fortunato Pyle MD  3/19/2023  17:18 EDT

## 2023-03-19 NOTE — PLAN OF CARE
Goal Outcome Evaluation:  Plan of Care Reviewed With: patient        Progress: no change  Outcome Evaluation: Patients vitals stable. Patient reports pain to back and PRN tylenol given this shift. Patient reports ready to go home wants results of bone scan so she can leave. Patient denies any other concerns or complaints this shift. Patient asleep most of shift. Will continue to monitor.

## 2023-03-20 ENCOUNTER — READMISSION MANAGEMENT (OUTPATIENT)
Dept: CALL CENTER | Facility: HOSPITAL | Age: 78
End: 2023-03-20
Payer: MEDICARE

## 2023-03-20 VITALS
HEIGHT: 68 IN | TEMPERATURE: 98.1 F | BODY MASS INDEX: 24.54 KG/M2 | HEART RATE: 74 BPM | DIASTOLIC BLOOD PRESSURE: 88 MMHG | RESPIRATION RATE: 18 BRPM | WEIGHT: 161.9 LBS | OXYGEN SATURATION: 96 % | SYSTOLIC BLOOD PRESSURE: 131 MMHG

## 2023-03-20 LAB
ANION GAP SERPL CALCULATED.3IONS-SCNC: 9.6 MMOL/L (ref 5–15)
BUN SERPL-MCNC: 18 MG/DL (ref 8–23)
BUN/CREAT SERPL: 17.6 (ref 7–25)
CALCIUM SPEC-SCNC: 9.3 MG/DL (ref 8.6–10.5)
CHLORIDE SERPL-SCNC: 98 MMOL/L (ref 98–107)
CO2 SERPL-SCNC: 30.4 MMOL/L (ref 22–29)
CREAT SERPL-MCNC: 1.02 MG/DL (ref 0.57–1)
DEPRECATED RDW RBC AUTO: 47.2 FL (ref 37–54)
EGFRCR SERPLBLD CKD-EPI 2021: 57.1 ML/MIN/1.73
ERYTHROCYTE [DISTWIDTH] IN BLOOD BY AUTOMATED COUNT: 13.8 % (ref 12.3–15.4)
GLUCOSE SERPL-MCNC: 115 MG/DL (ref 65–99)
HCT VFR BLD AUTO: 37.9 % (ref 34–46.6)
HGB BLD-MCNC: 12.8 G/DL (ref 12–15.9)
MAGNESIUM SERPL-MCNC: 2.1 MG/DL (ref 1.6–2.4)
MCH RBC QN AUTO: 31.4 PG (ref 26.6–33)
MCHC RBC AUTO-ENTMCNC: 33.8 G/DL (ref 31.5–35.7)
MCV RBC AUTO: 92.9 FL (ref 79–97)
PLATELET # BLD AUTO: 205 10*3/MM3 (ref 140–450)
PMV BLD AUTO: 9.7 FL (ref 6–12)
POTASSIUM SERPL-SCNC: 4 MMOL/L (ref 3.5–5.2)
RBC # BLD AUTO: 4.08 10*6/MM3 (ref 3.77–5.28)
SODIUM SERPL-SCNC: 138 MMOL/L (ref 136–145)
URATE SERPL-MCNC: 6.4 MG/DL (ref 2.4–5.7)
WBC NRBC COR # BLD: 6.42 10*3/MM3 (ref 3.4–10.8)

## 2023-03-20 PROCEDURE — 80048 BASIC METABOLIC PNL TOTAL CA: CPT | Performed by: INTERNAL MEDICINE

## 2023-03-20 PROCEDURE — 36415 COLL VENOUS BLD VENIPUNCTURE: CPT | Performed by: INTERNAL MEDICINE

## 2023-03-20 PROCEDURE — 25010000002 ENOXAPARIN PER 10 MG: Performed by: INTERNAL MEDICINE

## 2023-03-20 PROCEDURE — 83735 ASSAY OF MAGNESIUM: CPT | Performed by: INTERNAL MEDICINE

## 2023-03-20 PROCEDURE — 85027 COMPLETE CBC AUTOMATED: CPT | Performed by: INTERNAL MEDICINE

## 2023-03-20 PROCEDURE — 99232 SBSQ HOSP IP/OBS MODERATE 35: CPT | Performed by: NURSE PRACTITIONER

## 2023-03-20 PROCEDURE — 84550 ASSAY OF BLOOD/URIC ACID: CPT | Performed by: INTERNAL MEDICINE

## 2023-03-20 RX ORDER — SPIRONOLACTONE 25 MG/1
25 TABLET ORAL DAILY
Qty: 30 TABLET | Refills: 0 | Status: SHIPPED | OUTPATIENT
Start: 2023-03-20

## 2023-03-20 RX ORDER — LOSARTAN POTASSIUM 50 MG/1
50 TABLET ORAL
Qty: 30 TABLET | Refills: 0 | Status: SHIPPED | OUTPATIENT
Start: 2023-03-20

## 2023-03-20 RX ORDER — BISACODYL 10 MG
10 SUPPOSITORY, RECTAL RECTAL ONCE
Status: DISCONTINUED | OUTPATIENT
Start: 2023-03-20 | End: 2023-03-20 | Stop reason: HOSPADM

## 2023-03-20 RX ORDER — AMIODARONE HYDROCHLORIDE 100 MG/1
100 TABLET ORAL
Qty: 30 TABLET | Refills: 0 | Status: SHIPPED | OUTPATIENT
Start: 2023-03-20

## 2023-03-20 RX ORDER — BUMETANIDE 1 MG/1
1 TABLET ORAL DAILY
Qty: 90 TABLET | Refills: 1
Start: 2023-03-20

## 2023-03-20 RX ADMIN — METHIMAZOLE 5 MG: 5 TABLET ORAL at 07:48

## 2023-03-20 RX ADMIN — MONTELUKAST SODIUM 10 MG: 10 TABLET, FILM COATED ORAL at 07:48

## 2023-03-20 RX ADMIN — CETIRIZINE HYDROCHLORIDE 10 MG: 10 TABLET ORAL at 07:47

## 2023-03-20 RX ADMIN — METOPROLOL SUCCINATE 50 MG: 25 TABLET, EXTENDED RELEASE ORAL at 10:51

## 2023-03-20 RX ADMIN — ENOXAPARIN SODIUM 40 MG: 100 INJECTION SUBCUTANEOUS at 07:48

## 2023-03-20 RX ADMIN — ASPIRIN 81 MG: 81 TABLET, CHEWABLE ORAL at 07:47

## 2023-03-20 RX ADMIN — LOSARTAN POTASSIUM 50 MG: 50 TABLET, FILM COATED ORAL at 07:47

## 2023-03-20 RX ADMIN — BUMETANIDE 1 MG: 1 TABLET ORAL at 07:47

## 2023-03-20 RX ADMIN — ACETAMINOPHEN 650 MG: 325 TABLET, FILM COATED ORAL at 07:47

## 2023-03-20 RX ADMIN — AMIODARONE HYDROCHLORIDE 100 MG: 200 TABLET ORAL at 07:48

## 2023-03-20 RX ADMIN — SPIRONOLACTONE 25 MG: 25 TABLET ORAL at 07:48

## 2023-03-20 NOTE — DISCHARGE SUMMARY
PHYSICIAN DISCHARGE SUMMARY                                                                        [facility]    Patient Identification:  Name: Natalie Rosen  Age: 77 y.o.  Sex: female  :  1945  MRN: [id number]  Primary Care Physician: Bosler, James William III, MD    Admit date: 3/17/2023  Discharge date and time: 3/20/2023     Discharged Condition: good    Discharge Diagnoses:   • Acute on chronic systolic heart failure:   • Acute hypoxic respiratory failure:  secondary to above.  Resolved.  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         Hospital Course:  Pleasant 77-year-old female presenting to the emergency room with shortness of air and work-up revealing acute hypoxic respiratory failure secondary to exacerbation of systolic CHF.  Please see H&P for full details.  Patient was admitted and cardiology consulted.  She responded very nicely to IV loop diuretics.  This was switched over to oral diuretics she continues to do nicely.  In presentation he was reported that she required BiPAP.  She was quickly weaned down to 6 L nasal cannula and now today is back on room air and ambulating well without shortness of air.  Creatinine is above her baseline today.  She is very eager to go home which I think would be reasonable.  I will arrange for short-term follow-up.  I will also back off the dose of her oral diuretics from Bumex twice daily to daily.  She will follow-up with a cardiologist in a week.      Consults:     Consults     Date and Time Order Name Status Description    3/17/2023  5:55 AM Inpatient Cardiology Consult      3/17/2023  5:33 AM [facility] (on-call MD unless specified) Details              Discharge Exam:   General Appearance:  Comfortable, well-appearing, in no acute distress and not in pain.    Vital signs: (most recent):  "Blood pressure 131/88, pulse 74, temperature 98.1 °F (36.7 °C), temperature source Oral, resp. rate 18, height 172.7 cm (68\"), weight 73.4 kg (161 lb 14.4 oz), SpO2 96 %, not currently breastfeeding.    Lungs:  Normal effort and normal respiratory rate.  Breath sounds clear to auscultation.  (O2 saturations running 93 to 94% on room air.)  Heart: Normal rate.  Regular rhythm.    Extremities: There is no dependent edema.    Skin:  Warm and dry.      Disposition:  Home    Patient Instructions:      Discharge Medications      New Medications      Instructions Start Date   losartan 50 MG tablet  Commonly known as: COZAAR   50 mg, Oral, Every 24 Hours Scheduled      spironolactone 25 MG tablet  Commonly known as: ALDACTONE   25 mg, Oral, Daily         Changes to Medications      Instructions Start Date   amiodarone 100 MG tablet  Commonly known as: PACERONE  What changed:   · medication strength  · how much to take  · when to take this   100 mg, Oral, Every 24 Hours Scheduled      bumetanide 1 MG tablet  Commonly known as: BUMEX  What changed: when to take this   1 mg, Oral, Daily      Diclofenac Sodium 1 % gel gel  Commonly known as: VOLTAREN  What changed: See the new instructions.   APPLY 4 GRAMS TO THE AFFECTED AREA FOUR TIMES A DAY AS DIRECTED         Continue These Medications      Instructions Start Date   albuterol sulfate  (90 Base) MCG/ACT inhaler  Commonly known as: PROVENTIL HFA;VENTOLIN HFA;PROAIR HFA   2 puffs, Inhalation, Every 6 Hours PRN      aspirin 81 MG chewable tablet   81 mg, Oral, Daily      cetirizine 10 MG tablet  Commonly known as: zyrTEC   10 mg, Oral, Daily      escitalopram 5 MG tablet  Commonly known as: LEXAPRO   5 mg, Oral, Daily      ipratropium 0.06 % nasal spray  Commonly known as: ATROVENT   2 sprays, Nasal, Every Night at Bedtime      methIMAzole 5 MG tablet  Commonly known as: TAPAZOLE   TAKE 1 TABLET BY MOUTH DAILY SIX DAYS OUT OF THE WEEK      metoprolol succinate XL 50 MG " 24 hr tablet  Commonly known as: TOPROL-XL   50 mg, Oral, Daily      montelukast 10 MG tablet  Commonly known as: SINGULAIR   1 tablet, Oral, Every Morning      omeprazole 40 MG capsule  Commonly known as: priLOSEC   40 mg, Oral, Daily PRN      ondansetron 4 MG tablet  Commonly known as: ZOFRAN   4 mg, Oral, Daily, Takes every morning      pravastatin 40 MG tablet  Commonly known as: PRAVACHOL   TAKE ONE TABLET BY MOUTH EVERY NIGHT AT BEDTIME      PROBIOTIC PO   1 tablet, Oral, Daily      triamcinolone 55 MCG/ACT nasal inhaler  Commonly known as: NASACORT   2 sprays, Nasal, Every Morning      TYLENOL EXTRA STRENGTH PO   1 tablet, Oral, 4 Times Daily      vitamin B-6 50 MG tablet  Commonly known as: PYRIDOXINE   50 mg, Oral, Daily         Stop These Medications    Carbinoxamine Maleate 4 MG tablet     lisinopril 10 MG tablet  Commonly known as: PRINIVIL,ZESTRIL     PREVAGEN PO          Diet Instructions     Diet: Cardiac Diets; Low Sodium (2g); Texture: Regular Texture (IDDSI 7); Fluid Consistency: Thin (IDDSI 0)      Discharge Diet: Cardiac Diets    Cardiac Diet: Low Sodium (2g)    Texture: Regular Texture (IDDSI 7)    Fluid Consistency: Thin (IDDSI 0)        Future Appointments   Date Time Provider Department [place]   3/27/2023 11:00 AM [facility] [address] [facility]   3/27/2023  1:30 PM Keisha Garcia APRN [facility]   6/16/2023 10:15 AM Drake Larios MD [facility]   7/10/2023 11:00 AM [facility]   7/17/2023 11:00 AM Mickie Chacko APRN [facility]   12/12/2023 11:15 AM Andrew Carlin MD [facility]     Additional Instructions for the Follow-ups that You Need to Schedule     Discharge Follow-up with PCP   As directed       Currently Documented PCP:    Bosler, James William III, MD    PCP Phone Number:    [phone]     Follow Up Details: 1 week    Follow Up: 1 Week         Discharge Follow-up with Specialty: Cardiology; 1 Week   As directed       Specialty: Cardiology    Follow Up: 1 Week         Basic Metabolic Panel    Mar 23, 2023 (Approximate)      Release to patient: Routine Release            Follow-up Information     Bosler, James William III, MD .    Specialty: Internal Medicine  Why: 1 week  Contact information:  [address]  [place] [id number]  [phone]                       Discharge Order (From admission, onward)     Start     Ordered    03/20/23 1432  Discharge patient  Once        Expected Discharge Date: 03/20/23    Discharge Disposition: Home or Self Care    Physician of Record for Attribution - Please select from Treatment Team: FORTUNATO PYLE [[id number]]    Review needed by CMO to determine Physician of Record: No       Question Answer Comment   Physician of Record for Attribution - Please select from Treatment Team FORTUNATO PYLE    Review needed by CMO to determine Physician of Record No        03/20/23 1441                  Total time spent discharging patient including evaluation,post hospitalization follow up,  medication and post hospitalization instructions and education total time exceeds 30 minutes.    Signed:  Fortunato Pyle MD  3/20/2023  14:41 EDT    EMR Dragon/Transcription disclaimer:   Much of this encounter note is an electronic transcription/translation of spoken language to printed text. The electronic translation of spoken language may permit erroneous, or at times, nonsensical words or phrases to be inadvertently transcribed; Although I have reviewed the note for such errors, some may still exist.

## 2023-03-20 NOTE — PLAN OF CARE
Goal Outcome Evaluation:  Plan of Care Reviewed With: patient        Progress: improving  Outcome Evaluation: Rested well overnight. Denies SOA. Voiding well per jorje. Hopes to d/c home today

## 2023-03-20 NOTE — PROGRESS NOTES
"DAILY PROGRESS NOTE  [facility]    Patient Identification:  Name: Natalie Rosen  Age: 77 y.o.  Sex: female  :  1945  MRN: [id number]         Primary Care Physician: Bosler, James William III, MD      Subjective  Patient complains of constipation but she would rather wait till she goes home to take something for this.    Objective:  General Appearance:  Comfortable, well-appearing, in no acute distress and not in pain.    Vital signs: (most recent): Blood pressure 131/88, pulse 74, temperature 98.1 °F (36.7 °C), temperature source Oral, resp. rate 18, height 172.7 cm (68\"), weight 73.4 kg (161 lb 14.4 oz), SpO2 96 %, not currently breastfeeding.    Lungs:  Normal effort and normal respiratory rate.  Breath sounds clear to auscultation.  (O2 saturations running 93 to 94% on room air.)  Heart: Normal rate.  Regular rhythm.    Extremities: There is no dependent edema.    Skin:  Warm and dry.              Vital signs in last 24 hours:  Temp:  [98 °F (36.7 °C)-98.6 °F (37 °C)] 98.1 °F (36.7 °C)  Heart Rate:  [71-79] 74  Resp:  [18] 18  BP: (110-146)/(69-88) 131/88    Intake/Output:    Intake/Output Summary (Last 24 hours) at 3/20/2023 1200  Last data filed at 3/20/2023 0352  Gross per 24 hour   Intake 480 ml   Output 1100 ml   Net -620 ml         Results from last 7 days   Lab Units 23  0430 23  0556 23  0603 23  0742 23  0332   WBC 10*3/mm3 6.42 6.02 7.05 8.51 15.89*   HEMOGLOBIN g/dL 12.8 13.0 11.6* 14.0 15.1   PLATELETS 10*3/mm3 205 191 186 194 261     Results from last 7 days   Lab Units 23  0430 23  0556 23  0603 23  0742 23  0332   SODIUM mmol/L 138 139 140 141 142   POTASSIUM mmol/L 4.0 3.6 4.1 4.0 3.6   CHLORIDE mmol/L 98 98 103 102 102   CO2 mmol/L 30.4* 29.0 28.2 26.2 23.0   BUN mg/dL 18 16 17 15 15   CREATININE mg/dL 1.02* 0.69 0.73 0.65 0.88   GLUCOSE mg/dL 115* 110* 106* 108* 260*   Estimated Creatinine Clearance: 54.4 " mL/min (A) (by C-G formula based on SCr of 1.02 mg/dL (H)).  Results from last 7 days   Lab Units 03/20/23  0430 03/19/23  0556 03/18/23  0603 03/17/23  0742 03/17/23  0332   CALCIUM mg/dL 9.3 9.1 9.1 8.6 8.9   ALBUMIN g/dL  --   --   --   --  4.2   MAGNESIUM mg/dL 2.1 2.9* 1.8  --  2.2     Results from last 7 days   Lab Units 03/17/23  0332   ALBUMIN g/dL 4.2   BILIRUBIN mg/dL 0.4   ALK PHOS U/L 74   AST (SGOT) U/L 46*   ALT (SGPT) U/L 26       Assessment:    Acute on chronic combined systolic and diastolic CHF (congestive heart failure) ([facility])    Hyperlipidemia    Hyperthyroidism    Acute respiratory failure with hypoxia ([facility])    Asthma    NSVT (nonsustained ventricular tachycardia)    77 y.o. female admitted with Acute on chronic combined systolic and diastolic CHF (congestive heart failure) ([facility]).     • Acute on chronic systolic heart failure: Now on oral diuretics.  Cardiology evaluation appreciated.    • Acute hypoxic respiratory failure:  secondary to above.  Resolved.  • Asthma: No acute bronchospasm.  For the sputum overnight it does sound like it has cleared up this morning on exam.  Her Pro-Erick was negative as well as respiratory viral panel.  Continue cetirizine and will start nightly nasal spray.  Monitor.  • History of NSVT: On amiodarone  • Hypertension: Acceptable acutely.  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  • Ppx: Lovenox      Plan:  Please see above.  Creatinine up a bit from her baseline today.  If she remains here I will have this rechecked tomorrow.  Otherwise I will arrange for short-term follow-up.  Discharge when okay with cardiology.    Fortunato Pyle MD  3/20/2023  12:00 EDT

## 2023-03-20 NOTE — PROGRESS NOTES
"    Patient Name: Natalie Rosen  :1945  77 y.o.      Patient Care Team:  Bosler, James William III, MD as PCP - General (Internal Medicine)  Gerardo Julian MD as Obstetrician (Obstetrics and Gynecology)    Chief Complaint: follow up congestive heart failure    Interval History: doing well on oral diuretics. Had a little bit of oxygen placed last night for saturation of 88%.        Objective   Vital Signs  Temp:  [98 °F (36.7 °C)-98.6 °F (37 °C)] 98.1 °F (36.7 °C)  Heart Rate:  [71-79] 74  Resp:  [18] 18  BP: (110-146)/(69-88) 131/88    Intake/Output Summary (Last 24 hours) at 3/20/2023 1159  Last data filed at 3/20/2023 0352  Gross per 24 hour   Intake 480 ml   Output 1100 ml   Net -620 ml     Flowsheet Rows    Flowsheet Row First Filed Value   Admission Height 172.7 cm (68\") Documented at 2023 035   Admission Weight 78.2 kg (172 lb 6.4 oz) Documented at 2023 035          Physical Exam:   General Appearance:    Alert, cooperative, in no acute distress   Lungs:     Clear to auscultation.  Normal respiratory effort and rate.      Heart:    Regular rhythm and normal rate, normal S1 and S2, no murmurs, gallops or rubs.     Chest Wall:    No abnormalities observed   Abdomen:     Soft, nontender, positive bowel sounds.     Extremities:   no cyanosis, clubbing or edema.  No marked joint deformities.  Adequate musculoskeletal strength.       Results Review:    Results from last 7 days   Lab Units 23  0430   SODIUM mmol/L 138   POTASSIUM mmol/L 4.0   CHLORIDE mmol/L 98   CO2 mmol/L 30.4*   BUN mg/dL 18   CREATININE mg/dL 1.02*   GLUCOSE mg/dL 115*   CALCIUM mg/dL 9.3     Results from last 7 days   Lab Units 23  0553 23  0332   HSTROP T ng/L 51* 15*     Results from last 7 days   Lab Units 23  0430   WBC 10*3/mm3 6.42   HEMOGLOBIN g/dL 12.8   HEMATOCRIT % 37.9   PLATELETS 10*3/mm3 205     Results from last 7 days   Lab Units 23  0742 23  0332   INR  1.01 1.02   APTT " seconds  --  26.9     Results from last 7 days   Lab Units 03/20/23  0430   MAGNESIUM mg/dL 2.1                   Medication Review:   amiodarone, 100 mg, Oral, Q24H  aspirin, 81 mg, Oral, Daily  bisacodyl, 10 mg, Rectal, Once  bumetanide, 1 mg, Oral, BID  cetirizine, 10 mg, Oral, Daily  enoxaparin, 40 mg, Subcutaneous, Daily  fluticasone, 2 spray, Each Nare, Nightly  losartan, 50 mg, Oral, Q24H  methIMAzole, 5 mg, Oral, Daily  metoprolol succinate XL, 50 mg, Oral, Q24H  montelukast, 10 mg, Oral, QAM  pravastatin, 40 mg, Oral, Nightly  sodium chloride, 10 mL, Intravenous, Q12H  spironolactone, 25 mg, Oral, Daily              Assessment & Plan   1. Acute hypoxic and hypercapnic respiratory failure due to CHF - now on room air. Had oxygen placed last night for O2 88%.     2. Acute on chronic systolic and diastolic congestive heart failure due to nonischemic cardiomyopathy and diuretic noncompliance.     3. Frequent PVCs, nonsustained VT, on amiodarone.     4. Rate related RBBB    5. Mild to moderate mitral valve regurgitation    6. Mild pulmonary hypertension    Needs to get up and move around more. If tolerates - ok for discharge from cards standpoint. Continue GDMT with bumetanide, losartan (lisinopril stopped due to cough), metoprolol succinate and spironolactone.    Spironolactone and losartan are new. Needs BMP in one week. Will arrange office follow up in one week as well.     JOSEPH Gallardo  [facility]  03/20/23  11:59 EDT.

## 2023-03-21 NOTE — CASE MANAGEMENT/SOCIAL WORK
Case Management Discharge Note      Final Note: home no additonal dc orders noted for ccp. stanford burrell/ccp         Selected Continued Care - Discharged on 3/20/2023 Admission date: 3/17/2023 - Discharge disposition: Home or Self Care    Destination    No services have been selected for the patient.              Durable Medical Equipment    No services have been selected for the patient.              Dialysis/Infusion    No services have been selected for the patient.              Home Medical Care    No services have been selected for the patient.              Therapy    No services have been selected for the patient.              Community Resources    No services have been selected for the patient.              Community & DME    No services have been selected for the patient.                  Transportation Services  Private: Car    Final Discharge Disposition Code: 01 - home or self-care

## 2023-03-21 NOTE — OUTREACH NOTE
Prep Survey    Flowsheet Row Responses   [facility] facility patient discharged from? [place]   Is LACE score < 7 ? No   Eligibility Readm Mgmt   Discharge diagnosis a/c CHf   Does the patient have one of the following disease processes/diagnoses(primary or secondary)? CHF   Does the patient have Home health ordered? No   Is there a DME ordered? No   Prep survey completed? Yes          NATE URBAN - Registered Nurse

## 2023-03-22 LAB
BACTERIA SPEC AEROBE CULT: NORMAL
BACTERIA SPEC AEROBE CULT: NORMAL

## 2023-03-24 ENCOUNTER — READMISSION MANAGEMENT (OUTPATIENT)
Dept: CALL CENTER | Facility: HOSPITAL | Age: 78
End: 2023-03-24
Payer: MEDICARE

## 2023-03-24 NOTE — OUTREACH NOTE
CHF Week 1 Survey    Flowsheet Row Responses   [facility] [place] patient discharged from? [place]   Does the patient have one of the following disease processes/diagnoses(primary or secondary)? CHF   CHF Week 1 attempt successful? Yes   Call start time 1406   Call end time 1410   Discharge diagnosis a/c CHf   Meds reviewed with patient/caregiver? Yes   Does the patient have all medications ordered at discharge? Yes   Is the patient taking all medications as directed (includes completed medication regime)? Yes   Psychosocial issues? No   Comments Pt reports she is doing well, at baseline.    CHF Zone this Call Green Zone   Green Zone Patient reports doing well, No new or worsening shortness of breath, Weight check stable, No chest pain   Green Zone Interventions Daily weight check, Meds as directed    CHF Week 1 call completed? Yes   Revoked No further contact(revokes)-requires comment   Is the patient interested in additional calls from an ambulatory ?  NOTE:  applies to high risk patients requiring additional follow-up. No   Graduated/Revoked comments quick call, pt is stable   Call end time 1410          Inez DAVEY - Registered Nurse

## 2023-03-27 ENCOUNTER — HOSPITAL ENCOUNTER (OUTPATIENT)
Dept: ULTRASOUND IMAGING | Facility: HOSPITAL | Age: 78
Discharge: HOME | End: 2023-03-27
Admitting: INTERNAL MEDICINE
Payer: MEDICARE

## 2023-03-27 ENCOUNTER — OFFICE VISIT (OUTPATIENT)
Dept: CARDIOLOGY | Facility: CLINIC | Age: 78
End: 2023-03-27
Payer: MEDICARE

## 2023-03-27 VITALS
BODY MASS INDEX: 24.4 KG/M2 | HEART RATE: 69 BPM | WEIGHT: 161 LBS | HEIGHT: 68 IN | SYSTOLIC BLOOD PRESSURE: 112 MMHG | DIASTOLIC BLOOD PRESSURE: 62 MMHG

## 2023-03-27 DIAGNOSIS — I65.29 STENOSIS OF CAROTID ARTERY, UNSPECIFIED LATERALITY: ICD-10-CM

## 2023-03-27 DIAGNOSIS — E04.2 NON-TOXIC MULTINODULAR GOITER: ICD-10-CM

## 2023-03-27 DIAGNOSIS — I47.29 NSVT (NONSUSTAINED VENTRICULAR TACHYCARDIA): ICD-10-CM

## 2023-03-27 DIAGNOSIS — I42.8 NICM (NONISCHEMIC CARDIOMYOPATHY): Primary | ICD-10-CM

## 2023-03-27 DIAGNOSIS — I50.43 ACUTE ON CHRONIC COMBINED SYSTOLIC AND DIASTOLIC CHF (CONGESTIVE HEART FAILURE): ICD-10-CM

## 2023-03-27 DIAGNOSIS — E78.49 OTHER HYPERLIPIDEMIA: ICD-10-CM

## 2023-03-27 DIAGNOSIS — I34.0 NONRHEUMATIC MITRAL VALVE REGURGITATION: ICD-10-CM

## 2023-03-27 DIAGNOSIS — R09.89 CAROTID BRUIT, UNSPECIFIED LATERALITY: ICD-10-CM

## 2023-03-27 PROCEDURE — 1159F MED LIST DOCD IN RCRD: CPT | Performed by: NURSE PRACTITIONER

## 2023-03-27 PROCEDURE — 76536 US EXAM OF HEAD AND NECK: CPT

## 2023-03-27 PROCEDURE — 99214 OFFICE O/P EST MOD 30 MIN: CPT | Performed by: NURSE PRACTITIONER

## 2023-03-27 PROCEDURE — 1160F RVW MEDS BY RX/DR IN RCRD: CPT | Performed by: NURSE PRACTITIONER

## 2023-03-27 NOTE — PROGRESS NOTES
Subjective:     Encounter Date:03/27/2023      Patient ID: Natalie Rosen is a 77 y.o. female.    Chief Complaint:follow up CHF  History of Present Illness  This is a 76 y/o female who follows with Dr. Larios and is new to me today. She has a pmhx of nonischemic cardiomyopathy, hypertension, hyperlipidemia, former smoker, asthma, CHF and PVCs. She previously followed with Dr. Moore at [facility]. She had a stress test in 2018 that was considered to be equivocal but felt to likely be a normal exercise perfusion study with no definite infarct or ischemia. She had an echocaridogram in 2021 that showed an EF of 44%, mild global hypokinesis with minor regional variation and mild MR. She began following with Dr. Larios after presenting to the ED in September 2022 with complaints of shortness of breath after being diagnosed with COVID a few days prior. She was found to be volume overloaded and was IV diuresed. She was initially placed on Entresto but was having a lot of positional lightheadedness. This was eventually changed to losartan. She was also started on amiodarone for nonsustained VT.    In November 2022, she presented to the ED again with complaints of weakness and lightheadedness. She was having a lot of PVCs and short runs of nonsustained VT. She underwent a stress test and could only complete 3 minutes due to SOA and lightheadedness. Amiodarone was stopped and losartan increased for better BP control. At follow up with Dr. Larios, she continued to have dizziness felt to be blood pressure related. Carvedilol was switched to Toprol XL and losartan changed to lisinopril. She was also restarted on amiodarone.    She is here today for a follow up visit. She tells me that she has been feeling well. She denies any chest pain or shortness of breath with exertion. She denies any dizziness or syncope. She denies swelling in lower extremities, orthopnea or PND. Her weight fluctuates about a pound or less  depending on what she eats. She does follow a low sodium diet. She is on Bumex daily.     I have reviewed and updated as appropriate allergies, current medications, past family history, past medical history, past surgical history and problem list.    Review of Systems   Constitutional: Negative for fever, malaise/fatigue, weight gain and weight loss.   HENT: Negative for congestion, hoarse voice and sore throat.    Eyes: Negative for blurred vision and double vision.   Cardiovascular: Negative for chest pain, dyspnea on exertion, leg swelling, orthopnea, palpitations and syncope.   Respiratory: Negative for cough, shortness of breath and wheezing.    Gastrointestinal: Negative for abdominal pain, hematemesis, hematochezia and melena.   Genitourinary: Negative for dysuria and hematuria.   Neurological: Negative for dizziness, headaches, light-headedness and numbness.   Psychiatric/Behavioral: Negative for depression. The patient is not nervous/anxious.          Current Outpatient Medications:   •  Acetaminophen (TYLENOL EXTRA STRENGTH PO), Take 1 tablet by mouth 4 (Four) Times a Day., Disp: , Rfl:   •  albuterol sulfate  (90 Base) MCG/ACT inhaler, Inhale 2 puffs Every 6 (Six) Hours As Needed., Disp: , Rfl:   •  amiodarone (PACERONE) 100 MG tablet, Take 1 tablet by mouth Daily., Disp: 30 tablet, Rfl: 0  •  aspirin 81 MG chewable tablet, Chew 1 tablet Daily., Disp: , Rfl:   •  bumetanide (BUMEX) 1 MG tablet, Take 1 tablet by mouth Daily., Disp: 90 tablet, Rfl: 1  •  cetirizine (zyrTEC) 10 MG tablet, Take 1 tablet by mouth Daily., Disp: , Rfl:   •  Diclofenac Sodium (VOLTAREN) 1 % gel gel, APPLY 4 GRAMS TO THE AFFECTED AREA FOUR TIMES A DAY AS DIRECTED (Patient taking differently: Apply 4 g topically to the appropriate area as directed 4 (Four) Times a Day. back), Disp: 100 g, Rfl: 5  •  escitalopram (LEXAPRO) 5 MG tablet, Take 1 tablet by mouth Daily., Disp: , Rfl:   •  ipratropium (ATROVENT) 0.06 % nasal spray,  2 sprays into the nostril(s) as directed by provider every night at bedtime., Disp: , Rfl:   •  losartan (COZAAR) 50 MG tablet, Take 1 tablet by mouth Daily., Disp: 30 tablet, Rfl: 0  •  methIMAzole (TAPAZOLE) 5 MG tablet, TAKE 1 TABLET BY MOUTH DAILY SIX DAYS OUT OF THE WEEK, Disp: 72 tablet, Rfl: 0  •  metoprolol succinate XL (TOPROL-XL) 50 MG 24 hr tablet, Take 1 tablet by mouth Daily., Disp: 90 tablet, Rfl: 3  •  montelukast (SINGULAIR) 10 MG tablet, Take 1 tablet by mouth Every Morning., Disp: , Rfl:   •  omeprazole (priLOSEC) 40 MG capsule, Take 1 capsule by mouth Daily As Needed., Disp: , Rfl:   •  ondansetron (ZOFRAN) 4 MG tablet, Take 1 tablet by mouth Daily. Takes every morning, Disp: , Rfl:   •  pravastatin (PRAVACHOL) 40 MG tablet, TAKE ONE TABLET BY MOUTH EVERY NIGHT AT BEDTIME, Disp: 30 tablet, Rfl: 0  •  Probiotic Product (PROBIOTIC PO), Take 1 tablet by mouth Daily., Disp: , Rfl:   •  spironolactone (ALDACTONE) 25 MG tablet, Take 1 tablet by mouth Daily., Disp: 30 tablet, Rfl: 0  •  triamcinolone (NASACORT) 55 MCG/ACT nasal inhaler, 2 sprays into the nostril(s) as directed by provider Every Morning., Disp: , Rfl:   •  vitamin B-6 (PYRIDOXINE) 50 MG tablet, Take 1 tablet by mouth Daily., Disp: , Rfl:     Past Medical History:   Diagnosis Date   • Arthritis    • Asthma    • Benign essential hypertension    • Cancer (HCC)     MELANOMA   • Cardiomyopathy (HCC)    • CHF (congestive heart failure) (HCC)    • Goiter    • Heart murmur    • Hyperlipidemia    • Hyperthyroidism    • Hypothyroidism 2014   • Mitral valve insufficiency    • PONV (postoperative nausea and vomiting)    • Seasonal allergies    • Urinary frequency        Past Surgical History:   Procedure Laterality Date   • CATARACT EXTRACTION     • COLONOSCOPY     • HYSTERECTOMY     • OTHER SURGICAL HISTORY      PT HAS HAD SKIN CA REMOVED FROM BACK AND FACE (UPPER LIP)    • TX ARTHRP KNE CONDYLE&PLATU MEDIAL&LAT COMPARTMENTS Right 01/12/2017     "Procedure: RT TOTAL KNEE ARTHROPLASTY;  Surgeon: Doc Lance MD;  Location: [facility] OR;  Service: Orthopedics   • [place] ARTHRP KNE CONDYLE&PLATU MEDIAL&LAT COMPARTMENTS Left 2017    Procedure: LT TOTAL KNEE ARTHROPLASTY;  Surgeon: Doc Lance MD;  Location: [facility] OR;  Service: Orthopedics       Family History   Problem Relation Age of Onset   • Breast cancer Other    • Diabetes Other    • Cancer Mother         Breast Cancer   • Cancer Father         Prostate Cancer   • Malig Hyperthermia Neg Hx        Social History     Tobacco Use   • Smoking status: Former     Packs/day: 1.00     Years: 25.00     Pack years: 25.00     Types: Cigarettes     Quit date: 1985     Years since quittin.2   • Smokeless tobacco: Never   Vaping Use   • Vaping Use: Never used   Substance Use Topics   • Alcohol use: Yes     Alcohol/week: 6.0 standard drinks     Types: 6 Drinks containing 0.5 oz of alcohol per week     Comment: DAILY COCKTAIL   • Drug use: Never         ECG 12 Lead    Date/Time: 3/28/2023 12:09 PM  Performed by: Phuong Kang APRN  Authorized by: Phuong Kang APRN   Comparison: compared with previous ECG from 3/17/2023  Comparison to previous ECG: No longer in atrial flutter  Rhythm: sinus rhythm  Conduction: left bundle branch block               Objective:     Visit Vitals  /62   Pulse 69   Ht 172.7 cm (68\")   Wt 73 kg (161 lb)   BMI 24.48 kg/m²             Physical Exam  Constitutional:       Appearance: Normal appearance. She is normal weight.   HENT:      Head: Normocephalic.   Neck:      Vascular: No carotid bruit.   Cardiovascular:      Rate and Rhythm: Normal rate and regular rhythm.      Chest Wall: PMI is not displaced.      Pulses: Normal pulses.           Radial pulses are 2+ on the right side and 2+ on the left side.        Posterior tibial pulses are 2+ on the right side and 2+ on the left side.      Heart sounds: Normal heart sounds. No murmur heard.    No friction " rub. No gallop.   Pulmonary:      Effort: Pulmonary effort is normal.      Breath sounds: Normal breath sounds.   Abdominal:      General: Bowel sounds are normal. There is no distension.      Palpations: Abdomen is soft.   Musculoskeletal:      Right lower leg: No edema.      Left lower leg: No edema.   Skin:     General: Skin is warm and dry.      Capillary Refill: Capillary refill takes less than 2 seconds.   Neurological:      Mental Status: She is alert and oriented to person, place, and time.   Psychiatric:         Mood and Affect: Mood normal.         Behavior: Behavior normal.         Thought Content: Thought content normal.          Lab Review:   Lipid Panel    Lipid Panel 11/22/22 11/25/22 1/24/23   Total Cholesterol 191 197    Total Cholesterol   185   Triglycerides 152 (A) 115 75   HDL Cholesterol 71 (A) 72 (A) 76 (A)   VLDL Cholesterol 26 20 14   LDL Cholesterol  94 105 (A) 95   LDL/HDL Ratio 1.26 1.42    (A) Abnormal value       Comments are available for some flowsheets but are not being displayed.               Cardiac Procedures:   1. Holter monitor 2/22/23:  •  A relatively benign monitor study.  •  Total PVC burden of 0.84% reported.  No ventricular runs.  •  Total PAC burden of 0.02%.  There was one 5 beat atrial run.    2. Echocardiogram 2/22/23:  •  Left ventricular systolic function is low normal. Calculated left ventricular EF = 49.2%  •  The following left ventricular wall segments are hypokinetic: basal inferolateral, apical septal, basal inferoseptal, mid inferoseptal, mid anteroseptal and basal inferoseptal.  •  Left ventricular diastolic function was normal.  •  Moderate mitral valve regurgitation is present.  •  Calculated right ventricular systolic pressure from tricuspid regurgitation is 41 mmHg.  •  Mild pulmonary hypertension is present.     3. Nuclear stress test 11/27/22:  •  Myocardial perfusion imaging indicates a normal myocardial perfusion study with no evidence of  ischemia.  •  Left ventricular ejection fraction is moderately reduced (Calculated EF = 27%).  •  Impressions are consistent with a low risk study.  •  Findings consistent with an indeterminate ECG stress test.         Assessment:         Diagnoses and all orders for this visit:    1. NICM (nonischemic cardiomyopathy) (formerly [facility]) (Primary)    2. Stenosis of carotid artery, unspecified laterality    3. Other hyperlipidemia    4. Carotid bruit, unspecified laterality    5. Nonrheumatic mitral valve regurgitation    6. Acute on chronic combined systolic and diastolic CHF (congestive heart failure) (formerly [facility])    7. NSVT (nonsustained ventricular tachycardia)    Other orders  -     ECG 12 Lead  -     ECG 12 Lead            Plan:       1. Chronic combined systolic and diastolic CHF: most recent EF 49.2%. Appears euvolemic on exam. On Toprol XL and losartan, bumex and spironolactone. Continue with current medical management.  2. NICM: Plan as above in #1  3. Frequent PVCs: improved with amiodarone  4. HTN: blood pressure is well controlled on current regimen.     Thank you for allowing me to participate in this patient's care. Please call with any questions or concerns. Ms. Rosen will follow up with Dr. Larios in 3 months.          Your medication list          Accurate as of March 27, 2023 11:59 PM. If you have any questions, ask your nurse or doctor.            CHANGE how you take these medications      Instructions Last Dose Given Next Dose Due   Diclofenac Sodium 1 % gel gel  Commonly known as: VOLTAREN  What changed: See the new instructions.      APPLY 4 GRAMS TO THE AFFECTED AREA FOUR TIMES A DAY AS DIRECTED          CONTINUE taking these medications      Instructions Last Dose Given Next Dose Due   albuterol sulfate  (90 Base) MCG/ACT inhaler  Commonly known as: PROVENTIL HFA;VENTOLIN HFA;PROAIR HFA      Inhale 2 puffs Every 6 (Six) Hours As Needed.       amiodarone 100 MG tablet  Commonly known as: PACERONE       Take 1 tablet by mouth Daily.       aspirin 81 MG chewable tablet      Chew 1 tablet Daily.       bumetanide 1 MG tablet  Commonly known as: BUMEX      Take 1 tablet by mouth Daily.       cetirizine 10 MG tablet  Commonly known as: zyrTEC      Take 1 tablet by mouth Daily.       escitalopram 5 MG tablet  Commonly known as: LEXAPRO      Take 1 tablet by mouth Daily.       ipratropium 0.06 % nasal spray  Commonly known as: ATROVENT      2 sprays into the nostril(s) as directed by provider every night at bedtime.       losartan 50 MG tablet  Commonly known as: COZAAR      Take 1 tablet by mouth Daily.       methIMAzole 5 MG tablet  Commonly known as: TAPAZOLE      TAKE 1 TABLET BY MOUTH DAILY SIX DAYS OUT OF THE WEEK       metoprolol succinate XL 50 MG 24 hr tablet  Commonly known as: TOPROL-XL      Take 1 tablet by mouth Daily.       montelukast 10 MG tablet  Commonly known as: SINGULAIR      Take 1 tablet by mouth Every Morning.       omeprazole 40 MG capsule  Commonly known as: priLOSEC      Take 1 capsule by mouth Daily As Needed.       ondansetron 4 MG tablet  Commonly known as: ZOFRAN      Take 1 tablet by mouth Daily. Takes every morning       pravastatin 40 MG tablet  Commonly known as: PRAVACHOL      TAKE ONE TABLET BY MOUTH EVERY NIGHT AT BEDTIME       PROBIOTIC PO      Take 1 tablet by mouth Daily.       spironolactone 25 MG tablet  Commonly known as: ALDACTONE      Take 1 tablet by mouth Daily.       triamcinolone 55 MCG/ACT nasal inhaler  Commonly known as: NASACORT      2 sprays into the nostril(s) as directed by provider Every Morning.       TYLENOL EXTRA STRENGTH PO      Take 1 tablet by mouth 4 (Four) Times a Day.       vitamin B-6 50 MG tablet  Commonly known as: PYRIDOXINE      Take 1 tablet by mouth Daily.                Phuong Kang, JOSEPH  03/28/23  12:49 PM EDT

## 2023-03-28 PROCEDURE — 93000 ELECTROCARDIOGRAM COMPLETE: CPT | Performed by: NURSE PRACTITIONER

## 2023-04-03 RX ORDER — BUMETANIDE 1 MG/1
1 TABLET ORAL 3 TIMES WEEKLY
Start: 2023-04-03

## 2023-04-10 RX ORDER — BUMETANIDE 1 MG/1
1 TABLET ORAL DAILY
Qty: 90 TABLET | Refills: 3
Start: 2023-04-10 | End: 2023-04-20 | Stop reason: SDUPTHER

## 2023-04-10 RX ORDER — SPIRONOLACTONE 25 MG/1
25 TABLET ORAL DAILY
Qty: 90 TABLET | Refills: 3 | Status: SHIPPED | OUTPATIENT
Start: 2023-04-10

## 2023-04-10 RX ORDER — LOSARTAN POTASSIUM 50 MG/1
50 TABLET ORAL
Qty: 90 TABLET | Refills: 3 | Status: SHIPPED | OUTPATIENT
Start: 2023-04-10

## 2023-04-20 RX ORDER — BUMETANIDE 1 MG/1
1 TABLET ORAL DAILY
Qty: 90 TABLET | Refills: 3
Start: 2023-04-20

## 2023-04-24 RX ORDER — AMIODARONE HYDROCHLORIDE 100 MG/1
100 TABLET ORAL
Qty: 30 TABLET | Refills: 2 | Status: CANCELLED | OUTPATIENT
Start: 2023-04-24

## 2023-04-24 RX ORDER — AMIODARONE HYDROCHLORIDE 100 MG/1
100 TABLET ORAL
Qty: 90 TABLET | Refills: 3 | Status: SHIPPED | OUTPATIENT
Start: 2023-04-24

## 2023-05-12 DIAGNOSIS — Z12.31 SCREENING MAMMOGRAM FOR BREAST CANCER: Primary | ICD-10-CM

## 2023-05-15 RX ORDER — BUMETANIDE 1 MG/1
1 TABLET ORAL DAILY
Qty: 90 TABLET | Refills: 3
Start: 2023-05-15

## 2023-05-27 DIAGNOSIS — E05.90 HYPERTHYROIDISM: ICD-10-CM

## 2023-05-30 RX ORDER — METHIMAZOLE 5 MG/1
5 TABLET ORAL SEE ADMIN INSTRUCTIONS
Qty: 30 TABLET | Refills: 0 | Status: SHIPPED | OUTPATIENT
Start: 2023-05-30

## 2023-06-02 DIAGNOSIS — Z12.31 VISIT FOR SCREENING MAMMOGRAM: Primary | ICD-10-CM

## 2023-06-02 DIAGNOSIS — R92.8 ABNORMAL MAMMOGRAM: Primary | ICD-10-CM

## 2023-06-16 ENCOUNTER — OFFICE VISIT (OUTPATIENT)
Dept: CARDIOLOGY | Facility: CLINIC | Age: 78
End: 2023-06-16
Payer: MEDICARE

## 2023-06-16 ENCOUNTER — LAB (OUTPATIENT)
Dept: LAB | Facility: HOSPITAL | Age: 78
End: 2023-06-16
Payer: MEDICARE

## 2023-06-16 VITALS
BODY MASS INDEX: 24.25 KG/M2 | DIASTOLIC BLOOD PRESSURE: 60 MMHG | WEIGHT: 160 LBS | HEIGHT: 68 IN | HEART RATE: 75 BPM | SYSTOLIC BLOOD PRESSURE: 110 MMHG | OXYGEN SATURATION: 82 %

## 2023-06-16 DIAGNOSIS — I42.8 NICM (NONISCHEMIC CARDIOMYOPATHY): ICD-10-CM

## 2023-06-16 DIAGNOSIS — R06.02 EXERTIONAL SHORTNESS OF BREATH: ICD-10-CM

## 2023-06-16 DIAGNOSIS — I47.29 NSVT (NONSUSTAINED VENTRICULAR TACHYCARDIA): Primary | ICD-10-CM

## 2023-06-16 DIAGNOSIS — I47.29 NSVT (NONSUSTAINED VENTRICULAR TACHYCARDIA): ICD-10-CM

## 2023-06-16 LAB
ANION GAP SERPL CALCULATED.3IONS-SCNC: 10.6 MMOL/L (ref 5–15)
BUN SERPL-MCNC: 39 MG/DL (ref 8–23)
BUN/CREAT SERPL: 26 (ref 7–25)
CALCIUM SPEC-SCNC: 10.1 MG/DL (ref 8.6–10.5)
CHLORIDE SERPL-SCNC: 96 MMOL/L (ref 98–107)
CO2 SERPL-SCNC: 28.4 MMOL/L (ref 22–29)
CREAT SERPL-MCNC: 1.5 MG/DL (ref 0.57–1)
EGFRCR SERPLBLD CKD-EPI 2021: 36 ML/MIN/1.73
GLUCOSE SERPL-MCNC: 117 MG/DL (ref 65–99)
NT-PROBNP SERPL-MCNC: 469 PG/ML (ref 0–1800)
POTASSIUM SERPL-SCNC: 4.4 MMOL/L (ref 3.5–5.2)
SODIUM SERPL-SCNC: 135 MMOL/L (ref 136–145)

## 2023-06-16 PROCEDURE — 93000 ELECTROCARDIOGRAM COMPLETE: CPT | Performed by: INTERNAL MEDICINE

## 2023-06-16 PROCEDURE — 99214 OFFICE O/P EST MOD 30 MIN: CPT | Performed by: INTERNAL MEDICINE

## 2023-06-16 PROCEDURE — 36415 COLL VENOUS BLD VENIPUNCTURE: CPT

## 2023-06-16 PROCEDURE — 83880 ASSAY OF NATRIURETIC PEPTIDE: CPT

## 2023-06-16 PROCEDURE — 80048 BASIC METABOLIC PNL TOTAL CA: CPT

## 2023-06-16 NOTE — PROGRESS NOTES
PATIENTINFORMATION    Date of Office Visit: 2023  Encounter Provider: Drake Larios MD  Place of Service: [facility] CARDIOLOGY  Patient Name: Natalie Rosen  : 1945    Subjective:     Encounter Date:2023      Patient ID: Natalie Rosen is a 77 y.o. female.    No chief complaint on file.    HPI  Ms. Rosen is here for follow-up visit.  She was admitted in 2023 with acute shortness of breath/hypoxemia from CHF because of skipped diuretic doses.  Today she reports feeling tired most of the time and not exercising regularly.  Otherwise she denies any significant chest pain, orthopnea, PND, palpitations, presyncope syncope or extremity swelling.  Lisinopril switched to valsartan by her PCP because of elevated blood pressure last months.  Compliant with current medications without any significant side effects but she reports not putting out as much urine as she used to.  No weight gain or extremity edema.      ROS  All systems reviewed and negative except as noted in HPI.    Past Medical History:   Diagnosis Date   • Arthritis    • Asthma    • Benign essential hypertension    • Cancer     MELANOMA   • Cardiomyopathy    • CHF (congestive heart failure)    • Goiter    • Heart murmur    • Hyperlipidemia    • Hyperthyroidism    • Hypothyroidism    • Mitral valve insufficiency    • PONV (postoperative nausea and vomiting)    • Seasonal allergies    • Urinary frequency        Past Surgical History:   Procedure Laterality Date   • CATARACT EXTRACTION     • COLONOSCOPY     • HYSTERECTOMY     • OTHER SURGICAL HISTORY      PT HAS HAD SKIN CA REMOVED FROM BACK AND FACE (UPPER LIP)    • [place] ARTHRP KNE CONDYLE&PLATU MEDIAL&LAT COMPARTMENTS Right 2017    Procedure: RT TOTAL KNEE ARTHROPLASTY;  Surgeon: Doc Lance MD;  Location: [facility];  Service: Orthopedics   • [place] ARTHRP KNE CONDYLE&PLATU MEDIAL&LAT COMPARTMENTS Left 2017    Procedure: LT TOTAL KNEE  "ARTHROPLASTY;  Surgeon: Doc Lance MD;  Location: [facility];  Service: Orthopedics       Social History     Socioeconomic History   • Marital status:    Tobacco Use   • Smoking status: Former     Packs/day: 1.00     Years: 25.00     Pack years: 25.00     Types: Cigarettes     Quit date: 1985     Years since quittin.4   • Smokeless tobacco: Never   Vaping Use   • Vaping Use: Never used   Substance and Sexual Activity   • Alcohol use: Yes     Alcohol/week: 6.0 standard drinks     Types: 6 Drinks containing 0.5 oz of alcohol per week     Comment: DAILY COCKTAIL   • Drug use: Never   • Sexual activity: Defer       Family History   Problem Relation Age of Onset   • Breast cancer Other    • Diabetes Other    • Cancer Mother         Breast Cancer   • Cancer Father         Prostate Cancer   • Malig Hyperthermia Neg Hx            ECG 12 Lead    Date/Time: 2023 10:40 AM  Performed by: Drake Larios MD  Authorized by: Drake Larios MD   Comparison: compared with previous ECG from 3/27/2023  Similar to previous ECG  Rhythm: sinus rhythm  Rate: normal  Conduction: left bundle branch block  ST Segments: ST segments normal  T Waves: T waves normal  QRS axis: normal  Other: no other findings    Clinical impression: abnormal EKG               Objective:     /60   Pulse 75   Ht 172.7 cm (68\")   Wt 72.6 kg (160 lb)   SpO2 (!) 82%   BMI 24.33 kg/m²  Body mass index is 24.33 kg/m².     Constitutional:       General: Not in acute distress.     Appearance: Well-developed. Not diaphoretic.   Eyes:      Pupils: Pupils are equal, round, and reactive to light.   HENT:      Head: Normocephalic and atraumatic.   Neck:      Thyroid: No thyromegaly.   Pulmonary:      Effort: Pulmonary effort is normal. No respiratory distress.      Breath sounds: Normal breath sounds. No wheezing. No rales.   Chest:      Chest wall: Not tender to palpatation.   Cardiovascular:      Normal rate. Regular " rhythm.      No gallop.   Pulses:     Intact distal pulses.   Edema:     Peripheral edema absent.   Abdominal:      General: Bowel sounds are normal. There is no distension.      Palpations: Abdomen is soft.      Tenderness: There is no guarding.   Musculoskeletal: Normal range of motion.         General: No deformity.      Cervical back: Normal range of motion and neck supple. Skin:     General: Skin is warm and dry.      Findings: No rash.   Neurological:      Mental Status: Alert and oriented to person, place, and time.      Cranial Nerves: No cranial nerve deficit.      Deep Tendon Reflexes: Reflexes are normal and symmetric.   Psychiatric:         Judgment: Judgment normal.         Review Of Data: I have reviewed pertinent recent labs, images and documents and pertinent findings included in HPI or assessment below.    Lipid Panel        11/22/2022    14:50 11/25/2022    16:01 1/24/2023    12:32   Lipid Panel   Total Cholesterol 191  197     Total Cholesterol   185    Triglycerides 152  115  75    HDL Cholesterol 71  72  76    VLDL Cholesterol 26  20  14    LDL Cholesterol  94  105  95    LDL/HDL Ratio 1.26  1.42           Assessment/Plan:         1. Chronic combined systolic and diastolic congestive heart failure-left ventricular ejection fraction improved from 30s to low normal 50% with medical therapy.  2. Frequent PVCs with frequent nonsustained VT on Holter monitoring-significantly improved with amiodarone  3. Dizziness-significantly improved  4. Essential hypertension controlled  5. Hyperlipidemia on statin-lipid panel at goal.    She did not tolerate guideline directed medical therapy in the past but after her heart function improved blood pressure started to rise again.  Recently started on valsartan by PCP.  I will switch her to Entresto.  Otherwise continue current care.  She will start to walk regularly with plan to do 30-45 minutes of walking/stationary bike or treadmill.    Return to clinic in 6  months or sooner with any concerning symptoms    Diagnosis and plan of care discussed with patient and verbalized understanding.            Your medication list          Accurate as of June 16, 2023 12:54 PM. If you have any questions, ask your nurse or doctor.            CHANGE how you take these medications      Instructions Last Dose Given Next Dose Due   Diclofenac Sodium 1 % gel gel  Commonly known as: VOLTAREN  What changed: See the new instructions.      APPLY 4 GRAMS TO THE AFFECTED AREA FOUR TIMES A DAY AS DIRECTED          CONTINUE taking these medications      Instructions Last Dose Given Next Dose Due   albuterol sulfate  (90 Base) MCG/ACT inhaler  Commonly known as: PROVENTIL HFA;VENTOLIN HFA;PROAIR HFA      Inhale 2 puffs Every 6 (Six) Hours As Needed.       amiodarone 100 MG tablet  Commonly known as: PACERONE      Take 1 tablet by mouth Daily.       amiodarone 100 MG tablet  Commonly known as: Pacerone      Take 1 tablet by mouth Daily.       aspirin 81 MG chewable tablet      Chew 1 tablet Daily.       bumetanide 1 MG tablet  Commonly known as: BUMEX      Take 1 tablet by mouth Daily.       cetirizine 10 MG tablet  Commonly known as: zyrTEC      Take 1 tablet by mouth Daily.       escitalopram 5 MG tablet  Commonly known as: LEXAPRO      Take 1 tablet by mouth Daily.       ipratropium 0.06 % nasal spray  Commonly known as: ATROVENT      2 sprays into the nostril(s) as directed by provider every night at bedtime.       losartan 50 MG tablet  Commonly known as: COZAAR      Take 1 tablet by mouth Daily.       methIMAzole 5 MG tablet  Commonly known as: TAPAZOLE      Take 1 tablet by mouth See Admin Instructions. TAKE 1 TABLET BY MOUTH DAILY SIX DAYS OUT OF THE WEEK       metoprolol succinate XL 50 MG 24 hr tablet  Commonly known as: TOPROL-XL      Take 1 tablet by mouth Daily.       montelukast 10 MG tablet  Commonly known as: SINGULAIR      Take 1 tablet by mouth Every Morning.       omeprazole  40 MG capsule  Commonly known as: priLOSEC      Take 1 capsule by mouth Daily As Needed.       ondansetron 4 MG tablet  Commonly known as: ZOFRAN      Take 1 tablet by mouth Daily. Takes every morning       pravastatin 40 MG tablet  Commonly known as: PRAVACHOL      TAKE ONE TABLET BY MOUTH EVERY NIGHT AT BEDTIME       PROBIOTIC PO      Take 1 tablet by mouth Daily.       spironolactone 25 MG tablet  Commonly known as: ALDACTONE      Take 1 tablet by mouth Daily.       triamcinolone 55 MCG/ACT nasal inhaler  Commonly known as: NASACORT      2 sprays into the nostril(s) as directed by provider Every Morning.       TYLENOL EXTRA STRENGTH PO      Take 1 tablet by mouth 4 (Four) Times a Day.       vitamin B-6 50 MG tablet  Commonly known as: PYRIDOXINE      Take 1 tablet by mouth Daily.                  Drake Larios MD  06/16/23  12:54 EDT

## 2023-06-19 ENCOUNTER — TELEPHONE (OUTPATIENT)
Dept: CARDIOLOGY | Facility: CLINIC | Age: 78
End: 2023-06-19
Payer: MEDICARE

## 2023-06-19 DIAGNOSIS — I42.8 NICM (NONISCHEMIC CARDIOMYOPATHY): Primary | ICD-10-CM

## 2023-06-19 RX ORDER — SACUBITRIL AND VALSARTAN 24; 26 MG/1; MG/1
1 TABLET, FILM COATED ORAL 2 TIMES DAILY
Qty: 180 TABLET | Refills: 3
Start: 2023-06-19

## 2023-06-19 RX ORDER — BUMETANIDE 1 MG/1
1 TABLET ORAL 3 TIMES WEEKLY
Qty: 90 TABLET | Refills: 3
Start: 2023-06-19 | End: 2023-06-21 | Stop reason: SDUPTHER

## 2023-06-19 RX ORDER — BUMETANIDE 1 MG/1
1 TABLET ORAL 3 TIMES WEEKLY
Qty: 90 TABLET | Refills: 3
Start: 2023-06-19 | End: 2023-06-19 | Stop reason: SDUPTHER

## 2023-06-19 NOTE — PROGRESS NOTES
Please notify Ms. Rosen that blood work from last week shows slightly increased creatinine-kidney abnormality.  I want her to start taking Bumex 3 times weekly like Monday Wednesday and Friday.  She can always take extra pills if she feels increased shortness of breath, leg swelling or weight gain of more than 3 pounds.  I wanted to go to the lab next Monday and 2 weeks from today to see trend of creatinine.  Can you ask how her blood pressure has been since starting Entresto?  Thank you

## 2023-06-19 NOTE — TELEPHONE ENCOUNTER
Notified patient of results/recommendations. Patient verbalized understanding. She said SBP has been 110-115.    Lila Kimble RN  Triage MG

## 2023-06-19 NOTE — TELEPHONE ENCOUNTER
----- Message from Drake Larios MD sent at 6/19/2023  8:47 AM EDT -----  Please notify Ms. Rosen that blood work from last week shows slightly increased creatinine-kidney abnormality.  I want her to start taking Bumex 3 times weekly like Monday Wednesday and Friday.  She can always take extra pills if she feels increased shortness of breath, leg swelling or weight gain of more than 3 pounds.  I wanted to go to the lab next Monday and 2 weeks from today to see trend of creatinine.  Can you ask how her blood pressure has been since starting Entresto?  Thank you

## 2023-06-28 PROBLEM — M54.2 NECK PAIN ON LEFT SIDE: Status: RESOLVED | Noted: 2020-09-08 | Resolved: 2023-06-28

## 2023-06-28 PROBLEM — I50.22 CHRONIC HFREF (HEART FAILURE WITH REDUCED EJECTION FRACTION): Status: ACTIVE | Noted: 2023-06-28

## 2023-06-28 PROBLEM — R09.02 HYPOXIA: Status: RESOLVED | Noted: 2023-03-17 | Resolved: 2023-06-28

## 2023-06-28 PROBLEM — I50.43 ACUTE ON CHRONIC COMBINED SYSTOLIC AND DIASTOLIC CHF (CONGESTIVE HEART FAILURE): Status: RESOLVED | Noted: 2022-09-02 | Resolved: 2023-06-28

## 2023-06-28 PROBLEM — R42 DIZZINESS: Status: RESOLVED | Noted: 2022-11-22 | Resolved: 2023-06-28

## 2023-06-28 PROBLEM — M62.838 MUSCLE SPASM: Status: RESOLVED | Noted: 2020-11-23 | Resolved: 2023-06-28

## 2023-06-28 PROBLEM — J96.01 ACUTE RESPIRATORY FAILURE WITH HYPOXIA: Status: RESOLVED | Noted: 2022-09-02 | Resolved: 2023-06-28

## 2023-08-07 RX ORDER — SACUBITRIL AND VALSARTAN 24; 26 MG/1; MG/1
1 TABLET, FILM COATED ORAL 2 TIMES DAILY
Qty: 180 TABLET | Refills: 3
Start: 2023-08-07

## 2023-08-16 RX ORDER — SACUBITRIL AND VALSARTAN 24; 26 MG/1; MG/1
1 TABLET, FILM COATED ORAL 2 TIMES DAILY
Qty: 28 TABLET | Refills: 0 | COMMUNITY
Start: 2023-08-16

## 2023-08-16 RX ORDER — SACUBITRIL AND VALSARTAN 24; 26 MG/1; MG/1
TABLET, FILM COATED ORAL
Qty: 60 TABLET | Refills: 8 | Status: SHIPPED | OUTPATIENT
Start: 2023-08-16 | End: 2023-08-16 | Stop reason: SDUPTHER

## 2023-08-31 ENCOUNTER — HOSPITAL ENCOUNTER (OUTPATIENT)
Dept: CARDIOLOGY | Facility: HOSPITAL | Age: 78
Discharge: HOME | End: 2023-08-31
Payer: MEDICARE

## 2023-08-31 VITALS
OXYGEN SATURATION: 98 % | BODY MASS INDEX: 24.37 KG/M2 | HEART RATE: 64 BPM | WEIGHT: 160.8 LBS | HEIGHT: 68 IN | SYSTOLIC BLOOD PRESSURE: 121 MMHG | DIASTOLIC BLOOD PRESSURE: 64 MMHG

## 2023-08-31 DIAGNOSIS — I50.22 CHRONIC HFREF (HEART FAILURE WITH REDUCED EJECTION FRACTION): Primary | ICD-10-CM

## 2023-08-31 DIAGNOSIS — I42.8 NICM (NONISCHEMIC CARDIOMYOPATHY): ICD-10-CM

## 2023-08-31 DIAGNOSIS — E78.49 OTHER HYPERLIPIDEMIA: ICD-10-CM

## 2023-08-31 LAB
ANION GAP SERPL CALCULATED.3IONS-SCNC: 11 MMOL/L (ref 5–15)
BUN SERPL-MCNC: 25 MG/DL (ref 8–23)
BUN/CREAT SERPL: 21.9 (ref 7–25)
CALCIUM SPEC-SCNC: 9.5 MG/DL (ref 8.6–10.5)
CHLORIDE SERPL-SCNC: 100 MMOL/L (ref 98–107)
CO2 SERPL-SCNC: 25 MMOL/L (ref 22–29)
CREAT SERPL-MCNC: 1.14 MG/DL (ref 0.57–1)
EGFRCR SERPLBLD CKD-EPI 2021: 50 ML/MIN/1.73
GLUCOSE SERPL-MCNC: 84 MG/DL (ref 65–99)
POTASSIUM SERPL-SCNC: 4.6 MMOL/L (ref 3.5–5.2)
SODIUM SERPL-SCNC: 136 MMOL/L (ref 136–145)

## 2023-08-31 PROCEDURE — G0463 HOSPITAL OUTPT CLINIC VISIT: HCPCS

## 2023-08-31 PROCEDURE — 80048 BASIC METABOLIC PNL TOTAL CA: CPT | Performed by: NURSE PRACTITIONER

## 2023-08-31 NOTE — ADDENDUM NOTE
Encounter addended by: Luda Leal MA on: 8/31/2023 2:30 PM   Actions taken: Charge Capture section accepted

## 2023-08-31 NOTE — PROGRESS NOTES
[facility] HEART FAILURE      Patient Name: Natalie Rosen  :1945  Age: 77 y.o.  Sex: female  Referring Provider: Fortunato Pyle MD   Primary Cardiologist: Drake Larios MD  Encounter Provider:  JOSEPH Macedo      Chief Complaint:   Chief Complaint   Patient presents with    Congestive Heart Failure         History of Present IllnessThis 77-year-old female, known to this provider, comes today for further evaluation regarding her chronic combined systolic and diastolic heart failure.  Current diagnoses to include hypertension, hyperlipidemia, history of melanoma, mitral valve insufficiency, carotid artery stenosis, asthma, and hypothyroidism.  Records have been reviewed by me.    She followed with [place] [facility] previously.  Echo in 2021 revealed an LVEF of 44% with mild global hypokinesis.  Carvedilol and losartan were started in 2022.  She was admitted in  with volume overload and treated with diuretics.  She was treated for COVID-19 in  as well.    Zio patch was placed at discharge in  revealing sinus rhythm with a 4.5% PVC burden and 40 NSVT episodes lasting up to 10 beats, second-degree AV block present.  Echocardiogram in 2022 revealed an LVEF of 36 to 40%, ARB was transitioned to Entresto at that time.    Repeat echocardiogram in  revealed improvement in LVEF to 50% with medical therapy.  It appears that at some point her Lasix was transitioned to Bumex, at some point her Entresto was transitioned to valsartan and then subsequently transition back to Entresto.    She has been doing well on maximally tolerated GDMT for several weeks now.  Her potassium level has been trending up.  She has no active complaints at this time and states that she did well while in [place], from where she just returned.    The following portions of the patient's history were reviewed and updated as appropriate: allergies, current medications, past  family history, past medical history, past social history, past surgical history and problem list.    Current Outpatient Medications   Medication Sig Dispense Refill    Acetaminophen (TYLENOL EXTRA STRENGTH PO) Take 1 tablet by mouth 4 (Four) Times a Day.      albuterol sulfate  (90 Base) MCG/ACT inhaler Inhale 2 puffs Every 6 (Six) Hours As Needed.      amiodarone (Pacerone) 100 MG tablet Take 1 tablet by mouth Daily. 90 tablet 3    aspirin 81 MG chewable tablet Chew 1 tablet Daily.      atorvastatin (LIPITOR) 20 MG tablet Take 1 tablet by mouth Daily.      bumetanide (BUMEX) 1 MG tablet Take 1 tablet by mouth 3 (Three) Times a Week. Take on Monday, Wednesday, and Friday. Can take additional dose for increased shortness of breath, swelling, or a weight gain of 3lbs or greater. 90 tablet 3    dapagliflozin Propanediol (Farxiga) 10 MG tablet Take 10 mg by mouth Daily. 30 tablet 2    Diclofenac Sodium (VOLTAREN) 1 % gel gel APPLY 4 GRAMS TO THE AFFECTED AREA FOUR TIMES A DAY AS DIRECTED 100 g 5    escitalopram (LEXAPRO) 5 MG tablet       Fluticasone-Salmeterol (ADVAIR/WIXELA) 250-50 MCG/ACT DISKUS Advair Diskus 250 mcg-50 mcg/dose powder for inhalation   INHALE 1 PUFF BY MOUTH TWICE DAILY      ipratropium (ATROVENT) 0.06 % nasal spray 2 sprays into the nostril(s) as directed by provider every night at bedtime.      methIMAzole (TAPAZOLE) 5 MG tablet Take 1 tablet by mouth Daily. Skip one pill each week 90 tablet 1    metoprolol succinate XL (TOPROL-XL) 50 MG 24 hr tablet Take 1 tablet by mouth Daily. 90 tablet 3    montelukast (SINGULAIR) 10 MG tablet Take 1 tablet by mouth Every Morning.      omeprazole (priLOSEC) 40 MG capsule Take 1 capsule by mouth Daily As Needed.      ondansetron (ZOFRAN) 4 MG tablet Take 1 tablet by mouth Daily. Takes every morning      Probiotic Product (PROBIOTIC PO) Take 1 tablet by mouth Daily.      sacubitril-valsartan (Entresto) 24-26 MG tablet Take 1 tablet by mouth 2 (Two) Times  a Day. 28 tablet 0    spironolactone (ALDACTONE) 25 MG tablet Take 1 tablet by mouth Daily. 90 tablet 3    triamcinolone (NASACORT) 55 MCG/ACT nasal inhaler 2 sprays into the nostril(s) as directed by provider Every Morning.       No current facility-administered medications for this encounter.       Past Medical History:   Diagnosis Date    Arthritis     Asthma     Benign essential hypertension     Cancer     MELANOMA    Cardiomyopathy     CHF (congestive heart failure)     Goiter     Heart murmur     Hyperlipidemia     Hyperthyroidism     Hypothyroidism 2014    Mitral valve insufficiency     PONV (postoperative nausea and vomiting)     Seasonal allergies     Urinary frequency        Past Surgical History:   Procedure Laterality Date    CATARACT EXTRACTION      COLONOSCOPY      HYSTERECTOMY      OTHER SURGICAL HISTORY      PT HAS HAD SKIN CA REMOVED FROM BACK AND FACE (UPPER LIP)     TN ARTHRP KNE CONDYLE&PLATU MEDIAL&LAT COMPARTMENTS Right 01/12/2017    Procedure: RT TOTAL KNEE ARTHROPLASTY;  Surgeon: Doc Lance MD;  Location: [facility] OR;  Service: Orthopedics    TN ARTHRP KNE CONDYLE&PLATU MEDIAL&LAT COMPARTMENTS Left 06/26/2017    Procedure: LT TOTAL KNEE ARTHROPLASTY;  Surgeon: Doc Lance MD;  Location: [facility];  Service: Orthopedics       Physical Exam  Vitals and nursing note reviewed.   Constitutional:       General: She is not in acute distress.     Appearance: She is well-developed and normal weight. She is not ill-appearing.   HENT:      Head: Normocephalic and atraumatic.   Eyes:      Conjunctiva/sclera: Conjunctivae normal.      Pupils: Pupils are equal, round, and reactive to light.   Neck:      Vascular: No JVD.   Cardiovascular:      Rate and Rhythm: Normal rate and regular rhythm.      Heart sounds: Normal heart sounds. No murmur heard.    No friction rub. No gallop.   Pulmonary:      Effort: Pulmonary effort is normal. No respiratory distress.      Breath sounds: Normal  "breath sounds.   Abdominal:      General: Bowel sounds are normal. There is no distension.      Palpations: Abdomen is soft.   Musculoskeletal:         General: No swelling or deformity.   Skin:     General: Skin is warm and dry.      Capillary Refill: Capillary refill takes less than 2 seconds.   Neurological:      Mental Status: She is alert and oriented to person, place, and time. Mental status is at baseline.   Psychiatric:         Mood and Affect: Mood normal.         Behavior: Behavior normal.        Review of Systems   Constitutional:  Negative for fatigue and unexpected weight change.   HENT:  Negative for congestion and nosebleeds.    Eyes:  Negative for photophobia and visual disturbance.   Respiratory:  Negative for cough, chest tightness and shortness of breath.    Cardiovascular:  Negative for chest pain, palpitations and leg swelling.   Gastrointestinal:  Negative for abdominal distention and blood in stool.   Endocrine: Negative for polyphagia and polyuria.   Genitourinary:  Negative for frequency and urgency.   Musculoskeletal:  Negative for joint swelling and myalgias.   Skin:  Negative for pallor and rash.   Neurological:  Negative for dizziness, syncope, weakness, light-headedness, numbness and headaches.   Hematological:  Does not bruise/bleed easily.   Psychiatric/Behavioral:  Negative for confusion and sleep disturbance.       OBJECTIVE:  /64 (BP Location: Left arm, Patient Position: Sitting)   Pulse 64   Ht 172.7 cm (67.99\")   Wt 72.9 kg (160 lb 12.8 oz)   SpO2 98%   BMI 24.46 kg/mý      Body mass index is 24.46 kg/mý.  Wt Readings from Last 1 Encounters:   08/31/23 72.9 kg (160 lb 12.8 oz)       Lab Review:  Renal Function: CrCl cannot be calculated (Patient's most recent lab result is older than the maximum 30 days allowed.).    Lab Results   Component Value Date    PROBNP 469.0 06/16/2023       Results for orders placed during the hospital encounter of 02/22/23    Adult " Transthoracic Echo Complete w/ Color, Spectral and Contrast if Necessary Per Protocol    Interpretation Summary    Left ventricular systolic function is low normal. Calculated left ventricular EF = 49.2%    The following left ventricular wall segments are hypokinetic: basal inferolateral, apical septal, basal inferoseptal, mid inferoseptal, mid anteroseptal and basal inferoseptal.    Left ventricular diastolic function was normal.    Moderate mitral valve regurgitation is present.    Calculated right ventricular systolic pressure from tricuspid regurgitation is 41 mmHg.    Mild pulmonary hypertension is present.      Procedures      6 MINUTE WALK                      Cardiac Procedures:  1. N/A       Previously trialed diuretics  Lasix  Bumex      Previously trialed GDMT    Irbesartan  Valsartan  Entresto  Carvedilol  Metoprolol succinate  Spironolactone      ASSESSMENT:     Diagnosis Plan   1. Chronic HFrEF (heart failure with reduced ejection fraction)  Basic Metabolic Panel      2. Other hyperlipidemia        3. NICM (nonischemic cardiomyopathy)              PLAN OF CARE:  1.  HFrEF-NYHA class II.  Most recent ejection fraction is 49% as above, has been as low as 30%.  Current GDMT to include Entresto, Munir, spironolactone, metoprolol succinate, and diuresed on Bumex.    She remains euvolemic on exam and is doing well from a heart failure perspective.  She is on maximally tolerated GDMT.  I would like to continue this current dosing.  I will check a BMP today as her potassium level has been trending upward.  If within defined limits we will continue her current plan of care.  She is to continue following a strict low-sodium diet of 2000 mg daily or less, fluid restriction of 1500 mL daily, as well as remain adherent with daily weights.    Directions for when to call the clinic reviewed with the patient to include weight gain of 2 to 3 pounds in 24 hours, weight gain of 5 to 10 pounds within 7 days; worsening  shortness of breath; worsening lower extremity edema or abdominal distention.    2.  Nonischemic cardiomyopathy-currently LVEF is 49% as above.  Treatment as above.    3.  Hyperlipidemia-managed by primary cardiology team.    4.  Hypertension-stable and controlled.       BMP; continue current GDMT; follow-up in 3 months or sooner if needed      Advance Care Planning   Addressed at subsequent visit      Thank you for allowing me to participate in the care of your patient,         JOSEPH Macedo  [facility] HEART FAILURE  08/31/23  10:25 EDT      **Paola Disclaimer:**  Much of this encounter note is an electronic transcription/translation of spoken language to printed text. The electronic translation of spoken language may permit erroneous, or at times, nonsensical words or phrases to be inadvertently transcribed. Although I have reviewed the note for such errors, some may still exist.

## 2023-08-31 NOTE — PROGRESS NOTES
[facility] Heart Failure Clinic      Patient Name: Natalie Rosen  :1945  Age: 77 y.o.  Sex: female  Referring Provider: Fortunato Pyle MD   Primary Cardiologist: Drake Larios MD  Encounter Provider: Melissa Quezada, Pharmacy Intern      Chief Complaint: Congestive Heart Failure    HPI:  Patient presents for initial HF clinic visit. This is a 77 YOF with PMH significant for HFrEF (lowest EF of ~ 36% in 2022; now 49.2% in 2023), NICM, HLD, mitral valve insufficiency, NSVT, hyperthyroidism, and asthma. There were no changes at her last visit, but she did see a dietician about a week ago. She also has been having [facility] did [facility] in [facility]. She transferred both medications to [facility] pharmacy and will continue getting them there. Today, she is feeling well and has no other issues regarding medications.     Current Regimen:  Heart Failure  Bumetanide 1 mg three times a week  Entresto 24-26 mg BID  Metoprolol succinate 50 mg daily  Spironolactone 25 mg daily  Farxiga 10 mg daily    Other CV Meds  Pravastatin 40 mg daily    Medication Use:  Adherence: Good. Missed 0 doses in the last week. Adherence tools used include: pillbox. Barriers for adherence include: none  Past hx of medication use/intolerance: Valsartan (changed to Entresto); losartan (changed to valsartan); furosemide (changed to bumex)  Affordability: Part D; no issues with affordability      Diet: She saw a dietician last week and says the visit went well.       Social History:  Tobacco use: former smoker, quit in   EtOH use: frequently  Illicit drug use: no history of illicit drug use  Exercise: No exercise      Immunization Status:  Pneumococcal: PPSV23 (2019); states received another one previously.   Influenza: Obtains annually  COVID-19: Pfizer bivalent (10/25/2022); patient states up to date      OBJECTIVE:    /64 (BP  "Location: Left arm, Patient Position: Sitting)   Pulse 64   Ht 172.7 cm (67.99\")   Wt 72.9 kg (160 lb 12.8 oz)   SpO2 98%   BMI 24.46 kg/mý     Body mass index is 24.46 kg/mý.  Wt Readings from Last 1 Encounters:   08/31/23 72.9 kg (160 lb 12.8 oz)       Lab Review:  Renal Function: CrCl cannot be calculated (Patient's most recent lab result is older than the maximum 30 days allowed.).    Lab Results   Component Value Date    PROBNP 469.0 06/16/2023       Results for orders placed during the hospital encounter of 02/22/23    Adult Transthoracic Echo Complete w/ Color, Spectral and Contrast if Necessary Per Protocol    Interpretation Summary    Left ventricular systolic function is low normal. Calculated left ventricular EF = 49.2%    The following left ventricular wall segments are hypokinetic: basal inferolateral, apical septal, basal inferoseptal, mid inferoseptal, mid anteroseptal and basal inferoseptal.    Left ventricular diastolic function was normal.    Moderate mitral valve regurgitation is present.    Calculated right ventricular systolic pressure from tricuspid regurgitation is 41 mmHg.    Mild pulmonary hypertension is present.          ASSESSMENT/PLAN OF CARE:    HFrEF (Lowest EF of ~ 36% in 11/2022; now 49.2% in 02/2023)  Patient is currently managed on Entresto, bumetanide, metoprolol succinate, farxiga and spironolactone  Patient is stable from a HF perspective and denies SOB or COREY  Continue Farxiga 10 mg daily  Continue bumetanide 1 mg three times a week  Continue Entresto 24-26 mg BID  Up-titration not feasible due to soft blood pressures  Continue metoprolol succinate 50 mg daily  Goal heart rate 50s-60s. Heart rate at goal today.   Continue spironolactone 25 mg daily  Advised patient to call clinic with 2-3 lb weight gain in 24 hrs or >5 lb weight gain in 1 week  Reviewed diet recommendations including limiting sodium intake to <2000 mg/day. Discussed reading nutrition labels and reviewed " the [place].  Discussed exercise recommendations including 150 minutes of moderate exercise per week    Thank you for allowing me to participate in the care of your patient,       Melissa Quezada, Pharmacy Intern  [facility] Heart Failure Clinic  08/31/23  08:51 EDT

## 2023-08-31 NOTE — LETTER
2023       No Recipients    Patient: Natalie Rosen   YOB: 1945   Date of Visit: 2023     Dear Dr. Fernandez Recipients:    Thank you for referring Natalie Rosen to me for evaluation. Below are the relevant portions of my assessment and plan of care.    If you have questions, please do not hesitate to call me. I look forward to following Natalie along with you.         Sincerely,        JOSEPH Macedo        CC:   No Recipients      Progress Notes:          [facility] HEART FAILURE      Patient Name: Natalie Rosen  :1945  Age: 77 y.o.  Sex: female  Referring Provider: Fortunato Pyle MD   Primary Cardiologist: Drake Larios MD  Encounter Provider:  JOSEPH Macedo      Chief Complaint:   Chief Complaint   Patient presents with    Congestive Heart Failure         History of Present IllnessThis 77-year-old female, known to this provider, comes today for further evaluation regarding her chronic combined systolic and diastolic heart failure.  Current diagnoses to include hypertension, hyperlipidemia, history of melanoma, mitral valve insufficiency, carotid artery stenosis, asthma, and hypothyroidism.  Records have been reviewed by me.    She followed with [facility] previously.  Echo in 2021 revealed an LVEF of 44% with mild global hypokinesis.  Carvedilol and losartan were started in 2022.  She was admitted in  with volume overload and treated with diuretics.  She was treated for COVID-19 in  as well.    Zio patch was placed at discharge in  revealing sinus rhythm with a 4.5% PVC burden and 40 NSVT episodes lasting up to 10 beats, second-degree AV block present.  Echocardiogram in 2022 revealed an LVEF of 36 to 40%, ARB was transitioned to Entresto at that time.    Repeat echocardiogram in  revealed improvement in LVEF to 50% with medical therapy.  It appears that at some point her Lasix was transitioned  to Bumex, at some point her Entresto was transitioned to valsartan and then subsequently transition back to Entresto.    She has been doing well on maximally tolerated GDMT for several weeks now.  Her potassium level has been trending up.  She has no active complaints at this time and states that she did well while in [place], from where she just returned.    The following portions of the patient's history were reviewed and updated as appropriate: allergies, current medications, past family history, past medical history, past social history, past surgical history and problem list.    Current Outpatient Medications   Medication Sig Dispense Refill    Acetaminophen (TYLENOL EXTRA STRENGTH PO) Take 1 tablet by mouth 4 (Four) Times a Day.      albuterol sulfate  (90 Base) MCG/ACT inhaler Inhale 2 puffs Every 6 (Six) Hours As Needed.      amiodarone (Pacerone) 100 MG tablet Take 1 tablet by mouth Daily. 90 tablet 3    aspirin 81 MG chewable tablet Chew 1 tablet Daily.      atorvastatin (LIPITOR) 20 MG tablet Take 1 tablet by mouth Daily.      bumetanide (BUMEX) 1 MG tablet Take 1 tablet by mouth 3 (Three) Times a Week. Take on Monday, Wednesday, and Friday. Can take additional dose for increased shortness of breath, swelling, or a weight gain of 3lbs or greater. 90 tablet 3    dapagliflozin Propanediol (Farxiga) 10 MG tablet Take 10 mg by mouth Daily. 30 tablet 2    Diclofenac Sodium (VOLTAREN) 1 % gel gel APPLY 4 GRAMS TO THE AFFECTED AREA FOUR TIMES A DAY AS DIRECTED 100 g 5    escitalopram (LEXAPRO) 5 MG tablet       Fluticasone-Salmeterol (ADVAIR/WIXELA) 250-50 MCG/ACT DISKUS Advair Diskus 250 mcg-50 mcg/dose powder for inhalation   INHALE 1 PUFF BY MOUTH TWICE DAILY      ipratropium (ATROVENT) 0.06 % nasal spray 2 sprays into the nostril(s) as directed by provider every night at bedtime.      methIMAzole (TAPAZOLE) 5 MG tablet Take 1 tablet by mouth Daily. Skip one pill each week 90 tablet 1     metoprolol succinate XL (TOPROL-XL) 50 MG 24 hr tablet Take 1 tablet by mouth Daily. 90 tablet 3    montelukast (SINGULAIR) 10 MG tablet Take 1 tablet by mouth Every Morning.      omeprazole (priLOSEC) 40 MG capsule Take 1 capsule by mouth Daily As Needed.      ondansetron (ZOFRAN) 4 MG tablet Take 1 tablet by mouth Daily. Takes every morning      Probiotic Product (PROBIOTIC PO) Take 1 tablet by mouth Daily.      sacubitril-valsartan (Entresto) 24-26 MG tablet Take 1 tablet by mouth 2 (Two) Times a Day. 28 tablet 0    spironolactone (ALDACTONE) 25 MG tablet Take 1 tablet by mouth Daily. 90 tablet 3    triamcinolone (NASACORT) 55 MCG/ACT nasal inhaler 2 sprays into the nostril(s) as directed by provider Every Morning.       No current facility-administered medications for this encounter.       Past Medical History:   Diagnosis Date    Arthritis     Asthma     Benign essential hypertension     Cancer     MELANOMA    Cardiomyopathy     CHF (congestive heart failure)     Goiter     Heart murmur     Hyperlipidemia     Hyperthyroidism     Hypothyroidism 2014    Mitral valve insufficiency     PONV (postoperative nausea and vomiting)     Seasonal allergies     Urinary frequency        Past Surgical History:   Procedure Laterality Date    CATARACT EXTRACTION      COLONOSCOPY      HYSTERECTOMY      OTHER SURGICAL HISTORY      PT HAS HAD SKIN CA REMOVED FROM BACK AND FACE (UPPER LIP)     [place] ARTHRP KNE CONDYLE&PLATU MEDIAL&LAT COMPARTMENTS Right 01/12/2017    Procedure: RT TOTAL KNEE ARTHROPLASTY;  Surgeon: Doc Lance MD;  Location: [facility] OR;  Service: Orthopedics    [place] ARTHRP KNE CONDYLE&PLATU MEDIAL&LAT COMPARTMENTS Left 06/26/2017    Procedure: LT TOTAL KNEE ARTHROPLASTY;  Surgeon: Doc Lance MD;  Location: [facility] OR;  Service: Orthopedics       Physical Exam  Vitals and nursing note reviewed.   Constitutional:       General: She is not in acute distress.     Appearance:  She is well-developed and normal weight. She is not ill-appearing.   HENT:      Head: Normocephalic and atraumatic.   Eyes:      Conjunctiva/sclera: Conjunctivae normal.      Pupils: Pupils are equal, round, and reactive to light.   Neck:      Vascular: No JVD.   Cardiovascular:      Rate and Rhythm: Normal rate and regular rhythm.      Heart sounds: Normal heart sounds. No murmur heard.    No friction rub. No gallop.   Pulmonary:      Effort: Pulmonary effort is normal. No respiratory distress.      Breath sounds: Normal breath sounds.   Abdominal:      General: Bowel sounds are normal. There is no distension.      Palpations: Abdomen is soft.   Musculoskeletal:         General: No swelling or deformity.   Skin:     General: Skin is warm and dry.      Capillary Refill: Capillary refill takes less than 2 seconds.   Neurological:      Mental Status: She is alert and oriented to person, place, and time. Mental status is at baseline.   Psychiatric:         Mood and Affect: Mood normal.         Behavior: Behavior normal.        Review of Systems   Constitutional:  Negative for fatigue and unexpected weight change.   HENT:  Negative for congestion and nosebleeds.    Eyes:  Negative for photophobia and visual disturbance.   Respiratory:  Negative for cough, chest tightness and shortness of breath.    Cardiovascular:  Negative for chest pain, palpitations and leg swelling.   Gastrointestinal:  Negative for abdominal distention and blood in stool.   Endocrine: Negative for polyphagia and polyuria.   Genitourinary:  Negative for frequency and urgency.   Musculoskeletal:  Negative for joint swelling and myalgias.   Skin:  Negative for pallor and rash.   Neurological:  Negative for dizziness, syncope, weakness, light-headedness, numbness and headaches.   Hematological:  Does not bruise/bleed easily.   Psychiatric/Behavioral:  Negative for confusion and sleep disturbance.       OBJECTIVE:  /64 (BP Location: Left arm,  "Patient Position: Sitting)   Pulse 64   Ht 172.7 cm (67.99\")   Wt 72.9 kg (160 lb 12.8 oz)   SpO2 98%   BMI 24.46 kg/mý      Body mass index is 24.46 kg/mý.  Wt Readings from Last 1 Encounters:   08/31/23 72.9 kg (160 lb 12.8 oz)       Lab Review:  Renal Function: CrCl cannot be calculated (Patient's most recent lab result is older than the maximum 30 days allowed.).    Lab Results   Component Value Date    PROBNP 469.0 06/16/2023       Results for orders placed during the hospital encounter of 02/22/23    Adult Transthoracic Echo Complete w/ Color, Spectral and Contrast if Necessary Per Protocol    Interpretation Summary    Left ventricular systolic function is low normal. Calculated left ventricular EF = 49.2%    The following left ventricular wall segments are hypokinetic: basal inferolateral, apical septal, basal inferoseptal, mid inferoseptal, mid anteroseptal and basal inferoseptal.    Left ventricular diastolic function was normal.    Moderate mitral valve regurgitation is present.    Calculated right ventricular systolic pressure from tricuspid regurgitation is 41 mmHg.    Mild pulmonary hypertension is present.      Procedures      6 MINUTE WALK                      Cardiac Procedures:  1. N/A       Previously trialed diuretics  Lasix  Bumex      Previously trialed GDMT    Irbesartan  Valsartan  Entresto  Carvedilol  Metoprolol succinate  Spironolactone      ASSESSMENT:     Diagnosis Plan   1. Chronic HFrEF (heart failure with reduced ejection fraction)  Basic Metabolic Panel      2. Other hyperlipidemia        3. NICM (nonischemic cardiomyopathy)              PLAN OF CARE:  1.  HFrEF-NYHA class II.  Most recent ejection fraction is 49% as above, has been as low as 30%.  Current GDMT to include Entresto, Munir, spironolactone, metoprolol succinate, and diuresed on Bumex.    She remains euvolemic on exam and is doing well from a heart failure perspective.  She is on maximally tolerated GDMT.  I " would like to continue this current dosing.  I will check a BMP today as her potassium level has been trending upward.  If within defined limits we will continue her current plan of care.  She is to continue following a strict low-sodium diet of 2000 mg daily or less, fluid restriction of 1500 mL daily, as well as remain adherent with daily weights.    Directions for when to call the clinic reviewed with the patient to include weight gain of 2 to 3 pounds in 24 hours, weight gain of 5 to 10 pounds within 7 days; worsening shortness of breath; worsening lower extremity edema or abdominal distention.    2.  Nonischemic cardiomyopathy-currently LVEF is 49% as above.  Treatment as above.    3.  Hyperlipidemia-managed by primary cardiology team.    4.  Hypertension-stable and controlled.       BMP; continue current GDMT; follow-up in 3 months or sooner if needed      Advance Care Planning  Addressed at subsequent visit      Thank you for allowing me to participate in the care of your patient,         JOSEPH Macedo  [facility] HEART FAILURE  08/31/23  10:25 EDT      **Paola Disclaimer:**  Much of this encounter note is an electronic transcription/translation of spoken language to printed text. The electronic translation of spoken language may permit erroneous, or at times, nonsensical words or phrases to be inadvertently transcribed. Although I have reviewed the note for such errors, some may still exist.

## 2023-09-01 ENCOUNTER — TELEPHONE (OUTPATIENT)
Dept: CARDIOLOGY | Facility: HOSPITAL | Age: 78
End: 2023-09-01
Payer: MEDICARE

## 2023-09-01 NOTE — TELEPHONE ENCOUNTER
----- Message from JOSEPH Macedo sent at 9/1/2023  1:31 PM EDT -----  Please let the patient know that labs are stable.  Continue with current plan of care.    Thanks,  Shanique  ----- Message -----  From: Lab, Background User  Sent: 8/31/2023   2:44 PM EDT  To: JOSEPH Macedo

## 2023-09-11 NOTE — TELEPHONE ENCOUNTER
Rx Refill Note  Requested Prescriptions     Pending Prescriptions Disp Refills    Farxiga 10 MG tablet [Pharmacy Med Name: FARXIGA 10 MG TABLET] 30 tablet 2     Sig: TAKE 1 TABLET BY MOUTH DAILY      Last office visit with prescribing clinician: Visit date not found   Last telemedicine visit with prescribing clinician: Visit date not found   Next office visit with prescribing clinician: 12/21/2023                         Would you like a call back once the refill request has been completed: [] Yes [] No    If the office needs to give you a call back, can they leave a voicemail: [] Yes [] No    Elisabet Neumann MA  09/11/23, 15:00 EDT

## 2023-09-19 RX ORDER — DAPAGLIFLOZIN 10 MG/1
10 TABLET, FILM COATED ORAL DAILY
Qty: 90 TABLET | Refills: 3 | Status: SHIPPED | OUTPATIENT
Start: 2023-09-19

## 2023-09-19 NOTE — TELEPHONE ENCOUNTER
From: Natalie Rosen  To: Office of Jenny Shafer  Sent: 9/16/2023 10:03 AM EDT  Subject: Medication Renewal Request    Refills have been requested for the following medications:     dapagliflozin Propanediol (Farxiga) 10 MG tablet [Jenny Shafer]    Preferred pharmacy: [facility] [id number] [address]  AT [place] [phone] [place] [phone]   Delivery method: Pickup

## 2023-09-21 RX ORDER — DAPAGLIFLOZIN 10 MG/1
TABLET, FILM COATED ORAL
Qty: 30 TABLET | Refills: 2 | OUTPATIENT
Start: 2023-09-21

## 2023-10-23 ENCOUNTER — APPOINTMENT (OUTPATIENT)
Dept: WOMENS IMAGING | Facility: HOSPITAL | Age: 78
End: 2023-10-23
Payer: MEDICARE

## 2023-10-23 PROCEDURE — 77063 BREAST TOMOSYNTHESIS BI: CPT | Performed by: RADIOLOGY

## 2023-10-23 PROCEDURE — 77067 SCR MAMMO BI INCL CAD: CPT | Performed by: RADIOLOGY

## 2023-12-13 ENCOUNTER — LAB (OUTPATIENT)
Dept: ENDOCRINOLOGY | Age: 78
End: 2023-12-13
Payer: MEDICARE

## 2023-12-13 DIAGNOSIS — E05.90 HYPERTHYROIDISM: ICD-10-CM

## 2023-12-13 LAB
ALBUMIN SERPL-MCNC: 4.6 G/DL (ref 3.5–5.2)
ALBUMIN/GLOB SERPL: 2 G/DL
ALP SERPL-CCNC: 60 U/L (ref 39–117)
ALT SERPL-CCNC: 12 U/L (ref 1–33)
AST SERPL-CCNC: 22 U/L (ref 1–32)
BASOPHILS # BLD AUTO: 0.06 10*3/MM3 (ref 0–0.2)
BASOPHILS NFR BLD AUTO: 1.3 % (ref 0–1.5)
BILIRUB SERPL-MCNC: 0.6 MG/DL (ref 0–1.2)
BUN SERPL-MCNC: 16 MG/DL (ref 8–23)
BUN/CREAT SERPL: 14.7 (ref 7–25)
CALCIUM SERPL-MCNC: 9.6 MG/DL (ref 8.6–10.5)
CHLORIDE SERPL-SCNC: 99 MMOL/L (ref 98–107)
CO2 SERPL-SCNC: 25.6 MMOL/L (ref 22–29)
CREAT SERPL-MCNC: 1.09 MG/DL (ref 0.57–1)
EGFRCR SERPLBLD CKD-EPI 2021: 52.4 ML/MIN/1.73
EOSINOPHIL # BLD AUTO: 0.06 10*3/MM3 (ref 0–0.4)
EOSINOPHIL NFR BLD AUTO: 1.3 % (ref 0.3–6.2)
ERYTHROCYTE [DISTWIDTH] IN BLOOD BY AUTOMATED COUNT: 13.1 % (ref 12.3–15.4)
GLOBULIN SER CALC-MCNC: 2.3 GM/DL
GLUCOSE SERPL-MCNC: 101 MG/DL (ref 65–99)
HCT VFR BLD AUTO: 39.1 % (ref 34–46.6)
HGB BLD-MCNC: 12.7 G/DL (ref 12–15.9)
IMM GRANULOCYTES # BLD AUTO: 0.02 10*3/MM3 (ref 0–0.05)
IMM GRANULOCYTES NFR BLD AUTO: 0.4 % (ref 0–0.5)
LYMPHOCYTES # BLD AUTO: 1.56 10*3/MM3 (ref 0.7–3.1)
LYMPHOCYTES NFR BLD AUTO: 34.1 % (ref 19.6–45.3)
MCH RBC QN AUTO: 31.4 PG (ref 26.6–33)
MCHC RBC AUTO-ENTMCNC: 32.5 G/DL (ref 31.5–35.7)
MCV RBC AUTO: 96.8 FL (ref 79–97)
MONOCYTES # BLD AUTO: 0.48 10*3/MM3 (ref 0.1–0.9)
MONOCYTES NFR BLD AUTO: 10.5 % (ref 5–12)
NEUTROPHILS # BLD AUTO: 2.39 10*3/MM3 (ref 1.7–7)
NEUTROPHILS NFR BLD AUTO: 52.4 % (ref 42.7–76)
NRBC BLD AUTO-RTO: 0 /100 WBC (ref 0–0.2)
PLATELET # BLD AUTO: 224 10*3/MM3 (ref 140–450)
POTASSIUM SERPL-SCNC: 4.4 MMOL/L (ref 3.5–5.2)
PROT SERPL-MCNC: 6.9 G/DL (ref 6–8.5)
RBC # BLD AUTO: 4.04 10*6/MM3 (ref 3.77–5.28)
SODIUM SERPL-SCNC: 137 MMOL/L (ref 136–145)
T4 FREE SERPL-MCNC: 1.02 NG/DL (ref 0.93–1.7)
TSH SERPL DL<=0.005 MIU/L-ACNC: 3.38 UIU/ML (ref 0.27–4.2)
WBC # BLD AUTO: 4.57 10*3/MM3 (ref 3.4–10.8)

## 2023-12-18 NOTE — TELEPHONE ENCOUNTER
----- Message from Drake Larios MD sent at 12/6/2022 12:26 PM EST -----  Please notify Natalie that her blood work from today shows a normal kidney function and electrolyte levels.  Let me know if she has any questions.  Thank you   No

## 2023-12-20 ENCOUNTER — OFFICE VISIT (OUTPATIENT)
Dept: ENDOCRINOLOGY | Age: 78
End: 2023-12-20
Payer: MEDICARE

## 2023-12-20 VITALS
SYSTOLIC BLOOD PRESSURE: 118 MMHG | WEIGHT: 160.2 LBS | HEART RATE: 71 BPM | HEIGHT: 68 IN | TEMPERATURE: 96.9 F | DIASTOLIC BLOOD PRESSURE: 72 MMHG | OXYGEN SATURATION: 98 % | BODY MASS INDEX: 24.28 KG/M2

## 2023-12-20 DIAGNOSIS — E05.90 HYPERTHYROIDISM: Primary | ICD-10-CM

## 2023-12-20 PROCEDURE — 99213 OFFICE O/P EST LOW 20 MIN: CPT | Performed by: NURSE PRACTITIONER

## 2023-12-20 RX ORDER — CETIRIZINE HYDROCHLORIDE 10 MG/1
10 TABLET ORAL DAILY
COMMUNITY

## 2023-12-20 NOTE — PROGRESS NOTES
"Chief Complaint  Hyperthyroidism    Subjective        Natalie Rosen presents to [facility] ENDOCRINOLOGY  History of Present Illness    Diagnosed over 15+ years ago  has been on methimazole at various dosages over the years   currently on methimazole 5 mg 6 days a week      Also on amiodarone - 100 mg po daily.   Pmh s/f CHF -August 8/2022 and March 2023      Objective   Vital Signs:  /72 (BP Location: Left arm, Patient Position: Sitting)   Pulse 71   Temp 96.9 °F (36.1 °C)   Ht 172.7 cm (67.99\")   Wt 72.7 kg (160 lb 3.2 oz)   SpO2 98%   BMI 24.36 kg/m²   Estimated body mass index is 24.36 kg/m² as calculated from the following:    Height as of this encounter: 172.7 cm (67.99\").    Weight as of this encounter: 72.7 kg (160 lb 3.2 oz).       BMI is within normal parameters. No other follow-up for BMI required.      Physical Exam  Vitals reviewed.   Constitutional:       General: She is not in acute distress.  HENT:      Head: Normocephalic and atraumatic.   Cardiovascular:      Rate and Rhythm: Normal rate.   Pulmonary:      Effort: Pulmonary effort is normal. No respiratory distress.   Musculoskeletal:         General: No signs of injury. Normal range of motion.      Cervical back: Normal range of motion and neck supple.   Skin:     General: Skin is warm and dry.   Neurological:      Mental Status: She is alert and oriented to person, place, and time. Mental status is at baseline.   Psychiatric:         Mood and Affect: Mood normal.         Behavior: Behavior normal.         Thought Content: Thought content normal.         Judgment: Judgment normal.        Result Review :  The following data was reviewed by: JOSEPH Daugherty on 12/20/2023:  Common labs          7/10/2023    10:41 8/31/2023    14:27 12/13/2023    09:24   Common Labs   Glucose 98  84  101    BUN 24  25  16    Creatinine 1.10  1.14  1.09    Sodium 139  136  137    Potassium 5.3  4.6  4.4    Chloride 101  100  99  "   Calcium 10.2  9.5  9.6    Total Protein 6.8   6.9    Albumin 4.6   4.6    Total Bilirubin 0.7   0.6    Alkaline Phosphatase 49   60    AST (SGOT) 24   22    ALT (SGPT) 18   12    WBC 5.29   4.57    Hemoglobin 12.3   12.7    Hematocrit 36.8   39.1    Platelets 219   224                   Assessment and Plan   Diagnoses and all orders for this visit:    1. Hyperthyroidism (Primary)  -     TSH; Future  -     T4, Free; Future  -     T3, Free; Future  -     CBC Auto Differential; Future  -     Hepatic Function Panel; Future             Follow Up   Return in about 6 months (around 6/20/2024).    Thyroid labs stable  Continue methimazole 6 days a week at 5mg  Repeat labs before next visit     Patient was given instructions and counseling regarding her condition or for health maintenance advice. Please see specific information pulled into the AVS if appropriate.     JOSEPH Daugherty

## 2023-12-21 RX ORDER — METOPROLOL SUCCINATE 50 MG/1
50 TABLET, EXTENDED RELEASE ORAL DAILY
Qty: 90 TABLET | Refills: 1 | Status: SHIPPED | OUTPATIENT
Start: 2023-12-21

## 2023-12-22 ENCOUNTER — OFFICE VISIT (OUTPATIENT)
Dept: CARDIOLOGY | Facility: CLINIC | Age: 78
End: 2023-12-22
Payer: MEDICARE

## 2023-12-22 VITALS
BODY MASS INDEX: 24.31 KG/M2 | DIASTOLIC BLOOD PRESSURE: 60 MMHG | HEIGHT: 68 IN | SYSTOLIC BLOOD PRESSURE: 108 MMHG | OXYGEN SATURATION: 90 % | HEART RATE: 83 BPM | WEIGHT: 160.4 LBS

## 2023-12-22 DIAGNOSIS — I42.8 NICM (NONISCHEMIC CARDIOMYOPATHY): ICD-10-CM

## 2023-12-22 DIAGNOSIS — E78.00 PURE HYPERCHOLESTEROLEMIA: ICD-10-CM

## 2023-12-22 DIAGNOSIS — I47.29 NSVT (NONSUSTAINED VENTRICULAR TACHYCARDIA): Primary | ICD-10-CM

## 2023-12-22 PROCEDURE — 99214 OFFICE O/P EST MOD 30 MIN: CPT | Performed by: INTERNAL MEDICINE

## 2023-12-22 NOTE — PROGRESS NOTES
PATIENTINFORMATION    Date of Office Visit: 2023  Encounter Provider: Drake Larios MD  Place of Service: [facility] CARDIOLOGY  Patient Name: Natalie Rosen  : 1945    Subjective:     Encounter Date:2023      Patient ID: Natalie Rosen is a 78 y.o. female.    No chief complaint on file.    HPI  Ms. Rosen is a pleasant 78  years old lady who came to cardiology clinic for follow-up visit.  She is compliant with all current medications without significant side effects and denies any ER visit or hospitalization since last clinic visit.  She denies any rest or exertional chest pain, shortness of breath, orthopnea, PND, palpitations, presyncope syncope or extremity swelling.   She is very active without limiting symptoms.  She uses diuretic only 3 times weekly.  ROS  All systems reviewed and negative except as noted in HPI.    Past Medical History:   Diagnosis Date    Arthritis     Asthma     Benign essential hypertension     Cancer     MELANOMA    Cardiomyopathy     CHF (congestive heart failure)     Goiter     Heart murmur     Hyperlipidemia     Hyperthyroidism     Hypothyroidism 2014    Mitral valve insufficiency     PONV (postoperative nausea and vomiting)     Seasonal allergies     Urinary frequency        Past Surgical History:   Procedure Laterality Date    CATARACT EXTRACTION      COLONOSCOPY      HYSTERECTOMY      OTHER SURGICAL HISTORY      PT HAS HAD SKIN CA REMOVED FROM BACK AND FACE (UPPER LIP)     [place] ARTHRP KNE CONDYLE&PLATU MEDIAL&LAT COMPARTMENTS Right 2017    Procedure: RT TOTAL KNEE ARTHROPLASTY;  Surgeon: Doc Lance MD;  Location: [facility];  Service: Orthopedics    [place] ARTHRP KNE CONDYLE&PLATU MEDIAL&LAT COMPARTMENTS Left 2017    Procedure: LT TOTAL KNEE ARTHROPLASTY;  Surgeon: Doc Lance MD;  Location: [facility];  Service: Orthopedics       Social History     Socioeconomic History    Marital status:    Tobacco  "Use    Smoking status: Former     Packs/day: 1.00     Years: 25.00     Additional pack years: 0.00     Total pack years: 25.00     Types: Cigarettes     Quit date: 1985     Years since quittin.0    Smokeless tobacco: Never   Vaping Use    Vaping Use: Never used   Substance and Sexual Activity    Alcohol use: Yes     Alcohol/week: 6.0 standard drinks of alcohol     Types: 6 Drinks containing 0.5 oz of alcohol per week     Comment: DAILY COCKTAIL    Drug use: Never    Sexual activity: Not Currently     Partners: Male     Birth control/protection: Hysterectomy       Family History   Problem Relation Age of Onset    Breast cancer Other     Diabetes Other     Cancer Mother         Breast Cancer    Cancer Father         Prostate Cancer    Malig Hyperthermia Neg Hx          Procedures       Objective:     /60   Pulse 83   Ht 172.7 cm (67.99\")   Wt 72.8 kg (160 lb 6.4 oz)   SpO2 90%   BMI 24.40 kg/m²  Body mass index is 24.4 kg/m².     Constitutional:       General: Not in acute distress.     Appearance: Well-developed. Not diaphoretic.   Eyes:      Pupils: Pupils are equal, round, and reactive to light.   HENT:      Head: Normocephalic and atraumatic.   Neck:      Thyroid: No thyromegaly.   Pulmonary:      Effort: Pulmonary effort is normal. No respiratory distress.      Breath sounds: Normal breath sounds. No wheezing. No rales.   Chest:      Chest wall: Not tender to palpatation.   Cardiovascular:      Normal rate. Regular rhythm.      No gallop.    Pulses:     Intact distal pulses.   Edema:     Peripheral edema absent.   Abdominal:      General: Bowel sounds are normal. There is no distension.      Palpations: Abdomen is soft.      Tenderness: There is no guarding.   Musculoskeletal: Normal range of motion.         General: No deformity.      Cervical back: Normal range of motion and neck supple. Skin:     General: Skin is warm and dry.      Findings: No rash.   Neurological:      Mental Status: " Alert and oriented to person, place, and time.      Cranial Nerves: No cranial nerve deficit.      Deep Tendon Reflexes: Reflexes are normal and symmetric.   Psychiatric:         Judgment: Judgment normal.         Review Of Data: I have reviewed pertinent recent labs, images and documents and pertinent findings included in HPI or assessment below.    Lipid Panel          1/24/2023    12:32   Lipid Panel   Total Cholesterol 185    Triglycerides 75    HDL Cholesterol 76    VLDL Cholesterol 14    LDL Cholesterol  95          Assessment/Plan:     Chronic combined systolic and diastolic congestive heart failure-left ventricular ejection fraction improved from 30s to low normal 50% with medical therapy.  Frequent PVCs with frequent nonsustained VT on Holter monitoring-controlled with amiodarone and metoprolol.    IVCD-   Essential hypertension controlled  Hyperlipidemia on statin-lipid panel at goal.    Excellent blood pressure control.  Euvolemic on exam.  She is worried about cost of Farxiga and Entresto.   I have switched her to valsartan and held Farxiga because of cost.  She will continue to monitor blood pressure/heart rate/symptoms.  She will be in [place] for the next 3 months.    Follow-up in cardiac clinic in 6 months.-May repeat limited echocardiogram during follow-up visit.    Diagnosis and plan of care discussed with patient and verbalized understanding.            Your medication list            Accurate as of December 22, 2023  2:45 PM. If you have any questions, ask your nurse or doctor.                CONTINUE taking these medications        Instructions Last Dose Given Next Dose Due   albuterol sulfate  (90 Base) MCG/ACT inhaler  Commonly known as: PROVENTIL HFA;VENTOLIN HFA;PROAIR HFA      Inhale 2 puffs Every 6 (Six) Hours As Needed.       amiodarone 100 MG tablet  Commonly known as: Pacerone      Take 1 tablet by mouth Daily.       aspirin 81 MG chewable tablet      Chew 1 tablet Daily.        atorvastatin 20 MG tablet  Commonly known as: LIPITOR      Take 1 tablet by mouth Daily.       bumetanide 1 MG tablet  Commonly known as: BUMEX      Take 1 tablet by mouth 3 (Three) Times a Week. Take on Monday, Wednesday, and Friday. Can take additional dose for increased shortness of breath, swelling, or a weight gain of 3lbs or greater.       cetirizine 10 MG tablet  Commonly known as: zyrTEC      Take 1 tablet by mouth Daily.       Diclofenac Sodium 1 % gel gel  Commonly known as: VOLTAREN      APPLY 4 GRAMS TO THE AFFECTED AREA FOUR TIMES A DAY AS DIRECTED       escitalopram 5 MG tablet  Commonly known as: LEXAPRO           Fluticasone-Salmeterol 250-50 MCG/ACT DISKUS  Commonly known as: ADVAIR/WIXELA      Advair Diskus 250 mcg-50 mcg/dose powder for inhalation   INHALE 1 PUFF BY MOUTH TWICE DAILY       ipratropium 0.06 % nasal spray  Commonly known as: ATROVENT      2 sprays into the nostril(s) as directed by provider every night at bedtime.       methIMAzole 5 MG tablet  Commonly known as: TAPAZOLE      Take 1 tablet by mouth Daily. Skip one pill each week       metoprolol succinate XL 50 MG 24 hr tablet  Commonly known as: TOPROL-XL      Take 1 tablet by mouth Daily.       montelukast 10 MG tablet  Commonly known as: SINGULAIR      Take 1 tablet by mouth Every Morning.       omeprazole 40 MG capsule  Commonly known as: priLOSEC      Take 1 capsule by mouth Daily As Needed.       ondansetron 4 MG tablet  Commonly known as: ZOFRAN      Take 1 tablet by mouth Daily. Takes every morning       PROBIOTIC PO      Take 1 tablet by mouth Daily.       spironolactone 25 MG tablet  Commonly known as: ALDACTONE      Take 1 tablet by mouth Daily.       triamcinolone 55 MCG/ACT nasal inhaler  Commonly known as: NASACORT      2 sprays into the nostril(s) as directed by provider Every Morning.       TYLENOL EXTRA STRENGTH PO      Take 1 tablet by mouth 4 (Four) Times a Day.              STOP taking these medications       Entresto 24-26 MG tablet  Generic drug: sacubitril-valsartan  Stopped by: Drake Larios MD        Farxiga 10 MG tablet  Generic drug: dapagliflozin Propanediol  Stopped by: MD Drake Castaneda MD  12/22/23  14:45 EST

## 2023-12-23 ENCOUNTER — HOSPITAL ENCOUNTER (EMERGENCY)
Facility: HOSPITAL | Age: 78
Discharge: HOME | End: 2023-12-23
Payer: MEDICARE

## 2023-12-23 ENCOUNTER — APPOINTMENT (OUTPATIENT)
Dept: GENERAL RADIOLOGY | Facility: HOSPITAL | Age: 78
End: 2023-12-23
Payer: MEDICARE

## 2023-12-23 ENCOUNTER — APPOINTMENT (OUTPATIENT)
Dept: CT IMAGING | Facility: HOSPITAL | Age: 78
End: 2023-12-23
Payer: MEDICARE

## 2023-12-23 VITALS
WEIGHT: 160 LBS | BODY MASS INDEX: 24.25 KG/M2 | OXYGEN SATURATION: 98 % | HEART RATE: 91 BPM | HEIGHT: 68 IN | DIASTOLIC BLOOD PRESSURE: 87 MMHG | RESPIRATION RATE: 18 BRPM | TEMPERATURE: 97.8 F | SYSTOLIC BLOOD PRESSURE: 131 MMHG

## 2023-12-23 DIAGNOSIS — S51.811A LACERATION OF RIGHT FOREARM, INITIAL ENCOUNTER: ICD-10-CM

## 2023-12-23 DIAGNOSIS — S09.90XA MINOR HEAD INJURY, INITIAL ENCOUNTER: Primary | ICD-10-CM

## 2023-12-23 PROCEDURE — 99284 EMERGENCY DEPT VISIT MOD MDM: CPT

## 2023-12-23 PROCEDURE — 72125 CT NECK SPINE W/O DYE: CPT

## 2023-12-23 PROCEDURE — 73090 X-RAY EXAM OF FOREARM: CPT

## 2023-12-23 PROCEDURE — 70450 CT HEAD/BRAIN W/O DYE: CPT

## 2023-12-23 RX ORDER — LIDOCAINE HYDROCHLORIDE AND EPINEPHRINE 10; 10 MG/ML; UG/ML
10 INJECTION, SOLUTION INFILTRATION; PERINEURAL ONCE
Status: COMPLETED | OUTPATIENT
Start: 2023-12-23 | End: 2023-12-23

## 2023-12-23 RX ADMIN — LIDOCAINE HYDROCHLORIDE AND EPINEPHRINE 10 ML: 10; 10 INJECTION, SOLUTION INFILTRATION; PERINEURAL at 11:53

## 2023-12-23 NOTE — DISCHARGE INSTRUCTIONS
Please follow-up for suture removal in 10 to 14 days.  You may shower but do not scrub the wound.  You can clean it gently with antibacterial soap.  You may apply a small layer of antibiotic ointment.  If you develop redness or swelling at the area or yellow or green discharge, seek medical attention.

## 2023-12-23 NOTE — ED PROVIDER NOTES
EMERGENCY DEPARTMENT ENCOUNTER    Room Number:  22/22  Date of encounter:  12/23/2023  PCP: Bosler, James William III, MD  Historian: Patient  Full history not obtainable due to: None    HPI:  Chief Complaint: CHI    Context: Natalie Rosen is a 78 y.o. female with a PMH significant for nonischemic cardiomyopathy, mitral valve insufficiency, chronic low back pain who presents to the ED c/o closed head injury after a fall.  The patient bent over to pick something up off the floor last night at her house and upon trying to stand up she lost her balance backwards and fell.  She landed on her buttocks and then struck her head on the ground.  She believes she struck her right arm on a support column in her kitchen and sustained a laceration to the right forearm.  Denies numbness or weakness of the face or extremities, slurred speech, visual disturbance, photophobia, headache.  She is up-to-date on her tetanus vaccination.  No numbness or weakness of the right arm distally from her wound.  She does not take any blood thinners.  No other injuries sustained.      MEDICAL RECORD REVIEW:    Upon review of the medical record it appears the patient was evaluated in the office with cardiology for nonsustained ventricular tachycardia on December 22, 2023.  The patient had a normal TSH and T4 on 7/10/2023.    PAST MEDICAL HISTORY    Active Ambulatory Problems     Diagnosis Date Noted    NICM (nonischemic cardiomyopathy) 02/08/2016    Stenosis of carotid artery 02/08/2016    Hyperlipidemia 02/08/2016    Hyperthyroidism 02/08/2016    Non-toxic multinodular goiter 02/08/2016    Carotid bruit 02/24/2016    Mitral valve insufficiency 02/24/2016    Atrophic vaginitis 09/27/2016    Osteoarthritis, knee 01/12/2017    DJD (degenerative joint disease) of knee 01/12/2017    Chronic bilateral low back pain without sciatica 09/08/2020    Lumbar facet arthropathy 11/23/2020    Asthma 09/02/2022    NSVT (nonsustained ventricular tachycardia)  11/22/2022    Chronic HFrEF (heart failure with reduced ejection fraction) 06/28/2023     Resolved Ambulatory Problems     Diagnosis Date Noted    Abnormal weight gain 02/08/2016    Fatigue 02/08/2016    Neck pain on left side 09/08/2020    Muscle spasm 11/23/2020    COVID-19 09/02/2022    Acute on chronic combined systolic and diastolic CHF (congestive heart failure) 09/02/2022    Acute respiratory failure with hypoxia 09/02/2022    Hyponatremia 09/02/2022    Cytokine release syndrome, grade 1 09/04/2022    Dizziness 11/22/2022    Hypoxia 03/17/2023     Past Medical History:   Diagnosis Date    Arthritis     Benign essential hypertension     Cancer     Cardiomyopathy     CHF (congestive heart failure)     Goiter     Heart murmur     Hypothyroidism 2014    PONV (postoperative nausea and vomiting)     Seasonal allergies     Urinary frequency          PAST SURGICAL HISTORY  Past Surgical History:   Procedure Laterality Date    CATARACT EXTRACTION      COLONOSCOPY      HYSTERECTOMY      OTHER SURGICAL HISTORY      PT HAS HAD SKIN CA REMOVED FROM BACK AND FACE (UPPER LIP)     WI ARTHRP KNE CONDYLE&PLATU MEDIAL&LAT COMPARTMENTS Right 01/12/2017    Procedure: RT TOTAL KNEE ARTHROPLASTY;  Surgeon: Doc Lance MD;  Location: [facility] OR;  Service: Orthopedics    WI ARTHRP KNE CONDYLE&PLATU MEDIAL&LAT COMPARTMENTS Left 06/26/2017    Procedure: LT TOTAL KNEE ARTHROPLASTY;  Surgeon: Doc Lance MD;  Location: [facility] OR;  Service: Orthopedics         FAMILY HISTORY  Family History   Problem Relation Age of Onset    Breast cancer Other     Diabetes Other     Cancer Mother         Breast Cancer    Cancer Father         Prostate Cancer    Malig Hyperthermia Neg Hx          SOCIAL HISTORY  Social History     Socioeconomic History    Marital status:    Tobacco Use    Smoking status: Former     Packs/day: 1.00     Years: 25.00     Additional pack years: 0.00     Total pack years: 25.00     Types: Cigarettes      Quit date: 1985     Years since quittin.0    Smokeless tobacco: Never   Vaping Use    Vaping Use: Never used   Substance and Sexual Activity    Alcohol use: Yes     Alcohol/week: 6.0 standard drinks of alcohol     Types: 6 Drinks containing 0.5 oz of alcohol per week     Comment: DAILY COCKTAIL    Drug use: Never    Sexual activity: Not Currently     Partners: Male     Birth control/protection: Hysterectomy         ALLERGIES  Latex        REVIEW OF SYSTEMS    All systems reviewed and marked as negative except as listed in HPI     PHYSICAL EXAM    I have reviewed the triage vital signs and nursing notes.    ED Triage Vitals   Temp Heart Rate Resp BP SpO2   23 0919 23 0919 23 0919 2323 23   97.8 °F (36.6 °C) 91 18 131/87 98 %      Temp src Heart Rate Source Patient Position BP Location FiO2 (%)   -- -- -- -- --              Physical Exam  Constitutional:       General: She is not in acute distress.     Appearance: She is well-developed.   HENT:      Head: Normocephalic and atraumatic.     Eyes:      General: No scleral icterus.     Conjunctiva/sclera: Conjunctivae normal.   Neck:      Trachea: No tracheal deviation.   Cardiovascular:      Rate and Rhythm: Normal rate and regular rhythm.   Pulmonary:      Effort: Pulmonary effort is normal.      Breath sounds: Normal breath sounds.   Abdominal:      Palpations: Abdomen is soft.      Tenderness: There is no abdominal tenderness.   Musculoskeletal:         General: No deformity.      Cervical back: Normal range of motion. No spinous process tenderness or muscular tenderness.   Lymphadenopathy:      Cervical: No cervical adenopathy.   Skin:     General: Skin is warm and dry.          Neurological:      Mental Status: She is alert and oriented to person, place, and time.      Sensory: Sensation is intact.      Motor: Motor function is intact.   Psychiatric:         Behavior: Behavior normal.         Vital signs and  nursing notes reviewed.            LAB RESULTS  No results found for this or any previous visit (from the past 24 hour(s)).    Ordered the above labs and independently reviewed the results.        RADIOLOGY  No Radiology Exams Resulted Within Past 24 Hours    I ordered the above noted radiological studies. Independently reviewed by me and discussed with radiologist.  See dictation above for official radiology interpretation.      PROCEDURES    Laceration Repair    Date/Time: 12/23/2023 12:29 PM    Performed by: Harjinder Win PA  Authorized by: Harjinder Win PA    Consent:     Consent obtained:  Verbal    Consent given by:  Patient    Risks, benefits, and alternatives were discussed: yes      Risks discussed:  Infection and poor cosmetic result    Alternatives discussed:  No treatment  Universal protocol:     Patient identity confirmed:  Verbally with patient  Anesthesia:     Anesthesia method:  Local infiltration    Local anesthetic:  Lidocaine 1% WITH epi  Laceration details:     Location:  Shoulder/arm    Shoulder/arm location:  R lower arm    Length (cm):  4.2  Pre-procedure details:     Preparation:  Patient was prepped and draped in usual sterile fashion and imaging obtained to evaluate for foreign bodies  Exploration:     Imaging outcome: foreign body noted      Wound exploration: wound explored through full range of motion      Wound extent: no nerve damage noted, no tendon damage noted and no underlying fracture noted    Treatment:     Area cleansed with:  Chlorhexidine and saline    Amount of cleaning:  Extensive    Irrigation solution:  Sterile saline    Irrigation volume:  500    Debridement:  None    Undermining:  None  Skin repair:     Repair method:  Sutures    Suture size:  5-0    Suture material:  Nylon    Suture technique:  Simple interrupted (3 horiz. mattress sutures + 7 simple)  Approximation:     Approximation:  Close  Repair type:     Repair type:  Complex          MEDICATIONS GIVEN IN  ER    Medications - No data to display      PROGRESS, DATA ANALYSIS, CONSULTS, AND MEDICAL DECISION MAKING    All labs have been independently interpreted by me.  All radiology studies have been interpreted by me.  Discussion below represents my analysis of pertinent findings related to patient's condition, differential diagnosis, treatment plan and final disposition.    Patient presentation and evaluation consistent with closed head injury from a fall.  Reassuring ED workup with symptoms that are well-controlled.  Her wound on her right forearm has been closed with tendon 5-0 nylon sutures to be removed in the next couple of weeks.  Close return precautions given at this time and all questions answered.    - Chronic or social conditions impacting care: None      DIFFERENTIAL DIAGNOSIS INCLUDE BUT NOT LIMITED TO:     Intracranial bleeding, concussion, laceration, abrasion      Orders placed during this visit:  Orders Placed This Encounter   Procedures    CT Head Without Contrast    CT Cervical Spine Without Contrast         ED Course as of 12/23/23 1229   Sat Dec 23, 2023   1136 CT brain without contrast independently interpreted in PACS and demonstrates no acute intracranial hemorrhage. [JR]      ED Course User Index  [JR] Mervin Gurrola MD       AS OF 10:37 EST VITALS:    BP - 131/87  HR - 91  TEMP - 97.8 °F (36.6 °C)  02 SATS - 98%    1231 I rechecked the patient.  I discussed the patient's labs, radiology findings (including all incidental findings), diagnosis, and plan for discharge.  A repeat exam reveals no new worrisome changes from my initial exam findings.  The patient understands that the fact that they are being discharged does not denote that nothing is abnormal, it indicates that no clinical emergency is present and that they must follow-up as directed in order to properly maintain their health.  Follow-up instructions (specifically listed below) and return to ER precautions were given at this  time.  I specifically instructed the patient to follow-up with their PCP.  The patient understands and agrees with the plan, and is ready for discharge.  All questions answered.      DIAGNOSIS  Final diagnoses:   Minor head injury, initial encounter   Laceration of right forearm, initial encounter         DISPOSITION  D/c    Pt masked in first look. I wore a surgical mask throughout my encounters with the pt. I performed hand hygiene on entry into the pt room and upon exit.     Dictated utilizing Dragon dictation     Note Disclaimer: At [facility], we believe that sharing information builds trust and better relationships. You are receiving this note because you recently visited [facility]. It is possible you will see health information before a provider has talked with you about it. This kind of information can be easy to misunderstand. To help you fully understand what it means for your health, we urge you to discuss this note with your provider.      Harjinder Win PA  12/23/23 [id number]

## 2023-12-23 NOTE — ED NOTES
Patient to ER via car from home for trip and fall patient report hitting her head patient has R arm pain  No loc no blood thinners

## 2023-12-23 NOTE — ED PROVIDER NOTES
MD ATTESTATION NOTE    The VIDA and I have discussed this patient's history, physical exam, and treatment plan.  I have reviewed the documentation and personally had a face to face interaction with the patient. I affirm the documentation and agree with the treatment and plan.  The attached note describes my personal findings.      I provided a substantive portion of the care of the patient.  I personally performed the physical exam in its entirety, and below are my findings.        Brief HPI: Patient presents with complaint of head injury and right forearm laceration after she fell prior to arrival.    PHYSICAL EXAM  ED Triage Vitals   Temp Heart Rate Resp BP SpO2   12/23/23 0919 12/23/23 0919 12/23/23 0919 12/23/23 0923 12/23/23 0919   97.8 °F (36.6 °C) 91 18 131/87 98 %      Temp src Heart Rate Source Patient Position BP Location FiO2 (%)   -- -- -- -- --                GENERAL: Awake and alert, no acute distress  HENT: nares patent  EYES: no scleral icterus, pupils 3 mm react bilaterally, EOMI  CV: regular rhythm, normal rate  RESPIRATORY: normal effort  MUSCULOSKELETAL: no deformity  NEURO: alert, moves all extremities, follows commands  PSYCH:  calm, cooperative  SKIN: warm, dry.  Laceration of the right volar forearm with no foreign body, no active bleeding.    Vital signs and nursing notes reviewed.    ED Course as of 12/23/23 1219   Sat Dec 23, 2023   1136 CT brain without contrast independently interpreted in PACS and demonstrates no acute intracranial hemorrhage. [JR]      ED Course User Index  [JR] Mervin Gurrola MD         Plan: Primary closure of right forearm laceration performed by Mr. Audelia PA-C.  Patient will follow-up for suture removal in the [facility] in 10 to 14 days.  Wound care discussed.  Return precautions discussed.       Mervin Gurrola MD  12/23/23 1219

## 2024-02-02 RX ORDER — BUMETANIDE 1 MG/1
1 TABLET ORAL 3 TIMES WEEKLY
Qty: 90 TABLET | Refills: 3
Start: 2024-02-02

## 2024-02-12 RX ORDER — BUMETANIDE 1 MG/1
1 TABLET ORAL 3 TIMES WEEKLY
Qty: 90 TABLET | Refills: 3
Start: 2024-02-12 | End: 2024-02-19 | Stop reason: SDUPTHER

## 2024-02-19 RX ORDER — BUMETANIDE 1 MG/1
1 TABLET ORAL 3 TIMES WEEKLY
Qty: 90 TABLET | Refills: 2 | Status: SHIPPED | OUTPATIENT
Start: 2024-02-19

## 2024-02-29 DIAGNOSIS — E05.90 HYPERTHYROIDISM: ICD-10-CM

## 2024-02-29 RX ORDER — METHIMAZOLE 5 MG/1
5 TABLET ORAL DAILY
Qty: 90 TABLET | Refills: 0 | Status: SHIPPED | OUTPATIENT
Start: 2024-02-29

## 2024-02-29 NOTE — TELEPHONE ENCOUNTER
Rx Refill Note  Requested Prescriptions     Pending Prescriptions Disp Refills    methIMAzole (TAPAZOLE) 5 MG tablet [Pharmacy Med Name: METHIMAZOLE 5MG TABLETS] 90 tablet 1     Sig: TAKE 1 TABLET BY MOUTH DAILY, SKIP ONE PILL EACH WEEK.      Last office visit with prescribing clinician: 12/20/2023   Last telemedicine visit with prescribing clinician: Visit date not found   Next office visit with prescribing clinician: Visit date not found                         Would you like a call back once the refill request has been completed: [] Yes [] No    If the office needs to give you a call back, can they leave a voicemail: [] Yes [] No    Soha Doyle MA  02/29/24, 15:30 EST

## 2024-04-08 RX ORDER — SPIRONOLACTONE 25 MG/1
25 TABLET ORAL DAILY
Qty: 90 TABLET | Refills: 3 | Status: SHIPPED | OUTPATIENT
Start: 2024-04-08

## 2024-05-13 RX ORDER — AMIODARONE HYDROCHLORIDE 100 MG/1
100 TABLET ORAL DAILY
Qty: 90 TABLET | Refills: 3 | Status: SHIPPED | OUTPATIENT
Start: 2024-05-13

## 2024-05-29 DIAGNOSIS — E05.90 HYPERTHYROIDISM: ICD-10-CM

## 2024-05-29 RX ORDER — METHIMAZOLE 5 MG/1
TABLET ORAL
Qty: 30 TABLET | Refills: 0 | Status: SHIPPED | OUTPATIENT
Start: 2024-05-29

## 2024-05-29 NOTE — TELEPHONE ENCOUNTER
Rx Refill Note  Requested Prescriptions     Pending Prescriptions Disp Refills    methIMAzole (TAPAZOLE) 5 MG tablet [Pharmacy Med Name: METHIMAZOLE 5MG TABLETS] 90 tablet 0     Sig: TAKE 1 TABLET BY MOUTH EVERY DAY. SKIP 1 DAY PER WEEK      Last office visit with prescribing clinician: 12/20/2023   Last telemedicine visit with prescribing clinician: Visit date not found   Next office visit with prescribing clinician: Visit date not found                         Would you like a call back once the refill request has been completed: [] Yes [] No    If the office needs to give you a call back, can they leave a voicemail: [] Yes [] No    Marissa Doyle  05/29/24, 11:04 EDT

## 2024-06-20 ENCOUNTER — OFFICE VISIT (OUTPATIENT)
Dept: ENDOCRINOLOGY | Age: 79
End: 2024-06-20
Payer: MEDICARE

## 2024-06-20 VITALS
DIASTOLIC BLOOD PRESSURE: 82 MMHG | HEIGHT: 68 IN | SYSTOLIC BLOOD PRESSURE: 128 MMHG | WEIGHT: 160 LBS | BODY MASS INDEX: 24.25 KG/M2 | TEMPERATURE: 98.2 F

## 2024-06-20 DIAGNOSIS — E04.2 MULTINODULAR GOITER: ICD-10-CM

## 2024-06-20 DIAGNOSIS — E55.9 VITAMIN D DEFICIENCY: ICD-10-CM

## 2024-06-20 DIAGNOSIS — E05.90 HYPERTHYROIDISM: Primary | ICD-10-CM

## 2024-06-20 DIAGNOSIS — M85.832 OSTEOPENIA OF LEFT FOREARM: ICD-10-CM

## 2024-06-20 PROCEDURE — 99214 OFFICE O/P EST MOD 30 MIN: CPT | Performed by: STUDENT IN AN ORGANIZED HEALTH CARE EDUCATION/TRAINING PROGRAM

## 2024-06-20 PROCEDURE — G2211 COMPLEX E/M VISIT ADD ON: HCPCS | Performed by: STUDENT IN AN ORGANIZED HEALTH CARE EDUCATION/TRAINING PROGRAM

## 2024-06-20 RX ORDER — METOPROLOL SUCCINATE 50 MG/1
50 TABLET, EXTENDED RELEASE ORAL DAILY
Qty: 90 TABLET | Refills: 1 | Status: SHIPPED | OUTPATIENT
Start: 2024-06-20

## 2024-06-20 NOTE — ASSESSMENT & PLAN NOTE
Denies compressive symptoms  Thyroid ultrasound stable in March 2023  No further follow-up thyroid ultrasound recommended

## 2024-06-20 NOTE — ASSESSMENT & PLAN NOTE
Clinically euthyroid  Continue the current dose of methimazole 5 mg 6 days a week  Recheck TFT today

## 2024-06-20 NOTE — ASSESSMENT & PLAN NOTE
DEXA scan in 2019 showed osteopenia of left forearm  Given her age and hyperthyroidism will repeat bone density  Fall precaution discussed  Check CMP and vitamin D level today

## 2024-06-20 NOTE — PROGRESS NOTES
"Chief Complaint  Hyperthyroidism    Subjective        Natalie Rosen presents to [facility] ENDOCR[facility]OLOG[facility] for follow up.       History of Present Illness      Natalie is here today for follow-up on hyperthyroidism    Diagnosed with hyperthyroidism over 15 years ago  Has tried various doses of methimazole  Currently on methimazole 5 mg 6 days a week  Amiodarone 100 mg started in 2022    Has CHF and follows with cardiology    Denies heart racing or palpitations, denies unintentional weight loss  Denies fever, chills or sore throat  Reports episodes of tremor in her hands    DEXA scan in 2019 showed osteopenia of left forearm       Objective   Vital Signs:  /82 (BP Location: Left arm)   Temp 98.2 °F (36.8 °C) (Oral)   Ht 172.7 cm (67.99\")   Wt 72.6 kg (160 lb)   BMI 24.33 kg/m²   Estimated body mass index is 24.33 kg/m² as calculated from the following:    Height as of this encounter: 172.7 cm (67.99\").    Weight as of this encounter: 72.6 kg (160 lb).       BMI is within normal parameters. No other follow-up for BMI required.          Physical Exam  Constitutional:       Appearance: Normal appearance.   HENT:      Head: Normocephalic and atraumatic.   Eyes:      Extraocular Movements: Extraocular movements intact.   Cardiovascular:      Rate and Rhythm: Normal rate and regular rhythm.   Pulmonary:      Effort: Pulmonary effort is normal.      Breath sounds: Normal breath sounds. No wheezing.   Abdominal:      Palpations: Abdomen is soft.      Tenderness: There is no abdominal tenderness.   Musculoskeletal:         General: No swelling. Normal range of motion.      Cervical back: Neck supple. No tenderness.   Neurological:      Mental Status: She is alert and oriented to person, place, and time.      Comments: No tremor   Psychiatric:         Mood and Affect: Mood normal.        Result Review :  The following data was reviewed by: Mahrokh Nokhbehzaeim, MD on 06/20/2024:  CMP          " 7/10/2023    10:41 8/31/2023    14:27 12/13/2023    09:24   CMP   Glucose 98  84  101    BUN 24  25  16    Creatinine 1.10  1.14  1.09    EGFR  50.0     Sodium 139  136  137    Potassium 5.3  4.6  4.4    Chloride 101  100  99    Calcium 10.2  9.5  9.6    Total Protein 6.8   6.9    Albumin 4.6   4.6    Globulin 2.2   2.3    Total Bilirubin 0.7   0.6    Alkaline Phosphatase 49   60    AST (SGOT) 24   22    ALT (SGPT) 18   12    BUN/Creatinine Ratio 21.8  21.9  14.7    Anion Gap  11.0       CMP          7/10/2023    10:41 8/31/2023    14:27 12/13/2023    09:24   CMP   Glucose 98  84  101    BUN 24  25  16    Creatinine 1.10  1.14  1.09    EGFR  50.0     Sodium 139  136  137    Potassium 5.3  4.6  4.4    Chloride 101  100  99    Calcium 10.2  9.5  9.6    Total Protein 6.8   6.9    Albumin 4.6   4.6    Globulin 2.2   2.3    Total Bilirubin 0.7   0.6    Alkaline Phosphatase 49   60    AST (SGOT) 24   22    ALT (SGPT) 18   12    BUN/Creatinine Ratio 21.8  21.9  14.7    Anion Gap  11.0        TSH          7/10/2023    10:41 12/13/2023    09:24   TSH   TSH 1.450  3.380       Free T4   Date Value Ref Range Status   12/13/2023 1.02 0.93 - 1.70 ng/dL Final     Comment:     Results may be falsely increased if patient taking Biotin.      Data reviewed : Radiologic studies thyroid ultrasound             Assessment and Plan   Diagnoses and all orders for this visit:    1. Hyperthyroidism (Primary)  Assessment & Plan:  Clinically euthyroid  Continue the current dose of methimazole 5 mg 6 days a week  Recheck TFT today    Orders:  -     TSH  -     T4, Free  -     Comprehensive Metabolic Panel  -     CBC & Differential  -     DEXA Bone Density Axial    2. Osteopenia of left forearm  Assessment & Plan:  DEXA scan in 2019 showed osteopenia of left forearm  Given her age and hyperthyroidism will repeat bone density  Fall precaution discussed  Check CMP and vitamin D level today    Orders:  -     DEXA Bone Density Axial    3. Vitamin D  deficiency  Assessment & Plan:  Check vitamin D level today  Will start the appropriate dose of vitamin D accordingly    Orders:  -     Vitamin D 25 Hydroxy    4. Multinodular goiter  Assessment & Plan:  Denies compressive symptoms  Thyroid ultrasound stable in March 2023  No further follow-up thyroid ultrasound recommended               Follow Up   Return in about 6 months (around 12/20/2024).  Patient was given instructions and counseling regarding her condition or for health maintenance advice. Please see specific information pulled into the AVS if appropriate.

## 2024-06-21 LAB
25(OH)D3+25(OH)D2 SERPL-MCNC: 40 NG/ML (ref 30–100)
ALBUMIN SERPL-MCNC: 4.3 G/DL (ref 3.5–5.2)
ALBUMIN/GLOB SERPL: 2.5 G/DL
ALP SERPL-CCNC: 52 U/L (ref 39–117)
ALT SERPL-CCNC: 17 U/L (ref 1–33)
AST SERPL-CCNC: 24 U/L (ref 1–32)
BASOPHILS # BLD AUTO: 0.06 10*3/MM3 (ref 0–0.2)
BASOPHILS NFR BLD AUTO: 1.2 % (ref 0–1.5)
BILIRUB SERPL-MCNC: 0.5 MG/DL (ref 0–1.2)
BUN SERPL-MCNC: 27 MG/DL (ref 8–23)
BUN/CREAT SERPL: 25.2 (ref 7–25)
CALCIUM SERPL-MCNC: 9.5 MG/DL (ref 8.6–10.5)
CHLORIDE SERPL-SCNC: 102 MMOL/L (ref 98–107)
CO2 SERPL-SCNC: 28.1 MMOL/L (ref 22–29)
CREAT SERPL-MCNC: 1.07 MG/DL (ref 0.57–1)
EGFRCR SERPLBLD CKD-EPI 2021: 53.6 ML/MIN/1.73
EOSINOPHIL # BLD AUTO: 0.07 10*3/MM3 (ref 0–0.4)
EOSINOPHIL NFR BLD AUTO: 1.3 % (ref 0.3–6.2)
ERYTHROCYTE [DISTWIDTH] IN BLOOD BY AUTOMATED COUNT: 12.9 % (ref 12.3–15.4)
GLOBULIN SER CALC-MCNC: 1.7 GM/DL
GLUCOSE SERPL-MCNC: 121 MG/DL (ref 65–99)
HCT VFR BLD AUTO: 35.5 % (ref 34–46.6)
HGB BLD-MCNC: 11.8 G/DL (ref 12–15.9)
IMM GRANULOCYTES # BLD AUTO: 0.02 10*3/MM3 (ref 0–0.05)
IMM GRANULOCYTES NFR BLD AUTO: 0.4 % (ref 0–0.5)
LYMPHOCYTES # BLD AUTO: 1.47 10*3/MM3 (ref 0.7–3.1)
LYMPHOCYTES NFR BLD AUTO: 28.2 % (ref 19.6–45.3)
MCH RBC QN AUTO: 33.5 PG (ref 26.6–33)
MCHC RBC AUTO-ENTMCNC: 33.2 G/DL (ref 31.5–35.7)
MCV RBC AUTO: 100.9 FL (ref 79–97)
MONOCYTES # BLD AUTO: 0.64 10*3/MM3 (ref 0.1–0.9)
MONOCYTES NFR BLD AUTO: 12.3 % (ref 5–12)
NEUTROPHILS # BLD AUTO: 2.95 10*3/MM3 (ref 1.7–7)
NEUTROPHILS NFR BLD AUTO: 56.6 % (ref 42.7–76)
NRBC BLD AUTO-RTO: 0 /100 WBC (ref 0–0.2)
PLATELET # BLD AUTO: 228 10*3/MM3 (ref 140–450)
POTASSIUM SERPL-SCNC: 4.3 MMOL/L (ref 3.5–5.2)
PROT SERPL-MCNC: 6 G/DL (ref 6–8.5)
RBC # BLD AUTO: 3.52 10*6/MM3 (ref 3.77–5.28)
SODIUM SERPL-SCNC: 140 MMOL/L (ref 136–145)
T4 FREE SERPL-MCNC: 1.14 NG/DL (ref 0.92–1.68)
TSH SERPL DL<=0.005 MIU/L-ACNC: 2.65 UIU/ML (ref 0.27–4.2)
WBC # BLD AUTO: 5.21 10*3/MM3 (ref 3.4–10.8)

## 2024-06-25 ENCOUNTER — OFFICE VISIT (OUTPATIENT)
Dept: CARDIOLOGY | Facility: CLINIC | Age: 79
End: 2024-06-25
Payer: MEDICARE

## 2024-06-25 VITALS
HEIGHT: 67 IN | DIASTOLIC BLOOD PRESSURE: 72 MMHG | SYSTOLIC BLOOD PRESSURE: 130 MMHG | HEART RATE: 64 BPM | WEIGHT: 159 LBS | BODY MASS INDEX: 24.96 KG/M2

## 2024-06-25 DIAGNOSIS — I42.8 NICM (NONISCHEMIC CARDIOMYOPATHY): ICD-10-CM

## 2024-06-25 DIAGNOSIS — I50.22 CHRONIC HFREF (HEART FAILURE WITH REDUCED EJECTION FRACTION): ICD-10-CM

## 2024-06-25 DIAGNOSIS — I47.29 NSVT (NONSUSTAINED VENTRICULAR TACHYCARDIA): ICD-10-CM

## 2024-06-25 DIAGNOSIS — R09.89 CAROTID BRUIT, UNSPECIFIED LATERALITY: Primary | ICD-10-CM

## 2024-06-25 DIAGNOSIS — I65.29 STENOSIS OF CAROTID ARTERY, UNSPECIFIED LATERALITY: ICD-10-CM

## 2024-06-25 DIAGNOSIS — E78.49 OTHER HYPERLIPIDEMIA: ICD-10-CM

## 2024-06-25 DIAGNOSIS — I34.0 NONRHEUMATIC MITRAL VALVE REGURGITATION: ICD-10-CM

## 2024-06-25 PROCEDURE — 99214 OFFICE O/P EST MOD 30 MIN: CPT | Performed by: NURSE PRACTITIONER

## 2024-06-25 PROCEDURE — 1159F MED LIST DOCD IN RCRD: CPT | Performed by: NURSE PRACTITIONER

## 2024-06-25 PROCEDURE — 93000 ELECTROCARDIOGRAM COMPLETE: CPT | Performed by: NURSE PRACTITIONER

## 2024-06-25 PROCEDURE — 1160F RVW MEDS BY RX/DR IN RCRD: CPT | Performed by: NURSE PRACTITIONER

## 2024-06-25 NOTE — PROGRESS NOTES
Subjective:     Encounter Date:06/25/2024      Patient ID: Natalie Rosen is a 78 y.o. female.    Chief Complaint:follow up CHF  History of Present Illness  This is a 76 y/o female who follows with Dr. Larios and is known to me. She has a pmhx of nonischemic cardiomyopathy, hypertension, hyperlipidemia, former smoker, asthma, CHF and PVCs.     She is here today for a follow up visit. She has been doing well since she was seen in December. She has been monitoring her blood pressure at home and it is usually around 120/70s. She denies any chest pain, shortness of breath, palpitations, dizziness or syncope. She denies swelling in her lower extremities, orthopnea or PND. She is on Bumex 3 days a week. Weather permitting, she is active but does have pulmonary issues that can limit her when allergens are bad outside. Overall, she is feeling well.    Prior history:  She previously followed with Dr. Moore at [facility]. She had a stress test in 2018 that was considered to be equivocal but felt to likely be a normal exercise perfusion study with no definite infarct or ischemia. She had an echocaridogram in 2021 that showed an EF of 44%, mild global hypokinesis with minor regional variation and mild MR. She began following with Dr. Larios after presenting to the ED in September 2022 with complaints of shortness of breath after being diagnosed with COVID a few days prior. She was found to be volume overloaded and was IV diuresed. She was initially placed on Entresto but was having a lot of positional lightheadedness. This was eventually changed to losartan. She was also started on amiodarone for nonsustained VT.    In November 2022, she presented to the ED again with complaints of weakness and lightheadedness. She was having a lot of PVCs and short runs of nonsustained VT. She underwent a stress test and could only complete 3 minutes due to SOA and lightheadedness. Amiodarone was stopped and losartan increased for  better BP control. At follow up with Dr. Larios, she continued to have dizziness felt to be blood pressure related. Carvedilol was switched to Toprol XL and losartan changed to lisinopril. She was also restarted on amiodarone.    I saw her for the first time in March 2023. She was doing well and no changes were made. She then followed up with Dr. Larios in June and had been started on valsartan prior to that visit by her PCP. Dr. Larios switched her to Entresto. He then saw her in December. She was having issues with the cost of Entresto and Farxiga. Both were stopped and she was to be placed back on valsartan. It does not appear the valsartan was ever reordered. She was instructed to follow up with me with a possible repeat echocardiogram.    I have reviewed and updated as appropriate allergies, current medications, past family history, past medical history, past surgical history and problem list.    Review of Systems   Constitutional: Negative for fever, malaise/fatigue, weight gain and weight loss.   HENT:  Negative for congestion, hoarse voice and sore throat.    Eyes:  Negative for blurred vision and double vision.   Cardiovascular:  Negative for chest pain, dyspnea on exertion, leg swelling, orthopnea, palpitations and syncope.   Respiratory:  Negative for cough, shortness of breath and wheezing.    Gastrointestinal:  Negative for abdominal pain, hematemesis, hematochezia and melena.   Genitourinary:  Negative for dysuria and hematuria.   Neurological:  Negative for dizziness, headaches, light-headedness and numbness.   Psychiatric/Behavioral:  Negative for depression. The patient is not nervous/anxious.          Current Outpatient Medications:     Acetaminophen (TYLENOL EXTRA STRENGTH PO), Take 1 tablet by mouth 4 (Four) Times a Day., Disp: , Rfl:     albuterol sulfate  (90 Base) MCG/ACT inhaler, Inhale 2 puffs Every 6 (Six) Hours As Needed., Disp: , Rfl:     amiodarone (PACERONE) 100 MG  tablet, TAKE 1 TABLET BY MOUTH DAILY, Disp: 90 tablet, Rfl: 3    aspirin 81 MG chewable tablet, Chew 1 tablet Daily., Disp: , Rfl:     atorvastatin (LIPITOR) 20 MG tablet, Take 1 tablet by mouth Daily., Disp: , Rfl:     bumetanide (BUMEX) 1 MG tablet, Take 1 tablet by mouth 3 (Three) Times a Week. Take on Monday, Wednesday, and Friday. Can take additional dose for increased shortness of breath, swelling, or a weight gain of 3lbs or greater., Disp: 90 tablet, Rfl: 2    cetirizine (zyrTEC) 10 MG tablet, Take 1 tablet by mouth Daily., Disp: , Rfl:     escitalopram (LEXAPRO) 5 MG tablet, , Disp: , Rfl:     methIMAzole (TAPAZOLE) 5 MG tablet, TAKE 1 TABLET BY MOUTH EVERY DAY. SKIP 1 DAY PER WEEK, Disp: 30 tablet, Rfl: 0    metoprolol succinate XL (TOPROL-XL) 50 MG 24 hr tablet, TAKE 1 TABLET BY MOUTH DAILY, Disp: 90 tablet, Rfl: 1    montelukast (SINGULAIR) 10 MG tablet, Take 1 tablet by mouth Every Morning., Disp: , Rfl:     omeprazole (priLOSEC) 40 MG capsule, Take 1 capsule by mouth Daily As Needed., Disp: , Rfl:     Probiotic Product (PROBIOTIC PO), Take 1 tablet by mouth Daily., Disp: , Rfl:     spironolactone (ALDACTONE) 25 MG tablet, TAKE 1 TABLET BY MOUTH DAILY, Disp: 90 tablet, Rfl: 3    triamcinolone (NASACORT) 55 MCG/ACT nasal inhaler, 2 sprays into the nostril(s) as directed by provider Every Morning., Disp: , Rfl:     Past Medical History:   Diagnosis Date    Arthritis     Asthma     Benign essential hypertension     Cancer     MELANOMA    Cardiomyopathy     CHF (congestive heart failure)     Goiter     Heart murmur     Hyperlipidemia     Hyperthyroidism     Hypothyroidism 2014    Mitral valve insufficiency     PONV (postoperative nausea and vomiting)     Seasonal allergies     Urinary frequency     Vitamin D deficiency 6/20/2024       Past Surgical History:   Procedure Laterality Date    CATARACT EXTRACTION      COLONOSCOPY      HYSTERECTOMY      OTHER SURGICAL HISTORY      PT HAS HAD SKIN CA REMOVED FROM  "BACK AND FACE (UPPER LIP)     TX ARTHRP KNE CONDYLE&PLATU MEDIAL&LAT COMPARTMENTS Right 2017    Procedure: RT TOTAL KNEE ARTHROPLASTY;  Surgeon: Doc Lance MD;  Location: [facility] MAIN OR;  Service: Orthopedics    TX ARTHRP KNE CONDYLE&PLATU MEDIAL&LAT COMPARTMENTS Left 2017    Procedure: LT TOTAL KNEE ARTHROPLASTY;  Surgeon: Doc Lance MD;  Location: [facility] MAIN OR;  Service: Orthopedics       Family History   Problem Relation Age of Onset    Breast cancer Other     Diabetes Other     Cancer Mother         Breast Cancer    Cancer Father         Prostate Cancer    Malig Hyperthermia Neg Hx        Social History     Tobacco Use    Smoking status: Former     Current packs/day: 0.00     Average packs/day: 1 pack/day for 25.0 years (25.0 ttl pk-yrs)     Types: Cigarettes     Start date: 1960     Quit date: 1985     Years since quittin.5    Smokeless tobacco: Never   Vaping Use    Vaping status: Never Used   Substance Use Topics    Alcohol use: Yes     Alcohol/week: 6.0 standard drinks of alcohol     Types: 6 Drinks containing 0.5 oz of alcohol per week     Comment: DAILY COCKTAIL    Drug use: Never         ECG 12 Lead    Date/Time: 2024 11:25 AM  Performed by: Phuong Diaz APRN    Authorized by: Phuong Diaz APRN  Comparison: compared with previous ECG from 2023  Similar to previous ECG  Rhythm: sinus rhythm  Ectopy: unifocal PVCs  Conduction: non-specific intraventricular conduction delay             Objective:     Visit Vitals  /72   Pulse 64   Ht 170.2 cm (67\")   Wt 72.1 kg (159 lb)   BMI 24.90 kg/m²             Physical Exam  Constitutional:       Appearance: Normal appearance. She is normal weight.   HENT:      Head: Normocephalic.   Neck:      Vascular: No carotid bruit.   Cardiovascular:      Rate and Rhythm: Normal rate and regular rhythm. Occasional Extrasystoles are present.     Chest Wall: PMI is not displaced.      Pulses: Normal pulses.           " Radial pulses are 2+ on the right side and 2+ on the left side.        Posterior tibial pulses are 2+ on the right side and 2+ on the left side.      Heart sounds: Normal heart sounds. No murmur heard.     No friction rub. No gallop.   Pulmonary:      Effort: Pulmonary effort is normal.      Breath sounds: Normal breath sounds.   Abdominal:      General: Bowel sounds are normal. There is no distension.      Palpations: Abdomen is soft.   Musculoskeletal:      Right lower leg: No edema.      Left lower leg: No edema.   Skin:     General: Skin is warm and dry.      Capillary Refill: Capillary refill takes less than 2 seconds.   Neurological:      Mental Status: She is alert and oriented to person, place, and time.   Psychiatric:         Mood and Affect: Mood normal.         Behavior: Behavior normal.         Thought Content: Thought content normal.          Lab Review:           Cardiac Procedures:   Holter monitor 2/22/23:    A relatively benign monitor study.    Total PVC burden of 0.84% reported.  No ventricular runs.    Total PAC burden of 0.02%.  There was one 5 beat atrial run.    Echocardiogram 2/22/23:    Left ventricular systolic function is low normal. Calculated left ventricular EF = 49.2%    The following left ventricular wall segments are hypokinetic: basal inferolateral, apical septal, basal inferoseptal, mid inferoseptal, mid anteroseptal and basal inferoseptal.    Left ventricular diastolic function was normal.    Moderate mitral valve regurgitation is present.    Calculated right ventricular systolic pressure from tricuspid regurgitation is 41 mmHg.    Mild pulmonary hypertension is present.     Nuclear stress test 11/27/22:    Myocardial perfusion imaging indicates a normal myocardial perfusion study with no evidence of ischemia.    Left ventricular ejection fraction is moderately reduced (Calculated EF = 27%).    Impressions are consistent with a low risk study.    Findings consistent with an  indeterminate ECG stress test.         Assessment:         Diagnoses and all orders for this visit:    1. Carotid bruit, unspecified laterality (Primary)    2. Chronic HFrEF (heart failure with reduced ejection fraction)  -     Adult Transthoracic Echo Limited W/ Cont if Necessary Per Protocol; Future    3. Other hyperlipidemia    4. Nonrheumatic mitral valve regurgitation    5. NICM (nonischemic cardiomyopathy)    6. NSVT (nonsustained ventricular tachycardia)    7. Stenosis of carotid artery, unspecified laterality    Other orders  -     ECG 12 Lead            Plan:       1. Chronic combined systolic and diastolic CHF: most recent EF 49.2%. Appears euvolemic on exam. On Toprol XL and bumex and spironolactone. BP stable. Continue with current medical management. Will plan for repeat LTD echo at next visit to reassess EF.  2. NICM: Plan as above in #1  3. Frequent PVCs: improved on amiodarone  4. HTN: blood pressure is well controlled on current regimen.     Thank you for allowing me to participate in this patient's care. Please call with any questions or concerns. Ms. Rosen will follow up with Dr. Larios in 6 months with repeat echocardiogram.          Your medication list            Accurate as of June 25, 2024 11:33 AM. If you have any questions, ask your nurse or doctor.                CONTINUE taking these medications        Instructions Last Dose Given Next Dose Due   albuterol sulfate  (90 Base) MCG/ACT inhaler  Commonly known as: PROVENTIL HFA;VENTOLIN HFA;PROAIR HFA      Inhale 2 puffs Every 6 (Six) Hours As Needed.       amiodarone 100 MG tablet  Commonly known as: PACERONE      TAKE 1 TABLET BY MOUTH DAILY       aspirin 81 MG chewable tablet      Chew 1 tablet Daily.       atorvastatin 20 MG tablet  Commonly known as: LIPITOR      Take 1 tablet by mouth Daily.       bumetanide 1 MG tablet  Commonly known as: BUMEX      Take 1 tablet by mouth 3 (Three) Times a Week. Take on Monday, Wednesday,  and Friday. Can take additional dose for increased shortness of breath, swelling, or a weight gain of 3lbs or greater.       cetirizine 10 MG tablet  Commonly known as: zyrTEC      Take 1 tablet by mouth Daily.       escitalopram 5 MG tablet  Commonly known as: LEXAPRO           methIMAzole 5 MG tablet  Commonly known as: TAPAZOLE      TAKE 1 TABLET BY MOUTH EVERY DAY. SKIP 1 DAY PER WEEK       metoprolol succinate XL 50 MG 24 hr tablet  Commonly known as: TOPROL-XL      TAKE 1 TABLET BY MOUTH DAILY       montelukast 10 MG tablet  Commonly known as: SINGULAIR      Take 1 tablet by mouth Every Morning.       omeprazole 40 MG capsule  Commonly known as: priLOSEC      Take 1 capsule by mouth Daily As Needed.       PROBIOTIC PO      Take 1 tablet by mouth Daily.       spironolactone 25 MG tablet  Commonly known as: ALDACTONE      TAKE 1 TABLET BY MOUTH DAILY       triamcinolone 55 MCG/ACT nasal inhaler  Commonly known as: NASACORT      2 sprays into the nostril(s) as directed by provider Every Morning.       TYLENOL EXTRA STRENGTH PO      Take 1 tablet by mouth 4 (Four) Times a Day.                  JOSEPH eY  06/25/24  12:49 PM EDT

## 2024-07-05 DIAGNOSIS — E05.90 HYPERTHYROIDISM: ICD-10-CM

## 2024-07-08 NOTE — TELEPHONE ENCOUNTER
Rx Refill Note  Requested Prescriptions     Pending Prescriptions Disp Refills    methIMAzole (TAPAZOLE) 5 MG tablet [Pharmacy Med Name: METHIMAZOLE 5MG TABLETS] 30 tablet 0     Sig: TAKE 1 TABLET BY MOUTH EVERY DAY, SKIP 1 DAY PER WEEK      Last office visit with prescribing clinician: 12/20/2023   Last telemedicine visit with prescribing clinician: Visit date not found   Next office visit with prescribing clinician: Visit date not found                         Would you like a call back once the refill request has been completed: [] Yes [] No    If the office needs to give you a call back, can they leave a voicemail: [] Yes [] No    Kim Garcia MA  07/08/24, 08:14 EDT

## 2024-07-10 RX ORDER — METHIMAZOLE 5 MG/1
TABLET ORAL
Qty: 30 TABLET | Refills: 0 | Status: SHIPPED | OUTPATIENT
Start: 2024-07-10

## 2024-07-10 NOTE — TELEPHONE ENCOUNTER
Rx Refill Note  Requested Prescriptions     Pending Prescriptions Disp Refills    methIMAzole (TAPAZOLE) 5 MG tablet [Pharmacy Med Name: METHIMAZOLE 5MG TABLETS] 30 tablet 0     Sig: TAKE 1 TABLET BY MOUTH EVERY DAY, SKIP 1 DAY PER WEEK      Last office visit with prescribing clinician: 12/20/2023   Last telemedicine visit with prescribing clinician: Visit date not found   Next office visit with prescribing clinician: Visit date not found                         Would you like a call back once the refill request has been completed: [] Yes [] No    If the office needs to give you a call back, can they leave a voicemail: [] Yes [] No    Kim Garcia MA  07/10/24, 09:00 EDT

## 2024-07-18 RX ORDER — SACUBITRIL AND VALSARTAN 24; 26 MG/1; MG/1
1 TABLET, FILM COATED ORAL 2 TIMES DAILY
Qty: 60 TABLET | Refills: 3 | Status: SHIPPED | OUTPATIENT
Start: 2024-07-18

## 2024-08-17 DIAGNOSIS — E05.90 HYPERTHYROIDISM: ICD-10-CM

## 2024-08-19 RX ORDER — METHIMAZOLE 5 MG/1
TABLET ORAL
Qty: 30 TABLET | Refills: 0 | Status: SHIPPED | OUTPATIENT
Start: 2024-08-19

## 2024-08-19 NOTE — TELEPHONE ENCOUNTER
Rx Refill Note  Requested Prescriptions     Pending Prescriptions Disp Refills    methIMAzole (TAPAZOLE) 5 MG tablet [Pharmacy Med Name: METHIMAZOLE 5MG TABLETS] 30 tablet 0     Sig: TAKE 1 TABLET BY MOUTH EVERY DAY, SKIP 1 DAY PER WEEK      Last office visit with prescribing clinician: 6/20/2024   Last telemedicine visit with prescribing clinician: Visit date not found   Next office visit with prescribing clinician: 12/20/2024                         Would you like a call back once the refill request has been completed: [] Yes [] No    If the office needs to give you a call back, can they leave a voicemail: [] Yes [] No    Marissa Doyle  08/19/24, 08:02 EDT

## 2024-09-22 DIAGNOSIS — E05.90 HYPERTHYROIDISM: ICD-10-CM

## 2024-09-23 RX ORDER — METHIMAZOLE 5 MG/1
TABLET ORAL
Qty: 30 TABLET | Refills: 0 | Status: SHIPPED | OUTPATIENT
Start: 2024-09-23

## 2024-10-23 ENCOUNTER — APPOINTMENT (OUTPATIENT)
Dept: WOMENS IMAGING | Facility: HOSPITAL | Age: 79
End: 2024-10-23
Payer: MEDICARE

## 2024-10-23 PROCEDURE — 77067 SCR MAMMO BI INCL CAD: CPT | Performed by: RADIOLOGY

## 2024-10-23 PROCEDURE — 77063 BREAST TOMOSYNTHESIS BI: CPT | Performed by: RADIOLOGY

## 2024-10-26 DIAGNOSIS — E05.90 HYPERTHYROIDISM: ICD-10-CM

## 2024-10-28 RX ORDER — METHIMAZOLE 5 MG/1
TABLET ORAL
Qty: 30 TABLET | Refills: 0 | Status: SHIPPED | OUTPATIENT
Start: 2024-10-28

## 2024-10-28 NOTE — TELEPHONE ENCOUNTER
Rx Refill Note  Requested Prescriptions     Pending Prescriptions Disp Refills    methIMAzole (TAPAZOLE) 5 MG tablet [Pharmacy Med Name: METHIMAZOLE 5MG TABLETS] 30 tablet 0     Sig: TAKE 1 TABLET BY MOUTH EVERY DAY, SKIP 1 DAY PER WEEK      Last office visit with prescribing clinician: 6/20/2024   Last telemedicine visit with prescribing clinician: Visit date not found   Next office visit with prescribing clinician: 12/20/2024                         Would you like a call back once the refill request has been completed: [] Yes [] No    If the office needs to give you a call back, can they leave a voicemail: [] Yes [] No    Nichole Metzger MA  10/28/24, 09:47 EDT

## 2024-11-11 RX ORDER — SACUBITRIL AND VALSARTAN 24; 26 MG/1; MG/1
1 TABLET, FILM COATED ORAL 2 TIMES DAILY
Qty: 60 TABLET | Refills: 3 | Status: SHIPPED | OUTPATIENT
Start: 2024-11-11

## 2024-11-20 RX ORDER — AMIODARONE HYDROCHLORIDE 100 MG/1
100 TABLET ORAL DAILY
Qty: 90 TABLET | Refills: 3 | OUTPATIENT
Start: 2024-11-20

## 2024-11-20 RX ORDER — AMIODARONE HYDROCHLORIDE 100 MG/1
100 TABLET ORAL DAILY
Qty: 90 TABLET | Refills: 3 | Status: SHIPPED | OUTPATIENT
Start: 2024-11-20

## 2024-12-01 DIAGNOSIS — E05.90 HYPERTHYROIDISM: ICD-10-CM

## 2024-12-02 RX ORDER — METHIMAZOLE 5 MG/1
TABLET ORAL
Qty: 30 TABLET | Refills: 0 | Status: SHIPPED | OUTPATIENT
Start: 2024-12-02

## 2024-12-02 NOTE — TELEPHONE ENCOUNTER
Rx Refill Note  Requested Prescriptions     Pending Prescriptions Disp Refills    methIMAzole (TAPAZOLE) 5 MG tablet [Pharmacy Med Name: METHIMAZOLE 5MG TABLETS] 30 tablet 0     Sig: TAKE 1 TABLET BY MOUTH EVERY DAY, SKIP 1 DAY PER WEEK      Last office visit with prescribing clinician: 6/20/2024   Last telemedicine visit with prescribing clinician: Visit date not found   Next office visit with prescribing clinician: 12/20/2024                         Would you like a call back once the refill request has been completed: [] Yes [] No    If the office needs to give you a call back, can they leave a voicemail: [] Yes [] No    Marissa Doyle  12/02/24, 07:57 EST

## 2024-12-16 RX ORDER — METOPROLOL SUCCINATE 50 MG/1
50 TABLET, EXTENDED RELEASE ORAL DAILY
Qty: 90 TABLET | Refills: 1 | Status: SHIPPED | OUTPATIENT
Start: 2024-12-16

## 2024-12-16 NOTE — TELEPHONE ENCOUNTER
NOV:1/7/2025    LOV:6/25/2024    Assessment  Diagnoses and all orders for this visit:     1. Carotid bruit, unspecified laterality (Primary)     2. Chronic HFrEF (heart failure with reduced ejection fraction)  -     Adult Transthoracic Echo Limited W/ Cont if Necessary Per Protocol; Future     3. Other hyperlipidemia     4. Nonrheumatic mitral valve regurgitation     5. NICM (nonischemic cardiomyopathy)     6. NSVT (nonsustained ventricular tachycardia)     7. Stenosis of carotid artery, unspecified laterality     Other orders  -     ECG 12 Lead

## 2024-12-20 ENCOUNTER — TELEMEDICINE (OUTPATIENT)
Dept: ENDOCRINOLOGY | Age: 79
End: 2024-12-20
Payer: MEDICARE

## 2024-12-20 DIAGNOSIS — E55.9 VITAMIN D DEFICIENCY: ICD-10-CM

## 2024-12-20 DIAGNOSIS — M85.832 OSTEOPENIA OF LEFT FOREARM: ICD-10-CM

## 2024-12-20 DIAGNOSIS — E05.90 HYPERTHYROIDISM: Primary | ICD-10-CM

## 2024-12-20 NOTE — ASSESSMENT & PLAN NOTE
Bone density in 2019 showed osteopenia of left forearm  Most recent bone density in July 2024 did not include forearm and showed normal bone density  Fall precaution discussed  Optimize calcium and vitamin D supplements  Repeat bone density in July 2026

## 2024-12-20 NOTE — PROGRESS NOTES
Chief Complaint  HYPERTHYROIDISM     Subjective        Natalie Rosen presents to [facility] ENDOCRINOLOGY  History of Present Illness    You have chosen to receive care through a telehealth visit.  Do you consent to use a video/audio connection for your medical care today? Yes         Diagnosed with hyperthyroidism over 15 years ago  Has tried various doses of methimazole  Currently on methimazole 5 mg 6 days a week  Amiodarone 100 mg started in 2022     Has CHF and follows with cardiology  Denies palpitation or unintentional weight loss  Does not take biotin    Takes vitamin D supplements 1000 units daily     DEXA scan in 2019 showed osteopenia of left forearm      Dexa scan July 2024: 1. Normal bone mineral density lumbar spine. The bone mineral density   in the lumbar spine has decreased since the baseline DEXA scan (4.3%   change).   2. Normal bone mineral density left femoral neck. The bone mineral   density in the left femoral neck has increased  since the baseline   DEXA scan (1.0% change).   3. Normal bone mineral density right femoral neck.     Component      Latest Ref Rng 6/20/2024   WBC      3.40 - 10.80 10*3/mm3 5.21    RBC      3.77 - 5.28 10*6/mm3 3.52 (L)    Hemoglobin      12.0 - 15.9 g/dL 11.8 (L)    Hematocrit      34.0 - 46.6 % 35.5    MCV      79.0 - 97.0 fL 100.9 (H)    MCH      26.6 - 33.0 pg 33.5 (H)    MCHC      31.5 - 35.7 g/dL 33.2    RDW      12.3 - 15.4 % 12.9    Platelets      140 - 450 10*3/mm3 228    Neutrophil Rel %      42.7 - 76.0 % 56.6    Lymphocyte Rel %      19.6 - 45.3 % 28.2    Monocyte Rel %      5.0 - 12.0 % 12.3 (H)    Eosinophil Rel %      0.3 - 6.2 % 1.3    Basophil Rel %      0.0 - 1.5 % 1.2    Neutrophils Absolute      1.70 - 7.00 10*3/mm3 2.95    Lymphocytes Absolute      0.70 - 3.10 10*3/mm3 1.47    Monocytes Absolute      0.10 - 0.90 10*3/mm3 0.64    Eosinophils Absolute      0.00 - 0.40 10*3/mm3 0.07    Basophils Absolute      0.00 - 0.20  "10*3/mm3 0.06    Immature Granulocyte Rel %      0.0 - 0.5 % 0.4    Immature Grans, Absolute      0.00 - 0.05 10*3/mm3 0.02    nRBC      0.0 - 0.2 /100 WBC 0.0    Glucose      65 - 99 mg/dL 121 (H)    BUN      8 - 23 mg/dL 27 (H)    Creatinine      0.57 - 1.00 mg/dL 1.07 (H)    EGFR Result      >60.0 mL/min/1.73 53.6 (L)    BUN/Creatinine Ratio      7.0 - 25.0  25.2 (H)    Sodium      136 - 145 mmol/L 140    Potassium      3.5 - 5.2 mmol/L 4.3    Chloride      98 - 107 mmol/L 102    CO2      22.0 - 29.0 mmol/L 28.1    Calcium      8.6 - 10.5 mg/dL 9.5    Total Protein      6.0 - 8.5 g/dL 6.0    Albumin      3.5 - 5.2 g/dL 4.3    Globulin      gm/dL 1.7    A/G Ratio      g/dL 2.5    Total Bilirubin      0.0 - 1.2 mg/dL 0.5    Alkaline Phosphatase      39 - 117 U/L 52    AST (SGOT)      1 - 32 U/L 24    ALT (SGPT)      1 - 33 U/L 17    TSH Baseline      0.270 - 4.200 uIU/mL 2.650    Free T4      0.92 - 1.68 ng/dL 1.14    25 Hydroxy, Vitamin D      30.0 - 100.0 ng/ml 40.0       Legend:  (L) Low  (H) High    Objective   Vital Signs:  There were no vitals taken for this visit.  Estimated body mass index is 24.9 kg/m² as calculated from the following:    Height as of 6/25/24: 170.2 cm (67\").    Weight as of 6/25/24: 72.1 kg (159 lb).    BMI is within normal parameters. No other follow-up for BMI required.        Result Review :  The following data was reviewed by: Mahrokh Nokhbehzaeim, MD on 12/20/2024:  CMP          6/20/2024    11:11   CMP   Glucose 121    BUN 27    Creatinine 1.07    Sodium 140    Potassium 4.3    Chloride 102    Calcium 9.5    Total Protein 6.0    Albumin 4.3    Globulin 1.7    Total Bilirubin 0.5    Alkaline Phosphatase 52    AST (SGOT) 24    ALT (SGPT) 17    BUN/Creatinine Ratio 25.2      CBC          6/20/2024    11:11   CBC   WBC 5.21    RBC 3.52    Hemoglobin 11.8    Hematocrit 35.5    .9    MCH 33.5    MCHC 33.2    RDW 12.9    Platelets 228      TSH          6/20/2024    11:11   TSH   TSH " 2.650      Data reviewed : Radiologic studies Dexa scan            Assessment and Plan   Diagnoses and all orders for this visit:    1. Hyperthyroidism (Primary)  Assessment & Plan:  Clinically euthyroid  Continue the current dose of methimazole 5 mg 6 days a week  Repeat TFT    Orders:  -     Comprehensive Metabolic Panel  -     TSH  -     T4, Free  -     CBC & Differential    2. Vitamin D deficiency  Assessment & Plan:  Takes vitamin D supplements 1000 units daily  Recheck vitamin D level today    Orders:  -     Vitamin D,25-Hydroxy    3. Osteopenia of left forearm  Assessment & Plan:  Bone density in 2019 showed osteopenia of left forearm  Most recent bone density in July 2024 did not include forearm and showed normal bone density  Fall precaution discussed  Optimize calcium and vitamin D supplements  Repeat bone density in July 2026    Orders:  -     Vitamin D,25-Hydroxy  -     Comprehensive Metabolic Panel            Follow Up   Return in about 4 months (around 4/20/2025).  Patient was given instructions and counseling regarding her condition or for health maintenance advice. Please see specific information pulled into the AVS if appropriate.     Mode of Visit: Video  Location of patient: -HOME-  Location of provider: +HOME+  You have chosen to receive care through a telehealth visit.  The patient has signed the video visit consent form.  The visit included audio and video interaction. No technical issues occurred during this visit.

## 2024-12-23 ENCOUNTER — LAB (OUTPATIENT)
Facility: HOSPITAL | Age: 79
End: 2024-12-23
Payer: MEDICARE

## 2024-12-23 LAB
25(OH)D3 SERPL-MCNC: 48.4 NG/ML (ref 30–100)
ALBUMIN SERPL-MCNC: 4.2 G/DL (ref 3.5–5.2)
ALBUMIN/GLOB SERPL: 1.7 G/DL
ALP SERPL-CCNC: 56 U/L (ref 39–117)
ALT SERPL W P-5'-P-CCNC: 21 U/L (ref 1–33)
ANION GAP SERPL CALCULATED.3IONS-SCNC: 10.1 MMOL/L (ref 5–15)
AST SERPL-CCNC: 27 U/L (ref 1–32)
BASOPHILS # BLD AUTO: 0.07 10*3/MM3 (ref 0–0.2)
BASOPHILS NFR BLD AUTO: 1.3 % (ref 0–1.5)
BILIRUB SERPL-MCNC: 0.6 MG/DL (ref 0–1.2)
BUN SERPL-MCNC: 25 MG/DL (ref 8–23)
BUN/CREAT SERPL: 20.2 (ref 7–25)
CALCIUM SPEC-SCNC: 9.2 MG/DL (ref 8.6–10.5)
CHLORIDE SERPL-SCNC: 98 MMOL/L (ref 98–107)
CO2 SERPL-SCNC: 23.9 MMOL/L (ref 22–29)
CREAT SERPL-MCNC: 1.24 MG/DL (ref 0.57–1)
DEPRECATED RDW RBC AUTO: 46.8 FL (ref 37–54)
EGFRCR SERPLBLD CKD-EPI 2021: 44.6 ML/MIN/1.73
EOSINOPHIL # BLD AUTO: 0.07 10*3/MM3 (ref 0–0.4)
EOSINOPHIL NFR BLD AUTO: 1.3 % (ref 0.3–6.2)
ERYTHROCYTE [DISTWIDTH] IN BLOOD BY AUTOMATED COUNT: 13.1 % (ref 12.3–15.4)
GLOBULIN UR ELPH-MCNC: 2.5 GM/DL
GLUCOSE SERPL-MCNC: 97 MG/DL (ref 65–99)
HCT VFR BLD AUTO: 35.7 % (ref 34–46.6)
HGB BLD-MCNC: 11.5 G/DL (ref 12–15.9)
IMM GRANULOCYTES # BLD AUTO: 0.02 10*3/MM3 (ref 0–0.05)
IMM GRANULOCYTES NFR BLD AUTO: 0.4 % (ref 0–0.5)
LYMPHOCYTES # BLD AUTO: 1.66 10*3/MM3 (ref 0.7–3.1)
LYMPHOCYTES NFR BLD AUTO: 29.7 % (ref 19.6–45.3)
MCH RBC QN AUTO: 31.3 PG (ref 26.6–33)
MCHC RBC AUTO-ENTMCNC: 32.2 G/DL (ref 31.5–35.7)
MCV RBC AUTO: 97.3 FL (ref 79–97)
MONOCYTES # BLD AUTO: 0.64 10*3/MM3 (ref 0.1–0.9)
MONOCYTES NFR BLD AUTO: 11.4 % (ref 5–12)
NEUTROPHILS NFR BLD AUTO: 3.13 10*3/MM3 (ref 1.7–7)
NEUTROPHILS NFR BLD AUTO: 55.9 % (ref 42.7–76)
NRBC BLD AUTO-RTO: 0 /100 WBC (ref 0–0.2)
PLATELET # BLD AUTO: 238 10*3/MM3 (ref 140–450)
PMV BLD AUTO: 9.7 FL (ref 6–12)
POTASSIUM SERPL-SCNC: 5 MMOL/L (ref 3.5–5.2)
PROT SERPL-MCNC: 6.7 G/DL (ref 6–8.5)
RBC # BLD AUTO: 3.67 10*6/MM3 (ref 3.77–5.28)
SODIUM SERPL-SCNC: 132 MMOL/L (ref 136–145)
T4 FREE SERPL-MCNC: 1.22 NG/DL (ref 0.92–1.68)
TSH SERPL DL<=0.05 MIU/L-ACNC: 1.81 UIU/ML (ref 0.27–4.2)
WBC NRBC COR # BLD AUTO: 5.59 10*3/MM3 (ref 3.4–10.8)

## 2024-12-23 PROCEDURE — 84439 ASSAY OF FREE THYROXINE: CPT | Performed by: STUDENT IN AN ORGANIZED HEALTH CARE EDUCATION/TRAINING PROGRAM

## 2024-12-23 PROCEDURE — 80053 COMPREHEN METABOLIC PANEL: CPT | Performed by: STUDENT IN AN ORGANIZED HEALTH CARE EDUCATION/TRAINING PROGRAM

## 2024-12-23 PROCEDURE — 84443 ASSAY THYROID STIM HORMONE: CPT | Performed by: STUDENT IN AN ORGANIZED HEALTH CARE EDUCATION/TRAINING PROGRAM

## 2024-12-23 PROCEDURE — 82306 VITAMIN D 25 HYDROXY: CPT | Performed by: STUDENT IN AN ORGANIZED HEALTH CARE EDUCATION/TRAINING PROGRAM

## 2024-12-23 PROCEDURE — 85025 COMPLETE CBC W/AUTO DIFF WBC: CPT | Performed by: STUDENT IN AN ORGANIZED HEALTH CARE EDUCATION/TRAINING PROGRAM

## 2024-12-31 DIAGNOSIS — E05.90 HYPERTHYROIDISM: ICD-10-CM

## 2024-12-31 RX ORDER — METHIMAZOLE 5 MG/1
TABLET ORAL
Qty: 30 TABLET | Refills: 0 | Status: SHIPPED | OUTPATIENT
Start: 2024-12-31

## 2024-12-31 NOTE — TELEPHONE ENCOUNTER
Rx Refill Note  Requested Prescriptions     Pending Prescriptions Disp Refills    methIMAzole (TAPAZOLE) 5 MG tablet [Pharmacy Med Name: METHIMAZOLE 5MG TABLETS] 30 tablet 0     Sig: TAKE 1 TABLET BY MOUTH EVERY DAY, SKIP 1 DAY PER WEEK      Last office visit with prescribing clinician: 6/20/2024   Last telemedicine visit with prescribing clinician: 12/20/2024   Next office visit with prescribing clinician: Visit date not found                         Would you like a call back once the refill request has been completed: [] Yes [] No    If the office needs to give you a call back, can they leave a voicemail: [] Yes [] No    Soha Doyle MA  12/31/24, 09:26 EST

## 2025-01-07 ENCOUNTER — HOSPITAL ENCOUNTER (OUTPATIENT)
Dept: CARDIOLOGY | Facility: HOSPITAL | Age: 80
Discharge: HOME | End: 2025-01-07
Payer: MEDICARE

## 2025-01-17 ENCOUNTER — HOSPITAL ENCOUNTER (OUTPATIENT)
Dept: CARDIOLOGY | Facility: HOSPITAL | Age: 80
Discharge: HOME | End: 2025-01-17
Payer: MEDICARE

## 2025-01-17 ENCOUNTER — OFFICE VISIT (OUTPATIENT)
Dept: CARDIOLOGY | Facility: CLINIC | Age: 80
End: 2025-01-17
Payer: MEDICARE

## 2025-01-17 VITALS
DIASTOLIC BLOOD PRESSURE: 58 MMHG | HEIGHT: 67 IN | WEIGHT: 159 LBS | SYSTOLIC BLOOD PRESSURE: 110 MMHG | BODY MASS INDEX: 24.96 KG/M2

## 2025-01-17 VITALS
SYSTOLIC BLOOD PRESSURE: 112 MMHG | OXYGEN SATURATION: 95 % | HEIGHT: 67 IN | DIASTOLIC BLOOD PRESSURE: 60 MMHG | BODY MASS INDEX: 25.93 KG/M2 | WEIGHT: 165.2 LBS | HEART RATE: 64 BPM

## 2025-01-17 DIAGNOSIS — I49.3 FREQUENT PVCS: ICD-10-CM

## 2025-01-17 DIAGNOSIS — I42.8 NICM (NONISCHEMIC CARDIOMYOPATHY): ICD-10-CM

## 2025-01-17 DIAGNOSIS — I50.22 CHRONIC HFREF (HEART FAILURE WITH REDUCED EJECTION FRACTION): ICD-10-CM

## 2025-01-17 DIAGNOSIS — I50.22 CHRONIC HFREF (HEART FAILURE WITH REDUCED EJECTION FRACTION): Primary | ICD-10-CM

## 2025-01-17 LAB
AORTIC ARCH: 2.8 CM
AORTIC DIMENSIONLESS INDEX: 0.6 (DI)
ASCENDING AORTA: 3 CM
AV MEAN PRESS GRAD SYS DOP V1V2: 7 MMHG
AV VMAX SYS DOP: 173.4 CM/SEC
BH CV ECHO LEFT VENTRICLE GLOBAL LONGITUDINAL STRAIN: -18.3 %
BH CV ECHO MEAS - ACS: 1.86 CM
BH CV ECHO MEAS - AO MAX PG: 12 MMHG
BH CV ECHO MEAS - AO ROOT DIAM: 2.8 CM
BH CV ECHO MEAS - AO V2 VTI: 42.1 CM
BH CV ECHO MEAS - AVA(I,D): 1.76 CM2
BH CV ECHO MEAS - EDV(CUBED): 115.7 ML
BH CV ECHO MEAS - EDV(MOD-SP2): 137 ML
BH CV ECHO MEAS - EDV(MOD-SP4): 165 ML
BH CV ECHO MEAS - EF(MOD-SP2): 45.3 %
BH CV ECHO MEAS - EF(MOD-SP4): 44.2 %
BH CV ECHO MEAS - ESV(CUBED): 51 ML
BH CV ECHO MEAS - ESV(MOD-SP2): 75 ML
BH CV ECHO MEAS - ESV(MOD-SP4): 92 ML
BH CV ECHO MEAS - FS: 23.9 %
BH CV ECHO MEAS - IVS/LVPW: 0.93 CM
BH CV ECHO MEAS - IVSD: 1.11 CM
BH CV ECHO MEAS - LAT PEAK E' VEL: 10.4 CM/SEC
BH CV ECHO MEAS - LV DIASTOLIC VOL/BSA (35-75): 90 CM2
BH CV ECHO MEAS - LV MASS(C)D: 210.8 GRAMS
BH CV ECHO MEAS - LV MAX PG: 4.6 MMHG
BH CV ECHO MEAS - LV MEAN PG: 2.7 MMHG
BH CV ECHO MEAS - LV SYSTOLIC VOL/BSA (12-30): 50.2 CM2
BH CV ECHO MEAS - LV V1 MAX: 107.2 CM/SEC
BH CV ECHO MEAS - LV V1 VTI: 26.2 CM
BH CV ECHO MEAS - LVIDD: 4.9 CM
BH CV ECHO MEAS - LVIDS: 3.7 CM
BH CV ECHO MEAS - LVOT AREA: 2.8 CM2
BH CV ECHO MEAS - LVOT DIAM: 1.9 CM
BH CV ECHO MEAS - LVPWD: 1.19 CM
BH CV ECHO MEAS - MED PEAK E' VEL: 5.5 CM/SEC
BH CV ECHO MEAS - MR MAX PG: 89.1 MMHG
BH CV ECHO MEAS - MR MAX VEL: 472.1 CM/SEC
BH CV ECHO MEAS - MV A DUR: 0.12 SEC
BH CV ECHO MEAS - MV A MAX VEL: 99.9 CM/SEC
BH CV ECHO MEAS - MV DEC SLOPE: 372.9 CM/SEC2
BH CV ECHO MEAS - MV DEC TIME: 0.22 SEC
BH CV ECHO MEAS - MV E MAX VEL: 77.6 CM/SEC
BH CV ECHO MEAS - MV E/A: 0.78
BH CV ECHO MEAS - MV MAX PG: 3.7 MMHG
BH CV ECHO MEAS - MV MEAN PG: 1.52 MMHG
BH CV ECHO MEAS - MV P1/2T: 60.9 MSEC
BH CV ECHO MEAS - MV V2 VTI: 24.8 CM
BH CV ECHO MEAS - MVA(P1/2T): 3.6 CM2
BH CV ECHO MEAS - MVA(VTI): 3 CM2
BH CV ECHO MEAS - PA ACC TIME: 0.13 SEC
BH CV ECHO MEAS - PA V2 MAX: 127 CM/SEC
BH CV ECHO MEAS - PULM A REVS DUR: 0.1 SEC
BH CV ECHO MEAS - PULM A REVS VEL: 27.2 CM/SEC
BH CV ECHO MEAS - PULM DIAS VEL: 53.4 CM/SEC
BH CV ECHO MEAS - PULM S/D: 1.45
BH CV ECHO MEAS - PULM SYS VEL: 77.6 CM/SEC
BH CV ECHO MEAS - QP/QS: 1.05
BH CV ECHO MEAS - RAP SYSTOLE: 3 MMHG
BH CV ECHO MEAS - RV MAX PG: 3.7 MMHG
BH CV ECHO MEAS - RV V1 MAX: 95.6 CM/SEC
BH CV ECHO MEAS - RV V1 VTI: 23 CM
BH CV ECHO MEAS - RVOT DIAM: 2.07 CM
BH CV ECHO MEAS - RVSP: 29 MMHG
BH CV ECHO MEAS - SUP REN AO DIAM: 2 CM
BH CV ECHO MEAS - SV(LVOT): 74 ML
BH CV ECHO MEAS - SV(MOD-SP2): 62 ML
BH CV ECHO MEAS - SV(MOD-SP4): 73 ML
BH CV ECHO MEAS - SV(RVOT): 77.8 ML
BH CV ECHO MEAS - SVI(LVOT): 40.4 ML/M2
BH CV ECHO MEAS - SVI(MOD-SP2): 33.8 ML/M2
BH CV ECHO MEAS - SVI(MOD-SP4): 39.8 ML/M2
BH CV ECHO MEAS - TAPSE (>1.6): 2.6 CM
BH CV ECHO MEAS - TR MAX PG: 25.7 MMHG
BH CV ECHO MEAS - TR MAX VEL: 253.4 CM/SEC
BH CV ECHO MEASUREMENTS AVERAGE E/E' RATIO: 9.76
BH CV XLRA - RV BASE: 3.2 CM
BH CV XLRA - RV LENGTH: 7.4 CM
BH CV XLRA - RV MID: 2.5 CM
BH CV XLRA - TDI S': 14.5 CM/SEC
LEFT ATRIUM VOLUME INDEX: 40.7 ML/M2
LV EF BIPLANE MOD: 45.1 %
SINUS: 2.6 CM
STJ: 2.23 CM

## 2025-01-17 PROCEDURE — 25510000001 PERFLUTREN 6.52 MG/ML SUSPENSION 2 ML VIAL: Performed by: NURSE PRACTITIONER

## 2025-01-17 PROCEDURE — 93306 TTE W/DOPPLER COMPLETE: CPT

## 2025-01-17 PROCEDURE — 93356 MYOCRD STRAIN IMG SPCKL TRCK: CPT

## 2025-01-17 RX ORDER — BUMETANIDE 1 MG/1
1 TABLET ORAL 2 TIMES WEEKLY
Qty: 30 TABLET | Refills: 3 | Status: SHIPPED | OUTPATIENT
Start: 2025-01-20

## 2025-01-17 RX ORDER — SACUBITRIL AND VALSARTAN 24; 26 MG/1; MG/1
0.5 TABLET, FILM COATED ORAL 2 TIMES DAILY
Start: 2025-01-17

## 2025-01-17 RX ADMIN — PERFLUTREN 2 ML: 6.52 INJECTION, SUSPENSION INTRAVENOUS at 13:54

## 2025-01-17 NOTE — PROGRESS NOTES
PATIENTINFORMATION    Date of Office Visit: 2025  Encounter Provider: Drake Larios MD  Place of Service: [facility] CARDIOLOGY  Patient Name: Natalie Rosen  : 1945    Subjective:     Encounter Date:2025      Patient ID: Natalie Rosen is a 79 y.o. female.    Chief Complaint   Patient presents with    NSVT (nonsustained ventricular tachycardia       HPI  Ms. Rosen is a pleasant 79 years old lady who came to cardiology clinic for follow-up visit.  She is compliant with all current medications denies any ER visit or hospitalization since last clinic visit.  She reports frequent intermittent orthostatic symptoms but denies fainting.  Blood pressure runs on the lower side during home monitoring.  Otherwise no significant rest of exertional chest pain or shortness of breath.  No extremity swelling.  She is fairly active without significant limiting symptoms.    ROS  All systems reviewed and negative except as noted in HPI.    Past Medical History:   Diagnosis Date    Arthritis     Asthma     Benign essential hypertension     Cancer     MELANOMA    Cardiomyopathy     CHF (congestive heart failure)     Goiter     Heart murmur     Hyperlipidemia     Hyperthyroidism     Hypothyroidism     Mitral valve insufficiency     PONV (postoperative nausea and vomiting)     Seasonal allergies     Urinary frequency     Vitamin D deficiency 2024       Past Surgical History:   Procedure Laterality Date    CATARACT EXTRACTION      COLONOSCOPY      HYSTERECTOMY      OTHER SURGICAL HISTORY      PT HAS HAD SKIN CA REMOVED FROM BACK AND FACE (UPPER LIP)     RI ARTHRP KNE CONDYLE&PLATU MEDIAL&LAT COMPARTMENTS Right 2017    Procedure: RT TOTAL KNEE ARTHROPLASTY;  Surgeon: Doc Lance MD;  Location: [facility];  Service: Orthopedics    RI ARTHRP KNE CONDYLE&PLATU MEDIAL&LAT COMPARTMENTS Left 2017    Procedure: LT TOTAL KNEE ARTHROPLASTY;  Surgeon: Doc Lance  "MD;  Location: [facility] OR;  Service: Orthopedics       Social History     Socioeconomic History    Marital status:    Tobacco Use    Smoking status: Former     Current packs/day: 0.00     Average packs/day: 1 pack/day for 25.0 years (25.0 ttl pk-yrs)     Types: Cigarettes     Start date: 1960     Quit date: 1985     Years since quittin.0    Smokeless tobacco: Never   Vaping Use    Vaping status: Never Used   Substance and Sexual Activity    Alcohol use: Yes     Alcohol/week: 6.0 standard drinks of alcohol     Types: 6 Drinks containing 0.5 oz of alcohol per week     Comment: DAILY COCKTAIL    Drug use: Never    Sexual activity: Not Currently     Partners: Male     Birth control/protection: Hysterectomy       Family History   Problem Relation Age of Onset    Breast cancer Other     Diabetes Other     Cancer Mother         Breast Cancer    Cancer Father         Prostate Cancer    Malig Hyperthermia Neg Hx          Procedures       Objective:     /60 (BP Location: Left arm, Patient Position: Sitting, Cuff Size: Adult)   Pulse 64   Ht 170.2 cm (67\")   Wt 74.9 kg (165 lb 3.2 oz)   SpO2 95%   BMI 25.87 kg/m²  Body mass index is 25.87 kg/m².     Constitutional:       General: Not in acute distress.     Appearance: Well-developed. Not diaphoretic.   Eyes:      Pupils: Pupils are equal, round, and reactive to light.   HENT:      Head: Normocephalic and atraumatic.   Neck:      Thyroid: No thyromegaly.   Pulmonary:      Effort: Pulmonary effort is normal. No respiratory distress.      Breath sounds: Normal breath sounds. No wheezing. No rales.   Chest:      Chest wall: Not tender to palpatation.   Cardiovascular:      Normal rate. Regular rhythm.      No gallop.    Pulses:     Intact distal pulses.   Edema:     Peripheral edema absent.   Abdominal:      General: Bowel sounds are normal. There is no distension.      Palpations: Abdomen is soft.      Tenderness: There is no guarding. "   Musculoskeletal: Normal range of motion.         General: No deformity.      Cervical back: Normal range of motion and neck supple. Skin:     General: Skin is warm and dry.      Findings: No rash.   Neurological:      Mental Status: Alert and oriented to person, place, and time.      Cranial Nerves: No cranial nerve deficit.      Deep Tendon Reflexes: Reflexes are normal and symmetric.   Psychiatric:         Judgment: Judgment normal.         Review Of Data: I have reviewed pertinent recent labs, images and documents and pertinent findings included in HPI or assessment below.        Assessment/Plan:     Chronic combined systolic and diastolic congestive heart failure-left ventricular ejection fraction improved from 30s to low normal 50% with medical therapy.  Repeat echo in 1/17/2025 shows improved MR and near normal ventricular ejection fraction.  Frequent PVCs with frequent nonsustained VT on Holter monitoring-controlled with amiodarone and metoprolol.    Left bundle branch block-chronic and unchanged.  Essential hypertension   Hyperlipidemia on statin-lipid panel at goal.  CKD    Concerned about low blood pressure reading causing orthostatic symptoms.  I have decreased Entresto dose by half.  She will also use Lasix less frequently.  Otherwise continue current care.  Check labs in 2 weeks.  I will see her back in 6 months or sooner with any concerning symptoms.      Diagnosis and plan of care discussed with patient and verbalized understanding.            Your medication list            Accurate as of January 17, 2025  4:23 PM. If you have any questions, ask your nurse or doctor.                CHANGE how you take these medications        Instructions Last Dose Given Next Dose Due   bumetanide 1 MG tablet  Commonly known as: BUMEX  Start taking on: January 20, 2025  What changed: when to take this  Changed by: Drake Larios      Take 1 tablet by mouth 2 (Two) Times a Week. Take on Monday, Wednesday, and  Friday. Can take additional dose for increased shortness of breath, swelling, or a weight gain of 3lbs or greater.       Entresto 24-26 MG tablet  Generic drug: sacubitril-valsartan  What changed: how much to take  Changed by: Drake Larios      Take 0.5 tablets by mouth 2 (Two) Times a Day.              CONTINUE taking these medications        Instructions Last Dose Given Next Dose Due   albuterol sulfate  (90 Base) MCG/ACT inhaler  Commonly known as: PROVENTIL HFA;VENTOLIN HFA;PROAIR HFA      Inhale 2 puffs Every 6 (Six) Hours As Needed.       amiodarone 100 MG tablet  Commonly known as: PACERONE      TAKE 1 TABLET BY MOUTH DAILY       aspirin 81 MG chewable tablet      Chew 1 tablet Daily.       atorvastatin 20 MG tablet  Commonly known as: LIPITOR      Take 1 tablet by mouth Daily.       cetirizine 10 MG tablet  Commonly known as: zyrTEC      Take 1 tablet by mouth Daily.       escitalopram 5 MG tablet  Commonly known as: LEXAPRO           methIMAzole 5 MG tablet  Commonly known as: TAPAZOLE      TAKE 1 TABLET BY MOUTH EVERY DAY, SKIP 1 DAY PER WEEK       metoprolol succinate XL 50 MG 24 hr tablet  Commonly known as: TOPROL-XL      TAKE 1 TABLET BY MOUTH DAILY       montelukast 10 MG tablet  Commonly known as: SINGULAIR      Take 1 tablet by mouth Every Morning.       omeprazole 40 MG capsule  Commonly known as: priLOSEC      Take 1 capsule by mouth Daily As Needed.       PROBIOTIC PO      Take 1 tablet by mouth Daily.       spironolactone 25 MG tablet  Commonly known as: ALDACTONE      TAKE 1 TABLET BY MOUTH DAILY       triamcinolone 55 MCG/ACT nasal inhaler  Commonly known as: NASACORT      Administer 2 sprays into the nostril(s) as directed by provider Every Morning.       TYLENOL EXTRA STRENGTH PO      Take 1 tablet by mouth 4 (Four) Times a Day.                 Where to Get Your Medications        These medications were sent to [facility] [id number] - [place], [place] - [address]  [address] [address] [facility]  [address] [facility] [phone] [facility] [phone] [place]  [address] , [facility] [address]      Phone: [phone]   bumetanide 1 MG tablet       Information about where to get these medications is not yet available    Ask your nurse or doctor about these medications  Entresto 24-26 MG tablet             Drake Larios MD  01/17/25  16:23 EST

## 2025-02-03 DIAGNOSIS — E05.90 HYPERTHYROIDISM: ICD-10-CM

## 2025-02-03 RX ORDER — METHIMAZOLE 5 MG/1
TABLET ORAL
Qty: 30 TABLET | Refills: 0 | Status: SHIPPED | OUTPATIENT
Start: 2025-02-03

## 2025-02-03 NOTE — TELEPHONE ENCOUNTER
Rx Refill Note  Requested Prescriptions     Pending Prescriptions Disp Refills    methIMAzole (TAPAZOLE) 5 MG tablet [Pharmacy Med Name: METHIMAZOLE 5MG TABLETS] 30 tablet 0     Sig: TAKE 1 TABLET BY MOUTH EVERY DAY, SKIP 1 DAY PER WEEK      Last office visit with prescribing clinician: 6/20/2024   Last telemedicine visit with prescribing clinician: 12/20/2024   Next office visit with prescribing clinician: Visit date not found                         Would you like a call back once the refill request has been completed: [] Yes [] No    If the office needs to give you a call back, can they leave a voicemail: [] Yes [] No    Marissa Doyle  02/03/25, 12:22 EST

## 2025-03-07 DIAGNOSIS — E05.90 HYPERTHYROIDISM: ICD-10-CM

## 2025-03-07 RX ORDER — METHIMAZOLE 5 MG/1
TABLET ORAL
Qty: 30 TABLET | Refills: 0 | Status: SHIPPED | OUTPATIENT
Start: 2025-03-07

## 2025-03-07 NOTE — TELEPHONE ENCOUNTER
Rx Refill Note  Requested Prescriptions     Pending Prescriptions Disp Refills    methIMAzole (TAPAZOLE) 5 MG tablet [Pharmacy Med Name: METHIMAZOLE 5MG TABLETS] 30 tablet 0     Sig: TAKE 1 TABLET BY MOUTH EVERY DAY, SKIP 1 DAY PER WEEK      Last office visit with prescribing clinician: 6/20/2024   Last telemedicine visit with prescribing clinician: 12/20/2024   Next office visit with prescribing clinician: Visit date not found                         Would you like a call back once the refill request has been completed: [] Yes [] No    If the office needs to give you a call back, can they leave a voicemail: [] Yes [] No    Marissa Doyle  03/07/25, 13:51 EST

## 2025-03-19 RX ORDER — SPIRONOLACTONE 25 MG/1
25 TABLET ORAL DAILY
Qty: 90 TABLET | Refills: 3 | Status: SHIPPED | OUTPATIENT
Start: 2025-03-19

## 2025-03-21 RX ORDER — SACUBITRIL AND VALSARTAN 24; 26 MG/1; MG/1
0.5 TABLET, FILM COATED ORAL 2 TIMES DAILY
Qty: 90 TABLET | Refills: 1 | Status: SHIPPED | OUTPATIENT
Start: 2025-03-21

## 2025-04-15 ENCOUNTER — LAB (OUTPATIENT)
Facility: HOSPITAL | Age: 80
End: 2025-04-15
Payer: MEDICARE

## 2025-04-15 ENCOUNTER — OFFICE VISIT (OUTPATIENT)
Dept: ENDOCRINOLOGY | Age: 80
End: 2025-04-15
Payer: MEDICARE

## 2025-04-15 VITALS
OXYGEN SATURATION: 99 % | DIASTOLIC BLOOD PRESSURE: 50 MMHG | WEIGHT: 165.2 LBS | SYSTOLIC BLOOD PRESSURE: 110 MMHG | TEMPERATURE: 97.8 F | BODY MASS INDEX: 25.87 KG/M2 | HEART RATE: 64 BPM

## 2025-04-15 DIAGNOSIS — M85.832 OSTEOPENIA OF LEFT FOREARM: ICD-10-CM

## 2025-04-15 DIAGNOSIS — E05.90 HYPERTHYROIDISM: Primary | ICD-10-CM

## 2025-04-15 DIAGNOSIS — E55.9 VITAMIN D DEFICIENCY: ICD-10-CM

## 2025-04-15 DIAGNOSIS — E04.2 MULTINODULAR GOITER: ICD-10-CM

## 2025-04-15 LAB
25(OH)D3 SERPL-MCNC: 49.3 NG/ML (ref 30–100)
ALBUMIN SERPL-MCNC: 4 G/DL (ref 3.5–5.2)
ALBUMIN/GLOB SERPL: 1.4 G/DL
ALP SERPL-CCNC: 54 U/L (ref 39–117)
ALT SERPL W P-5'-P-CCNC: 15 U/L (ref 1–33)
ANION GAP SERPL CALCULATED.3IONS-SCNC: 10.8 MMOL/L (ref 5–15)
AST SERPL-CCNC: 22 U/L (ref 1–32)
BASOPHILS # BLD AUTO: 0.05 10*3/MM3 (ref 0–0.2)
BASOPHILS NFR BLD AUTO: 0.9 % (ref 0–1.5)
BILIRUB SERPL-MCNC: 0.4 MG/DL (ref 0–1.2)
BUN SERPL-MCNC: 28 MG/DL (ref 8–23)
BUN/CREAT SERPL: 23.9 (ref 7–25)
CALCIUM SPEC-SCNC: 9.5 MG/DL (ref 8.6–10.5)
CHLORIDE SERPL-SCNC: 99 MMOL/L (ref 98–107)
CO2 SERPL-SCNC: 27.2 MMOL/L (ref 22–29)
CREAT SERPL-MCNC: 1.17 MG/DL (ref 0.57–1)
DEPRECATED RDW RBC AUTO: 43.8 FL (ref 37–54)
EGFRCR SERPLBLD CKD-EPI 2021: 47.9 ML/MIN/1.73
EOSINOPHIL # BLD AUTO: 0.06 10*3/MM3 (ref 0–0.4)
EOSINOPHIL NFR BLD AUTO: 1.1 % (ref 0.3–6.2)
ERYTHROCYTE [DISTWIDTH] IN BLOOD BY AUTOMATED COUNT: 12 % (ref 12.3–15.4)
GLOBULIN UR ELPH-MCNC: 2.9 GM/DL
GLUCOSE SERPL-MCNC: 96 MG/DL (ref 65–99)
HCT VFR BLD AUTO: 38.3 % (ref 34–46.6)
HGB BLD-MCNC: 12.2 G/DL (ref 12–15.9)
IMM GRANULOCYTES # BLD AUTO: 0.01 10*3/MM3 (ref 0–0.05)
IMM GRANULOCYTES NFR BLD AUTO: 0.2 % (ref 0–0.5)
LYMPHOCYTES # BLD AUTO: 1.48 10*3/MM3 (ref 0.7–3.1)
LYMPHOCYTES NFR BLD AUTO: 27.6 % (ref 19.6–45.3)
MCH RBC QN AUTO: 31.9 PG (ref 26.6–33)
MCHC RBC AUTO-ENTMCNC: 31.9 G/DL (ref 31.5–35.7)
MCV RBC AUTO: 100.3 FL (ref 79–97)
MONOCYTES # BLD AUTO: 0.61 10*3/MM3 (ref 0.1–0.9)
MONOCYTES NFR BLD AUTO: 11.4 % (ref 5–12)
NEUTROPHILS NFR BLD AUTO: 3.15 10*3/MM3 (ref 1.7–7)
NEUTROPHILS NFR BLD AUTO: 58.8 % (ref 42.7–76)
NRBC BLD AUTO-RTO: 0 /100 WBC (ref 0–0.2)
PLATELET # BLD AUTO: 234 10*3/MM3 (ref 140–450)
PMV BLD AUTO: 9.7 FL (ref 6–12)
POTASSIUM SERPL-SCNC: 4.6 MMOL/L (ref 3.5–5.2)
PROT SERPL-MCNC: 6.9 G/DL (ref 6–8.5)
RBC # BLD AUTO: 3.82 10*6/MM3 (ref 3.77–5.28)
SODIUM SERPL-SCNC: 137 MMOL/L (ref 136–145)
T4 FREE SERPL-MCNC: 1.19 NG/DL (ref 0.92–1.68)
TSH SERPL DL<=0.05 MIU/L-ACNC: 1.13 UIU/ML (ref 0.27–4.2)
WBC NRBC COR # BLD AUTO: 5.36 10*3/MM3 (ref 3.4–10.8)

## 2025-04-15 PROCEDURE — 84439 ASSAY OF FREE THYROXINE: CPT | Performed by: STUDENT IN AN ORGANIZED HEALTH CARE EDUCATION/TRAINING PROGRAM

## 2025-04-15 PROCEDURE — 84443 ASSAY THYROID STIM HORMONE: CPT | Performed by: STUDENT IN AN ORGANIZED HEALTH CARE EDUCATION/TRAINING PROGRAM

## 2025-04-15 PROCEDURE — 36415 COLL VENOUS BLD VENIPUNCTURE: CPT | Performed by: STUDENT IN AN ORGANIZED HEALTH CARE EDUCATION/TRAINING PROGRAM

## 2025-04-15 PROCEDURE — 80053 COMPREHEN METABOLIC PANEL: CPT | Performed by: STUDENT IN AN ORGANIZED HEALTH CARE EDUCATION/TRAINING PROGRAM

## 2025-04-15 PROCEDURE — 82306 VITAMIN D 25 HYDROXY: CPT | Performed by: STUDENT IN AN ORGANIZED HEALTH CARE EDUCATION/TRAINING PROGRAM

## 2025-04-15 PROCEDURE — 85025 COMPLETE CBC W/AUTO DIFF WBC: CPT | Performed by: STUDENT IN AN ORGANIZED HEALTH CARE EDUCATION/TRAINING PROGRAM

## 2025-04-15 RX ORDER — AMOXICILLIN 500 MG/1
CAPSULE ORAL
COMMUNITY
Start: 2025-04-08

## 2025-04-15 NOTE — ASSESSMENT & PLAN NOTE
Bone density in 2019 showed osteopenia of left forearm  Most recent bone density in July 2024 did not include forearm and showed normal bone density  Fall precaution discussed  Repeat bone density in July 2026

## 2025-04-15 NOTE — PROGRESS NOTES
Chief Complaint  Hyperthyroidism    Subjective        Natalie Rosen presents to [facility] ENDOCRINOLOGY for follow up.       Hyperthyroidism          Diagnosed with hyperthyroidism over 15 years ago  Has tried various doses of methimazole  Currently on methimazole 5 mg 6 days a week  Amiodarone 100 mg started in 2022     Has CHF and follows with cardiology  Denies palpitation or unintentional weight loss       Takes vitamin D supplements 1000 units daily     DEXA scan in 2019 showed osteopenia of left forearm     Dexa scan July 2024: 1. Normal bone mineral density, did not include the forearm      Thyroid us 2023:   There are multiple bilateral stable colloid containing mixed solid and  cystic isoechoic well-defined wider than tall nodules without  microcalcifications measuring up to 1.7 x 1.5 x 1 cm.     IMPRESSION:  Thyroid nodules, tirads-1 and 2. No further follow-up is  necessary.     Thyroid uptake scan 2012:   1. Normal 24-hour radioiodine uptake of 25%.     2. Heterogeneous distribution of the activity in the thyroid gland consistent with a multi nodular thyroid.     Component      Latest Ref Rn 12/23/2024   WBC      3.40 - 10.80 10*3/mm3 5.59    RBC      3.77 - 5.28 10*6/mm3 3.67 (L)    Hemoglobin      12.0 - 15.9 g/dL 11.5 (L)    Hematocrit      34.0 - 46.6 % 35.7    MCV      79.0 - 97.0 fL 97.3 (H)    MCH      26.6 - 33.0 pg 31.3    MCHC      31.5 - 35.7 g/dL 32.2    RDW      12.3 - 15.4 % 13.1    RDW-SD      37.0 - 54.0 fl 46.8    MPV      6.0 - 12.0 fL 9.7    Platelets      140 - 450 10*3/mm3 238    Neutrophil Rel %      42.7 - 76.0 % 55.9    Lymphocyte Rel %      19.6 - 45.3 % 29.7    Monocyte Rel %      5.0 - 12.0 % 11.4    Eosinophil Rel %      0.3 - 6.2 % 1.3    Basophil Rel %      0.0 - 1.5 % 1.3    Immature Granulocyte Rel %      0.0 - 0.5 % 0.4    Neutrophils Absolute      1.70 - 7.00 10*3/mm3 3.13    Lymphocytes Absolute      0.70 - 3.10 10*3/mm3 1.66    Monocytes  "Absolute      0.10 - 0.90 10*3/mm3 0.64    Eosinophils Absolute      0.00 - 0.40 10*3/mm3 0.07    Basophils Absolute      0.00 - 0.20 10*3/mm3 0.07    Immature Grans, Absolute      0.00 - 0.05 10*3/mm3 0.02    nRBC      0.0 - 0.2 /100 WBC 0.0    Glucose      65 - 99 mg/dL 97    BUN      8 - 23 mg/dL 25 (H)    Creatinine      0.57 - 1.00 mg/dL 1.24 (H)    Sodium      136 - 145 mmol/L 132 (L)    Potassium      3.5 - 5.2 mmol/L 5.0    Chloride      98 - 107 mmol/L 98    CO2      22.0 - 29.0 mmol/L 23.9    Calcium      8.6 - 10.5 mg/dL 9.2    Total Protein      6.0 - 8.5 g/dL 6.7    Albumin      3.5 - 5.2 g/dL 4.2    ALT (SGPT)      1 - 33 U/L 21    AST (SGOT)      1 - 32 U/L 27    Alkaline Phosphatase      39 - 117 U/L 56    Total Bilirubin      0.0 - 1.2 mg/dL 0.6    Globulin      gm/dL 2.5    A/G Ratio      g/dL 1.7    BUN/Creatinine Ratio      7.0 - 25.0  20.2    Anion Gap      5.0 - 15.0 mmol/L 10.1    eGFR      >60.0 mL/min/1.73 44.6 (L)    25 Hydroxy, Vitamin D      30.0 - 100.0 ng/ml 48.4    TSH Baseline      0.270 - 4.200 uIU/mL 1.810    Free T4      0.92 - 1.68 ng/dL 1.22       Legend:  (L) Low  (H) High  Objective   Vital Signs:  /50   Pulse 64   Temp 97.8 °F (36.6 °C) (Oral)   Wt 74.9 kg (165 lb 3.2 oz)   SpO2 99%   BMI 25.87 kg/m²   Estimated body mass index is 25.87 kg/m² as calculated from the following:    Height as of 1/17/25: 170.2 cm (67\").    Weight as of this encounter: 74.9 kg (165 lb 3.2 oz).         Physical Exam  Constitutional:       Appearance: Normal appearance.   Cardiovascular:      Rate and Rhythm: Normal rate.   Pulmonary:      Effort: Pulmonary effort is normal.      Breath sounds: Normal breath sounds. No wheezing.   Abdominal:      Palpations: Abdomen is soft.      Tenderness: There is no abdominal tenderness.   Musculoskeletal:         General: No swelling.      Cervical back: Neck supple. No tenderness.   Neurological:      Mental Status: She is alert and oriented to " person, place, and time.   Psychiatric:         Mood and Affect: Mood normal.        Result Review :  The following data was reviewed by: Mahrokh Nokhbehzaeim, MD on 04/15/2025:  CMP          6/20/2024    11:11 12/23/2024    13:28   CMP   Glucose 121  97    BUN 27  25    Creatinine 1.07  1.24    EGFR 53.6  44.6    Sodium 140  132    Potassium 4.3  5.0    Chloride 102  98    Calcium 9.5  9.2    Total Protein 6.0  6.7    Albumin 4.3  4.2    Globulin 1.7  2.5    Total Bilirubin 0.5  0.6    Alkaline Phosphatase 52  56    AST (SGOT) 24  27    ALT (SGPT) 17  21    Albumin/Globulin Ratio 2.5  1.7    BUN/Creatinine Ratio 25.2  20.2    Anion Gap  10.1      CMP          6/20/2024    11:11 12/23/2024    13:28   CMP   Glucose 121  97    BUN 27  25    Creatinine 1.07  1.24    EGFR 53.6  44.6    Sodium 140  132    Potassium 4.3  5.0    Chloride 102  98    Calcium 9.5  9.2    Total Protein 6.0  6.7    Albumin 4.3  4.2    Globulin 1.7  2.5    Total Bilirubin 0.5  0.6    Alkaline Phosphatase 52  56    AST (SGOT) 24  27    ALT (SGPT) 17  21    Albumin/Globulin Ratio 2.5  1.7    BUN/Creatinine Ratio 25.2  20.2    Anion Gap  10.1       TSH          6/20/2024    11:11 12/23/2024    13:28   TSH   TSH 2.650  1.810       Free T4   Date Value Ref Range Status   12/23/2024 1.22 0.92 - 1.68 ng/dL Final      25 Hydroxy, Vitamin D   Date Value Ref Range Status   12/23/2024 48.4 30.0 - 100.0 ng/ml Final                   Assessment and Plan   Diagnoses and all orders for this visit:    1. Hyperthyroidism (Primary)  Assessment & Plan:  Clinically euthyroid  Continue the current dose of methimazole 5 mg 6 days a week  Repeat TFT    Orders:  -     Comprehensive Metabolic Panel  -     TSH  -     T4, Free  -     CBC & Differential    2. Vitamin D deficiency  Assessment & Plan:  Takes vitamin D supplements 1000 units daily  Recheck vitamin D level today    Orders:  -     Vitamin D,25-Hydroxy    3. Osteopenia of left forearm  Assessment & Plan:  Bone  density in 2019 showed osteopenia of left forearm  Most recent bone density in July 2024 did not include forearm and showed normal bone density  Fall precaution discussed  Repeat bone density in July 2026    Orders:  -     TSH  -     T4, Free    4. Multinodular goiter  Assessment & Plan:  Tender on exam   Repeat thyroid us     Orders:  -     US Thyroid      Low sodium and impaired kidney function, repeat CMP today if has hyponatremia and impaired kidney function will forward the results to PCP May benefit from nephrology evaluation       Follow Up   Return in about 6 months (around 10/15/2025).  Patient was given instructions and counseling regarding her condition or for health maintenance advice. Please see specific information pulled into the AVS if appropriate.

## 2025-04-18 DIAGNOSIS — E05.90 HYPERTHYROIDISM: ICD-10-CM

## 2025-04-18 RX ORDER — METHIMAZOLE 5 MG/1
TABLET ORAL
Qty: 90 TABLET | Refills: 1 | Status: SHIPPED | OUTPATIENT
Start: 2025-04-18

## 2025-04-18 NOTE — TELEPHONE ENCOUNTER
Rx Refill Note  Requested Prescriptions     Pending Prescriptions Disp Refills    methIMAzole (TAPAZOLE) 5 MG tablet [Pharmacy Med Name: METHIMAZOLE 5MG TABLETS] 30 tablet 0     Sig: TAKE 1 TABLET BY MOUTH EVERY DAY, SKIP 1 DAY PER WEEK      Last office visit with prescribing clinician: 4/15/2025   Last telemedicine visit with prescribing clinician: 12/20/2024   Next office visit with prescribing clinician: 10/15/2025                         Would you like a call back once the refill request has been completed: [] Yes [] No    If the office needs to give you a call back, can they leave a voicemail: [] Yes [] No    Nichole Metzger MA  04/18/25, 11:45 EDT

## 2025-04-21 ENCOUNTER — HOSPITAL ENCOUNTER (OUTPATIENT)
Dept: ULTRASOUND IMAGING | Facility: HOSPITAL | Age: 80
Discharge: HOME | End: 2025-04-21
Admitting: STUDENT IN AN ORGANIZED HEALTH CARE EDUCATION/TRAINING PROGRAM
Payer: MEDICARE

## 2025-04-21 PROCEDURE — 76536 US EXAM OF HEAD AND NECK: CPT

## 2025-06-10 RX ORDER — METOPROLOL SUCCINATE 50 MG/1
50 TABLET, EXTENDED RELEASE ORAL DAILY
Qty: 90 TABLET | Refills: 1 | Status: SHIPPED | OUTPATIENT
Start: 2025-06-10

## 2025-06-10 NOTE — TELEPHONE ENCOUNTER
Refill Protocol Passed, Requesting Metoprolol 50 mg     LOV 01/17/2025 w/ [facility]     FU 08/04/2025 w/ [facility]    Last Labs-    CBC 04/15/2025    CMP 04/15/2025    TSH 01/17/2025    LIPID 01/24/2023

## 2025-08-04 ENCOUNTER — OFFICE VISIT (OUTPATIENT)
Dept: CARDIOLOGY | Age: 80
End: 2025-08-04
Payer: MEDICARE

## 2025-08-04 ENCOUNTER — HOSPITAL ENCOUNTER (OUTPATIENT)
Dept: GENERAL RADIOLOGY | Facility: HOSPITAL | Age: 80
Discharge: HOME | End: 2025-08-04
Payer: MEDICARE

## 2025-08-04 ENCOUNTER — LAB (OUTPATIENT)
Dept: LAB | Facility: HOSPITAL | Age: 80
End: 2025-08-04
Payer: MEDICARE

## 2025-08-04 VITALS
SYSTOLIC BLOOD PRESSURE: 118 MMHG | WEIGHT: 168 LBS | DIASTOLIC BLOOD PRESSURE: 60 MMHG | HEART RATE: 66 BPM | HEIGHT: 67 IN | BODY MASS INDEX: 26.37 KG/M2

## 2025-08-04 DIAGNOSIS — R06.02 SOB (SHORTNESS OF BREATH): ICD-10-CM

## 2025-08-04 DIAGNOSIS — I50.32 CHRONIC HEART FAILURE WITH PRESERVED EJECTION FRACTION (HFPEF): ICD-10-CM

## 2025-08-04 DIAGNOSIS — I49.3 FREQUENT PVCS: Primary | ICD-10-CM

## 2025-08-04 DIAGNOSIS — I34.0 NONRHEUMATIC MITRAL VALVE REGURGITATION: ICD-10-CM

## 2025-08-04 DIAGNOSIS — E78.2 MIXED HYPERLIPIDEMIA: ICD-10-CM

## 2025-08-04 DIAGNOSIS — R09.89 BRUIT OF LEFT CAROTID ARTERY: ICD-10-CM

## 2025-08-04 LAB
ALBUMIN SERPL-MCNC: 4.3 G/DL (ref 3.5–5.2)
ALBUMIN/GLOB SERPL: 1.7 G/DL
ALP SERPL-CCNC: 60 U/L (ref 39–117)
ALT SERPL W P-5'-P-CCNC: 24 U/L (ref 1–33)
ANION GAP SERPL CALCULATED.3IONS-SCNC: 9 MMOL/L (ref 5–15)
AST SERPL-CCNC: 30 U/L (ref 1–32)
BILIRUB SERPL-MCNC: 0.6 MG/DL (ref 0–1.2)
BUN SERPL-MCNC: 28 MG/DL (ref 8–23)
BUN/CREAT SERPL: 25.2 (ref 7–25)
CALCIUM SPEC-SCNC: 9.5 MG/DL (ref 8.6–10.5)
CHLORIDE SERPL-SCNC: 103 MMOL/L (ref 98–107)
CO2 SERPL-SCNC: 27 MMOL/L (ref 22–29)
CREAT SERPL-MCNC: 1.11 MG/DL (ref 0.57–1)
EGFRCR SERPLBLD CKD-EPI 2021: 51 ML/MIN/1.73
GLOBULIN UR ELPH-MCNC: 2.6 GM/DL
GLUCOSE SERPL-MCNC: 106 MG/DL (ref 65–99)
NT-PROBNP SERPL-MCNC: 762 PG/ML (ref 0–1800)
POTASSIUM SERPL-SCNC: 4.9 MMOL/L (ref 3.5–5.2)
PROT SERPL-MCNC: 6.9 G/DL (ref 6–8.5)
SODIUM SERPL-SCNC: 139 MMOL/L (ref 136–145)

## 2025-08-04 PROCEDURE — 36415 COLL VENOUS BLD VENIPUNCTURE: CPT

## 2025-08-04 PROCEDURE — 83880 ASSAY OF NATRIURETIC PEPTIDE: CPT

## 2025-08-04 PROCEDURE — 71046 X-RAY EXAM CHEST 2 VIEWS: CPT

## 2025-08-04 PROCEDURE — 99214 OFFICE O/P EST MOD 30 MIN: CPT | Performed by: INTERNAL MEDICINE

## 2025-08-04 PROCEDURE — 80053 COMPREHEN METABOLIC PANEL: CPT

## 2025-08-04 RX ORDER — FLUTICASONE FUROATE, UMECLIDINIUM BROMIDE AND VILANTEROL TRIFENATATE 100; 62.5; 25 UG/1; UG/1; UG/1
1 POWDER RESPIRATORY (INHALATION) EVERY MORNING
COMMUNITY
Start: 2025-07-09

## 2025-08-04 RX ORDER — ONDANSETRON 4 MG/1
4 TABLET, FILM COATED ORAL EVERY 8 HOURS PRN
COMMUNITY
Start: 2025-04-02

## 2025-08-04 RX ORDER — ESCITALOPRAM OXALATE 10 MG/1
1 TABLET ORAL DAILY
COMMUNITY
Start: 2025-07-21

## 2025-08-20 ENCOUNTER — RESULTS FOLLOW-UP (OUTPATIENT)
Dept: CARDIOLOGY | Age: 80
End: 2025-08-20
Payer: MEDICARE

## 2025-08-20 ENCOUNTER — HOSPITAL ENCOUNTER (OUTPATIENT)
Dept: CARDIOLOGY | Facility: HOSPITAL | Age: 80
Discharge: HOME | End: 2025-08-20
Admitting: INTERNAL MEDICINE
Payer: MEDICARE

## 2025-08-20 DIAGNOSIS — R09.89 BRUIT OF LEFT CAROTID ARTERY: ICD-10-CM

## 2025-08-20 LAB
BH CV XLRA MEAS LEFT DIST CCA EDV: 11.3 CM/SEC
BH CV XLRA MEAS LEFT DIST CCA PSV: 63.9 CM/SEC
BH CV XLRA MEAS LEFT DIST ICA EDV: -16.7 CM/SEC
BH CV XLRA MEAS LEFT DIST ICA PSV: -94.4 CM/SEC
BH CV XLRA MEAS LEFT ICA/CCA RATIO: 1.48
BH CV XLRA MEAS LEFT MID ICA EDV: -14.9 CM/SEC
BH CV XLRA MEAS LEFT MID ICA PSV: -83.9 CM/SEC
BH CV XLRA MEAS LEFT PROX CCA PSV: 114.5 CM/SEC
BH CV XLRA MEAS LEFT PROX ECA PSV: -78.1 CM/SEC
BH CV XLRA MEAS LEFT PROX ICA EDV: 9 CM/SEC
BH CV XLRA MEAS LEFT PROX ICA PSV: 71.3 CM/SEC
BH CV XLRA MEAS LEFT PROX SCLA PSV: 125.8 CM/SEC
BH CV XLRA MEAS LEFT VERTEBRAL A EDV: 7.4 CM/SEC
BH CV XLRA MEAS LEFT VERTEBRAL A PSV: 52.5 CM/SEC
BH CV XLRA MEAS RIGHT DIST CCA EDV: 10.3 CM/SEC
BH CV XLRA MEAS RIGHT DIST CCA PSV: 60.4 CM/SEC
BH CV XLRA MEAS RIGHT DIST ICA EDV: -16.2 CM/SEC
BH CV XLRA MEAS RIGHT DIST ICA PSV: -83.4 CM/SEC
BH CV XLRA MEAS RIGHT ICA/CCA RATIO: 1.38
BH CV XLRA MEAS RIGHT MID ICA EDV: 14 CM/SEC
BH CV XLRA MEAS RIGHT MID ICA PSV: 73.8 CM/SEC
BH CV XLRA MEAS RIGHT PROX CCA PSV: 107.5 CM/SEC
BH CV XLRA MEAS RIGHT PROX ECA PSV: 113.5 CM/SEC
BH CV XLRA MEAS RIGHT PROX ICA EDV: -13.2 CM/SEC
BH CV XLRA MEAS RIGHT PROX ICA PSV: -79 CM/SEC
BH CV XLRA MEAS RIGHT PROX SCLA PSV: 129.4 CM/SEC
BH CV XLRA MEAS RIGHT VERTEBRAL A EDV: 10.5 CM/SEC
BH CV XLRA MEAS RIGHT VERTEBRAL A PSV: 53.1 CM/SEC
LEFT ARM BP: NORMAL MMHG
RIGHT ARM BP: NORMAL MMHG

## 2025-08-20 PROCEDURE — 93880 EXTRACRANIAL BILAT STUDY: CPT

## (undated) DEVICE — BNDG ELAS ELITE V/CLOSE 6IN 5YD LF STRL

## (undated) DEVICE — DRSNG WND GZ CURAD OIL EMULSION 3X8IN LF STRL 1PK

## (undated) DEVICE — ANTIBACTERIAL UNDYED BRAIDED (POLYGLACTIN 910), SYNTHETIC ABSORBABLE SUTURE: Brand: COATED VICRYL

## (undated) DEVICE — KT DRN EVAC WND PVC PCH WTROC RND 10F400

## (undated) DEVICE — SYR LUERLOK 20CC

## (undated) DEVICE — PREMIUM WET SKIN PREP TRAY: Brand: MEDLINE INDUSTRIES, INC.

## (undated) DEVICE — APPL DURAPREP IODOPHOR APL 26ML

## (undated) DEVICE — ENCORE® LATEX ORTHO SIZE 8.5, STERILE LATEX POWDER-FREE SURGICAL GLOVE: Brand: ENCORE

## (undated) DEVICE — SOL NACL 0.9PCT 1000ML

## (undated) DEVICE — PK KN TOTL 40

## (undated) DEVICE — PIN TROC SIGNATURE PK/2

## (undated) DEVICE — STPLR SKIN VISISTAT WD 35CT

## (undated) DEVICE — GLV SURG TRIUMPH CLASSIC PF LTX 8.5 STRL

## (undated) DEVICE — UNDERCAST PADDING: Brand: DEROYAL

## (undated) DEVICE — DUAL CUT SAGITTAL BLADE

## (undated) DEVICE — NDL SPINE 20G 3 1/2 YEL STRL 1P/U

## (undated) DEVICE — BANDAGE,GAUZE,BULKEE II,4.5"X4.1YD,STRL: Brand: MEDLINE

## (undated) DEVICE — GAUZE,SPONGE,FLUFF,6"X6.75",STRL,10/TRAY: Brand: MEDLINE